# Patient Record
Sex: FEMALE | Race: WHITE | NOT HISPANIC OR LATINO | Employment: OTHER | URBAN - METROPOLITAN AREA
[De-identification: names, ages, dates, MRNs, and addresses within clinical notes are randomized per-mention and may not be internally consistent; named-entity substitution may affect disease eponyms.]

---

## 2017-01-02 ENCOUNTER — CARE COORDINATION (OUTPATIENT)
Dept: CARE COORDINATION | Facility: CLINIC | Age: 42
End: 2017-01-02

## 2017-01-02 ENCOUNTER — TELEPHONE (OUTPATIENT)
Dept: ORTHOPEDICS | Facility: CLINIC | Age: 42
End: 2017-01-02

## 2017-01-02 ENCOUNTER — TELEPHONE (OUTPATIENT)
Dept: PHARMACY | Facility: OTHER | Age: 42
End: 2017-01-02

## 2017-01-02 NOTE — TELEPHONE ENCOUNTER
Called patient about scheduling with Dr. Parkinson this week. Left number to call back an open times that we have.     Naye ALVARADO LPN

## 2017-01-02 NOTE — TELEPHONE ENCOUNTER
MTM referral from: Transitions of Care (recent hospital discharge or ED visit)    MTM referral outreach attempt #1 on January 2, 2017 at 11:06 AM      Outcome: Patient is not interested at this time because she is a [facility] patient and information was passed along to them, will route to MTM Pharmacist/Provider as an FYI. Thank you for the referral.     Renetta Calvillo, MTM Coordinator

## 2017-01-03 ENCOUNTER — TELEPHONE (OUTPATIENT)
Dept: TRANSPLANT | Facility: CLINIC | Age: 42
End: 2017-01-03

## 2017-01-04 ENCOUNTER — PRE VISIT (OUTPATIENT)
Dept: ORTHOPEDICS | Facility: CLINIC | Age: 42
End: 2017-01-04

## 2017-01-04 NOTE — TELEPHONE ENCOUNTER
1.  Date/reason for appt: 1/13/17 -- left foot diabetic ulcer, ED f/u  2.  Referring provider:   3.  Call to patient (Yes / No - short description): no, ED f/u  4.  Previous care at / records requested from: [facility] ED 12/27/16 -- records and imaging in epic/pacs

## 2017-01-09 ENCOUNTER — TELEPHONE (OUTPATIENT)
Dept: TRANSPLANT | Facility: CLINIC | Age: 42
End: 2017-01-09

## 2017-01-09 NOTE — TELEPHONE ENCOUNTER
"Pt c/o feeling chronically fatigued and can fall asleep at the \"drop of a hat\". She is afebrile and has no pain.   Last labs were done 12/30/16- he Tac level was 16.8 but he dose had been decreased that day from 0.4 mg bid to 0.2 mg bid.   Hgb was 9.7 but steady- will continue to monitor.  She was reminded to make sure to do weekly labs.  She will have labs drawn tomorrow and then we will discuss the results and what the next step will be.   "

## 2017-01-10 ENCOUNTER — TELEPHONE (OUTPATIENT)
Dept: TRANSPLANT | Facility: CLINIC | Age: 42
End: 2017-01-10

## 2017-01-10 ENCOUNTER — RESULTS ONLY (OUTPATIENT)
Dept: OTHER | Facility: CLINIC | Age: 42
End: 2017-01-10

## 2017-01-10 DIAGNOSIS — Z48.298 AFTERCARE FOLLOWING ORGAN TRANSPLANT: ICD-10-CM

## 2017-01-10 DIAGNOSIS — Z94.83 PANCREAS REPLACED BY TRANSPLANT (H): ICD-10-CM

## 2017-01-10 DIAGNOSIS — T86.899 COMPLICATION OF TRANSPLANTED PANCREAS: ICD-10-CM

## 2017-01-10 DIAGNOSIS — Z94.0 KIDNEY REPLACED BY TRANSPLANT: ICD-10-CM

## 2017-01-10 LAB
AMYLASE SERPL-CCNC: 54 U/L (ref 30–110)
ANION GAP SERPL CALCULATED.3IONS-SCNC: 8 MMOL/L (ref 3–14)
BASOPHILS # BLD AUTO: 0.1 10E9/L (ref 0–0.2)
BASOPHILS NFR BLD AUTO: 2.4 %
BUN SERPL-MCNC: 17 MG/DL (ref 7–30)
CALCIUM SERPL-MCNC: 8.9 MG/DL (ref 8.5–10.1)
CHLORIDE SERPL-SCNC: 108 MMOL/L (ref 94–109)
CO2 SERPL-SCNC: 24 MMOL/L (ref 20–32)
CREAT SERPL-MCNC: 1.48 MG/DL (ref 0.52–1.04)
CREAT UR-MCNC: 155 MG/DL
DIFFERENTIAL METHOD BLD: ABNORMAL
EOSINOPHIL # BLD AUTO: 0.3 10E9/L (ref 0–0.7)
EOSINOPHIL NFR BLD AUTO: 11.6 %
ERYTHROCYTE [DISTWIDTH] IN BLOOD BY AUTOMATED COUNT: 11.9 % (ref 10–15)
GFR SERPL CREATININE-BSD FRML MDRD: 38 ML/MIN/1.7M2
GLUCOSE SERPL-MCNC: 81 MG/DL (ref 70–99)
HCT VFR BLD AUTO: 33.9 % (ref 35–47)
HGB BLD-MCNC: 10.4 G/DL (ref 11.7–15.7)
LIPASE SERPL-CCNC: 111 U/L (ref 73–393)
LYMPHOCYTES # BLD AUTO: 0.5 10E9/L (ref 0.8–5.3)
LYMPHOCYTES NFR BLD AUTO: 20.8 %
MCH RBC QN AUTO: 29.1 PG (ref 26.5–33)
MCHC RBC AUTO-ENTMCNC: 30.7 G/DL (ref 31.5–36.5)
MCV RBC AUTO: 95 FL (ref 78–100)
MICROALBUMIN UR-MCNC: 14 MG/L
MICROALBUMIN/CREAT UR: 9.23 MG/G CR (ref 0–25)
MONOCYTES # BLD AUTO: 0.4 10E9/L (ref 0–1.3)
MONOCYTES NFR BLD AUTO: 17.2 %
NEUTROPHILS # BLD AUTO: 1.2 10E9/L (ref 1.6–8.3)
NEUTROPHILS NFR BLD AUTO: 48 %
PLATELET # BLD AUTO: 322 10E9/L (ref 150–450)
POTASSIUM SERPL-SCNC: 4.1 MMOL/L (ref 3.4–5.3)
RBC # BLD AUTO: 3.57 10E12/L (ref 3.8–5.2)
SODIUM SERPL-SCNC: 140 MMOL/L (ref 133–144)
TACROLIMUS BLD-MCNC: 13.5 UG/L (ref 5–15)
TME LAST DOSE: NORMAL H
WBC # BLD AUTO: 2.5 10E9/L (ref 4–11)

## 2017-01-10 PROCEDURE — 87799 DETECT AGENT NOS DNA QUANT: CPT | Performed by: FAMILY MEDICINE

## 2017-01-10 PROCEDURE — 82043 UR ALBUMIN QUANTITATIVE: CPT | Performed by: FAMILY MEDICINE

## 2017-01-10 PROCEDURE — 36415 COLL VENOUS BLD VENIPUNCTURE: CPT | Performed by: FAMILY MEDICINE

## 2017-01-10 PROCEDURE — 85025 COMPLETE CBC W/AUTO DIFF WBC: CPT | Performed by: FAMILY MEDICINE

## 2017-01-10 PROCEDURE — 80048 BASIC METABOLIC PNL TOTAL CA: CPT | Performed by: FAMILY MEDICINE

## 2017-01-10 PROCEDURE — 86832 HLA CLASS I HIGH DEFIN QUAL: CPT | Performed by: FAMILY MEDICINE

## 2017-01-10 PROCEDURE — 82150 ASSAY OF AMYLASE: CPT | Performed by: FAMILY MEDICINE

## 2017-01-10 PROCEDURE — 86833 HLA CLASS II HIGH DEFIN QUAL: CPT | Performed by: FAMILY MEDICINE

## 2017-01-10 PROCEDURE — 83690 ASSAY OF LIPASE: CPT | Performed by: FAMILY MEDICINE

## 2017-01-10 PROCEDURE — 80197 ASSAY OF TACROLIMUS: CPT | Performed by: FAMILY MEDICINE

## 2017-01-11 LAB
BKV DNA # SPEC NAA+PROBE: NORMAL COPIES/ML
BKV DNA SPEC NAA+PROBE-LOG#: NORMAL LOG COPIES/ML
PRA DONOR SPECIFIC ABY: NORMAL
PRA DONOR SPECIFIC ABY: NORMAL
SPECIMEN SOURCE: NORMAL

## 2017-01-12 ENCOUNTER — TELEPHONE (OUTPATIENT)
Dept: TRANSPLANT | Facility: CLINIC | Age: 42
End: 2017-01-12

## 2017-01-12 DIAGNOSIS — Z94.0 KIDNEY REPLACED BY TRANSPLANT: Primary | ICD-10-CM

## 2017-01-12 LAB
CMV DNA SPEC NAA+PROBE-ACNC: NORMAL [IU]/ML
CMV DNA SPEC NAA+PROBE-LOG#: NORMAL {LOG_IU}/ML
DONOR IDENTIFICATION: NORMAL
DSA COMMENTS: NORMAL
DSA PRESENT: NO
DSA TEST METHOD: NORMAL
ORGAN: NORMAL
SA1 CELL: NORMAL
SA1 COMMENTS: NORMAL
SA1 HI RISK ABY: NORMAL
SA1 MOD RISK ABY: NORMAL
SA1 TEST METHOD: NORMAL
SA2 CELL: NORMAL
SA2 COMMENTS: NORMAL
SA2 HI RISK ABY UA: NORMAL
SA2 MOD RISK ABY: NORMAL
SA2 TEST METHOD: NORMAL
SPECIMEN SOURCE: NORMAL

## 2017-01-12 NOTE — TELEPHONE ENCOUNTER
Tac level is 13.5- pt will hold tonight's dose and then decrease it to 0.1 mg bid. She remains on Itraconazole. Her Creatinine is elevated but it is anticipated that it will come down with decreasing her tac level.   She states she is starting to feel better after her foot infection has been taken care of. She will be stopping the Amoxicillin this weekend.

## 2017-01-13 ENCOUNTER — OFFICE VISIT (OUTPATIENT)
Dept: ORTHOPEDICS | Facility: CLINIC | Age: 42
End: 2017-01-13

## 2017-01-13 VITALS — WEIGHT: 115 LBS | HEIGHT: 61 IN | BODY MASS INDEX: 21.71 KG/M2

## 2017-01-13 DIAGNOSIS — E11.610 CHARCOT'S ARTHROPATHY, DIABETIC (H): ICD-10-CM

## 2017-01-13 DIAGNOSIS — E10.8 TYPE 1 DIABETES MELLITUS WITH COMPLICATION (H): ICD-10-CM

## 2017-01-13 DIAGNOSIS — E10.621 DIABETIC ULCER OF LEFT FOOT ASSOCIATED WITH TYPE 1 DIABETES MELLITUS (H): Primary | ICD-10-CM

## 2017-01-13 DIAGNOSIS — L97.529 DIABETIC ULCER OF LEFT FOOT ASSOCIATED WITH TYPE 1 DIABETES MELLITUS (H): Primary | ICD-10-CM

## 2017-01-13 DIAGNOSIS — G63 POLYNEUROPATHY ASSOCIATED WITH UNDERLYING DISEASE (H): ICD-10-CM

## 2017-01-13 LAB
EBV DNA # SPEC NAA+PROBE: 4700 {COPIES}/ML
EBV DNA SPEC NAA+PROBE-LOG#: 3.7 {LOG_COPIES}/ML

## 2017-01-13 NOTE — NURSING NOTE
"Reason For Visit:   Chief Complaint   Patient presents with     Consult     Left foot diabetic ulcer       Primary MD: Hafsa Orozco      Ht 1.537 m (5' 0.5\")  Wt 52.164 kg (115 lb)  BMI 22.08 kg/m2    Pain Assessment  Patient Currently in Pain: Unable to assess (States she doesnt feel her feet )        Current Outpatient Prescriptions   Medication     tacrolimus (PROGRAF - GENERIC EQUIVALENT) 1 mg/mL suspension     oxyCODONE (ROXICODONE) 5 MG IR tablet     amoxicillin-clavulanate (AUGMENTIN) 500-125 MG per tablet     order for DME     magnesium oxide (MAG-OX) 400 (241.3 MG) MG tablet     vitamin D (ERGOCALCIFEROL) 62959 UNIT capsule     pravastatin (PRAVACHOL) 20 MG tablet     mycophenolic acid (MYFORTIC - GENERIC EQUIVALENT) 180 MG EC tablet     sulfamethoxazole-trimethoprim (BACTRIM,SEPTRA) 400-80 MG per tablet     aspirin  MG EC tablet     itraconazole (SPORANOX) 100 MG capsule     citalopram (CELEXA) 10 MG tablet     Levothyroxine Sodium (SYNTHROID PO)     cetirizine (ZYRTEC ALLERGY) 10 MG tablet     No current facility-administered medications for this visit.     Facility-Administered Medications Ordered in Other Visits   Medication     0.9% sodium chloride infusion          Allergies   Allergen Reactions     Ciprofloxacin Nausea     Pollen Extract Other (See Comments)     Seasonal allergies     Pyridostigmine Other (See Comments)     Spastic muscle motion in the face and legs, weakness in the arms. Slight swelling of the tongue.       "

## 2017-01-13 NOTE — PROGRESS NOTES
Date of Service: 1/13/2017    Chief Complaint:   Chief Complaint   Patient presents with     Consult     Left foot diabetic ulcer        HPI: Rose is a 41 year old female who presents today for further evaluation of left foot wound. After her second kidney transplant in August, she had a lot of swelling from fluid overload in her lower extremities. She came back from [place] vacation on 12/27 and was working all day, then at 7 pm, she started vomiting. Went to ER and was admitted with fever and cellulitis d/t diabetic foot wound. She was d/c'ed on oral Augmentin and now today she takes her last dose. Most often she wears a custom diabetic tennis shoes with inserts. Today she is wearing a tall CAM walker on the left leg. She had had an AFO molded for her but she could not tolerate it. She had a brace made for her about a week or two prior to being admitted to the hospital from Dr. Bond. She hasn't picked it up yet. Today she is feeling much better, denies N/V, fever, chills, sweats, fatigue, changes in blood sugars. To her the left foot appears back to normal besides the wound on the bottom of her foot. She has been dressing the wound with non-adherent dressing and a DSD over it, changed every day. She is s/p kidney and pancreatic transplantation.    PMH:   Past Medical History   Diagnosis Date     Osteoporosis      Dyslipidemia      Diabetic retinopathy      Secondary renal hyperparathyroidism (H)      Diabetes mellitus type 1 (H) 1988     12yr old     History of acute pyelonephritis      2003     ESRD on hemodialysis (H)      Feb 2006 to 2007     Hypothyroidism      on and off synthroid     Charcot foot due to diabetes mellitus (H) Dec 2006     left     History of blood transfusion      Kidney replaced by transplant 2007     Immunosuppressed status (H)      Neurogenic orthostatic hypotension (H)      Anemia in chronic renal disease      Pulmonary histoplasmosis (H)        PSxH:   Past Surgical History  "  Procedure Laterality Date     Transplant kidney recipient living related  2007     sister     C anesth,open heart surgery+pump  2006     for thrombectomy only - catheter related      section            Int nephrostomy percut right*       Native nephrectomy       simult with tx surgery.  ? Right     Transplant pancreas, kidney  donor, combined N/A 2016     Procedure: COMBINED TRANSPLANT PANCREAS, KIDNEY  DONOR;  Surgeon: Gilson Doe MD;  [facility]: [facility]     Bench kidney N/A 2016     Procedure: BENCH KIDNEY;  Surgeon: Gilson Doe MD;  Location: [facility]     Bench pancreas N/A 2016     Procedure: BENCH PANCREAS;  Surgeon: Gilson Doe MD;  Location: [facility]     Cystoscopy, remove stent(s), combined Left 2016     Procedure: COMBINED CYSTOSCOPY, REMOVE STENT(S);  Surgeon: Gilson Doe MD;  Location: [facility]       Allergies: Ciprofloxacin; Pollen extract; and Pyridostigmine    SH:   Social History     Social History     Marital Status:      Spouse Name: N/A     Number of Children: N/A     Years of Education: N/A     Occupational History     Not on file.     Social History Main Topics     Smoking status: Never Smoker      Smokeless tobacco: Not on file     Alcohol Use: Yes      Comment: rare     Drug Use: No     Sexual Activity: Not on file     Other Topics Concern     Blood Transfusions Yes     ,  during sepsis     Social History Narrative       FH:   Family History   Problem Relation Age of Onset     DIABETES Mother 56     type 2     DIABETES Maternal Grandmother 60     type 2     Asthma Mother      Asthma Son      Hypertension Mother      KIDNEY DISEASE No family hx of        Objective:   5' .5\" 115 lbs 0 oz    PT and DP pulses are 2/4 bilaterally. CRT is >3s. Positive pedal hair.   Gross sensation is decreased bilaterally.   Equinus is noted bilaterally. No pain with active or passive ROM of the ankle, MTJ, 1st ray, or halluces " bilaterally,. Charcot foot changes to left foot. There is a plantar prominence noted at the left lateral midfoot that puts her in a rigid forefoot varus.    Right foot structurally normal.  Nails normal bilaterally.    A diabetic foot wound is noted at left  plantar foot measuring approximately 4cm x 3cm x 0.1cm s/p debridement.     Block Classification: II    Wound base: Red/Granulation that is dry    Edges: hyperkeratotic with a flaccid bullae    Drainage: scant/serous    Odor: none    Undermining: yes    Bone Exposure: No    Clinical Signs of Infection: No    After obtaining patient consent, all devitalized tissue was sharply removed from the wound base to the level of healthy granular tissue, using sharp dissection techniques.     Assessment:   - Diabetic left foot wound  - DM type 1 s/p pancreatic transplant 8/2016  - Charcot arthropathy Left [id number]    Plan:  - Pt seen and evaluated. We discussed the short, intermediate, and long term goals. For short term, we want to get the wound healed. Intermediate term, transition back into the brace. Long term is to keep the wound from reopening. May not need a future surgical procedure, however this may be a possibility to create a more plantargrade foot. Her chances for another Charcot event are reduced with having the pancreas transplant.   - Wound debrided as described above.  - Continue to stay off of foot as much as possible.  - Continue to wear diabetic CAM walker. Bring your brace to next visit if the lab at [facility] agrees that it fits properly.  - Wound care instructions: clean wound with microklenz and pat dry. Cover with 4x4 gauze and a roll gauze wrap. Change dressing daily.  - RTC 1 week

## 2017-01-13 NOTE — Clinical Note
1/13/2017       RE: Rose Castaneda  [address] [address]     Dear Colleague,    Thank you for referring your patient, Rose Castaneda, to the [facility] at [facility]. Please see a copy of my visit note below.    Date of Service: 1/13/2017    Chief Complaint:   Chief Complaint   Patient presents with     Consult     Left foot diabetic ulcer        HPI: Rose is a 41 year old female who presents today for further evaluation of left foot wound. After her second kidney transplant in August, she had a lot of swelling from fluid overload in her lower extremities. [place] [facility] [place] [place] and was working all day, then at 7 pm, she started vomiting. Went to ER and was admitted with fever and cellulitis d/t diabetic foot wound. She was d/c'ed on oral Augmentin and now today she takes her last dose. Most often she wears a custom diabetic tennis shoes with inserts. Today she is wearing a tall CAM walker on the left leg. She had had an AFO molded for her but she could not tolerate it. She had a brace made for her about a week or two prior to being admitted to the hospital from Dr. Bond. She hasn't picked it up yet. Today she is feeling much better, denies N/V, fever, chills, sweats, fatigue, changes in blood sugars. To her the left foot appears back to normal besides the wound on the bottom of her foot. She has been dressing the wound with non-adherent dressing and a DSD over it, changed every day. She is s/p kidney and pancreatic transplantation.    PMH:   Past Medical History   Diagnosis Date     Osteoporosis      Dyslipidemia      Diabetic retinopathy      Secondary renal hyperparathyroidism (H)      Diabetes mellitus type 1 (H) 1988     12yr old     History of acute pyelonephritis      2003     ESRD on hemodialysis (H)      Feb 2006 to 2007     Hypothyroidism      on and off synthroid     Charcot foot due to  diabetes mellitus (H) Dec 2006     left     History of blood transfusion      Kidney replaced by transplant      Immunosuppressed status (H)      Neurogenic orthostatic hypotension (H)      Anemia in chronic renal disease      Pulmonary histoplasmosis (H)        PSxH:   Past Surgical History   Procedure Laterality Date     Transplant kidney recipient living related  2007     sister     C anesth,open heart surgery+pump       for thrombectomy only - catheter related      section            Int nephrostomy percut right*       Native nephrectomy       simult with tx surgery.  ? Right     Transplant pancreas, kidney  donor, combined N/A 2016     Procedure: COMBINED TRANSPLANT PANCREAS, KIDNEY  DONOR;  Surgeon: Gilson Doe MD;  [facility]     Bench kidney N/A 2016     Procedure: BENCH KIDNEY;  Surgeon: Gilson Doe MD;  [facility]     Bench pancreas N/A 2016     Procedure: BENCH PANCREAS;  Surgeon: Gilson Doe MD;  [facility]     Cystoscopy, remove stent(s), combined Left 2016     Procedure: COMBINED CYSTOSCOPY, REMOVE STENT(S);  Surgeon: Gilson Doe MD;  Location: [facility] OR       Allergies: Ciprofloxacin; Pollen extract; and Pyridostigmine    SH:   Social History     Social History     Marital Status:      Spouse Name: N/A     Number of Children: N/A     Years of Education: N/A     Occupational History     Not on file.     Social History Main Topics     Smoking status: Never Smoker      Smokeless tobacco: Not on file     Alcohol Use: Yes      Comment: rare     Drug Use: No     Sexual Activity: Not on file     Other Topics Concern     Blood Transfusions Yes     ,  during sepsis     Social History Narrative       FH:   Family History   Problem Relation Age of Onset     DIABETES Mother 56     type 2     DIABETES Maternal Grandmother 60     type 2     Asthma Mother      Asthma Son      Hypertension Mother   "    KIDNEY DISEASE No family hx of        Objective:   5' .5\" 115 lbs 0 oz    PT and DP pulses are 2/4 bilaterally. CRT is >3s. Positive pedal hair.   Gross sensation is decreased bilaterally.   Equinus is noted bilaterally. No pain with active or passive ROM of the ankle, MTJ, 1st ray, or halluces bilaterally,. Charcot foot changes to left foot. There is a plantar prominence noted at the left lateral midfoot that puts her in a rigid forefoot varus.    Right foot structurally normal.  Nails normal bilaterally.    A diabetic foot wound is noted at left  plantar foot measuring approximately 4cm x 3cm x 0.1cm s/p debridement.     Block Classification: II    Wound base: Red/Granulation that is dry    Edges: hyperkeratotic with a flaccid bullae    Drainage: scant/serous    Odor: none    Undermining: yes    Bone Exposure: No    Clinical Signs of Infection: No    After obtaining patient consent, all devitalized tissue was sharply removed from the wound base to the level of healthy granular tissue, using sharp dissection techniques.     Assessment:   - Diabetic left foot wound  - DM type 1 s/p pancreatic transplant 8/2016  - Charcot arthropathy Left [id number]    Plan:  - Pt seen and evaluated. We discussed the short, intermediate, and long term goals. For short term, we want to get the wound healed. Intermediate term, transition back into the brace. Long term is to keep the wound from reopening. May not need a future surgical procedure, however this may be a possibility to create a more plantargrade foot. Her chances for another Charcot event are reduced with having the pancreas transplant.   - Wound debrided as described above.  - Continue to stay off of foot as much as possible.  - Continue to wear diabetic CAM walker. Bring your brace to next visit if the lab at [facility] agrees that it fits properly.  - Wound care instructions: clean wound with microklenz and pat dry. Cover with 4x4 gauze and a roll gauze wrap. Change " dressing daily.  - RTC 1 week                 Again, thank you for allowing me to participate in the care of your patient.      Sincerely,    Darius Parkinson DPM

## 2017-01-16 ENCOUNTER — TELEPHONE (OUTPATIENT)
Dept: TRANSPLANT | Facility: CLINIC | Age: 42
End: 2017-01-16

## 2017-01-16 NOTE — PROGRESS NOTES
Quick Note:    Sent to physician for review  EBV 4700. Tac level was 13- dose has been reduced. Pt is scheduled to have EBV monitored monthly  ______

## 2017-01-16 NOTE — TELEPHONE ENCOUNTER
EBV is 4700- Tac level is 13, dose has been decreased.   Dr Marquez notified  Will continue to monitor EBV monthly.

## 2017-01-17 DIAGNOSIS — Z94.83 PANCREAS TRANSPLANTED (H): ICD-10-CM

## 2017-01-17 DIAGNOSIS — Z94.0 KIDNEY REPLACED BY TRANSPLANT: Primary | ICD-10-CM

## 2017-01-17 RX ORDER — SULFAMETHOXAZOLE AND TRIMETHOPRIM 400; 80 MG/1; MG/1
1 TABLET ORAL
Qty: 40 TABLET | Refills: 3 | Status: SHIPPED | OUTPATIENT
Start: 2017-01-18 | End: 2018-01-23

## 2017-01-23 DIAGNOSIS — Z94.0 KIDNEY REPLACED BY TRANSPLANT: ICD-10-CM

## 2017-01-23 DIAGNOSIS — Z48.298 AFTERCARE FOLLOWING ORGAN TRANSPLANT: ICD-10-CM

## 2017-01-23 DIAGNOSIS — T86.899 COMPLICATION OF TRANSPLANTED PANCREAS: ICD-10-CM

## 2017-01-23 DIAGNOSIS — Z94.83 PANCREAS REPLACED BY TRANSPLANT (H): ICD-10-CM

## 2017-01-23 LAB
ALBUMIN SERPL-MCNC: 3.6 G/DL (ref 3.4–5)
ALP SERPL-CCNC: 110 U/L (ref 40–150)
ALT SERPL W P-5'-P-CCNC: 21 U/L (ref 0–50)
AMYLASE SERPL-CCNC: 74 U/L (ref 30–110)
ANION GAP SERPL CALCULATED.3IONS-SCNC: 10 MMOL/L (ref 3–14)
AST SERPL W P-5'-P-CCNC: 22 U/L (ref 0–45)
BASOPHILS # BLD AUTO: 0.1 10E9/L (ref 0–0.2)
BASOPHILS NFR BLD AUTO: 1.1 %
BILIRUB DIRECT SERPL-MCNC: 0.2 MG/DL (ref 0–0.2)
BILIRUB SERPL-MCNC: 0.5 MG/DL (ref 0.2–1.3)
BUN SERPL-MCNC: 21 MG/DL (ref 7–30)
CALCIUM SERPL-MCNC: 9.3 MG/DL (ref 8.5–10.1)
CHLORIDE SERPL-SCNC: 111 MMOL/L (ref 94–109)
CO2 SERPL-SCNC: 22 MMOL/L (ref 20–32)
CREAT SERPL-MCNC: 1.23 MG/DL (ref 0.52–1.04)
DIFFERENTIAL METHOD BLD: ABNORMAL
EOSINOPHIL # BLD AUTO: 0.4 10E9/L (ref 0–0.7)
EOSINOPHIL NFR BLD AUTO: 7.7 %
ERYTHROCYTE [DISTWIDTH] IN BLOOD BY AUTOMATED COUNT: 12 % (ref 10–15)
GFR SERPL CREATININE-BSD FRML MDRD: 48 ML/MIN/1.7M2
GLUCOSE SERPL-MCNC: 98 MG/DL (ref 70–99)
HCT VFR BLD AUTO: 35.4 % (ref 35–47)
HGB BLD-MCNC: 11.1 G/DL (ref 11.7–15.7)
LIPASE SERPL-CCNC: 197 U/L (ref 73–393)
LYMPHOCYTES # BLD AUTO: 0.5 10E9/L (ref 0.8–5.3)
LYMPHOCYTES NFR BLD AUTO: 11.6 %
MCH RBC QN AUTO: 29.4 PG (ref 26.5–33)
MCHC RBC AUTO-ENTMCNC: 31.4 G/DL (ref 31.5–36.5)
MCV RBC AUTO: 94 FL (ref 78–100)
MONOCYTES # BLD AUTO: 0.5 10E9/L (ref 0–1.3)
MONOCYTES NFR BLD AUTO: 10.3 %
NEUTROPHILS # BLD AUTO: 3.2 10E9/L (ref 1.6–8.3)
NEUTROPHILS NFR BLD AUTO: 69.3 %
PLATELET # BLD AUTO: 245 10E9/L (ref 150–450)
POTASSIUM SERPL-SCNC: 4.6 MMOL/L (ref 3.4–5.3)
PROT SERPL-MCNC: 6.4 G/DL (ref 6.8–8.8)
RBC # BLD AUTO: 3.78 10E12/L (ref 3.8–5.2)
SODIUM SERPL-SCNC: 143 MMOL/L (ref 133–144)
WBC # BLD AUTO: 4.6 10E9/L (ref 4–11)

## 2017-01-23 PROCEDURE — 82150 ASSAY OF AMYLASE: CPT | Performed by: FAMILY MEDICINE

## 2017-01-23 PROCEDURE — 80048 BASIC METABOLIC PNL TOTAL CA: CPT | Performed by: FAMILY MEDICINE

## 2017-01-23 PROCEDURE — 36415 COLL VENOUS BLD VENIPUNCTURE: CPT | Performed by: FAMILY MEDICINE

## 2017-01-23 PROCEDURE — 83690 ASSAY OF LIPASE: CPT | Performed by: FAMILY MEDICINE

## 2017-01-23 PROCEDURE — 80076 HEPATIC FUNCTION PANEL: CPT | Performed by: FAMILY MEDICINE

## 2017-01-23 PROCEDURE — 85025 COMPLETE CBC W/AUTO DIFF WBC: CPT | Performed by: FAMILY MEDICINE

## 2017-01-23 PROCEDURE — 80197 ASSAY OF TACROLIMUS: CPT | Performed by: FAMILY MEDICINE

## 2017-01-24 ENCOUNTER — TELEPHONE (OUTPATIENT)
Dept: NEPHROLOGY | Facility: CLINIC | Age: 42
End: 2017-01-24

## 2017-01-24 DIAGNOSIS — Z94.0 KIDNEY REPLACED BY TRANSPLANT: Primary | ICD-10-CM

## 2017-01-24 LAB
TACROLIMUS BLD-MCNC: 5.2 UG/L (ref 5–15)
TME LAST DOSE: NORMAL H

## 2017-01-24 NOTE — TELEPHONE ENCOUNTER
Left detailed message for patient to increase prograf and asked she call back to discuss foot etc.

## 2017-01-24 NOTE — TELEPHONE ENCOUNTER
German, this is Jimmy's office from Medicine [facility] on the 3rd floor at [facility] located at [address] [place] [address]. We are reminding you of your upcoming  appointment on 2/7/2017 at 12:00 pm. Please arrive 30 minutes prior to your appointment time for check in. Also, please bring an updated medication list including dose of prescribed and over the counter medications you are currently taking or your labeled medication bottles with you to your appointment. If you have any questions or would like to cancel or reschedule your appointment, please call us at [phone].     If you are having labs draw same day you need a lab appointment scheduled 1 hour prior to your visit.    You are welcome to have your labs done 2-7 days before your appointment at any [facility] or [facility] facility. If you have your labs completed before your appointment, please still come 30 minutes early for check-in.     Thank-You for allowing us to be a member of your Health Care Team,     Genie FLORES CMA

## 2017-01-24 NOTE — TELEPHONE ENCOUNTER
Tac level is 5.2- increase dose from 0.1 mg bid to 0.2 mg in am and 0.1 mg in pm.  Also, please see if she is feeling less fatigued. How is her foot doing? Does the wound appear well healed still?

## 2017-01-30 NOTE — TELEPHONE ENCOUNTER
Patient did receive dose change and has been taking prograf 0.2/0.1 and states feeling like she has more energy, wound healing well and has upcoming appointment with prosthetic provider to get fit again.

## 2017-02-07 ENCOUNTER — OFFICE VISIT (OUTPATIENT)
Dept: ORTHOPEDICS | Facility: CLINIC | Age: 42
End: 2017-02-07

## 2017-02-07 ENCOUNTER — OFFICE VISIT (OUTPATIENT)
Dept: NEPHROLOGY | Facility: CLINIC | Age: 42
End: 2017-02-07
Attending: INTERNAL MEDICINE
Payer: COMMERCIAL

## 2017-02-07 ENCOUNTER — RESULTS ONLY (OUTPATIENT)
Dept: OTHER | Facility: CLINIC | Age: 42
End: 2017-02-07

## 2017-02-07 VITALS
HEART RATE: 53 BPM | TEMPERATURE: 98.2 F | WEIGHT: 110.9 LBS | BODY MASS INDEX: 20.94 KG/M2 | HEIGHT: 61 IN | OXYGEN SATURATION: 98 % | DIASTOLIC BLOOD PRESSURE: 66 MMHG | SYSTOLIC BLOOD PRESSURE: 101 MMHG

## 2017-02-07 DIAGNOSIS — N18.9 ANEMIA IN CHRONIC RENAL DISEASE: ICD-10-CM

## 2017-02-07 DIAGNOSIS — D63.1 ANEMIA IN CHRONIC RENAL DISEASE: ICD-10-CM

## 2017-02-07 DIAGNOSIS — L97.529 DIABETIC ULCER OF LEFT FOOT ASSOCIATED WITH TYPE 1 DIABETES MELLITUS (H): Primary | ICD-10-CM

## 2017-02-07 DIAGNOSIS — Z79.899 OTHER LONG TERM (CURRENT) DRUG THERAPY: ICD-10-CM

## 2017-02-07 DIAGNOSIS — E10.8 TYPE 1 DIABETES MELLITUS WITH COMPLICATION (H): ICD-10-CM

## 2017-02-07 DIAGNOSIS — Z94.0 KIDNEY REPLACED BY TRANSPLANT: ICD-10-CM

## 2017-02-07 DIAGNOSIS — E83.42 HYPOMAGNESEMIA: ICD-10-CM

## 2017-02-07 DIAGNOSIS — E55.9 VITAMIN D DEFICIENCY: ICD-10-CM

## 2017-02-07 DIAGNOSIS — Z94.83 PANCREAS REPLACED BY TRANSPLANT (H): ICD-10-CM

## 2017-02-07 DIAGNOSIS — G90.3 NEUROGENIC ORTHOSTATIC HYPOTENSION (H): ICD-10-CM

## 2017-02-07 DIAGNOSIS — T86.899 COMPLICATION OF TRANSPLANTED PANCREAS: ICD-10-CM

## 2017-02-07 DIAGNOSIS — Z48.298 AFTERCARE FOLLOWING ORGAN TRANSPLANT: ICD-10-CM

## 2017-02-07 DIAGNOSIS — D84.9 IMMUNOSUPPRESSED STATUS (H): ICD-10-CM

## 2017-02-07 DIAGNOSIS — E10.621 DIABETIC ULCER OF LEFT FOOT ASSOCIATED WITH TYPE 1 DIABETES MELLITUS (H): Primary | ICD-10-CM

## 2017-02-07 DIAGNOSIS — Z94.0 KIDNEY REPLACED BY TRANSPLANT: Primary | ICD-10-CM

## 2017-02-07 LAB
AMYLASE SERPL-CCNC: 84 U/L (ref 30–110)
ANION GAP SERPL CALCULATED.3IONS-SCNC: 8 MMOL/L (ref 3–14)
BASOPHILS # BLD AUTO: 0 10E9/L (ref 0–0.2)
BASOPHILS NFR BLD AUTO: 0.5 %
BUN SERPL-MCNC: 20 MG/DL (ref 7–30)
CALCIUM SERPL-MCNC: 8.7 MG/DL (ref 8.5–10.1)
CHLORIDE SERPL-SCNC: 107 MMOL/L (ref 94–109)
CHOLEST SERPL-MCNC: 131 MG/DL
CO2 SERPL-SCNC: 24 MMOL/L (ref 20–32)
CREAT SERPL-MCNC: 1.25 MG/DL (ref 0.52–1.04)
DIFFERENTIAL METHOD BLD: ABNORMAL
EOSINOPHIL # BLD AUTO: 0.1 10E9/L (ref 0–0.7)
EOSINOPHIL NFR BLD AUTO: 4.7 %
ERYTHROCYTE [DISTWIDTH] IN BLOOD BY AUTOMATED COUNT: 12.6 % (ref 10–15)
GFR SERPL CREATININE-BSD FRML MDRD: 47 ML/MIN/1.7M2
GLUCOSE SERPL-MCNC: 68 MG/DL (ref 70–99)
HBA1C MFR BLD: 5.1 % (ref 4.3–6)
HCT VFR BLD AUTO: 32.7 % (ref 35–47)
HDLC SERPL-MCNC: 72 MG/DL
HGB BLD-MCNC: 10.5 G/DL (ref 11.7–15.7)
LDLC SERPL CALC-MCNC: 48 MG/DL
LIPASE SERPL-CCNC: 122 U/L (ref 73–393)
LYMPHOCYTES # BLD AUTO: 0.3 10E9/L (ref 0.8–5.3)
LYMPHOCYTES NFR BLD AUTO: 11.8 %
MCH RBC QN AUTO: 29.9 PG (ref 26.5–33)
MCHC RBC AUTO-ENTMCNC: 32.1 G/DL (ref 31.5–36.5)
MCV RBC AUTO: 93 FL (ref 78–100)
MONOCYTES # BLD AUTO: 0.3 10E9/L (ref 0–1.3)
MONOCYTES NFR BLD AUTO: 15.1 %
NEUTROPHILS # BLD AUTO: 1.4 10E9/L (ref 1.6–8.3)
NEUTROPHILS NFR BLD AUTO: 67.9 %
NONHDLC SERPL-MCNC: 59 MG/DL
PLATELET # BLD AUTO: 197 10E9/L (ref 150–450)
POTASSIUM SERPL-SCNC: 3.8 MMOL/L (ref 3.4–5.3)
PROT UR-MCNC: 0.5 G/L
PROT/CREAT 24H UR: 0.15 G/G CR (ref 0–0.2)
RBC # BLD AUTO: 3.51 10E12/L (ref 3.8–5.2)
SODIUM SERPL-SCNC: 139 MMOL/L (ref 133–144)
TRIGL SERPL-MCNC: 56 MG/DL
WBC # BLD AUTO: 2.1 10E9/L (ref 4–11)

## 2017-02-07 PROCEDURE — 86832 HLA CLASS I HIGH DEFIN QUAL: CPT | Performed by: FAMILY MEDICINE

## 2017-02-07 PROCEDURE — 99213 OFFICE O/P EST LOW 20 MIN: CPT | Mod: 27,ZF

## 2017-02-07 PROCEDURE — 80197 ASSAY OF TACROLIMUS: CPT | Performed by: FAMILY MEDICINE

## 2017-02-07 PROCEDURE — 80061 LIPID PANEL: CPT | Performed by: FAMILY MEDICINE

## 2017-02-07 PROCEDURE — 87799 DETECT AGENT NOS DNA QUANT: CPT | Performed by: FAMILY MEDICINE

## 2017-02-07 PROCEDURE — 83036 HEMOGLOBIN GLYCOSYLATED A1C: CPT | Performed by: FAMILY MEDICINE

## 2017-02-07 PROCEDURE — 36415 COLL VENOUS BLD VENIPUNCTURE: CPT | Performed by: FAMILY MEDICINE

## 2017-02-07 PROCEDURE — 80048 BASIC METABOLIC PNL TOTAL CA: CPT | Performed by: FAMILY MEDICINE

## 2017-02-07 PROCEDURE — 86833 HLA CLASS II HIGH DEFIN QUAL: CPT | Performed by: FAMILY MEDICINE

## 2017-02-07 PROCEDURE — 85025 COMPLETE CBC W/AUTO DIFF WBC: CPT | Performed by: FAMILY MEDICINE

## 2017-02-07 PROCEDURE — 83690 ASSAY OF LIPASE: CPT | Performed by: FAMILY MEDICINE

## 2017-02-07 PROCEDURE — 84156 ASSAY OF PROTEIN URINE: CPT | Performed by: FAMILY MEDICINE

## 2017-02-07 PROCEDURE — 82150 ASSAY OF AMYLASE: CPT | Performed by: FAMILY MEDICINE

## 2017-02-07 ASSESSMENT — PAIN SCALES - GENERAL: PAINLEVEL: NO PAIN (0)

## 2017-02-07 NOTE — MR AVS SNAPSHOT
After Visit Summary   2/7/2017    Rose Castaneda    MRN: [id number]           Patient Information     Date Of Birth          1975        Visit Information        Provider Department      2/7/2017 11:20 AM Darius Parkinson DPM [facility]        Today's Diagnoses     Diabetic ulcer of left foot associated with type 1 diabetes mellitus (H)    -  1     Pancreas replaced by transplant (H)         Type 1 diabetes mellitus with complication (H)            Follow-ups after your visit        Your next 10 appointments already scheduled     Feb 07, 2017 12:00 PM   (Arrive by 11:30 AM)   Return Kidney Transplant with Mauricio Marquez MD   [facility] ([facility])    [address] [place] [address]  3rd [facility] [address] [phone]            Feb 17, 2017  9:20 AM   (Arrive by 9:05 AM)   RETURN FOOT/ANKLE with Darius Parkinson DPM   [facility] ([facility])    [address] [place] [address]  4th [facility] [address] [phone]              Who to contact     Please call your clinic at [phone] to:    Ask questions about your health    Make or cancel appointments    Discuss your medicines    Learn about your test results    Speak to your doctor   If you have compliments or concerns about an experience at your clinic, or if you wish to file a complaint, please contact [facility] Patient Relations at [phone] or email us at [email or web address]         Additional Information About Your Visit        MyChart Information     LINYWORKS is an electronic gateway that provides easy, online access to your medical records. With LINYWORKS, you can request a clinic appointment, read your test results, renew a prescription or communicate with your care team.     To sign up for AchieveM[employer] visit the website at [email or web address]   You will be asked to  enter the access code listed below, as well as some personal information. Please follow the directions to create your username and password.     Your access code is: [email or web address]  Expires: 3/30/2017  2:52 PM     Your access code will  in 90 days. If you need help or a new code, please contact your [facility] or call [phone] for assistance.        Care EveryWhere ID     This is your Care EveryWhere ID. This could be used by other organizations to access your [facility] medical records  [id number]         Blood Pressure from Last 3 Encounters:   16 107/71   16 102/67   16 127/84    Weight from Last 3 Encounters:   17 52.164 kg (115 lb)   16 52.663 kg (116 lb 1.6 oz)   16 53.797 kg (118 lb 9.6 oz)              Today, you had the following     No orders found for display         Today's Medication Changes          These changes are accurate as of: 17 11:44 AM.  If you have any questions, ask your nurse or doctor.               These medicines have changed or have updated prescriptions.        Dose/Directions    vitamin D 16994 UNIT capsule   Commonly known as:  ERGOCALCIFEROL   This may have changed:  when to take this   Used for:  Kidney replaced by transplant, Pancreas replaced by transplant (H), Low calcium levels        Dose:  22221 Units   Take 1 capsule (50,000 Units) by mouth once a week   Quantity:  13 capsule   Refills:  3                Primary Care Provider Office Phone # Fax #    Hafsa Orozco [phone]       [facility] [address] [place] [address] DR [place] CALL [place] [phone]        Thank you!     Thank you for choosing [facility]  for your care. Our goal is always to provide you with excellent care. Hearing back from our patients is one way we can continue to improve our services. Please take a few minutes to complete the written survey that you may receive in the mail after your visit with us. Thank  you!             Your Updated Medication List - Protect others around you: Learn how to safely use, store and throw away your medicines at www.disposemymeds.org.          This list is accurate as of: 2/7/17 11:44 AM.  Always use your most recent med list.                   Brand Name Dispense Instructions for use    aspirin 325 MG EC tablet     60 tablet    Take 1 tablet (325 mg) by mouth daily       citalopram 10 MG tablet    celeXA     Take 20 mg by mouth daily       itraconazole 100 MG capsule    SPORANOX     Take by mouth 2 times daily       magnesium oxide 400 (241.3 MG) MG tablet    MAG-OX    30 tablet    Take 1 tablet (400 mg) by mouth daily       mycophenolic acid 180 MG EC tablet    MYFORTIC - GENERIC EQUIVALENT    540 tablet    Take 3 tablets (540 mg) by mouth 2 times daily       order for DME     1 each    Equipment being ordered: Lynnette () Treatment Diagnosis: impaired gait       oxyCODONE 5 MG IR tablet    ROXICODONE    30 tablet    Take 1-2 tablets (5-10 mg) by mouth every 4 hours as needed for moderate to severe pain       pravastatin 20 MG tablet    PRAVACHOL    90 tablet    Take 1 tablet (20 mg) by mouth daily       sulfamethoxazole-trimethoprim 400-80 MG per tablet    BACTRIM/SEPTRA    40 tablet    Take 1 tablet by mouth Every Mon, Wed, Fri Morning       SYNTHROID PO      Take 50 mcg by mouth daily       tacrolimus 1 mg/mL suspension    PROGRAF - GENERIC EQUIVALENT    9 mL    0.2 mg every morning and 0.1 mg every evening.       vitamin D 52856 UNIT capsule    ERGOCALCIFEROL    13 capsule    Take 1 capsule (50,000 Units) by mouth once a week       ZYRTEC ALLERGY 10 MG tablet   Generic drug:  cetirizine      Take 1 tablet by mouth daily.

## 2017-02-07 NOTE — Clinical Note
2/7/2017      RE: Rose Castaneda  [address] [facility] [address]       Assessment and Plan:  1. DDKT (SPK) - baseline Cr ~ 0.8-1.0, which is higher lately.  Patient had mild GWENDOLYN during recent hospitalization up to ~ 1.5, but was trending down with creatinine ~ 1.2 most recently after finishing antibiotics.  Labs pending for today.  Encouraged patient to push fluids.  If creatinine remains elevated, would consider a kidney transplant biopsy.  No proteinuria.  No DSA.  Will make no changes in immunosuppression.  2. Pancreas Tx (SPK) - euglycemic off insulin with normal HbA1c and stable pancreatic enzymes.  No changes in immunosuppression.  3. Neurogenic orthostatic hypotension - okay BP off Florinef.  Recommend patient push fluids, especially in the morning.  4. Anemia in chronic renal disease - improved Hgb, near normal.  Iron replete.  Will follow.  5. Secondary renal hyperparathyroidism - minimal increase in PTH and no need to follow further.  Will continue to treat vitamin D deficiency.  6. Vitamin D deficiency - low vitamin D level and will continue ergocalciferol.  After finished with ergocalciferol course, patient can start cholecalciferol 2000 iu daily.  Will recheck vitamin D level at next clinic visit.  7. Hypomagnesemia - normal serum magnesium and will continue oral magnesium supplement.  8. Diarrhea - resolved.  9. 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  Will recheck EBV PCR with today's labs.  10. H/o pulmonary histoplasmosis - asymptomatic and will continue on itraconazole.   11. Left foot cellulitis - resolved and now off antibiotics.  12. Recommend return visit in 3 months.    Assessment and plan was discussed with patient and she voiced her understanding and agreement.    Reason for Visit:  Ms. Castaneda is here for routine follow up.    HPI:   Rose Castaneda is a 41 year old female  with ESKD from DM and is status post SPK on 8/5/16.         Transplant Hx:       Tx: SPK  Date: 8/5/16       Present Maintenance IS: Tacrolimus and Mycophenolic acid       Baseline Creatinine: 0.8-1.0       Recent DSA: No  Date last checked: 1/2017       Biopsy: No    Ms. Castaneda reports feeling okay overall with some medical complaints.  Patient was recently admitted for cellulitis of left foot after she developed some swelling and a blister opened up.  She was treated with antibiotics and put in a protective boot.  Patient reports now being off antibiotics and the wound is almost healed.  At time of this infection, her serum creatinine was elevated to ~ 1.5 range.  Last check was improved at 1.2, but still above baseline.  Labs pending for today.    Her energy level is good, although maybe a bit lower the last couple of days.  She is active, but with left foot wound, not much exercise lately.  Denies any chest pain or shortness of breath with exertion.  Appetite is better and pretty good now.  Her weight is stable to down a few pounds.  No nausea, vomiting or diarrhea.  No fever, sweats or chills.  No leg swelling.  No pain or burning with urination.    Home BP: 100/60s with rare lightheadedness.  Lower right when she wakes up, but then better throughout the day.      ROS:   A comprehensive review of systems was obtained and negative, except as noted in the HPI or PMH.    Active Medical Problems:  Patient Active Problem List   Diagnosis     Diabetes mellitus type 1 (H)     Immunosuppressed status (H)     Kidney replaced by transplant     Neurogenic orthostatic hypotension (H)     Osteoporosis     Dyslipidemia     Secondary renal hyperparathyroidism (H)     Hypothyroidism     Anemia in chronic renal disease     Pulmonary histoplasmosis (H)     Pancreas replaced by transplant (H)     Hypomagnesemia     Vitamin D deficiency     Group A streptococcal infection     Cellulitis of left lower extremity     Aftercare  following organ transplant       Personal Hx:  Social History     Social History     Marital Status:      Spouse Name: N/A     Number of Children: N/A     Years of Education: N/A     Occupational History     Not on file.     Social History Main Topics     Smoking status: Never Smoker      Smokeless tobacco: Not on file     Alcohol Use: Yes      Comment: rare     Drug Use: No     Sexual Activity: Not on file     Other Topics Concern     Blood Transfusions Yes     2006, 2003 during sepsis     Social History Narrative       Allergies:  Allergies   Allergen Reactions     Ciprofloxacin Nausea     Pollen Extract Other (See Comments)     Seasonal allergies     Pyridostigmine Other (See Comments)     Spastic muscle motion in the face and legs, weakness in the arms. Slight swelling of the tongue.       Medications:  Prior to Admission medications    Medication Sig Start Date End Date Taking? Authorizing Provider   phosphorus tablet 250 mg (PHOSPHA 250 NEUTRAL) 250 MG tablet Take 2 tablets (500 mg) by mouth 3 times daily 8/19/16  Yes Mauricio Marquez MD   PROGRAF 0.5 MG PO CAPSULE Take 1 capsule (0.5 mg) by mouth 2 times daily Take as directed by physician 8/22/16   Gilson Doe MD   magnesium oxide 400 MG CAPS Take 800 mg by mouth 2 times daily 8/16/16   Mauricio Marquez MD   fludrocortisone (FLORINEF) 0.1 MG tablet Take 1 tablet (0.1 mg) by mouth 2 times daily 8/15/16   Ilan Rodriges MD   oxyCODONE (ROXICODONE) 5 MG immediate release tablet Take 1 tablet (5 mg) by mouth every 4 hours as needed for moderate to severe pain 8/12/16   Chantelle Ramon PA-C   acetaminophen (TYLENOL) 325 MG tablet Take 2 tablets (650 mg) by mouth every 4 hours as needed for mild pain or fever 8/12/16   Chantelle Ramon PA-C   aspirin  MG EC tablet Take 1 tablet (325 mg) by mouth daily 8/12/16   Chantelle Ramon PA-C   ondansetron (ZOFRAN-ODT) 4 MG disintegrating tablet Take 1 tablet (4 mg) by mouth  "every 6 hours as needed for nausea 8/12/16   Chantelle Ramon PA-C   valGANciclovir (VALCYTE) 450 MG tablet Take 2 tablets (900 mg) by mouth daily 8/12/16   Chantelle Ramon PA-C   senna-docusate (SENOKOT-S;PERICOLACE) 8.6-50 MG per tablet Take 1 tablet by mouth 2 times daily 8/12/16   Chantelle Ramon PA-C   sulfamethoxazole-trimethoprim (BACTRIM,SEPTRA) 400-80 MG per tablet Take 1 tablet by mouth daily 8/12/16   Chantelle Ramon PA-C   clotrimazole (MYCELEX) 10 MG LOZG Place 1 lozenge (10 mg) inside cheek 4 times daily 8/12/16   Chantelle Ramon PA-C   MYFORTIC 360 MG PO EC TABLET Take 2 tablets (720 mg) by mouth 2 times daily 8/12/16   Chantelle Ramon PA-C   itraconazole (SPORANOX) 100 MG capsule Take by mouth 2 times daily     Reported, Patient   pravastatin (PRAVACHOL) 80 MG tablet Take 20 mg by mouth daily 4/10/13   Dao Bills MD   citalopram (CELEXA) 10 MG tablet Take 20 mg by mouth daily     Reported, Patient   Levothyroxine Sodium (SYNTHROID PO) Take 50 mcg by mouth daily     Reported, Patient   cetirizine (ZYRTEC ALLERGY) 10 MG tablet Take 1 tablet by mouth daily.    Reported, Patient       Vitals:  /66 mmHg  Pulse 53  Temp(Src) 98.2  F (36.8  C) (Oral)  Ht 1.537 m (5' 0.5\")  Wt 50.304 kg (110 lb 14.4 oz)  BMI 21.29 kg/m2  SpO2 98%    Exam:   GENERAL APPEARANCE: alert and no distress  HENT: mouth without ulcers or lesions  LYMPHATICS: no cervical or supraclavicular nodes  RESP: lungs clear to auscultation - no rales, rhonchi or wheezes  CV: regular rhythm, normal rate, no rub, no murmur  EDEMA: no LE edema bilaterally, protective boot on left  ABDOMEN: soft, nondistended, nontender, bowel sounds normal  MS: extremities normal - no gross deformities noted, no evidence of inflammation in joints, no muscle tenderness  SKIN: no rash  TX KIDNEY: normal    Results:   Recent Results (from the past 168 hour(s))   CBC with platelets differential    Collection Time: " 02/07/17  9:21 AM   Result Value Ref Range    WBC 2.1 (L) 4.0 - 11.0 10e9/L    RBC Count 3.51 (L) 3.8 - 5.2 10e12/L    Hemoglobin 10.5 (L) 11.7 - 15.7 g/dL    Hematocrit 32.7 (L) 35.0 - 47.0 %    MCV 93 78 - 100 fl    MCH 29.9 26.5 - 33.0 pg    MCHC 32.1 31.5 - 36.5 g/dL    RDW 12.6 10.0 - 15.0 %    Platelet Count 197 150 - 450 10e9/L    Diff Method Automated Method     % Neutrophils 67.9 %    % Lymphocytes 11.8 %    % Monocytes 15.1 %    % Eosinophils 4.7 %    % Basophils 0.5 %    Absolute Neutrophil 1.4 (L) 1.6 - 8.3 10e9/L    Absolute Lymphocytes 0.3 (L) 0.8 - 5.3 10e9/L    Absolute Monocytes 0.3 0.0 - 1.3 10e9/L    Absolute Eosinophils 0.1 0.0 - 0.7 10e9/L    Absolute Basophils 0.0 0.0 - 0.2 10e9/L   Hemoglobin A1c    Collection Time: 02/07/17  9:21 AM   Result Value Ref Range    Hemoglobin A1C 5.1 4.3 - 6.0 %         Mauricio Marquez MD

## 2017-02-07 NOTE — Clinical Note
2/7/2017       RE: Rose Castaneda  [address] [facility] [address] [facility] [address]     Dear Colleague,    Thank you for referring your patient, Rose Castaneda, to the [facility] NEPHROLOGY at [facility]. Please see a copy of my visit note below.    Assessment and Plan:  1. DDKT (SPK) - baseline Cr ~ 0.8-1.0, which is higher lately.  Patient had mild GWENDOLYN during recent hospitalization up to ~ 1.5, but was trending down with creatinine ~ 1.2 most recently after finishing antibiotics.  Labs pending for today.  Encouraged patient to push fluids.  If creatinine remains elevated, would consider a kidney transplant biopsy.  No proteinuria.  No DSA.  Will make no changes in immunosuppression.  2. Pancreas Tx (SPK) - euglycemic off insulin with normal HbA1c and stable pancreatic enzymes.  No changes in immunosuppression.  3. Neurogenic orthostatic hypotension - okay BP off Florinef.  Recommend patient push fluids, especially in the morning.  4. Anemia in chronic renal disease - improved Hgb, near normal.  Iron replete.  Will follow.  5. Secondary renal hyperparathyroidism - minimal increase in PTH and no need to follow further.  Will continue to treat vitamin D deficiency.  6. Vitamin D deficiency - low vitamin D level and will continue ergocalciferol.  After finished with ergocalciferol course, patient can start cholecalciferol 2000 iu daily.  Will recheck vitamin D level at next clinic visit.  7. Hypomagnesemia - normal serum magnesium and will continue oral magnesium supplement.  8. Diarrhea - resolved.  9. 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  Will recheck EBV PCR with today's labs.  10. H/o pulmonary histoplasmosis - asymptomatic and will continue on itraconazole.   11. Left foot cellulitis - resolved and now off antibiotics.  12. Recommend return visit in 3 months.    Assessment and  plan was discussed with patient and she voiced her understanding and agreement.    Reason for Visit:  Ms. Castaneda is here for routine follow up.    HPI:   Rose Castaneda is a 41 year old female with ESKD from DM and is status post SPK on 8/5/16.         Transplant Hx:       Tx: SPK  Date: 8/5/16       Present Maintenance IS: Tacrolimus and Mycophenolic acid       Baseline Creatinine: 0.8-1.0       Recent DSA: No  Date last checked: 1/2017       Biopsy: No    Ms. Castaneda reports feeling okay overall with some medical complaints.  Patient was recently admitted for cellulitis of left foot after she developed some swelling and a blister opened up.  She was treated with antibiotics and put in a protective boot.  Patient reports now being off antibiotics and the wound is almost healed.  At time of this infection, her serum creatinine was elevated to ~ 1.5 range.  Last check was improved at 1.2, but still above baseline.  Labs pending for today.    Her energy level is good, although maybe a bit lower the last couple of days.  She is active, but with left foot wound, not much exercise lately.  Denies any chest pain or shortness of breath with exertion.  Appetite is better and pretty good now.  Her weight is stable to down a few pounds.  No nausea, vomiting or diarrhea.  No fever, sweats or chills.  No leg swelling.  No pain or burning with urination.    Home BP: 100/60s with rare lightheadedness.  Lower right when she wakes up, but then better throughout the day.      ROS:   A comprehensive review of systems was obtained and negative, except as noted in the HPI or PMH.    Active Medical Problems:  Patient Active Problem List   Diagnosis     Diabetes mellitus type 1 (H)     Immunosuppressed status (H)     Kidney replaced by transplant     Neurogenic orthostatic hypotension (H)     Osteoporosis     Dyslipidemia     Secondary renal hyperparathyroidism (H)     Hypothyroidism     Anemia in chronic renal disease     Pulmonary  histoplasmosis (H)     Pancreas replaced by transplant (H)     Hypomagnesemia     Vitamin D deficiency     Group A streptococcal infection     Cellulitis of left lower extremity     Aftercare following organ transplant       Personal Hx:  Social History     Social History     Marital Status:      Spouse Name: N/A     Number of Children: N/A     Years of Education: N/A     Occupational History     Not on file.     Social History Main Topics     Smoking status: Never Smoker      Smokeless tobacco: Not on file     Alcohol Use: Yes      Comment: rare     Drug Use: No     Sexual Activity: Not on file     Other Topics Concern     Blood Transfusions Yes     2006, 2003 during sepsis     Social History Narrative       Allergies:  Allergies   Allergen Reactions     Ciprofloxacin Nausea     Pollen Extract Other (See Comments)     Seasonal allergies     Pyridostigmine Other (See Comments)     Spastic muscle motion in the face and legs, weakness in the arms. Slight swelling of the tongue.       Medications:  Prior to Admission medications    Medication Sig Start Date End Date Taking? Authorizing Provider   phosphorus tablet 250 mg (PHOSPHA 250 NEUTRAL) 250 MG tablet Take 2 tablets (500 mg) by mouth 3 times daily 8/19/16  Yes Mauricio Marquez MD   PROGRAF 0.5 MG PO CAPSULE Take 1 capsule (0.5 mg) by mouth 2 times daily Take as directed by physician 8/22/16   Gilson Doe MD   magnesium oxide 400 MG CAPS Take 800 mg by mouth 2 times daily 8/16/16   Mauricio Marquez MD   fludrocortisone (FLORINEF) 0.1 MG tablet Take 1 tablet (0.1 mg) by mouth 2 times daily 8/15/16   Ilan Rodriges MD   oxyCODONE (ROXICODONE) 5 MG immediate release tablet Take 1 tablet (5 mg) by mouth every 4 hours as needed for moderate to severe pain 8/12/16   Chantelle Ramon PA-C   acetaminophen (TYLENOL) 325 MG tablet Take 2 tablets (650 mg) by mouth every 4 hours as needed for mild pain or fever 8/12/16   Chantelle Ramon  "MOOSE   aspirin  MG EC tablet Take 1 tablet (325 mg) by mouth daily 8/12/16   Chantelle Ramon PA-C   ondansetron (ZOFRAN-ODT) 4 MG disintegrating tablet Take 1 tablet (4 mg) by mouth every 6 hours as needed for nausea 8/12/16   Chantelle Ramon PA-C   valGANciclovir (VALCYTE) 450 MG tablet Take 2 tablets (900 mg) by mouth daily 8/12/16   Chantelle Ramon PA-C   senna-docusate (SENOKOT-S;PERICOLACE) 8.6-50 MG per tablet Take 1 tablet by mouth 2 times daily 8/12/16   Chantelle Ramon PA-C   sulfamethoxazole-trimethoprim (BACTRIM,SEPTRA) 400-80 MG per tablet Take 1 tablet by mouth daily 8/12/16   Chantelle Ramon PA-C   clotrimazole (MYCELEX) 10 MG LOZG Place 1 lozenge (10 mg) inside cheek 4 times daily 8/12/16   Chantelle Ramon PA-C   MYFORTIC 360 MG PO EC TABLET Take 2 tablets (720 mg) by mouth 2 times daily 8/12/16   Chantelle Ramon PA-C   itraconazole (SPORANOX) 100 MG capsule Take by mouth 2 times daily     Reported, Patient   pravastatin (PRAVACHOL) 80 MG tablet Take 20 mg by mouth daily 4/10/13   Dao Bills MD   citalopram (CELEXA) 10 MG tablet Take 20 mg by mouth daily     Reported, Patient   Levothyroxine Sodium (SYNTHROID PO) Take 50 mcg by mouth daily     Reported, Patient   cetirizine (ZYRTEC ALLERGY) 10 MG tablet Take 1 tablet by mouth daily.    Reported, Patient       Vitals:  /66 mmHg  Pulse 53  Temp(Src) 98.2  F (36.8  C) (Oral)  Ht 1.537 m (5' 0.5\")  Wt 50.304 kg (110 lb 14.4 oz)  BMI 21.29 kg/m2  SpO2 98%    Exam:   GENERAL APPEARANCE: alert and no distress  HENT: mouth without ulcers or lesions  LYMPHATICS: no cervical or supraclavicular nodes  RESP: lungs clear to auscultation - no rales, rhonchi or wheezes  CV: regular rhythm, normal rate, no rub, no murmur  EDEMA: no LE edema bilaterally, protective boot on left  ABDOMEN: soft, nondistended, nontender, bowel sounds normal  MS: extremities normal - no gross deformities noted, no evidence of " inflammation in joints, no muscle tenderness  SKIN: no rash  TX KIDNEY: normal    Results:   Recent Results (from the past 168 hour(s))   CBC with platelets differential    Collection Time: 02/07/17  9:21 AM   Result Value Ref Range    WBC 2.1 (L) 4.0 - 11.0 10e9/L    RBC Count 3.51 (L) 3.8 - 5.2 10e12/L    Hemoglobin 10.5 (L) 11.7 - 15.7 g/dL    Hematocrit 32.7 (L) 35.0 - 47.0 %    MCV 93 78 - 100 fl    MCH 29.9 26.5 - 33.0 pg    MCHC 32.1 31.5 - 36.5 g/dL    RDW 12.6 10.0 - 15.0 %    Platelet Count 197 150 - 450 10e9/L    Diff Method Automated Method     % Neutrophils 67.9 %    % Lymphocytes 11.8 %    % Monocytes 15.1 %    % Eosinophils 4.7 %    % Basophils 0.5 %    Absolute Neutrophil 1.4 (L) 1.6 - 8.3 10e9/L    Absolute Lymphocytes 0.3 (L) 0.8 - 5.3 10e9/L    Absolute Monocytes 0.3 0.0 - 1.3 10e9/L    Absolute Eosinophils 0.1 0.0 - 0.7 10e9/L    Absolute Basophils 0.0 0.0 - 0.2 10e9/L   Hemoglobin A1c    Collection Time: 02/07/17  9:21 AM   Result Value Ref Range    Hemoglobin A1C 5.1 4.3 - 6.0 %       Again, thank you for allowing me to participate in the care of your patient.      Sincerely,    Mauricio Marquez MD

## 2017-02-07 NOTE — PROGRESS NOTES
Chief Complaint: No chief complaint on file.    Allergies   Allergen Reactions     Ciprofloxacin Nausea     Pollen Extract Other (See Comments)     Seasonal allergies     Pyridostigmine Other (See Comments)     Spastic muscle motion in the face and legs, weakness in the arms. Slight swelling of the tongue.     Subjective: Rose is a 41 year old female who presents to the clinic today for a follow up of diabetic L foot wound. She has been cleaning wound and changing the dressing daily. She noticed there was a scab above the other wound so she picked at it and put vaseline on. This morning the piece of skin was basically just hanging there so she clipped it off. She notes that her foot rubs in the boot frequently and causes these blisters. She got her LE brace from [employer], but it hasn't been fitted yet as they are waiting for our okay. Denies increased redness, odor, purulent drainage or swelling to the area.     Objective:  A diabetic foot wound is noted at left  plantar foot measuring approximately 0.6cm x 1cm x 0.1cm s/p debridement. With a new area opened proximally, triangular shaped measuring 0.7cm x 0.4cm (red, granular base, deep to subcutaneous).     Block Classification: II - deep to the subcutaneous    Wound base: Fibrous, yellow, white    Edges: hyperkeratotic    Drainage: scant/serous    Odor: none    Undermining: yes    Bone Exposure: No    Clinical Signs of Infection: No    After obtaining patient consent, all devitalized tissue was sharply removed from the wound base to the level of healthy granular tissue, using sharp dissection techniques. Wound base exhibited minimal bleeding.    Assessment:    - Diabetic left foot wounds  - DM type 1 s/p pancreatic transplant 8/2016  - Charcot arthropathy Left foot 2007    Plan:  - Pt seen and evaluated.   - Wound debrided as described above.  - Continue to stay off of foot as much as possible - wear diabetic CAM walker for now. Okay to get the shoes and brace  fitted in the meantime.  - Wound care instructions: clean wounds with microklenz and pat dry. Apply pea size amount of medihoney to both wounds. Cover with 4x4 gauze and a roll gauze wrap. Change dressing daily.  - RTC 2 weeks

## 2017-02-07 NOTE — Clinical Note
2/7/2017       RE: Rose Castaneda  [address] [facility] [address]     Dear Colleague,    Thank you for referring your patient, Rose Castaneda, to the [facility]TH[facility]IC at [facility]. Please see a copy of my visit note below.    Chief Complaint: No chief complaint on file.    Allergies   Allergen Reactions     Ciprofloxacin Nausea     Pollen Extract Other (See Comments)     Seasonal allergies     Pyridostigmine Other (See Comments)     Spastic muscle motion in the face and legs, weakness in the arms. Slight swelling of the tongue.     Subjective: Rose is a 41 year old female who presents to the clinic today for a follow up of diabetic L foot wound. She has been cleaning wound and changing the dressing daily. She noticed there was a scab above the other wound so she picked at it and put vaseline on. This morning the piece of skin was basically just hanging there so she clipped it off. She notes that her foot rubs in the boot frequently and causes these blisters. She got her LE brace from [employer], but it hasn't been fitted yet as they are waiting for our okay. Denies increased redness, odor, purulent drainage or swelling to the area.     Objective:  A diabetic foot wound is noted at left  plantar foot measuring approximately 0.6cm x 1cm x 0.1cm s/p debridement. With a new area opened proximally, triangular shaped measuring 0.7cm x 0.4cm (red, granular base, deep to subcutaneous).     Block Classification: II - deep to the subcutaneous    Wound base: Fibrous, yellow, white    Edges: hyperkeratotic    Drainage: scant/serous    Odor: none    Undermining: yes    Bone Exposure: No    Clinical Signs of Infection: No    After obtaining patient consent, all devitalized tissue was sharply removed from the wound base to the level of healthy granular tissue, using sharp dissection techniques. Wound base exhibited minimal bleeding.    Assessment:    - Diabetic  left foot wounds  - DM type 1 s/p pancreatic transplant 8/2016  - Charcot arthropathy Left foot 2007    Plan:  - Pt seen and evaluated.   - Wound debrided as described above.  - Continue to stay off of foot as much as possible - wear diabetic CAM walker for now. Okay to get the shoes and brace fitted in the meantime.  - Wound care instructions: clean wounds with microklenz and pat dry. Apply pea size amount of medihoney to both wounds. Cover with 4x4 gauze and a roll gauze wrap. Change dressing daily.  - RTC 2 weeks    Again, thank you for allowing me to participate in the care of your patient.      Sincerely,    Darius Parkinson DPM

## 2017-02-07 NOTE — PROGRESS NOTES
Assessment and Plan:  1. DDKT (SPK) - baseline Cr ~ 0.8-1.0, which is higher lately.  Patient had mild GWENDOLYN during recent hospitalization up to ~ 1.5, but was trending down with creatinine ~ 1.2 most recently after finishing antibiotics.  Labs pending for today.  Encouraged patient to push fluids.  If creatinine remains elevated, would consider a kidney transplant biopsy.  No proteinuria.  No DSA.  Will make no changes in immunosuppression.  2. Pancreas Tx (SPK) - euglycemic off insulin with normal HbA1c and stable pancreatic enzymes.  No changes in immunosuppression.  3. Neurogenic orthostatic hypotension - okay BP off Florinef.  Recommend patient push fluids, especially in the morning.  4. Anemia in chronic renal disease - improved Hgb, near normal.  Iron replete.  Will follow.  5. Secondary renal hyperparathyroidism - minimal increase in PTH and no need to follow further.  Will continue to treat vitamin D deficiency.  6. Vitamin D deficiency - low vitamin D level and will continue ergocalciferol.  After finished with ergocalciferol course, patient can start cholecalciferol 2000 iu daily.  Will recheck vitamin D level at next clinic visit.  7. Hypomagnesemia - normal serum magnesium and will continue oral magnesium supplement.  8. Diarrhea - resolved.  9. 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  Will recheck EBV PCR with today's labs.  10. H/o pulmonary histoplasmosis - asymptomatic and will continue on itraconazole.   11. Left foot cellulitis - resolved and now off antibiotics.  12. Recommend return visit in 3 months.    Assessment and plan was discussed with patient and she voiced her understanding and agreement.    Reason for Visit:  Ms. Castaneda is here for routine follow up.    HPI:   Rose Castaneda is a 41 year old female with ESKD from DM and is status post SPK on 8/5/16.         Transplant Hx:       Tx:  SPK  Date: 8/5/16       Present Maintenance IS: Tacrolimus and Mycophenolic acid       Baseline Creatinine: 0.8-1.0       Recent DSA: No  Date last checked: 1/2017       Biopsy: No    Ms. Castaneda reports feeling okay overall with some medical complaints.  Patient was recently admitted for cellulitis of left foot after she developed some swelling and a blister opened up.  She was treated with antibiotics and put in a protective boot.  Patient reports now being off antibiotics and the wound is almost healed.  At time of this infection, her serum creatinine was elevated to ~ 1.5 range.  Last check was improved at 1.2, but still above baseline.  Labs pending for today.    Her energy level is good, although maybe a bit lower the last couple of days.  She is active, but with left foot wound, not much exercise lately.  Denies any chest pain or shortness of breath with exertion.  Appetite is better and pretty good now.  Her weight is stable to down a few pounds.  No nausea, vomiting or diarrhea.  No fever, sweats or chills.  No leg swelling.  No pain or burning with urination.    Home BP: 100/60s with rare lightheadedness.  Lower right when she wakes up, but then better throughout the day.      ROS:   A comprehensive review of systems was obtained and negative, except as noted in the HPI or PMH.    Active Medical Problems:  Patient Active Problem List   Diagnosis     Diabetes mellitus type 1 (H)     Immunosuppressed status (H)     Kidney replaced by transplant     Neurogenic orthostatic hypotension (H)     Osteoporosis     Dyslipidemia     Secondary renal hyperparathyroidism (H)     Hypothyroidism     Anemia in chronic renal disease     Pulmonary histoplasmosis (H)     Pancreas replaced by transplant (H)     Hypomagnesemia     Vitamin D deficiency     Group A streptococcal infection     Cellulitis of left lower extremity     Aftercare following organ transplant       Personal Hx:  Social History     Social History     Marital  Status:      Spouse Name: N/A     Number of Children: N/A     Years of Education: N/A     Occupational History     Not on file.     Social History Main Topics     Smoking status: Never Smoker      Smokeless tobacco: Not on file     Alcohol Use: Yes      Comment: rare     Drug Use: No     Sexual Activity: Not on file     Other Topics Concern     Blood Transfusions Yes     2006, 2003 during sepsis     Social History Narrative       Allergies:  Allergies   Allergen Reactions     Ciprofloxacin Nausea     Pollen Extract Other (See Comments)     Seasonal allergies     Pyridostigmine Other (See Comments)     Spastic muscle motion in the face and legs, weakness in the arms. Slight swelling of the tongue.       Medications:  Prior to Admission medications    Medication Sig Start Date End Date Taking? Authorizing Provider   phosphorus tablet 250 mg (PHOSPHA 250 NEUTRAL) 250 MG tablet Take 2 tablets (500 mg) by mouth 3 times daily 8/19/16  Yes Mauricio Mraquez MD   PROGRAF 0.5 MG PO CAPSULE Take 1 capsule (0.5 mg) by mouth 2 times daily Take as directed by physician 8/22/16   Gilson Doe MD   magnesium oxide 400 MG CAPS Take 800 mg by mouth 2 times daily 8/16/16   Mauricio Marquez MD   fludrocortisone (FLORINEF) 0.1 MG tablet Take 1 tablet (0.1 mg) by mouth 2 times daily 8/15/16   Ilan Rodriges MD   oxyCODONE (ROXICODONE) 5 MG immediate release tablet Take 1 tablet (5 mg) by mouth every 4 hours as needed for moderate to severe pain 8/12/16   Chantelle Ramon PA-C   acetaminophen (TYLENOL) 325 MG tablet Take 2 tablets (650 mg) by mouth every 4 hours as needed for mild pain or fever 8/12/16   Chantelle Ramon PA-C   aspirin  MG EC tablet Take 1 tablet (325 mg) by mouth daily 8/12/16   Chantelle Ramon PA-C   ondansetron (ZOFRAN-ODT) 4 MG disintegrating tablet Take 1 tablet (4 mg) by mouth every 6 hours as needed for nausea 8/12/16   Chantelle Ramon PA-C   valGANciclovir  "(VALCYTE) 450 MG tablet Take 2 tablets (900 mg) by mouth daily 8/12/16   Chantelle Ramon PA-C   senna-docusate (SENOKOT-S;PERICOLACE) 8.6-50 MG per tablet Take 1 tablet by mouth 2 times daily 8/12/16   Chantelle Ramon PA-C   sulfamethoxazole-trimethoprim (BACTRIM,SEPTRA) 400-80 MG per tablet Take 1 tablet by mouth daily 8/12/16   Chantelle Ramon PA-C   clotrimazole (MYCELEX) 10 MG LOZG Place 1 lozenge (10 mg) inside cheek 4 times daily 8/12/16   Chantelle Ramon PA-C   MYFORTIC 360 MG PO EC TABLET Take 2 tablets (720 mg) by mouth 2 times daily 8/12/16   Chantelle Ramon PA-C   itraconazole (SPORANOX) 100 MG capsule Take by mouth 2 times daily     Reported, Patient   pravastatin (PRAVACHOL) 80 MG tablet Take 20 mg by mouth daily 4/10/13   Dao Bills MD   citalopram (CELEXA) 10 MG tablet Take 20 mg by mouth daily     Reported, Patient   Levothyroxine Sodium (SYNTHROID PO) Take 50 mcg by mouth daily     Reported, Patient   cetirizine (ZYRTEC ALLERGY) 10 MG tablet Take 1 tablet by mouth daily.    Reported, Patient       Vitals:  /66 mmHg  Pulse 53  Temp(Src) 98.2  F (36.8  C) (Oral)  Ht 1.537 m (5' 0.5\")  Wt 50.304 kg (110 lb 14.4 oz)  BMI 21.29 kg/m2  SpO2 98%    Exam:   GENERAL APPEARANCE: alert and no distress  HENT: mouth without ulcers or lesions  LYMPHATICS: no cervical or supraclavicular nodes  RESP: lungs clear to auscultation - no rales, rhonchi or wheezes  CV: regular rhythm, normal rate, no rub, no murmur  EDEMA: no LE edema bilaterally, protective boot on left  ABDOMEN: soft, nondistended, nontender, bowel sounds normal  MS: extremities normal - no gross deformities noted, no evidence of inflammation in joints, no muscle tenderness  SKIN: no rash  TX KIDNEY: normal    Results:   Recent Results (from the past 168 hour(s))   CBC with platelets differential    Collection Time: 02/07/17  9:21 AM   Result Value Ref Range    WBC 2.1 (L) 4.0 - 11.0 10e9/L    RBC Count " 3.51 (L) 3.8 - 5.2 10e12/L    Hemoglobin 10.5 (L) 11.7 - 15.7 g/dL    Hematocrit 32.7 (L) 35.0 - 47.0 %    MCV 93 78 - 100 fl    MCH 29.9 26.5 - 33.0 pg    MCHC 32.1 31.5 - 36.5 g/dL    RDW 12.6 10.0 - 15.0 %    Platelet Count 197 150 - 450 10e9/L    Diff Method Automated Method     % Neutrophils 67.9 %    % Lymphocytes 11.8 %    % Monocytes 15.1 %    % Eosinophils 4.7 %    % Basophils 0.5 %    Absolute Neutrophil 1.4 (L) 1.6 - 8.3 10e9/L    Absolute Lymphocytes 0.3 (L) 0.8 - 5.3 10e9/L    Absolute Monocytes 0.3 0.0 - 1.3 10e9/L    Absolute Eosinophils 0.1 0.0 - 0.7 10e9/L    Absolute Basophils 0.0 0.0 - 0.2 10e9/L   Hemoglobin A1c    Collection Time: 02/07/17  9:21 AM   Result Value Ref Range    Hemoglobin A1C 5.1 4.3 - 6.0 %

## 2017-02-07 NOTE — NURSING NOTE
Reason For Visit:   Chief Complaint   Patient presents with     RECHECK     Follow up. Diabetic. Ulcer, Left Foot.        Pain Assessment  Patient Currently in Pain: Denies           Current Outpatient Prescriptions   Medication Sig Dispense Refill     tacrolimus (PROGRAF - GENERIC EQUIVALENT) 1 mg/mL suspension 0.2 mg every morning and 0.1 mg every evening. 9 mL 3     sulfamethoxazole-trimethoprim (BACTRIM/SEPTRA) 400-80 MG per tablet Take 1 tablet by mouth Every Mon, Wed, Fri Morning 40 tablet 3     oxyCODONE (ROXICODONE) 5 MG IR tablet Take 1-2 tablets (5-10 mg) by mouth every 4 hours as needed for moderate to severe pain 30 tablet 0     order for DME Equipment being ordered: Crutches ()  Treatment Diagnosis: impaired gait 1 each 0     magnesium oxide (MAG-OX) 400 (241.3 MG) MG tablet Take 1 tablet (400 mg) by mouth daily 30 tablet 3     vitamin D (ERGOCALCIFEROL) 07184 UNIT capsule Take 1 capsule (50,000 Units) by mouth once a week (Patient taking differently: Take 50,000 Units by mouth every 30 days ) 13 capsule 3     pravastatin (PRAVACHOL) 20 MG tablet Take 1 tablet (20 mg) by mouth daily 90 tablet 3     mycophenolic acid (MYFORTIC - GENERIC EQUIVALENT) 180 MG EC tablet Take 3 tablets (540 mg) by mouth 2 times daily 540 tablet 3     aspirin  MG EC tablet Take 1 tablet (325 mg) by mouth daily 60 tablet 3     itraconazole (SPORANOX) 100 MG capsule Take by mouth 2 times daily        citalopram (CELEXA) 10 MG tablet Take 20 mg by mouth daily        Levothyroxine Sodium (SYNTHROID PO) Take 50 mcg by mouth daily        cetirizine (ZYRTEC ALLERGY) 10 MG tablet Take 1 tablet by mouth daily.            Allergies   Allergen Reactions     Ciprofloxacin Nausea     Pollen Extract Other (See Comments)     Seasonal allergies     Pyridostigmine Other (See Comments)     Spastic muscle motion in the face and legs, weakness in the arms. Slight swelling of the tongue.

## 2017-02-07 NOTE — NURSING NOTE
"Chief Complaint   Patient presents with     RECHECK     Transplant follow up       Initial /66 mmHg  Pulse 53  Temp(Src) 98.2  F (36.8  C) (Oral)  Ht 1.537 m (5' 0.5\")  Wt 50.304 kg (110 lb 14.4 oz)  BMI 21.29 kg/m2  SpO2 98% Estimated body mass index is 21.29 kg/(m^2) as calculated from the following:    Height as of this encounter: 1.537 m (5' 0.5\").    Weight as of this encounter: 50.304 kg (110 lb 14.4 oz).  Medication Reconciliation: complete    "

## 2017-02-08 ENCOUNTER — TELEPHONE (OUTPATIENT)
Dept: TRANSPLANT | Facility: CLINIC | Age: 42
End: 2017-02-08

## 2017-02-08 LAB
BKV DNA # SPEC NAA+PROBE: NORMAL COPIES/ML
BKV DNA SPEC NAA+PROBE-LOG#: NORMAL LOG COPIES/ML
CMV DNA SPEC NAA+PROBE-ACNC: 2892 [IU]/ML
CMV DNA SPEC NAA+PROBE-LOG#: 3.5 {LOG_IU}/ML
EBV DNA # SPEC NAA+PROBE: NORMAL {COPIES}/ML
EBV DNA SPEC NAA+PROBE-LOG#: NORMAL {LOG_COPIES}/ML
PRA DONOR SPECIFIC ABY: NORMAL
SPECIMEN SOURCE: ABNORMAL
SPECIMEN SOURCE: NORMAL
TACROLIMUS BLD-MCNC: 3.6 UG/L (ref 5–15)
TME LAST DOSE: ABNORMAL H

## 2017-02-08 NOTE — TELEPHONE ENCOUNTER
Tac level is 3.6. Increase dose from .2 mg in am and .1 mg in pm to 0.2 mg bid, recheck a level next week.          mg

## 2017-02-09 NOTE — TELEPHONE ENCOUNTER
Left detailed message for patient to increase prograf to 0.2 mg twice per day and lab again x 1 week. Asked patient to call back and confirm.

## 2017-02-10 ENCOUNTER — TELEPHONE (OUTPATIENT)
Dept: TRANSPLANT | Facility: CLINIC | Age: 42
End: 2017-02-10

## 2017-02-10 DIAGNOSIS — B25.9 CMV (CYTOMEGALOVIRUS INFECTION) (H): Primary | ICD-10-CM

## 2017-02-10 DIAGNOSIS — T86.899 COMPLICATION OF TRANSPLANTED PANCREAS: ICD-10-CM

## 2017-02-10 RX ORDER — VALGANCICLOVIR 450 MG/1
450 TABLET, FILM COATED ORAL 2 TIMES DAILY
Qty: 180 TABLET | Refills: 3 | Status: SHIPPED | OUTPATIENT
Start: 2017-02-10 | End: 2018-10-01

## 2017-02-10 NOTE — TELEPHONE ENCOUNTER
CMV is 2892. CMV status at time of transplant was D-, R+.   Will start her on renal dosing of Valcyte 450 mg bid until her CMV PCR is neg 2 times in a row ( testing will be every 2 weeks). Sh will then decrease her dose to maintenance dosing- probably 450 mg qd ( renal dosing)

## 2017-02-16 LAB
DONOR IDENTIFICATION: NORMAL
DSA COMMENTS: NORMAL
DSA PRESENT: NO
DSA TEST METHOD: NORMAL
ORGAN: NORMAL
SA1 CELL: NORMAL
SA1 COMMENTS: NORMAL
SA1 HI RISK ABY: NORMAL
SA1 MOD RISK ABY: NORMAL
SA1 TEST METHOD: NORMAL
SA2 CELL: NORMAL
SA2 COMMENTS: NORMAL
SA2 HI RISK ABY UA: NORMAL
SA2 MOD RISK ABY: NORMAL
SA2 TEST METHOD: NORMAL

## 2017-02-17 ENCOUNTER — OFFICE VISIT (OUTPATIENT)
Dept: ORTHOPEDICS | Facility: CLINIC | Age: 42
End: 2017-02-17

## 2017-02-17 DIAGNOSIS — L97.522 ULCER OF LEFT FOOT, WITH FAT LAYER EXPOSED (H): Primary | ICD-10-CM

## 2017-02-17 NOTE — NURSING NOTE
Reason For Visit:   Chief Complaint   Patient presents with     Foot Problems     f/u left foot diabetic ulcer       There were no vitals taken for this visit.    Pain Assessment  Patient Currently in Pain: No

## 2017-02-17 NOTE — MR AVS SNAPSHOT
After Visit Summary   2017    Rose Castaneda    MRN: [id number]           Patient Information     Date Of Birth          1975        Visit Information        Provider Department      2017 9:20 AM Darius Parkinson DPM [facility]         Follow-ups after your visit        Your next 10 appointments already scheduled     2017 11:00 AM CST   (Arrive by 10:45 AM)   RETURN FOOT/ANKLE with ALFA Reveles [facility] ([facility])    [address] [place] [id number] [phone]              Who to contact     Please call your clinic at [phone] to:    Ask questions about your health    Make or cancel appointments    Discuss your medicines    Learn about your test results    Speak to your doctor   If you have compliments or concerns about an experience at your clinic, or if you wish to file a complaint, please contact [facility] Patient Relations at [phone] or email us at [email or web address]         Additional Information About Your Visit        MyChart Information     MeSixty is an electronic gateway that provides easy, online access to your medical records. With MeSixty, you can request a clinic appointment, read your test results, renew a prescription or communicate with your care team.     To sign up for MeSixty visit the website at [email or web address]   You will be asked to enter the access code listed below, as well as some personal information. Please follow the directions to create your username and password.     Your access code is: [email or web address]  Expires: 3/30/2017  2:52 PM     Your access code will  in 90 days. If you need help or a new code, please contact your [facility] or call [phone] for assistance.        Care EveryWhere ID     This is your Care EveryWhere ID. This could  be used by other organizations to access your [facility] medical records  [id number]         Blood Pressure from Last 3 Encounters:   02/07/17 101/66   12/31/16 107/71   11/14/16 102/67    Weight from Last 3 Encounters:   02/07/17 50.3 kg (110 lb 14.4 oz)   01/13/17 52.2 kg (115 lb)   12/31/16 52.7 kg (116 lb 1.6 oz)              Today, you had the following     No orders found for display         Today's Medication Changes          These changes are accurate as of: 2/17/17 10:06 AM.  If you have any questions, ask your nurse or doctor.               These medicines have changed or have updated prescriptions.        Dose/Directions    vitamin D 76235 UNIT capsule   Commonly known as:  ERGOCALCIFEROL   This may have changed:  when to take this   Used for:  Kidney replaced by transplant, Pancreas replaced by transplant (H), Low calcium levels        Dose:  84894 Units   Take 1 capsule (50,000 Units) by mouth once a week   Quantity:  13 capsule   Refills:  3                Primary Care Provider Office Phone # Fax #    Hafsa JOSEPH Orozco [phone]       [facility] [address] [place] [address] [address] [place] [phone]        Thank you!     Thank you for choosing [facility]  for your care. Our goal is always to provide you with excellent care. Hearing back from our patients is one way we can continue to improve our services. Please take a few minutes to complete the written survey that you may receive in the mail after your visit with us. Thank you!             Your Updated Medication List - Protect others around you: Learn how to safely use, store and throw away your medicines at www.disposemymeds.org.          This list is accurate as of: 2/17/17 10:06 AM.  Always use your most recent med list.                   Brand Name Dispense Instructions for use    aspirin 325 MG EC tablet     60 tablet    Take 1 tablet (325 mg) by mouth daily       citalopram 10 MG tablet    celeXA     Take 20 mg by mouth  daily       itraconazole 100 MG capsule    SPORANOX     Take by mouth 2 times daily       mycophenolic acid 180 MG EC tablet    MYFORTIC - GENERIC EQUIVALENT    540 tablet    Take 3 tablets (540 mg) by mouth 2 times daily       order for DME     1 each    Equipment being ordered: Lynnette () Treatment Diagnosis: impaired gait       pravastatin 20 MG tablet    PRAVACHOL    90 tablet    Take 1 tablet (20 mg) by mouth daily       sulfamethoxazole-trimethoprim 400-80 MG per tablet    BACTRIM/SEPTRA    40 tablet    Take 1 tablet by mouth Every Mon, Wed, Fri Morning       SYNTHROID PO      Take 50 mcg by mouth daily       tacrolimus 1 mg/mL suspension    PROGRAF - GENERIC EQUIVALENT    9 mL    0.2 mg every morning and 0.1 mg every evening.       valGANciclovir 450 MG tablet    VALCYTE    180 tablet    Take 1 tablet (450 mg) by mouth 2 times daily       vitamin D 69763 UNIT capsule    ERGOCALCIFEROL    13 capsule    Take 1 capsule (50,000 Units) by mouth once a week       ZYRTEC ALLERGY 10 MG tablet   Generic drug:  cetirizine      Take 1 tablet by mouth daily.

## 2017-02-17 NOTE — LETTER
2/17/2017       RE: Rose Castaneda  [address] [place] [address]     Dear Colleague,    Thank you for referring your patient, Rose Castaneda, to the [facility] at [facility]. Please see a copy of my visit note below.    Chief Complaint:   Chief Complaint   Patient presents with     Foot Problems     f/u left foot diabetic ulcer          Allergies   Allergen Reactions     Ciprofloxacin Nausea     Pollen Extract Other (See Comments)     Seasonal allergies     Pyridostigmine Other (See Comments)     Spastic muscle motion in the face and legs, weakness in the arms. Slight swelling of the tongue.         Subjective: Rose is a 41 year old female who presents to the clinic today for a follow up of left foot wound. She relates that she has been wearing the boot still. She is covering the wound still.     Objective      A diabetic wound is noted at left  plantar midfoot measuring 1.1cm x 0.8cm x 0.1cm.    Block Classification: II    Wound base: Pink  Yellow/Granulation  Slough    Edges: hyperkeratotic    Drainage: small/serous    Odor: no    Undermining: no    Bone Exposure: No    Clinical Signs of Infection: No    After obtaining patient consent, all devitalized tissue was sharply removed from the wound base to the level of healthy granular tissue, using sharp dissection techniques. The wound base and edges exhibited healthy bleeding.        Assessment: Wound on the plantar left foot - not much progress since the last visit.     Plan:   - Pt seen and evaluated  - I will see her more often to serially debride the wound. It will benefit from this.  - I covered the area with the MediHoney and a Mepilex and Kerlix. She should continue with this dressing. The wound did have some dog and human hair in it. I related to her to keep this . Mepilex should help form a better barrier. Cont wearing the boot.  - Pt to return to clinic in 1 week.        Again, thank you for allowing me to participate in the care of your patient.      Sincerely,    Darius Parkinson DPM

## 2017-02-17 NOTE — PROGRESS NOTES
Chief Complaint:   Chief Complaint   Patient presents with     Foot Problems     f/u left foot diabetic ulcer          Allergies   Allergen Reactions     Ciprofloxacin Nausea     Pollen Extract Other (See Comments)     Seasonal allergies     Pyridostigmine Other (See Comments)     Spastic muscle motion in the face and legs, weakness in the arms. Slight swelling of the tongue.         Subjective: Rose is a 41 year old female who presents to the clinic today for a follow up of left foot wound. She relates that she has been wearing the boot still. She is covering the wound still.     Objective      A diabetic wound is noted at left  plantar midfoot measuring 1.1cm x 0.8cm x 0.1cm.    Block Classification: II    Wound base: Pink  Yellow/Granulation  Slough    Edges: hyperkeratotic    Drainage: small/serous    Odor: no    Undermining: no    Bone Exposure: No    Clinical Signs of Infection: No    After obtaining patient consent, all devitalized tissue was sharply removed from the wound base to the level of healthy granular tissue, using sharp dissection techniques. The wound base and edges exhibited healthy bleeding.        Assessment: Wound on the plantar left foot - not much progress since the last visit.     Plan:   - Pt seen and evaluated  - I will see her more often to serially debride the wound. It will benefit from this.  - I covered the area with the MediHoney and a Mepilex and Kerlix. She should continue with this dressing. The wound did have some dog and human hair in it. I related to her to keep this . Mepilex should help form a better barrier. Cont wearing the boot.  - Pt to return to clinic in 1 week.

## 2017-02-21 ENCOUNTER — TELEPHONE (OUTPATIENT)
Dept: TRANSPLANT | Facility: CLINIC | Age: 42
End: 2017-02-21

## 2017-02-21 DIAGNOSIS — B25.9 CMV (CYTOMEGALOVIRUS INFECTION) (H): ICD-10-CM

## 2017-02-21 DIAGNOSIS — T86.899 COMPLICATION OF TRANSPLANTED PANCREAS: ICD-10-CM

## 2017-02-21 DIAGNOSIS — Z94.0 KIDNEY REPLACED BY TRANSPLANT: ICD-10-CM

## 2017-02-21 DIAGNOSIS — Z48.298 AFTERCARE FOLLOWING ORGAN TRANSPLANT: ICD-10-CM

## 2017-02-21 DIAGNOSIS — I95.9 HYPOTENSION: Primary | ICD-10-CM

## 2017-02-21 DIAGNOSIS — Z94.83 PANCREAS REPLACED BY TRANSPLANT (H): ICD-10-CM

## 2017-02-21 LAB
BASOPHILS # BLD AUTO: 0 10E9/L (ref 0–0.2)
BASOPHILS NFR BLD AUTO: 0.5 %
DIFFERENTIAL METHOD BLD: ABNORMAL
EOSINOPHIL # BLD AUTO: 0.1 10E9/L (ref 0–0.7)
EOSINOPHIL NFR BLD AUTO: 3.2 %
ERYTHROCYTE [DISTWIDTH] IN BLOOD BY AUTOMATED COUNT: 12.6 % (ref 10–15)
HCT VFR BLD AUTO: 30.8 % (ref 35–47)
HGB BLD-MCNC: 9.7 G/DL (ref 11.7–15.7)
LIPASE SERPL-CCNC: 79 U/L (ref 73–393)
LYMPHOCYTES # BLD AUTO: 0.2 10E9/L (ref 0.8–5.3)
LYMPHOCYTES NFR BLD AUTO: 8.6 %
MCH RBC QN AUTO: 28.4 PG (ref 26.5–33)
MCHC RBC AUTO-ENTMCNC: 31.5 G/DL (ref 31.5–36.5)
MCV RBC AUTO: 90 FL (ref 78–100)
MONOCYTES # BLD AUTO: 0.1 10E9/L (ref 0–1.3)
MONOCYTES NFR BLD AUTO: 5.9 %
NEUTROPHILS # BLD AUTO: 1.8 10E9/L (ref 1.6–8.3)
NEUTROPHILS NFR BLD AUTO: 81.8 %
PLATELET # BLD AUTO: 226 10E9/L (ref 150–450)
RBC # BLD AUTO: 3.42 10E12/L (ref 3.8–5.2)
WBC # BLD AUTO: 2.2 10E9/L (ref 4–11)

## 2017-02-21 PROCEDURE — 36415 COLL VENOUS BLD VENIPUNCTURE: CPT | Performed by: FAMILY MEDICINE

## 2017-02-21 PROCEDURE — 80048 BASIC METABOLIC PNL TOTAL CA: CPT | Performed by: FAMILY MEDICINE

## 2017-02-21 PROCEDURE — 80197 ASSAY OF TACROLIMUS: CPT | Performed by: FAMILY MEDICINE

## 2017-02-21 PROCEDURE — 83690 ASSAY OF LIPASE: CPT | Performed by: FAMILY MEDICINE

## 2017-02-21 PROCEDURE — 80180 DRUG SCRN QUAN MYCOPHENOLATE: CPT | Performed by: FAMILY MEDICINE

## 2017-02-21 PROCEDURE — 82150 ASSAY OF AMYLASE: CPT | Performed by: FAMILY MEDICINE

## 2017-02-21 PROCEDURE — 85025 COMPLETE CBC W/AUTO DIFF WBC: CPT | Performed by: FAMILY MEDICINE

## 2017-02-21 RX ORDER — MYCOPHENOLIC ACID 180 MG/1
360 TABLET, DELAYED RELEASE ORAL 2 TIMES DAILY
Qty: 180 TABLET | Refills: 3
Start: 2017-02-21 | End: 2017-05-22

## 2017-02-21 RX ORDER — FLUDROCORTISONE ACETATE 0.1 MG/1
0.1 TABLET ORAL
Qty: 14 TABLET | Refills: 6 | Status: SHIPPED | OUTPATIENT
Start: 2017-02-22 | End: 2017-06-11

## 2017-02-21 NOTE — TELEPHONE ENCOUNTER
"----- Message from Mauricio Marquez MD sent at 2/21/2017 10:59 AM CST -----  Would decrease mycophenolic acid to 360 mg bid and restart Florinef 0.1 mg 3x per week.  Okay to give IV fluids if needed.    She should be getting CMV PCR weekly.    Eduard  ----- Message -----     From: Swapna Peres RN     Sent: 2/21/2017   8:42 AM       To: Mauricio Marquez MD    Is going to get her labs done today including a follow up CMV.   She states that for about the past 2 weeks she has been sleeping about 16 hrs/ day and feels \"crummy\". Since she is sleeping so much, she is not eating and drinking as much and has lost about 18# since transplant.  Now she is battling low BP again. She is trying to eat and drink more.  When we discussed Midodrine and  she reminded me that there was a black box warning concerning Midodrine and her having coronary arthrosclerosis.  She knows to use abdominal binders and compression stockings.  Anything else?    "

## 2017-02-22 DIAGNOSIS — Z94.0 KIDNEY REPLACED BY TRANSPLANT: ICD-10-CM

## 2017-02-22 LAB
AMYLASE SERPL-CCNC: 78 U/L (ref 30–110)
ANION GAP SERPL CALCULATED.3IONS-SCNC: 9 MMOL/L (ref 3–14)
BUN SERPL-MCNC: 19 MG/DL (ref 7–30)
CALCIUM SERPL-MCNC: 8.9 MG/DL (ref 8.5–10.1)
CHLORIDE SERPL-SCNC: 111 MMOL/L (ref 94–109)
CMV DNA SPEC NAA+PROBE-ACNC: ABNORMAL [IU]/ML
CMV DNA SPEC NAA+PROBE-LOG#: 4.3 {LOG_IU}/ML
CO2 SERPL-SCNC: 22 MMOL/L (ref 20–32)
CREAT SERPL-MCNC: 1.28 MG/DL (ref 0.52–1.04)
GFR SERPL CREATININE-BSD FRML MDRD: 45 ML/MIN/1.7M2
GLUCOSE SERPL-MCNC: 89 MG/DL (ref 70–99)
POTASSIUM SERPL-SCNC: 4 MMOL/L (ref 3.4–5.3)
SODIUM SERPL-SCNC: 142 MMOL/L (ref 133–144)
SPECIMEN SOURCE: ABNORMAL
TACROLIMUS BLD-MCNC: 4.9 UG/L (ref 5–15)
TME LAST DOSE: ABNORMAL H

## 2017-02-22 NOTE — TELEPHONE ENCOUNTER
Patient states accurate prograf level and she is already taking 0.2 mg twice per day, per orders of Swapna Thell to increase prograf to 0.3 mg every morning and 0.2 mg every evening.

## 2017-02-22 NOTE — TELEPHONE ENCOUNTER
Tac level is 4.9- verify that this was a 12 hour trough, then increase dose from 0.2 in am and 0.1 in pm to 0.2 mg bid

## 2017-02-23 ENCOUNTER — TELEPHONE (OUTPATIENT)
Dept: TRANSPLANT | Facility: CLINIC | Age: 42
End: 2017-02-23

## 2017-02-23 DIAGNOSIS — Z94.83 PANCREAS REPLACED BY TRANSPLANT (H): Primary | ICD-10-CM

## 2017-02-23 DIAGNOSIS — B25.9 CMV (CYTOMEGALOVIRUS INFECTION) (H): ICD-10-CM

## 2017-02-23 LAB
MYCOPHENOLATE SERPL LC/MS/MS-MCNC: 1.2 MG/L (ref 1–3.5)
MYCOPHENOLATE-G SERPL LC/MS/MS-MCNC: 45.2 MG/L (ref 30–95)
TME LAST DOSE: NORMAL H

## 2017-02-24 DIAGNOSIS — Z94.0 KIDNEY REPLACED BY TRANSPLANT: ICD-10-CM

## 2017-03-01 ENCOUNTER — OFFICE VISIT (OUTPATIENT)
Dept: ORTHOPEDICS | Facility: CLINIC | Age: 42
End: 2017-03-01

## 2017-03-01 DIAGNOSIS — E10.8 TYPE 1 DIABETES MELLITUS WITH COMPLICATION (H): ICD-10-CM

## 2017-03-01 DIAGNOSIS — L97.522 ULCER OF LEFT FOOT, WITH FAT LAYER EXPOSED (H): Primary | ICD-10-CM

## 2017-03-01 NOTE — LETTER
3/1/2017       RE: Rose Castaneda  [address] [facility] [facility] [address]     Dear Colleague,    Thank you for referring your patient, Rose Castaneda, to the [facility] at [facility]. Please see a copy of my visit note below.    Chief Complaint: No chief complaint on file.         Allergies   Allergen Reactions     Ciprofloxacin Nausea     Pollen Extract Other (See Comments)     Seasonal allergies     Pyridostigmine Other (See Comments)     Spastic muscle motion in the face and legs, weakness in the arms. Slight swelling of the tongue.         Subjective: Rose is a 41 year old female who presents to the clinic today for a follow up of left foot wound. She relates that she was walking a little more than usual over the last week and she has noticed that the wound has started to leak a clear fluid. She relates that she has had a spike in her CMV numbers. She is currently on immunosuppressive drugs.      Objective      A diabetic wound is noted at left  plantar foot measuring 2 cm x 2 cm x 0.3 cm. The original wound is surrounded by a flaccid friction bullae from 1 to 3 O'Clock. This is the source of the increased drainage. This was deroofed today and is deep to the dermis.     Block Classification: II    Wound base: Red  Yellow/Granulation, slough    Edges: Macerated/hyperkeratotic    Drainage: moderate/serous    Odor: None     Undermining: Yes     Bone Exposure: No    Clinical Signs of Infection: No    After obtaining patient consent, all devitalized tissue was sharply removed from the wound base to the level of healthy granular tissue, using sharp dissection techniques. The wound base and edges exhibited minimal bleeding.        Assessment: Wound on the plantar left foot - increased in size from the pressure bullae.     Plan:   - Pt seen and evaluated  - Debrided the wound as described. Applied cedrick to the wound and covered with DSD. She is to  only change the gauze dressing and do not scrub out the wound for at least 72 hours. On Sunday, she can transition back to the MediHoney dressings.   - The CAM is not efficiently minimizing her plantar pressures. I dispensed DH2 shoe to be worn at all times when walking. It was modified for the wound.   - Pt to return to clinic in 2 weeks.      Again, thank you for allowing me to participate in the care of your patient.      Sincerely,    Darius Parkinson DPM

## 2017-03-01 NOTE — PROGRESS NOTES
Chief Complaint: No chief complaint on file.         Allergies   Allergen Reactions     Ciprofloxacin Nausea     Pollen Extract Other (See Comments)     Seasonal allergies     Pyridostigmine Other (See Comments)     Spastic muscle motion in the face and legs, weakness in the arms. Slight swelling of the tongue.         Subjective: Rose is a 41 year old female who presents to the clinic today for a follow up of left foot wound. She relates that she was walking a little more than usual over the last week and she has noticed that the wound has started to leak a clear fluid. She relates that she has had a spike in her CMV numbers. She is currently on immunosuppressive drugs.      Objective      A diabetic wound is noted at left  plantar foot measuring 2 cm x 2 cm x 0.3 cm. The original wound is surrounded by a flaccid friction bullae from 1 to 3 O'Clock. This is the source of the increased drainage. This was deroofed today and is deep to the dermis.     Block Classification: II    Wound base: Red  Yellow/Granulation, slough    Edges: Macerated/hyperkeratotic    Drainage: moderate/serous    Odor: None     Undermining: Yes     Bone Exposure: No    Clinical Signs of Infection: No    After obtaining patient consent, all devitalized tissue was sharply removed from the wound base to the level of healthy granular tissue, using sharp dissection techniques. The wound base and edges exhibited minimal bleeding.        Assessment: Wound on the plantar left foot - increased in size from the pressure bullae.     Plan:   - Pt seen and evaluated  - Debrided the wound as described. Applied cedrick to the wound and covered with DSD. She is to only change the gauze dressing and do not scrub out the wound for at least 72 hours. On Sunday, she can transition back to the MediHoney dressings.   - The CAM is not efficiently minimizing her plantar pressures. I dispensed DH2 shoe to be worn at all times when walking. It was modified for the  wound.   - Pt to return to clinic in 2 weeks.

## 2017-03-01 NOTE — PATIENT INSTRUCTIONS
Wound care instructions:   Do not wash wound for 72 hours, but change the gauze wrap daily.    On Monday 3/5/17, you can clean the wound out with saline or microklenz like normal. Then, apply medihoney to the wound and cover with 4x4 gauze and a kerlix wrap.    Change dressing daily. Always wear DH2 shoe when ambulating.

## 2017-03-01 NOTE — NURSING NOTE
Reason For Visit:   Chief Complaint   Patient presents with     Wound Check     Diabetic Ulcer, Left Foot.       Pain Assessment  Patient Currently in Pain: Yes  0-10 Pain Scale: 1  Primary Pain Location: Foot  Pain Orientation: Left               Current Outpatient Prescriptions   Medication Sig Dispense Refill     tacrolimus (PROGRAF - GENERIC EQUIVALENT) 1 mg/mL suspension 0.3 mg every morning and 0.2 mg every evening. 15 mL 6     mycophenolic acid (MYFORTIC - GENERIC EQUIVALENT) 180 MG EC tablet Take 2 tablets (360 mg) by mouth 2 times daily 180 tablet 3     fludrocortisone (FLORINEF) 0.1 MG tablet Take 1 tablet (0.1 mg) by mouth three times a week 14 tablet 6     valGANciclovir (VALCYTE) 450 MG tablet Take 1 tablet (450 mg) by mouth 2 times daily 180 tablet 3     sulfamethoxazole-trimethoprim (BACTRIM/SEPTRA) 400-80 MG per tablet Take 1 tablet by mouth Every Mon, Wed, Fri Morning 40 tablet 3     order for DME Equipment being ordered: Lynnette ()  Treatment Diagnosis: impaired gait 1 each 0     vitamin D (ERGOCALCIFEROL) 13437 UNIT capsule Take 1 capsule (50,000 Units) by mouth once a week (Patient taking differently: Take 50,000 Units by mouth every 30 days ) 13 capsule 3     pravastatin (PRAVACHOL) 20 MG tablet Take 1 tablet (20 mg) by mouth daily 90 tablet 3     aspirin  MG EC tablet Take 1 tablet (325 mg) by mouth daily 60 tablet 3     itraconazole (SPORANOX) 100 MG capsule Take by mouth 2 times daily        citalopram (CELEXA) 10 MG tablet Take 20 mg by mouth daily        Levothyroxine Sodium (SYNTHROID PO) Take 50 mcg by mouth daily        cetirizine (ZYRTEC ALLERGY) 10 MG tablet Take 1 tablet by mouth daily.            Allergies   Allergen Reactions     Ciprofloxacin Nausea     Pollen Extract Other (See Comments)     Seasonal allergies     Pyridostigmine Other (See Comments)     Spastic muscle motion in the face and legs, weakness in the arms. Slight swelling of the tongue.

## 2017-03-01 NOTE — MR AVS SNAPSHOT
After Visit Summary   3/1/2017    Rose Castaneda    MRN: [id number]           Patient Information     Date Of Birth          1975        Visit Information        Provider Department      3/1/2017 11:20 AM Darius Parkinson DPM [facility]        Today's Diagnoses     Type 1 diabetes mellitus with complication (H)    -  1    Ulcer of left foot, with fat layer exposed (H)          Care Instructions    Wound care instructions:   Do not wash wound for 72 hours, but change the gauze wrap daily.    On Monday 3/5/17, you can clean the wound out with saline or microklenz like normal. Then, apply medihoney to the wound and cover with 4x4 gauze and a kerlix wrap.    Change dressing daily. Always wear DH2 shoe when ambulating.        Follow-ups after your visit        Follow-up notes from your care team     Return in about 1 week (around 3/8/2017).      Your next 10 appointments already scheduled     Mar 17, 2017 11:40 AM CDT   (Arrive by 11:25 AM)   RETURN FOOT/ANKLE with Darius Parkinson DPM   [facility] ([facility])    [address] [place] [address] [phone]              Who to contact     Please call your clinic at [phone] to:    Ask questions about your health    Make or cancel appointments    Discuss your medicines    Learn about your test results    Speak to your doctor   If you have compliments or concerns about an experience at your clinic, or if you wish to file a complaint, please contact [facility] Patient Relations at [phone] or email us at [email or web address]         Additional Information About Your Visit        MyChart Information     Glokalise is an electronic gateway that provides easy, online access to your medical records. With Glokalise, you can request a clinic appointment, read your test results, renew a prescription or communicate with  your care team.     To sign up for [email or web address] visit the website at [email or web address]   You will be asked to enter the access code listed below, as well as some personal information. Please follow the directions to create your username and password.     Your access code is: [email or web address]  Expires: 3/30/2017  2:52 PM     Your access code will  in 90 days. If you need help or a new code, please contact your [facility] or call [phone] for assistance.        Care EveryWhere ID     This is your Care EveryWhere ID. This could be used by other organizations to access your [place] medical records  [id number]         Blood Pressure from Last 3 Encounters:   17 101/66   16 107/71   16 102/67    Weight from Last 3 Encounters:   17 50.3 kg (110 lb 14.4 oz)   17 52.2 kg (115 lb)   16 52.7 kg (116 lb 1.6 oz)              Today, you had the following     No orders found for display         Today's Medication Changes          These changes are accurate as of: 3/1/17 12:01 PM.  If you have any questions, ask your nurse or doctor.               These medicines have changed or have updated prescriptions.        Dose/Directions    vitamin D 08375 UNIT capsule   Commonly known as:  ERGOCALCIFEROL   This may have changed:  when to take this   Used for:  Kidney replaced by transplant, Pancreas replaced by transplant (H), Low calcium levels        Dose:  32233 Units   Take 1 capsule (50,000 Units) by mouth once a week   Quantity:  13 capsule   Refills:  3                Primary Care Provider Office Phone # Fax #    Hafsa Orozco [phone]       [facility] [address] [place] [address] DR [place] [phone]        Thank you!     Thank you for choosing [facility]  for your care. Our goal is always to provide you with excellent care. Hearing back from our patients is one way we can continue to improve our services. Please take  a few minutes to complete the written survey that you may receive in the mail after your visit with us. Thank you!             Your Updated Medication List - Protect others around you: Learn how to safely use, store and throw away your medicines at www.disposemymeds.org.          This list is accurate as of: 3/1/17 12:01 PM.  Always use your most recent med list.                   Brand Name Dispense Instructions for use    aspirin 325 MG EC tablet     60 tablet    Take 1 tablet (325 mg) by mouth daily       citalopram 10 MG tablet    celeXA     Take 20 mg by mouth daily       fludrocortisone 0.1 MG tablet    FLORINEF    14 tablet    Take 1 tablet (0.1 mg) by mouth three times a week       itraconazole 100 MG capsule    SPORANOX     Take by mouth 2 times daily       mycophenolic acid 180 MG EC tablet    MYFORTIC - GENERIC EQUIVALENT    180 tablet    Take 2 tablets (360 mg) by mouth 2 times daily       order for DME     1 each    Equipment being ordered: Lynnette () Treatment Diagnosis: impaired gait       pravastatin 20 MG tablet    PRAVACHOL    90 tablet    Take 1 tablet (20 mg) by mouth daily       sulfamethoxazole-trimethoprim 400-80 MG per tablet    BACTRIM/SEPTRA    40 tablet    Take 1 tablet by mouth Every Mon, Wed, Fri Morning       SYNTHROID PO      Take 50 mcg by mouth daily       tacrolimus 1 mg/mL suspension    PROGRAF - GENERIC EQUIVALENT    15 mL    0.3 mg every morning and 0.2 mg every evening.       valGANciclovir 450 MG tablet    VALCYTE    180 tablet    Take 1 tablet (450 mg) by mouth 2 times daily       vitamin D 25040 UNIT capsule    ERGOCALCIFEROL    13 capsule    Take 1 capsule (50,000 Units) by mouth once a week       ZYRTEC ALLERGY 10 MG tablet   Generic drug:  cetirizine      Take 1 tablet by mouth daily.

## 2017-03-07 ENCOUNTER — TELEPHONE (OUTPATIENT)
Dept: NEPHROLOGY | Facility: CLINIC | Age: 42
End: 2017-03-07

## 2017-03-07 NOTE — TELEPHONE ENCOUNTER
Aundrea Richardson called to advise neph clinic she will be pt's [employer]  for things like claims, other assistance. Phone: [phone]. Detailed VM fine. Fax: [phone]. Sent to neph pool.

## 2017-03-09 DIAGNOSIS — Z94.83 PANCREAS REPLACED BY TRANSPLANT (H): ICD-10-CM

## 2017-03-09 DIAGNOSIS — B25.9 CMV (CYTOMEGALOVIRUS INFECTION) (H): ICD-10-CM

## 2017-03-09 LAB
AMYLASE SERPL-CCNC: 55 U/L (ref 30–110)
ANION GAP SERPL CALCULATED.3IONS-SCNC: 10 MMOL/L (ref 3–14)
BASOPHILS # BLD AUTO: 0 10E9/L (ref 0–0.2)
BASOPHILS NFR BLD AUTO: 0.2 %
BUN SERPL-MCNC: 27 MG/DL (ref 7–30)
CALCIUM SERPL-MCNC: 8.5 MG/DL (ref 8.5–10.1)
CHLORIDE SERPL-SCNC: 113 MMOL/L (ref 94–109)
CO2 SERPL-SCNC: 19 MMOL/L (ref 20–32)
CREAT SERPL-MCNC: 1.11 MG/DL (ref 0.52–1.04)
DIFFERENTIAL METHOD BLD: ABNORMAL
EOSINOPHIL # BLD AUTO: 0.1 10E9/L (ref 0–0.7)
EOSINOPHIL NFR BLD AUTO: 2.9 %
ERYTHROCYTE [DISTWIDTH] IN BLOOD BY AUTOMATED COUNT: 14.4 % (ref 10–15)
GFR SERPL CREATININE-BSD FRML MDRD: 54 ML/MIN/1.7M2
GLUCOSE SERPL-MCNC: 74 MG/DL (ref 70–99)
HCT VFR BLD AUTO: 26.6 % (ref 35–47)
HGB BLD-MCNC: 8.6 G/DL (ref 11.7–15.7)
LIPASE SERPL-CCNC: 118 U/L (ref 73–393)
LYMPHOCYTES # BLD AUTO: 0.5 10E9/L (ref 0.8–5.3)
LYMPHOCYTES NFR BLD AUTO: 11.9 %
MCH RBC QN AUTO: 29.4 PG (ref 26.5–33)
MCHC RBC AUTO-ENTMCNC: 32.3 G/DL (ref 31.5–36.5)
MCV RBC AUTO: 91 FL (ref 78–100)
MONOCYTES # BLD AUTO: 0.3 10E9/L (ref 0–1.3)
MONOCYTES NFR BLD AUTO: 7.4 %
NEUTROPHILS # BLD AUTO: 3.5 10E9/L (ref 1.6–8.3)
NEUTROPHILS NFR BLD AUTO: 77.6 %
PLATELET # BLD AUTO: 292 10E9/L (ref 150–450)
POTASSIUM SERPL-SCNC: 3.7 MMOL/L (ref 3.4–5.3)
RBC # BLD AUTO: 2.93 10E12/L (ref 3.8–5.2)
SODIUM SERPL-SCNC: 142 MMOL/L (ref 133–144)
TACROLIMUS BLD-MCNC: 7.2 UG/L (ref 5–15)
TME LAST DOSE: NORMAL H
WBC # BLD AUTO: 4.5 10E9/L (ref 4–11)

## 2017-03-09 PROCEDURE — 36415 COLL VENOUS BLD VENIPUNCTURE: CPT | Performed by: FAMILY MEDICINE

## 2017-03-09 PROCEDURE — 82150 ASSAY OF AMYLASE: CPT | Performed by: FAMILY MEDICINE

## 2017-03-09 PROCEDURE — 80197 ASSAY OF TACROLIMUS: CPT | Performed by: FAMILY MEDICINE

## 2017-03-09 PROCEDURE — 83690 ASSAY OF LIPASE: CPT | Performed by: FAMILY MEDICINE

## 2017-03-09 PROCEDURE — 85025 COMPLETE CBC W/AUTO DIFF WBC: CPT | Performed by: FAMILY MEDICINE

## 2017-03-09 PROCEDURE — 80048 BASIC METABOLIC PNL TOTAL CA: CPT | Performed by: FAMILY MEDICINE

## 2017-03-10 LAB
CMV DNA SPEC NAA+PROBE-ACNC: 1246 [IU]/ML
CMV DNA SPEC NAA+PROBE-LOG#: 3.1 {LOG_IU}/ML
SPECIMEN SOURCE: ABNORMAL

## 2017-03-17 ENCOUNTER — OFFICE VISIT (OUTPATIENT)
Dept: ORTHOPEDICS | Facility: CLINIC | Age: 42
End: 2017-03-17

## 2017-03-17 DIAGNOSIS — E10.8 TYPE 1 DIABETES MELLITUS WITH COMPLICATION (H): ICD-10-CM

## 2017-03-17 DIAGNOSIS — L97.522 ULCER OF LEFT FOOT, WITH FAT LAYER EXPOSED (H): Primary | ICD-10-CM

## 2017-03-17 NOTE — LETTER
3/17/2017       RE: Rose Castaneda  [address] [address]     Dear Colleague,    Thank you for referring your patient, Rose Castaneda, to the [facility] at [facility]. Please see a copy of my visit note below.    1.1 x 1  Chief Complaint:   Chief Complaint   Patient presents with     WOUND CARE     Follow up left diabetic plantar foot wound.           Allergies   Allergen Reactions     Ciprofloxacin Nausea     Pollen Extract Other (See Comments)     Seasonal allergies     Pyridostigmine Other (See Comments)     Spastic muscle motion in the face and legs, weakness in the arms. Slight swelling of the tongue.         Subjective: Rose is a 41 year old female who presents to the clinic today for a follow up of left plantar foot wound. She relates that a few days after the last visit, her foot got very red and swollen. She was going to go to the ED, but the swelling started to reduce. She notes that she has decreased the size of the dressing and she has noticed that the foot sinks better into the hole in the DH2 shoe.     Objective      A diabetic wound is noted at left  plantar foot measuring 1.1cm x 1cm x 1mm.    Block Classification: II    Wound base: Red/Granulation    Edges: intact    Drainage: light/serous    Odor: no    Undermining: no    Bone Exposure: No    Clinical Signs of Infection: No    After obtaining patient consent, all devitalized tissue was sharply removed from the wound base to the level of healthy granular tissue, using sharp dissection techniques. The wound base and edges exhibited some bleeding.        Assessment: left plantar midfoot wound -has contracted since the last visit.     Plan:   - Pt seen and evaluated  - Wound was debrided as described. It has contracted by 50%. Unsure of th etiology of the swelling, however she may be allergic to the Renetta. I changed the dressing to Silvercel and a 4x4. If the foot  begins to swell again, she may be allergic to the silver. She should then stop and go back to [facility].   - Pt to return to clinic in 2 weeks      Again, thank you for allowing me to participate in the care of your patient.      Sincerely,    Darius Parkinson DPM

## 2017-03-17 NOTE — MR AVS SNAPSHOT
After Visit Summary   3/17/2017    Rose Castaneda    MRN: [id number]           Patient Information     Date Of Birth          1975        Visit Information        Provider Department      3/17/2017 11:40 AM Darius Parkinson DPM [facility]        Today's Diagnoses     Ulcer of left foot, with fat layer exposed (H)    -  1    Type 1 diabetes mellitus with complication (H)           Follow-ups after your visit        Your next 10 appointments already scheduled     Apr 03, 2017  1:00 PM CDT   (Arrive by 12:45 PM)   RETURN FOOT/ANKLE with Darius Parkinson DPM   [facility] ([facility])    [address]  4th Floor  [facility] [address] [phone]            Jun 09, 2017  1:20 PM CDT   (Arrive by 12:50 PM)   Return Kidney Transplant with Mauricio Marquez MD   [facility] ([facility])    [address]  3rd Floor  [facility] [address] [phone]              Who to contact     Please call your clinic at [phone] to:    Ask questions about your health    Make or cancel appointments    Discuss your medicines    Learn about your test results    Speak to your doctor   If you have compliments or concerns about an experience at your clinic, or if you wish to file a complaint, please contact [facility] Patient Relations at [phone] or email us at [email or web address]         Additional Information About Your Visit        MyChart Information     wmbly is an electronic gateway that provides easy, online access to your medical records. With wmbly, you can request a clinic appointment, read your test results, renew a prescription or communicate with your care team.     To sign up for Zazengot visit the website at [email or web address]   You will be asked to enter the access code listed below, as well as some personal  information. Please follow the directions to create your username and password.     Your access code is: [email or web address]  Expires: 3/30/2017  3:52 PM     Your access code will  in 90 days. If you need help or a new code, please contact your [facility] or call [phone] for assistance.        Care EveryWhere ID     This is your Care EveryWhere ID. This could be used by other organizations to access your [place] medical records  [id number]         Blood Pressure from Last 3 Encounters:   17 101/66   16 107/71   16 102/67    Weight from Last 3 Encounters:   17 50.3 kg (110 lb 14.4 oz)   17 52.2 kg (115 lb)   16 52.7 kg (116 lb 1.6 oz)              We Performed the Following     DEBRIDEMENT WOUND UP TO 20 SQ CM          Today's Medication Changes          These changes are accurate as of: 3/17/17 12:05 PM.  If you have any questions, ask your nurse or doctor.               These medicines have changed or have updated prescriptions.        Dose/Directions    vitamin D 79843 UNIT capsule   Commonly known as:  ERGOCALCIFEROL   This may have changed:  when to take this   Used for:  Kidney replaced by transplant, Pancreas replaced by transplant (H), Low calcium levels        Dose:  86848 Units   Take 1 capsule (50,000 Units) by mouth once a week   Quantity:  13 capsule   Refills:  3                Primary Care Provider Office Phone # Fax #    Hafsa Orozco [phone]       [facility] [address] [place] [address] DR [place] [phone]        Thank you!     Thank you for choosing [facility]  for your care. Our goal is always to provide you with excellent care. Hearing back from our patients is one way we can continue to improve our services. Please take a few minutes to complete the written survey that you may receive in the mail after your visit with us. Thank you!             Your Updated Medication List - Protect others  around you: Learn how to safely use, store and throw away your medicines at www.disposemymeds.org.          This list is accurate as of: 3/17/17 12:05 PM.  Always use your most recent med list.                   Brand Name Dispense Instructions for use    aspirin 325 MG EC tablet     60 tablet    Take 1 tablet (325 mg) by mouth daily       citalopram 10 MG tablet    celeXA     Take 20 mg by mouth daily       fludrocortisone 0.1 MG tablet    FLORINEF    14 tablet    Take 1 tablet (0.1 mg) by mouth three times a week       itraconazole 100 MG capsule    SPORANOX     Take by mouth 2 times daily       mycophenolic acid 180 MG EC tablet    MYFORTIC - GENERIC EQUIVALENT    180 tablet    Take 2 tablets (360 mg) by mouth 2 times daily       order for DME     1 each    Equipment being ordered: Lynnette () Treatment Diagnosis: impaired gait       pravastatin 20 MG tablet    PRAVACHOL    90 tablet    Take 1 tablet (20 mg) by mouth daily       sulfamethoxazole-trimethoprim 400-80 MG per tablet    BACTRIM/SEPTRA    40 tablet    Take 1 tablet by mouth Every Mon, Wed, Fri Morning       SYNTHROID PO      Take 50 mcg by mouth daily       tacrolimus 1 mg/mL suspension    PROGRAF - GENERIC EQUIVALENT    15 mL    0.3 mg every morning and 0.2 mg every evening.       valGANciclovir 450 MG tablet    VALCYTE    180 tablet    Take 1 tablet (450 mg) by mouth 2 times daily       vitamin D 67285 UNIT capsule    ERGOCALCIFEROL    13 capsule    Take 1 capsule (50,000 Units) by mouth once a week       ZITHROMAX Z-RAMONA PO      Take 250 mg by mouth daily       ZYRTEC ALLERGY 10 MG tablet   Generic drug:  cetirizine      Take 1 tablet by mouth daily.

## 2017-03-17 NOTE — PROGRESS NOTES
1.1 x 1  Chief Complaint:   Chief Complaint   Patient presents with     WOUND CARE     Follow up left diabetic plantar foot wound.           Allergies   Allergen Reactions     Ciprofloxacin Nausea     Pollen Extract Other (See Comments)     Seasonal allergies     Pyridostigmine Other (See Comments)     Spastic muscle motion in the face and legs, weakness in the arms. Slight swelling of the tongue.         Subjective: Rose is a 41 year old female who presents to the clinic today for a follow up of left plantar foot wound. She relates that a few days after the last visit, her foot got very red and swollen. She was going to go to the ED, but the swelling started to reduce. She notes that she has decreased the size of the dressing and she has noticed that the foot sinks better into the hole in the DH2 shoe.     Objective      A diabetic wound is noted at left  plantar foot measuring 1.1cm x 1cm x 1mm.    Block Classification: II    Wound base: Red/Granulation    Edges: intact    Drainage: light/serous    Odor: no    Undermining: no    Bone Exposure: No    Clinical Signs of Infection: No    After obtaining patient consent, all devitalized tissue was sharply removed from the wound base to the level of healthy granular tissue, using sharp dissection techniques. The wound base and edges exhibited some bleeding.        Assessment: left plantar midfoot wound -has contracted since the last visit.     Plan:   - Pt seen and evaluated  - Wound was debrided as described. It has contracted by 50%. Unsure of th etiology of the swelling, however she may be allergic to the Renetta. I changed the dressing to Silvercel and a 4x4. If the foot begins to swell again, she may be allergic to the silver. She should then stop and go back to [facility].   - Pt to return to clinic in 2 weeks

## 2017-03-17 NOTE — NURSING NOTE
Reason For Visit:   Chief Complaint   Patient presents with     WOUND CARE     Follow up left diabetic plantar foot wound.        Pain Assessment  Patient Currently in Pain: No

## 2017-03-27 ENCOUNTER — TELEPHONE (OUTPATIENT)
Dept: TRANSPLANT | Facility: CLINIC | Age: 42
End: 2017-03-27

## 2017-03-27 NOTE — TELEPHONE ENCOUNTER
Pt had pneumococcal 23 in 2010 so can get the [id number]. She was advised to follow her PCP's advise on the frequency of pap's.

## 2017-03-27 NOTE — TELEPHONE ENCOUNTER
Rose just saw PCP for yearly and she wants to know if she can have the pneumonia vaccine (Prevnar) and if she can now get her paps every 3 years rather than yearly. Please call her at [phone]

## 2017-03-28 DIAGNOSIS — Z48.298 AFTERCARE FOLLOWING ORGAN TRANSPLANT: ICD-10-CM

## 2017-03-28 DIAGNOSIS — T86.899 COMPLICATION OF TRANSPLANTED PANCREAS: ICD-10-CM

## 2017-03-28 DIAGNOSIS — Z94.0 KIDNEY REPLACED BY TRANSPLANT: ICD-10-CM

## 2017-03-28 DIAGNOSIS — B25.9 CMV (CYTOMEGALOVIRUS INFECTION) (H): ICD-10-CM

## 2017-03-28 DIAGNOSIS — Z94.83 PANCREAS REPLACED BY TRANSPLANT (H): ICD-10-CM

## 2017-03-28 LAB
AMYLASE SERPL-CCNC: 67 U/L (ref 30–110)
ANION GAP SERPL CALCULATED.3IONS-SCNC: 9 MMOL/L (ref 3–14)
BASOPHILS # BLD AUTO: 0.1 10E9/L (ref 0–0.2)
BASOPHILS NFR BLD AUTO: 1.5 %
BUN SERPL-MCNC: 21 MG/DL (ref 7–30)
CALCIUM SERPL-MCNC: 8.5 MG/DL (ref 8.5–10.1)
CHLORIDE SERPL-SCNC: 112 MMOL/L (ref 94–109)
CO2 SERPL-SCNC: 23 MMOL/L (ref 20–32)
CREAT SERPL-MCNC: 1.17 MG/DL (ref 0.52–1.04)
DIFFERENTIAL METHOD BLD: ABNORMAL
EOSINOPHIL # BLD AUTO: 0.2 10E9/L (ref 0–0.7)
EOSINOPHIL NFR BLD AUTO: 5.8 %
ERYTHROCYTE [DISTWIDTH] IN BLOOD BY AUTOMATED COUNT: 16.3 % (ref 10–15)
GFR SERPL CREATININE-BSD FRML MDRD: 50 ML/MIN/1.7M2
GLUCOSE SERPL-MCNC: 89 MG/DL (ref 70–99)
HCT VFR BLD AUTO: 31.1 % (ref 35–47)
HGB BLD-MCNC: 9.8 G/DL (ref 11.7–15.7)
LIPASE SERPL-CCNC: 97 U/L (ref 73–393)
LYMPHOCYTES # BLD AUTO: 0.5 10E9/L (ref 0.8–5.3)
LYMPHOCYTES NFR BLD AUTO: 14.8 %
MCH RBC QN AUTO: 30 PG (ref 26.5–33)
MCHC RBC AUTO-ENTMCNC: 31.5 G/DL (ref 31.5–36.5)
MCV RBC AUTO: 95 FL (ref 78–100)
MONOCYTES # BLD AUTO: 0.3 10E9/L (ref 0–1.3)
MONOCYTES NFR BLD AUTO: 8 %
NEUTROPHILS # BLD AUTO: 2.3 10E9/L (ref 1.6–8.3)
NEUTROPHILS NFR BLD AUTO: 69.9 %
PLATELET # BLD AUTO: 296 10E9/L (ref 150–450)
POTASSIUM SERPL-SCNC: 4.2 MMOL/L (ref 3.4–5.3)
RBC # BLD AUTO: 3.27 10E12/L (ref 3.8–5.2)
SODIUM SERPL-SCNC: 144 MMOL/L (ref 133–144)
TACROLIMUS BLD-MCNC: 7.5 UG/L (ref 5–15)
TME LAST DOSE: NORMAL H
WBC # BLD AUTO: 3.3 10E9/L (ref 4–11)

## 2017-03-28 PROCEDURE — 36415 COLL VENOUS BLD VENIPUNCTURE: CPT | Performed by: FAMILY MEDICINE

## 2017-03-28 PROCEDURE — 80180 DRUG SCRN QUAN MYCOPHENOLATE: CPT | Performed by: FAMILY MEDICINE

## 2017-03-28 PROCEDURE — 82150 ASSAY OF AMYLASE: CPT | Performed by: FAMILY MEDICINE

## 2017-03-28 PROCEDURE — 83690 ASSAY OF LIPASE: CPT | Performed by: FAMILY MEDICINE

## 2017-03-28 PROCEDURE — 80048 BASIC METABOLIC PNL TOTAL CA: CPT | Performed by: FAMILY MEDICINE

## 2017-03-28 PROCEDURE — 80197 ASSAY OF TACROLIMUS: CPT | Performed by: FAMILY MEDICINE

## 2017-03-28 PROCEDURE — 87799 DETECT AGENT NOS DNA QUANT: CPT | Performed by: FAMILY MEDICINE

## 2017-03-28 PROCEDURE — 85025 COMPLETE CBC W/AUTO DIFF WBC: CPT | Performed by: FAMILY MEDICINE

## 2017-03-29 LAB
CMV DNA SPEC NAA+PROBE-ACNC: ABNORMAL [IU]/ML
CMV DNA SPEC NAA+PROBE-LOG#: <2.1 {LOG_IU}/ML
MYCOPHENOLATE SERPL LC/MS/MS-MCNC: 2.48 MG/L (ref 1–3.5)
MYCOPHENOLATE-G SERPL LC/MS/MS-MCNC: 63.5 MG/L (ref 30–95)
SPECIMEN SOURCE: ABNORMAL
TME LAST DOSE: NORMAL H

## 2017-03-30 LAB
EBV DNA # SPEC NAA+PROBE: 1354 {COPIES}/ML
EBV DNA SPEC NAA+PROBE-LOG#: 3.1 {LOG_COPIES}/ML

## 2017-04-03 ENCOUNTER — OFFICE VISIT (OUTPATIENT)
Dept: ORTHOPEDICS | Facility: CLINIC | Age: 42
End: 2017-04-03

## 2017-04-03 DIAGNOSIS — L97.522 ULCER OF LEFT FOOT, WITH FAT LAYER EXPOSED (H): Primary | ICD-10-CM

## 2017-04-03 DIAGNOSIS — E10.8 TYPE 1 DIABETES MELLITUS WITH COMPLICATION (H): ICD-10-CM

## 2017-04-03 DIAGNOSIS — D84.9 IMMUNOSUPPRESSED STATUS (H): ICD-10-CM

## 2017-04-03 NOTE — NURSING NOTE
Reason For Visit:   Chief Complaint   Patient presents with     RECHECK     2 week follow up.  Diabetic wound, left plantar foot.       Pain Assessment  Patient Currently in Pain: Denies            Current Outpatient Prescriptions   Medication Sig Dispense Refill     Azithromycin (ZITHROMAX Z-RAMONA PO) Take 250 mg by mouth daily       tacrolimus (PROGRAF - GENERIC EQUIVALENT) 1 mg/mL suspension 0.3 mg every morning and 0.2 mg every evening. 15 mL 6     mycophenolic acid (MYFORTIC - GENERIC EQUIVALENT) 180 MG EC tablet Take 2 tablets (360 mg) by mouth 2 times daily 180 tablet 3     fludrocortisone (FLORINEF) 0.1 MG tablet Take 1 tablet (0.1 mg) by mouth three times a week 14 tablet 6     valGANciclovir (VALCYTE) 450 MG tablet Take 1 tablet (450 mg) by mouth 2 times daily 180 tablet 3     sulfamethoxazole-trimethoprim (BACTRIM/SEPTRA) 400-80 MG per tablet Take 1 tablet by mouth Every Mon, Wed, Fri Morning 40 tablet 3     order for DME Equipment being ordered: Lynnette ()  Treatment Diagnosis: impaired gait 1 each 0     vitamin D (ERGOCALCIFEROL) 31711 UNIT capsule Take 1 capsule (50,000 Units) by mouth once a week (Patient taking differently: Take 50,000 Units by mouth every 30 days ) 13 capsule 3     pravastatin (PRAVACHOL) 20 MG tablet Take 1 tablet (20 mg) by mouth daily 90 tablet 3     aspirin  MG EC tablet Take 1 tablet (325 mg) by mouth daily 60 tablet 3     itraconazole (SPORANOX) 100 MG capsule Take by mouth 2 times daily        citalopram (CELEXA) 10 MG tablet Take 20 mg by mouth daily        Levothyroxine Sodium (SYNTHROID PO) Take 50 mcg by mouth daily        cetirizine (ZYRTEC ALLERGY) 10 MG tablet Take 1 tablet by mouth daily.            Allergies   Allergen Reactions     Ciprofloxacin Nausea     Pollen Extract Other (See Comments)     Seasonal allergies     Pyridostigmine Other (See Comments)     Spastic muscle motion in the face and legs, weakness in the arms. Slight swelling of the tongue.

## 2017-04-03 NOTE — LETTER
4/3/2017       RE: Rose Castaneda  [address] [address]     Dear Colleague,    Thank you for referring your patient, Rose Castaneda, to the [facility] at [facility]. Please see a copy of my visit note below.    Chief Complaint:   Chief Complaint   Patient presents with     RECHECK     2 week follow up.  Diabetic wound, left plantar foot.          Allergies   Allergen Reactions     Ciprofloxacin Nausea     Pollen Extract Other (See Comments)     Seasonal allergies     Pyridostigmine Other (See Comments)     Spastic muscle motion in the face and legs, weakness in the arms. Slight swelling of the tongue.         Subjective: Rose is a 41 year old female who presents to the clinic today for a follow up of left foot wound. She relates that she has been using the SilverCel on the wound. There has been some drainage on the wound, but overall she is doing well. No new complaints today     Objective      A diabetic pressure wound is noted at left  plantar foot measuring 1cm x 1cm x 0.1cm.    Block Classification: II    Wound base: Red/Granulation    Edges: hyperkeratotic    Drainage: small/serous    Odor: no    Undermining: no    Bone Exposure: No    Clinical Signs of Infection: No    After obtaining patient consent, all devitalized tissue was sharply removed from the wound base to the level of healthy granular tissue, using sharp dissection techniques. The wound base and edges exhibited healthy bleeding.        Assessment: Wound on the plantar left foot    Plan:   - Pt seen and evaluated  - At this point, I feel as though the wound is stalling some. It may be possible that she will need to see Dr. Norton in order to have a resection of bone in order to help this heal. In the interim, I would like to try Regranex on the wound. I think that we may be able to see some closure with 1 to 2 tubes of the medications. I discussed the black box  warning with her and she would like to try it to get the wound closed. I have ordered this for her.   - Pt to return to clinic in 1 week with the Regranex.       Again, thank you for allowing me to participate in the care of your patient.      Sincerely,    Darius Parkinson DPM

## 2017-04-03 NOTE — MR AVS SNAPSHOT
After Visit Summary   4/3/2017    Rose Castaneda    MRN: [id number]           Patient Information     Date Of Birth          1975        Visit Information        Provider Department      4/3/2017 1:00 PM Darius Parkinson DPM [facility]         Follow-ups after your visit        Your next 10 appointments already scheduled     Apr 28, 2017  9:00 AM CDT   (Arrive by 8:45 AM)   RETURN FOOT/ANKLE with ALFA Reveles [facility] ([facility])    [address]  [address] [facility] [id number] [phone]            Jun 09, 2017  1:20 PM CDT   (Arrive by 12:50 PM)   Return Kidney Transplant with Mauricio Marquez MD   [facility] ([facility])    [address] [place] [address]  [address] [facility] [id number] [phone]              Who to contact     Please call your clinic at [phone] to:    Ask questions about your health    Make or cancel appointments    Discuss your medicines    Learn about your test results    Speak to your doctor   If you have compliments or concerns about an experience at your clinic, or if you wish to file a complaint, please contact [facility] Patient Relations at [phone] or email us at [email or web address]         Additional Information About Your Visit        MyChart Information     R[employer] Junglet is an electronic gateway that provides easy, online access to your medical records. With Motomotives, you can request a clinic appointment, read your test results, renew a prescription or communicate with your care team.     To sign up for [employer]let visit the website at [email or web address]   You will be asked to enter the access code listed below, as well as some personal information. Please follow the directions to create your username and password.     Your access code is: [email or web address]  Expires: 7/2/2017  2:03  PM     Your access code will  in 90 days. If you need help or a new code, please contact your [facility] or call [phone] for assistance.        Care EveryWhere ID     This is your Care EveryWhere ID. This could be used by other organizations to access your [facility] medical records  [id number]         Blood Pressure from Last 3 Encounters:   17 101/66   16 107/71   16 102/67    Weight from Last 3 Encounters:   17 50.3 kg (110 lb 14.4 oz)   17 52.2 kg (115 lb)   16 52.7 kg (116 lb 1.6 oz)              Today, you had the following     No orders found for display         Today's Medication Changes          These changes are accurate as of: 4/3/17  2:03 PM.  If you have any questions, ask your nurse or doctor.               These medicines have changed or have updated prescriptions.        Dose/Directions    vitamin D 13125 UNIT capsule   Commonly known as:  ERGOCALCIFEROL   This may have changed:  when to take this   Used for:  Kidney replaced by transplant, Pancreas replaced by transplant (H), Low calcium levels        Dose:  43143 Units   Take 1 capsule (50,000 Units) by mouth once a week   Quantity:  13 capsule   Refills:  3                Primary Care Provider Office Phone # Fax #    Hafsa JOSEPH Orozco [phone]       [facility] [address] [place] [address] DR [place]AN[place]A [place] [phone]        Thank you!     Thank you for choosing [facility]  for your care. Our goal is always to provide you with excellent care. Hearing back from our patients is one way we can continue to improve our services. Please take a few minutes to complete the written survey that you may receive in the mail after your visit with us. Thank you!             Your Updated Medication List - Protect others around you: Learn how to safely use, store and throw away your medicines at www.disposemymeds.org.          This list is accurate as of: 4/3/17   2:03 PM.  Always use your most recent med list.                   Brand Name Dispense Instructions for use    aspirin 325 MG EC tablet     60 tablet    Take 1 tablet (325 mg) by mouth daily       citalopram 10 MG tablet    celeXA     Take 20 mg by mouth daily       fludrocortisone 0.1 MG tablet    FLORINEF    14 tablet    Take 1 tablet (0.1 mg) by mouth three times a week       itraconazole 100 MG capsule    SPORANOX     Take by mouth 2 times daily       mycophenolic acid 180 MG EC tablet    MYFORTIC - GENERIC EQUIVALENT    180 tablet    Take 2 tablets (360 mg) by mouth 2 times daily       order for DME     1 each    Equipment being ordered: [employer] () Treatment Diagnosis: impaired gait       pravastatin 20 MG tablet    PRAVACHOL    90 tablet    Take 1 tablet (20 mg) by mouth daily       sulfamethoxazole-trimethoprim 400-80 MG per tablet    BACTRIM/SEPTRA    40 tablet    Take 1 tablet by mouth Every Mon, Wed, Fri Morning       SYNTHROID PO      Take 50 mcg by mouth daily       tacrolimus 1 mg/mL suspension    PROGRAF - GENERIC EQUIVALENT    15 mL    0.3 mg every morning and 0.2 mg every evening.       valGANciclovir 450 MG tablet    VALCYTE    180 tablet    Take 1 tablet (450 mg) by mouth 2 times daily       vitamin D 35379 UNIT capsule    ERGOCALCIFEROL    13 capsule    Take 1 capsule (50,000 Units) by mouth once a week       ZITHROMAX Z-RAMONA PO      Take 250 mg by mouth daily       ZYRTEC ALLERGY 10 MG tablet   Generic drug:  cetirizine      Take 1 tablet by mouth daily.

## 2017-04-05 NOTE — PROGRESS NOTES
Chief Complaint:   Chief Complaint   Patient presents with     RECHECK     2 week follow up.  Diabetic wound, left plantar foot.          Allergies   Allergen Reactions     Ciprofloxacin Nausea     Pollen Extract Other (See Comments)     Seasonal allergies     Pyridostigmine Other (See Comments)     Spastic muscle motion in the face and legs, weakness in the arms. Slight swelling of the tongue.         Subjective: Rose is a 41 year old female who presents to the clinic today for a follow up of left foot wound. She relates that she has been using the SilverCel on the wound. There has been some drainage on the wound, but overall she is doing well. No new complaints today     Objective      A diabetic pressure wound is noted at left  plantar foot measuring 1cm x 1cm x 0.1cm.    Block Classification: II    Wound base: Red/Granulation    Edges: hyperkeratotic    Drainage: small/serous    Odor: no    Undermining: no    Bone Exposure: No    Clinical Signs of Infection: No    After obtaining patient consent, all devitalized tissue was sharply removed from the wound base to the level of healthy granular tissue, using sharp dissection techniques. The wound base and edges exhibited healthy bleeding.        Assessment: Wound on the plantar left foot    Plan:   - Pt seen and evaluated  - At this point, I feel as though the wound is stalling some. It may be possible that she will need to see Dr. Norton in order to have a resection of bone in order to help this heal. In the interim, I would like to try Regranex on the wound. I think that we may be able to see some closure with 1 to 2 tubes of the medications. I discussed the black box warning with her and she would like to try it to get the wound closed. I have ordered this for her.   - Pt to return to clinic in 1 week with the Regranex.

## 2017-05-11 DIAGNOSIS — Z79.899 OTHER LONG TERM (CURRENT) DRUG THERAPY: ICD-10-CM

## 2017-05-11 DIAGNOSIS — Z94.83 PANCREAS REPLACED BY TRANSPLANT (H): ICD-10-CM

## 2017-05-11 DIAGNOSIS — B25.9 CMV (CYTOMEGALOVIRUS INFECTION) (H): ICD-10-CM

## 2017-05-11 DIAGNOSIS — Z94.0 KIDNEY REPLACED BY TRANSPLANT: ICD-10-CM

## 2017-05-11 DIAGNOSIS — T86.899 COMPLICATION OF TRANSPLANTED PANCREAS: ICD-10-CM

## 2017-05-11 DIAGNOSIS — Z48.298 AFTERCARE FOLLOWING ORGAN TRANSPLANT: ICD-10-CM

## 2017-05-11 LAB
ALBUMIN SERPL-MCNC: 3.5 G/DL (ref 3.4–5)
ALP SERPL-CCNC: 157 U/L (ref 40–150)
ALT SERPL W P-5'-P-CCNC: 40 U/L (ref 0–50)
AMYLASE SERPL-CCNC: 74 U/L (ref 30–110)
ANION GAP SERPL CALCULATED.3IONS-SCNC: 10 MMOL/L (ref 3–14)
AST SERPL W P-5'-P-CCNC: 35 U/L (ref 0–45)
BASOPHILS # BLD AUTO: 0 10E9/L (ref 0–0.2)
BASOPHILS NFR BLD AUTO: 0.6 %
BILIRUB DIRECT SERPL-MCNC: 0.1 MG/DL (ref 0–0.2)
BILIRUB SERPL-MCNC: 0.4 MG/DL (ref 0.2–1.3)
BUN SERPL-MCNC: 15 MG/DL (ref 7–30)
CALCIUM SERPL-MCNC: 8.6 MG/DL (ref 8.5–10.1)
CHLORIDE SERPL-SCNC: 109 MMOL/L (ref 94–109)
CHOLEST SERPL-MCNC: 151 MG/DL
CO2 SERPL-SCNC: 24 MMOL/L (ref 20–32)
CREAT SERPL-MCNC: 1.02 MG/DL (ref 0.52–1.04)
CREAT UR-MCNC: 141 MG/DL
DIFFERENTIAL METHOD BLD: ABNORMAL
EOSINOPHIL # BLD AUTO: 0.2 10E9/L (ref 0–0.7)
EOSINOPHIL NFR BLD AUTO: 5.8 %
ERYTHROCYTE [DISTWIDTH] IN BLOOD BY AUTOMATED COUNT: 14.6 % (ref 10–15)
GFR SERPL CREATININE-BSD FRML MDRD: 59 ML/MIN/1.7M2
GLUCOSE SERPL-MCNC: 84 MG/DL (ref 70–99)
HBA1C MFR BLD: 5 % (ref 4.3–6)
HCT VFR BLD AUTO: 35.4 % (ref 35–47)
HDLC SERPL-MCNC: 75 MG/DL
HGB BLD-MCNC: 11.3 G/DL (ref 11.7–15.7)
LDLC SERPL CALC-MCNC: 62 MG/DL
LIPASE SERPL-CCNC: 93 U/L (ref 73–393)
LYMPHOCYTES # BLD AUTO: 0.5 10E9/L (ref 0.8–5.3)
LYMPHOCYTES NFR BLD AUTO: 14.7 %
MCH RBC QN AUTO: 30.5 PG (ref 26.5–33)
MCHC RBC AUTO-ENTMCNC: 31.9 G/DL (ref 31.5–36.5)
MCV RBC AUTO: 96 FL (ref 78–100)
MICROALBUMIN UR-MCNC: 24 MG/L
MICROALBUMIN/CREAT UR: 17.09 MG/G CR (ref 0–25)
MONOCYTES # BLD AUTO: 0.2 10E9/L (ref 0–1.3)
MONOCYTES NFR BLD AUTO: 7.3 %
NEUTROPHILS # BLD AUTO: 2.2 10E9/L (ref 1.6–8.3)
NEUTROPHILS NFR BLD AUTO: 71.6 %
NONHDLC SERPL-MCNC: 76 MG/DL
PLATELET # BLD AUTO: 291 10E9/L (ref 150–450)
POTASSIUM SERPL-SCNC: 3.8 MMOL/L (ref 3.4–5.3)
PROT SERPL-MCNC: 6.7 G/DL (ref 6.8–8.8)
PROT UR-MCNC: 0.28 G/L
PROT/CREAT 24H UR: 0.2 G/G CR (ref 0–0.2)
RBC # BLD AUTO: 3.7 10E12/L (ref 3.8–5.2)
SODIUM SERPL-SCNC: 143 MMOL/L (ref 133–144)
TACROLIMUS BLD-MCNC: 5.7 UG/L (ref 5–15)
TME LAST DOSE: NORMAL H
TRIGL SERPL-MCNC: 71 MG/DL
WBC # BLD AUTO: 3.1 10E9/L (ref 4–11)

## 2017-05-11 PROCEDURE — 82043 UR ALBUMIN QUANTITATIVE: CPT | Performed by: TRANSPLANT SURGERY

## 2017-05-11 PROCEDURE — 80048 BASIC METABOLIC PNL TOTAL CA: CPT | Performed by: TRANSPLANT SURGERY

## 2017-05-11 PROCEDURE — 83036 HEMOGLOBIN GLYCOSYLATED A1C: CPT | Performed by: TRANSPLANT SURGERY

## 2017-05-11 PROCEDURE — 87799 DETECT AGENT NOS DNA QUANT: CPT | Performed by: TRANSPLANT SURGERY

## 2017-05-11 PROCEDURE — 80061 LIPID PANEL: CPT | Performed by: TRANSPLANT SURGERY

## 2017-05-11 PROCEDURE — 80180 DRUG SCRN QUAN MYCOPHENOLATE: CPT | Performed by: TRANSPLANT SURGERY

## 2017-05-11 PROCEDURE — 83690 ASSAY OF LIPASE: CPT | Performed by: TRANSPLANT SURGERY

## 2017-05-11 PROCEDURE — 82150 ASSAY OF AMYLASE: CPT | Performed by: TRANSPLANT SURGERY

## 2017-05-11 PROCEDURE — 85025 COMPLETE CBC W/AUTO DIFF WBC: CPT | Performed by: TRANSPLANT SURGERY

## 2017-05-11 PROCEDURE — 84156 ASSAY OF PROTEIN URINE: CPT | Performed by: TRANSPLANT SURGERY

## 2017-05-11 PROCEDURE — 80197 ASSAY OF TACROLIMUS: CPT | Performed by: TRANSPLANT SURGERY

## 2017-05-11 PROCEDURE — 36415 COLL VENOUS BLD VENIPUNCTURE: CPT | Performed by: TRANSPLANT SURGERY

## 2017-05-11 PROCEDURE — 80076 HEPATIC FUNCTION PANEL: CPT | Performed by: TRANSPLANT SURGERY

## 2017-05-12 ENCOUNTER — OFFICE VISIT (OUTPATIENT)
Dept: ORTHOPEDICS | Facility: CLINIC | Age: 42
End: 2017-05-12

## 2017-05-12 ENCOUNTER — TELEPHONE (OUTPATIENT)
Dept: TRANSPLANT | Facility: CLINIC | Age: 42
End: 2017-05-12

## 2017-05-12 DIAGNOSIS — L97.522 ULCER OF LEFT FOOT, WITH FAT LAYER EXPOSED (H): Primary | ICD-10-CM

## 2017-05-12 DIAGNOSIS — E11.610 CHARCOT FOOT DUE TO DIABETES MELLITUS (H): ICD-10-CM

## 2017-05-12 LAB
CMV DNA SPEC NAA+PROBE-ACNC: 2110 [IU]/ML
CMV DNA SPEC NAA+PROBE-LOG#: 3.3 {LOG_IU}/ML
EBV DNA # SPEC NAA+PROBE: NORMAL {COPIES}/ML
EBV DNA SPEC NAA+PROBE-LOG#: NORMAL {LOG_COPIES}/ML
SPECIMEN SOURCE: ABNORMAL

## 2017-05-12 NOTE — PROGRESS NOTES
Chief Complaint:   Chief Complaint   Patient presents with     RECHECK     2 week follow up.  Diabetic wound, left plantar foot.          Allergies   Allergen Reactions     Amoxicillin-Pot Clavulanate Diarrhea     Ciprofloxacin Nausea     Pollen Extract Other (See Comments)     Seasonal allergies     Pyridostigmine Other (See Comments)     Spastic muscle motion in the face and legs, weakness in the arms. Slight swelling of the tongue.         Subjective: Rose is a 41 year old female who presents to the clinic today for a follow up of left foot wound. She relates that the pain has increased in the left foot over the last week or so, along with some swelling. She has also been sleepy She relates that her dog ate the velcro strap on the surgical shoe and she then has to go back into her regular shoe. She did  the Regranex but has not used it yet. She would like to know if she should be using her brace again.     ROS: No N/V/D/F/C/NS/SOB/CP    Objective  Data Unavailable Data Unavailable Data Unavailable Data Unavailable Data Unavailable 0 lbs 0 oz    A diabetic wound is noted at left  plantar foot measuring 1cm x 1 cm x 0.1cm.    Block Classification: II to subcutaneous    Wound base: Red  Pink/Granulation    Edges: hyperkeratotic    Drainage: light/serous    Odor: no    Undermining: no    Bone Exposure: No    Clinical Signs of Infection: No    After obtaining patient consent, the wound was irrigated with copious amounts of saline. A scalpel was then used to debride the wound into the subcutaneous tissue. The wound edges were debrided to healthy, bleeding tissue. Given the patient's lack of sensation, no anesthesia was necessary for the procedure.     left foot xrays indicated in 3 weightbearing views.    Charcot changes are noted to be unchanged in the midfoot. Very subtle  Transverse linear lucency noted in the sclerotic portion of the 5th met base. Could be possible stress reaction secondary to a Charcot  foot in non-diabetic shoes. No other osseous or soft tissue      Assessment: Left plantar foot wound secondary to Charcot foot. - STALLED  Stress fracture of the left 5th met      Plan:   - Pt seen and evaluated  - The wound was debrided as described. She is already taking Bactrim. I don't see any infection. The pain and swelling is likely coming from this lucency. I have dispensed another DH2 shoe for her. This will be helpful for the wound and possible stress fx.   - I taught her how to use the Regranex gel. 12 hours on and 12 off. She relates that she can use this.  - Pt to return to clinic in 2 weeks.

## 2017-05-12 NOTE — NURSING NOTE
Reason For Visit:   Chief Complaint   Patient presents with     RECHECK     2 week follow up.  Diabetic wound, left plantar foot. Pt stated that her left foot is swollen and sore and has been leaking. Pt also stated that she has not been wearing her boot due to her dog chewing off a velcroe piece.        Pain Assessment  Patient Currently in Pain: Yes  Primary Pain Location: Foot  Pain Orientation: Left  Pain Descriptors: Sore (stiffness. )               Current Outpatient Prescriptions   Medication Sig Dispense Refill     Azithromycin (ZITHROMAX Z-RAMONA PO) Take 250 mg by mouth daily       tacrolimus (PROGRAF - GENERIC EQUIVALENT) 1 mg/mL suspension 0.3 mg every morning and 0.2 mg every evening. 15 mL 6     mycophenolic acid (MYFORTIC - GENERIC EQUIVALENT) 180 MG EC tablet Take 2 tablets (360 mg) by mouth 2 times daily 180 tablet 3     fludrocortisone (FLORINEF) 0.1 MG tablet Take 1 tablet (0.1 mg) by mouth three times a week 14 tablet 6     valGANciclovir (VALCYTE) 450 MG tablet Take 1 tablet (450 mg) by mouth 2 times daily 180 tablet 3     sulfamethoxazole-trimethoprim (BACTRIM/SEPTRA) 400-80 MG per tablet Take 1 tablet by mouth Every Mon, Wed, Fri Morning 40 tablet 3     order for DME Equipment being ordered: Crutches ()  Treatment Diagnosis: impaired gait 1 each 0     vitamin D (ERGOCALCIFEROL) 96851 UNIT capsule Take 1 capsule (50,000 Units) by mouth once a week (Patient taking differently: Take 50,000 Units by mouth every 30 days ) 13 capsule 3     pravastatin (PRAVACHOL) 20 MG tablet Take 1 tablet (20 mg) by mouth daily 90 tablet 3     aspirin  MG EC tablet Take 1 tablet (325 mg) by mouth daily 60 tablet 3     itraconazole (SPORANOX) 100 MG capsule Take by mouth 2 times daily        citalopram (CELEXA) 10 MG tablet Take 20 mg by mouth daily        Levothyroxine Sodium (SYNTHROID PO) Take 50 mcg by mouth daily        cetirizine (ZYRTEC ALLERGY) 10 MG tablet Take 1 tablet by mouth daily.             Allergies   Allergen Reactions     Amoxicillin-Pot Clavulanate Diarrhea     Ciprofloxacin Nausea     Pollen Extract Other (See Comments)     Seasonal allergies     Pyridostigmine Other (See Comments)     Spastic muscle motion in the face and legs, weakness in the arms. Slight swelling of the tongue.

## 2017-05-12 NOTE — MR AVS SNAPSHOT
After Visit Summary   5/12/2017    Rose Castaneda    MRN: [id number]           Patient Information     Date Of Birth          1975        Visit Information        Provider Department      5/12/2017 9:20 AM Darius Parkinson DPM [facility]        Today's Diagnoses     Ulcer of left foot, with fat layer exposed (H)    -  1    Charcot foot due to diabetes mellitus (H)           Follow-ups after your visit        Your next 10 appointments already scheduled     May 26, 2017  9:20 AM CDT   (Arrive by 9:05 AM)   RETURN FOOT/ANKLE with Darius Parkinson DPM   [facility] ([facility])    [address]  4th [address]  [facility] [address] [phone]            Jun 09, 2017  1:20 PM CDT   (Arrive by 12:50 PM)   Return Kidney Transplant with Mauricio Marquez MD   [facility] ([facility])    [address]  [address] [facility] [address] [phone]              Who to contact     Please call your clinic at [phone] to:    Ask questions about your health    Make or cancel appointments    Discuss your medicines    Learn about your test results    Speak to your doctor   If you have compliments or concerns about an experience at your clinic, or if you wish to file a complaint, please contact [facility] Patient Relations at [phone] or email us at [email or web address]         Additional Information About Your Visit        MyChart Information     Whistlestop is an electronic gateway that provides easy, online access to your medical records. With Whistlestop, you can request a clinic appointment, read your test results, renew a prescription or communicate with your care team.     To sign up for [employer] visit the website at [email or web address]   You will be asked to enter the access code listed below, as well as some personal  information. Please follow the directions to create your username and password.     Your access code is: [email or web address]  Expires: 2017  2:03 PM     Your access code will  in 90 days. If you need help or a new code, please contact your [facility] or call [phone] for assistance.        Care EveryWhere ID     This is your Care EveryWhere ID. This could be used by other organizations to access your [place] medical records  [id number]         Blood Pressure from Last 3 Encounters:   17 101/66   16 107/71   16 102/67    Weight from Last 3 Encounters:   17 50.3 kg (110 lb 14.4 oz)   17 52.2 kg (115 lb)   16 52.7 kg (116 lb 1.6 oz)              We Performed the Following     DEBRIDEMENT WOUND UP TO 20 SQ CM          Today's Medication Changes          These changes are accurate as of: 17 12:19 PM.  If you have any questions, ask your nurse or doctor.               These medicines have changed or have updated prescriptions.        Dose/Directions    vitamin D 52766 UNIT capsule   Commonly known as:  ERGOCALCIFEROL   This may have changed:  when to take this   Used for:  Kidney replaced by transplant, Pancreas replaced by transplant (H), Low calcium levels        Dose:  53311 Units   Take 1 capsule (50,000 Units) by mouth once a week   Quantity:  13 capsule   Refills:  3                Primary Care Provider Office Phone # Fax #    Hafsa Orozco [phone]       [facility] [address] [place] [address] DR [place] [phone]        Thank you!     Thank you for choosing [facility]  for your care. Our goal is always to provide you with excellent care. Hearing back from our patients is one way we can continue to improve our services. Please take a few minutes to complete the written survey that you may receive in the mail after your visit with us. Thank you!             Your Updated Medication List - Protect others  around you: Learn how to safely use, store and throw away your medicines at www.disposemymeds.org.          This list is accurate as of: 5/12/17 12:19 PM.  Always use your most recent med list.                   Brand Name Dispense Instructions for use    aspirin 325 MG EC tablet     60 tablet    Take 1 tablet (325 mg) by mouth daily       citalopram 10 MG tablet    celeXA     Take 20 mg by mouth daily       fludrocortisone 0.1 MG tablet    FLORINEF    14 tablet    Take 1 tablet (0.1 mg) by mouth three times a week       itraconazole 100 MG capsule    SPORANOX     Take by mouth 2 times daily       mycophenolic acid 180 MG EC tablet    MYFORTIC - GENERIC EQUIVALENT    180 tablet    Take 2 tablets (360 mg) by mouth 2 times daily       order for DME     1 each    Equipment being ordered: Lynnette () Treatment Diagnosis: impaired gait       pravastatin 20 MG tablet    PRAVACHOL    90 tablet    Take 1 tablet (20 mg) by mouth daily       sulfamethoxazole-trimethoprim 400-80 MG per tablet    BACTRIM/SEPTRA    40 tablet    Take 1 tablet by mouth Every Mon, Wed, Fri Morning       SYNTHROID PO      Take 50 mcg by mouth daily       tacrolimus 1 mg/mL suspension    PROGRAF - GENERIC EQUIVALENT    15 mL    0.3 mg every morning and 0.2 mg every evening.       valGANciclovir 450 MG tablet    VALCYTE    180 tablet    Take 1 tablet (450 mg) by mouth 2 times daily       vitamin D 99166 UNIT capsule    ERGOCALCIFEROL    13 capsule    Take 1 capsule (50,000 Units) by mouth once a week       ZITHROMAX Z-RAMONA PO      Take 250 mg by mouth daily       ZYRTEC ALLERGY 10 MG tablet   Generic drug:  cetirizine      Take 1 tablet by mouth daily.

## 2017-05-12 NOTE — TELEPHONE ENCOUNTER
CMV is up to 2110. Pt continues to be on Valcyte 450 mg bid ( renal dosing). She will have the CMV rechecked again in about 3 weeks.

## 2017-05-12 NOTE — LETTER
5/12/2017     RE: Rose Castaneda  [address] [facility] [address]     Dear Colleague,    Thank you for referring your patient, Rose Castaneda, to the [facility] at [facility]. Please see a copy of my visit note below.    Chief Complaint:   Chief Complaint   Patient presents with     RECHECK     2 week follow up.  Diabetic wound, left plantar foot.          Allergies   Allergen Reactions     Amoxicillin-Pot Clavulanate Diarrhea     Ciprofloxacin Nausea     Pollen Extract Other (See Comments)     Seasonal allergies     Pyridostigmine Other (See Comments)     Spastic muscle motion in the face and legs, weakness in the arms. Slight swelling of the tongue.         Subjective: Rose is a 41 year old female who presents to the clinic today for a follow up of left foot wound. She relates that the pain has increased in the left foot over the last week or so, along with some swelling. She has also been sleepy She relates that her dog ate the velcro strap on the surgical shoe and she then has to go back into her regular shoe. She did  the Regranex but has not used it yet. She would like to know if she should be using her brace again.     ROS: No N/V/D/F/C/NS/SOB/CP    Objective  Data Unavailable Data Unavailable Data Unavailable Data Unavailable Data Unavailable 0 lbs 0 oz    A diabetic wound is noted at left  plantar foot measuring 1cm x 1 cm x 0.1cm.    Block Classification: II to subcutaneous    Wound base: Red  Pink/Granulation    Edges: hyperkeratotic    Drainage: light/serous    Odor: no    Undermining: no    Bone Exposure: No    Clinical Signs of Infection: No    After obtaining patient consent, the wound was irrigated with copious amounts of saline. A scalpel was then used to debride the wound into the subcutaneous tissue. The wound edges were debrided to healthy, bleeding tissue. Given the patient's lack of sensation, no anesthesia was  necessary for the procedure.     left foot xrays indicated in 3 weightbearing views.    Charcot changes are noted to be unchanged in the midfoot. Very subtle  Transverse linear lucency noted in the sclerotic portion of the 5th met base. Could be possible stress reaction secondary to a Charcot foot in non-diabetic shoes. No other osseous or soft tissue      Assessment: Left plantar foot wound secondary to Charcot foot. - STALLED  Stress fracture of the left 5th met      Plan:   - Pt seen and evaluated  - The wound was debrided as described. She is already taking Bactrim. I don't see any infection. The pain and swelling is likely coming from this lucency. I have dispensed another DH2 shoe for her. This will be helpful for the wound and possible stress fx.   - I taught her how to use the Regranex gel. 12 hours on and 12 off. She relates that she can use this.  - Pt to return to clinic in 2 weeks.  Again, thank you for allowing me to participate in the care of your patient.      Sincerely,    Darius Parkinson DPM

## 2017-05-14 LAB
MYCOPHENOLATE SERPL LC/MS/MS-MCNC: 0.64 MG/L (ref 1–3.5)
MYCOPHENOLATE-G SERPL LC/MS/MS-MCNC: 24.9 MG/L (ref 30–95)
TME LAST DOSE: ABNORMAL H

## 2017-05-19 ENCOUNTER — HOSPITAL ENCOUNTER (EMERGENCY)
Facility: CLINIC | Age: 42
Discharge: HOME | End: 2017-05-20
Attending: INTERNAL MEDICINE | Admitting: INTERNAL MEDICINE
Payer: COMMERCIAL

## 2017-05-19 ENCOUNTER — APPOINTMENT (OUTPATIENT)
Dept: ULTRASOUND IMAGING | Facility: CLINIC | Age: 42
End: 2017-05-19
Attending: INTERNAL MEDICINE
Payer: COMMERCIAL

## 2017-05-19 ENCOUNTER — TELEPHONE (OUTPATIENT)
Dept: TRANSPLANT | Facility: CLINIC | Age: 42
End: 2017-05-19

## 2017-05-19 DIAGNOSIS — N39.0 ACUTE UTI: ICD-10-CM

## 2017-05-19 DIAGNOSIS — Z94.0 KIDNEY REPLACED BY TRANSPLANT: ICD-10-CM

## 2017-05-19 DIAGNOSIS — R53.81 MALAISE: ICD-10-CM

## 2017-05-19 DIAGNOSIS — E86.0 DEHYDRATION: ICD-10-CM

## 2017-05-19 DIAGNOSIS — Z94.83 PANCREAS REPLACED BY TRANSPLANT (H): ICD-10-CM

## 2017-05-19 LAB
ALBUMIN SERPL-MCNC: 3.4 G/DL (ref 3.4–5)
ALBUMIN UR-MCNC: 10 MG/DL
ALP SERPL-CCNC: 169 U/L (ref 40–150)
ALT SERPL W P-5'-P-CCNC: 29 U/L (ref 0–50)
ANION GAP SERPL CALCULATED.3IONS-SCNC: 7 MMOL/L (ref 3–14)
APPEARANCE UR: ABNORMAL
AST SERPL W P-5'-P-CCNC: 24 U/L (ref 0–45)
BACTERIA #/AREA URNS HPF: ABNORMAL /HPF
BASOPHILS # BLD AUTO: 0 10E9/L (ref 0–0.2)
BASOPHILS NFR BLD AUTO: 0.6 %
BILIRUB SERPL-MCNC: 0.6 MG/DL (ref 0.2–1.3)
BILIRUB UR QL STRIP: NEGATIVE
BUN SERPL-MCNC: 17 MG/DL (ref 7–30)
CALCIUM SERPL-MCNC: 8.6 MG/DL (ref 8.5–10.1)
CHLORIDE SERPL-SCNC: 108 MMOL/L (ref 94–109)
CO2 SERPL-SCNC: 26 MMOL/L (ref 20–32)
COLOR UR AUTO: YELLOW
CREAT SERPL-MCNC: 1.25 MG/DL (ref 0.52–1.04)
CRP SERPL-MCNC: 97 MG/L (ref 0–8)
DIFFERENTIAL METHOD BLD: ABNORMAL
EOSINOPHIL # BLD AUTO: 0.1 10E9/L (ref 0–0.7)
EOSINOPHIL NFR BLD AUTO: 2 %
ERYTHROCYTE [DISTWIDTH] IN BLOOD BY AUTOMATED COUNT: 14 % (ref 10–15)
ERYTHROCYTE [SEDIMENTATION RATE] IN BLOOD BY WESTERGREN METHOD: 18 MM/H (ref 0–20)
GFR SERPL CREATININE-BSD FRML MDRD: 47 ML/MIN/1.7M2
GLUCOSE SERPL-MCNC: 71 MG/DL (ref 70–99)
GLUCOSE UR STRIP-MCNC: NEGATIVE MG/DL
HCT VFR BLD AUTO: 34.7 % (ref 35–47)
HGB BLD-MCNC: 11.1 G/DL (ref 11.7–15.7)
HGB UR QL STRIP: NEGATIVE
HYALINE CASTS #/AREA URNS LPF: 12 /LPF (ref 0–2)
IMM GRANULOCYTES # BLD: 0 10E9/L (ref 0–0.4)
IMM GRANULOCYTES NFR BLD: 0.2 %
INR PPP: 1.12 (ref 0.86–1.14)
KETONES UR STRIP-MCNC: NEGATIVE MG/DL
LACTATE BLD-SCNC: 1 MMOL/L (ref 0.7–2.1)
LEUKOCYTE ESTERASE UR QL STRIP: ABNORMAL
LIPASE SERPL-CCNC: 149 U/L (ref 73–393)
LYMPHOCYTES # BLD AUTO: 0.6 10E9/L (ref 0.8–5.3)
LYMPHOCYTES NFR BLD AUTO: 12 %
MAGNESIUM SERPL-MCNC: 1.9 MG/DL (ref 1.6–2.3)
MCH RBC QN AUTO: 29.8 PG (ref 26.5–33)
MCHC RBC AUTO-ENTMCNC: 32 G/DL (ref 31.5–36.5)
MCV RBC AUTO: 93 FL (ref 78–100)
MONOCYTES # BLD AUTO: 0.4 10E9/L (ref 0–1.3)
MONOCYTES NFR BLD AUTO: 7.1 %
MUCOUS THREADS #/AREA URNS LPF: PRESENT /LPF
NEUTROPHILS # BLD AUTO: 4 10E9/L (ref 1.6–8.3)
NEUTROPHILS NFR BLD AUTO: 78.1 %
NITRATE UR QL: NEGATIVE
NRBC # BLD AUTO: 0 10*3/UL
NRBC BLD AUTO-RTO: 0 /100
PH UR STRIP: 6 PH (ref 5–7)
PHOSPHATE SERPL-MCNC: 3 MG/DL (ref 2.5–4.5)
PLATELET # BLD AUTO: 243 10E9/L (ref 150–450)
POTASSIUM SERPL-SCNC: 3.7 MMOL/L (ref 3.4–5.3)
PROT SERPL-MCNC: 7.1 G/DL (ref 6.8–8.8)
RBC # BLD AUTO: 3.72 10E12/L (ref 3.8–5.2)
RBC #/AREA URNS AUTO: 1 /HPF (ref 0–2)
SODIUM SERPL-SCNC: 140 MMOL/L (ref 133–144)
SP GR UR STRIP: 1.01 (ref 1–1.03)
SQUAMOUS #/AREA URNS AUTO: 1 /HPF (ref 0–1)
TROPONIN I SERPL-MCNC: NORMAL UG/L (ref 0–0.04)
TSH SERPL DL<=0.005 MIU/L-ACNC: 1.79 MU/L (ref 0.4–4)
URN SPEC COLLECT METH UR: ABNORMAL
UROBILINOGEN UR STRIP-MCNC: NORMAL MG/DL (ref 0–2)
WBC # BLD AUTO: 5.1 10E9/L (ref 4–11)
WBC #/AREA URNS AUTO: 59 /HPF (ref 0–2)

## 2017-05-19 PROCEDURE — 96360 HYDRATION IV INFUSION INIT: CPT | Performed by: INTERNAL MEDICINE

## 2017-05-19 PROCEDURE — 83605 ASSAY OF LACTIC ACID: CPT | Performed by: INTERNAL MEDICINE

## 2017-05-19 PROCEDURE — 87086 URINE CULTURE/COLONY COUNT: CPT | Performed by: INTERNAL MEDICINE

## 2017-05-19 PROCEDURE — 83735 ASSAY OF MAGNESIUM: CPT | Performed by: INTERNAL MEDICINE

## 2017-05-19 PROCEDURE — 80053 COMPREHEN METABOLIC PANEL: CPT | Performed by: INTERNAL MEDICINE

## 2017-05-19 PROCEDURE — 87186 SC STD MICRODIL/AGAR DIL: CPT | Performed by: INTERNAL MEDICINE

## 2017-05-19 PROCEDURE — 99284 EMERGENCY DEPT VISIT MOD MDM: CPT | Mod: Z6 | Performed by: INTERNAL MEDICINE

## 2017-05-19 PROCEDURE — 83690 ASSAY OF LIPASE: CPT | Performed by: INTERNAL MEDICINE

## 2017-05-19 PROCEDURE — 84484 ASSAY OF TROPONIN QUANT: CPT | Performed by: INTERNAL MEDICINE

## 2017-05-19 PROCEDURE — 25000128 H RX IP 250 OP 636: Performed by: INTERNAL MEDICINE

## 2017-05-19 PROCEDURE — 81001 URINALYSIS AUTO W/SCOPE: CPT | Performed by: INTERNAL MEDICINE

## 2017-05-19 PROCEDURE — 99285 EMERGENCY DEPT VISIT HI MDM: CPT | Mod: 25 | Performed by: INTERNAL MEDICINE

## 2017-05-19 PROCEDURE — 80197 ASSAY OF TACROLIMUS: CPT | Performed by: INTERNAL MEDICINE

## 2017-05-19 PROCEDURE — 87088 URINE BACTERIA CULTURE: CPT | Performed by: INTERNAL MEDICINE

## 2017-05-19 PROCEDURE — 84443 ASSAY THYROID STIM HORMONE: CPT | Performed by: INTERNAL MEDICINE

## 2017-05-19 PROCEDURE — 85025 COMPLETE CBC W/AUTO DIFF WBC: CPT | Performed by: INTERNAL MEDICINE

## 2017-05-19 PROCEDURE — 86140 C-REACTIVE PROTEIN: CPT | Performed by: INTERNAL MEDICINE

## 2017-05-19 PROCEDURE — 80158 DRUG ASSAY CYCLOSPORINE: CPT | Performed by: INTERNAL MEDICINE

## 2017-05-19 PROCEDURE — 85652 RBC SED RATE AUTOMATED: CPT | Performed by: INTERNAL MEDICINE

## 2017-05-19 PROCEDURE — 84100 ASSAY OF PHOSPHORUS: CPT | Performed by: INTERNAL MEDICINE

## 2017-05-19 PROCEDURE — 76776 US EXAM K TRANSPL W/DOPPLER: CPT

## 2017-05-19 PROCEDURE — 85610 PROTHROMBIN TIME: CPT | Performed by: INTERNAL MEDICINE

## 2017-05-19 RX ORDER — SODIUM CHLORIDE 9 MG/ML
1000 INJECTION, SOLUTION INTRAVENOUS CONTINUOUS
Status: DISCONTINUED | OUTPATIENT
Start: 2017-05-19 | End: 2017-05-20 | Stop reason: HOSPADM

## 2017-05-19 RX ADMIN — SODIUM CHLORIDE 1000 ML: 9 INJECTION, SOLUTION INTRAVENOUS at 20:50

## 2017-05-19 ASSESSMENT — ENCOUNTER SYMPTOMS
BACK PAIN: 0
FLANK PAIN: 0
FEVER: 0
ADENOPATHY: 0
CONFUSION: 0
CONSTIPATION: 0
HEADACHES: 1
FATIGUE: 1
NECK PAIN: 0
VOMITING: 0
NAUSEA: 0
ABDOMINAL PAIN: 1
DYSURIA: 0
COUGH: 0
LIGHT-HEADEDNESS: 1
SORE THROAT: 0
DIARRHEA: 0
TROUBLE SWALLOWING: 0
WOUND: 1
WEAKNESS: 0
CHILLS: 0
SHORTNESS OF BREATH: 0
WHEEZING: 0
NUMBNESS: 0
SPEECH DIFFICULTY: 0

## 2017-05-19 NOTE — TELEPHONE ENCOUNTER
Rose has called back again and would like a call within the next hour  she really isn't feeling well and wants to go in and have labs done if need be please call pt

## 2017-05-19 NOTE — TELEPHONE ENCOUNTER
"Pt states that she has been sleeping almost all day every day for te past 3 days. She does eat and drink when she is awake but feels that she may now be dehydrated because she is sleeping so much and probably not getting enough fluid in when she is awake.   She complains that she had a \"crazy headache\" before this sleepiness started and it has not gone away   She denies fever, constipation or diarrhea.   She does have a pinpoint of pain just below her umbilicus that she wonders if it is her ovaries. She has not had her menses for several months but does not get it very often.  He last labs on 5/11/17 were fairly normal for her except for the increase in her CMV.  She was advised to come to the ER here to be assessed.  She said she will once her son gets home from school and she gets him situated safely.  "

## 2017-05-19 NOTE — TELEPHONE ENCOUNTER
Rose called concerned about her CMV level coming back high. She wants to let Swapna know that she has sleeping for almost two days straight, and would like to be called back.

## 2017-05-19 NOTE — ED AVS SNAPSHOT
[facility], [place], Emergency Department    [address] [place] [address]    Phone:  [phone]                                       Rose Castaneda   MRN: [id number]    Department:  [facility], Emergency Department   Date of Visit:  5/19/2017           After Visit Summary Signature Page     I have received my discharge instructions, and my questions have been answered. I have discussed any challenges I see with this plan with the nurse or doctor.    ..........................................................................................................................................  Patient/Patient Representative Signature      ..........................................................................................................................................  Patient Representative Print Name and Relationship to Patient    ..................................................               ................................................  Date                                            Time    ..........................................................................................................................................  Reviewed by Signature/Title    ...................................................              ..............................................  Date                                                            Time

## 2017-05-19 NOTE — ED AVS SNAPSHOT
[facility], Emergency Department    [address] [facility] [address]    Phone:  [phone]                                       Rose Castaneda   MRN: [id number]    Department:  [facility], Emergency Department   Date of Visit:  5/19/2017           Patient Information     Date Of Birth          1975        Your diagnoses for this visit were:     Malaise     Acute UTI     Kidney replaced by transplant     Pancreas replaced by transplant (H)     Dehydration        You were seen by Gurinder Eckert MD.      Follow-up Information     Follow up with Hafsa Orozco    Contact information:    [facility]  [address] [place] [address] [address] [place] [address]  [phone]          Discharge Instructions       Levaquin 500 mg daily for 7 days.  Have your labs drawn on Tuesday.  Follow up [facility].  Follow up with Dr Marquez in the nephrology/ transplant clinic.    Future Appointments        Provider Department Dept Phone Center    5/26/2017 9:20 AM Darius Parkinson DPM [facility] Orthopaedic Clinic [phone] [facility]    6/9/2017 1:20 PM Mauricio Marquez MD [facility] Nephrology [phone] [facility]      24 Hour Appointment Hotline       To make an appointment at any [facility], call [phone] ([phone]). If you don't have a family doctor or clinic, we will help you find one. [facility] clinics are conveniently located to serve the needs of you and your family.             Review of your medicines      START taking        Dose / Directions Last dose taken    levofloxacin 500 MG tablet   Commonly known as:  LEVAQUIN   Dose:  500 mg   Quantity:  7 tablet        Take 1 tablet (500 mg) by mouth daily   Refills:  0          Our records show that you are taking the medicines listed below. If these are incorrect, please call your family doctor or clinic.        Dose / Directions Last dose taken    aspirin 325 MG EC tablet   Dose:  325 mg   Quantity:  60 tablet         Take 1 tablet (325 mg) by mouth daily   Refills:  3        citalopram 10 MG tablet   Commonly known as:  celeXA   Dose:  20 mg        Take 20 mg by mouth daily   Refills:  0        fludrocortisone 0.1 MG tablet   Commonly known as:  FLORINEF   Dose:  0.1 mg   Quantity:  14 tablet        Take 1 tablet (0.1 mg) by mouth three times a week   Refills:  6        itraconazole 100 MG capsule   Commonly known as:  SPORANOX        Take by mouth 2 times daily   Refills:  0        mycophenolic acid 180 MG EC tablet   Commonly known as:  MYFORTIC - GENERIC EQUIVALENT   Dose:  360 mg   Quantity:  180 tablet        Take 2 tablets (360 mg) by mouth 2 times daily   Refills:  3        order for DME   Quantity:  1 each        Equipment being ordered: Crutches () Treatment Diagnosis: impaired gait   Refills:  0        pravastatin 20 MG tablet   Commonly known as:  PRAVACHOL   Dose:  20 mg   Quantity:  90 tablet        Take 1 tablet (20 mg) by mouth daily   Refills:  3        sulfamethoxazole-trimethoprim 400-80 MG per tablet   Commonly known as:  BACTRIM/SEPTRA   Dose:  1 tablet   Indication:  PCP prophy   Quantity:  40 tablet        Take 1 tablet by mouth Every Mon, Wed, Fri Morning   Refills:  3        SYNTHROID PO   Dose:  50 mcg        Take 50 mcg by mouth daily   Refills:  0        tacrolimus 1 mg/mL suspension   Commonly known as:  PROGRAF - GENERIC EQUIVALENT   Quantity:  15 mL        0.3 mg every morning and 0.2 mg every evening.   Refills:  6        valGANciclovir 450 MG tablet   Commonly known as:  VALCYTE   Dose:  450 mg   Quantity:  180 tablet        Take 1 tablet (450 mg) by mouth 2 times daily   Refills:  3        vitamin D 49644 UNIT capsule   Commonly known as:  ERGOCALCIFEROL   Dose:  51151 Units   Quantity:  13 capsule        Take 1 capsule (50,000 Units) by mouth once a week   Refills:  3        ZITHROMAX Z-RAMONA PO   Dose:  250 mg        Take 250 mg by mouth daily   Refills:  0        ZYRTEC ALLERGY 10 MG tablet    Dose:  1 tablet   Generic drug:  cetirizine        Take 1 tablet by mouth daily.   Refills:  0                Prescriptions were sent or printed at these locations (1 Prescription)                   Other Prescriptions                Printed at Department/Unit printer (1 of 1)         levofloxacin (LEVAQUIN) 500 MG tablet                Procedures and tests performed during your visit     CBC with platelets differential    CRP inflammation    Comprehensive metabolic panel    Cyclosporine    Erythrocyte sedimentation rate auto    INR    Lactic acid    Lipase    Magnesium    Phosphorus    TSH with free T4 reflex    Tacrolimus level    Troponin I    UA with Microscopic    US Renal Transplant    Urine Culture      Orders Needing Specimen Collection     None      Pending Results     Date and Time Order Name Status Description    5/19/2017 2011 Urine Culture Preliminary     5/19/2017 2011 US Renal Transplant Preliminary     5/19/2017 2011 Cyclosporine In process     5/19/2017 2011 Tacrolimus level In process             Pending Culture Results     Date and Time Order Name Status Description    5/19/2017 2011 Urine Culture Preliminary             Pending Results Instructions     If you had any lab results that were not finalized at the time of your Discharge, you can call the ED Lab Result RN at [phone]. You will be contacted by this team for any positive Lab results or changes in treatment. The nurses are available 7 days a week from 10A to 6:30P.  You can leave a message 24 hours per day and they will return your call.        Thank you for choosing [facility]       Thank you for choosing [facility] for your care. Our goal is always to provide you with excellent care. Hearing back from our patients is one way we can continue to improve our services. Please take a few minutes to complete the written survey that you may receive in the mail after you visit with us. Thank you!        MyChart Information     [employer] lets  "you send messages to your doctor, view your test results, renew your prescriptions, schedule appointments and more. To sign up, go to [email or web address] . Click on \"Log in\" on the left side of the screen, which will take you to the Welcome page. Then click on \"Sign up Now\" on the right side of the page.     You will be asked to enter the access code listed below, as well as some personal information. Please follow the directions to create your username and password.     Your access code is: [email or web address]  Expires: 2017  2:03 PM     Your access code will  in 90 days. If you need help or a new code, please call your [facility] clinic or [phone].        Care EveryWhere ID     This is your Care EveryWhere ID. This could be used by other organizations to access your [facility] medical records  [id number]        After Visit Summary       This is your record. Keep this with you and show to your community pharmacist(s) and doctor(s) at your next visit.                  "

## 2017-05-19 NOTE — ED NOTES
Extreme fatigue since Tuesday. Also has localized pain to the left side of her umbilicus since Tuesday. Kidney and pancreas transplant in August 2016. Patient called transplant coordinator and was told to come to ED for evaluation. SBP 80s in triage. Patient states her BP runs on the low side, but she may be dehydrated from fatigue the last few days.

## 2017-05-20 VITALS
DIASTOLIC BLOOD PRESSURE: 86 MMHG | OXYGEN SATURATION: 99 % | SYSTOLIC BLOOD PRESSURE: 144 MMHG | RESPIRATION RATE: 16 BRPM | HEIGHT: 61 IN | TEMPERATURE: 98.5 F

## 2017-05-20 LAB
CYCLOSPORINE BLD LC/MS/MS-MCNC: <25 UG/L (ref 50–400)
TACROLIMUS BLD-MCNC: 6.9 UG/L (ref 5–15)
TME LAST DOSE: ABNORMAL H
TME LAST DOSE: NORMAL H

## 2017-05-20 PROCEDURE — 25000132 ZZH RX MED GY IP 250 OP 250 PS 637: Performed by: INTERNAL MEDICINE

## 2017-05-20 RX ORDER — LEVOFLOXACIN 500 MG/1
500 TABLET, FILM COATED ORAL DAILY
Qty: 7 TABLET | Refills: 0 | Status: SHIPPED | OUTPATIENT
Start: 2017-05-20 | End: 2018-02-12

## 2017-05-20 RX ORDER — LEVOFLOXACIN 500 MG/1
500 TABLET, FILM COATED ORAL ONCE
Status: COMPLETED | OUTPATIENT
Start: 2017-05-20 | End: 2017-05-20

## 2017-05-20 RX ADMIN — LEVOFLOXACIN 500 MG: 500 TABLET, FILM COATED ORAL at 00:14

## 2017-05-20 NOTE — DISCHARGE INSTRUCTIONS
Levaquin 500 mg daily for 7 days.  Have your labs drawn on Tuesday.  Follow up [facility].  Follow up with Dr Marquez in the nephrology/ transplant clinic.

## 2017-05-20 NOTE — ED PROVIDER NOTES
History     Chief Complaint   Patient presents with     Fatigue     Abdominal Pain     HPI  Rose Castaneda is a 41 year old female who has a history of kidney pancreas transplant in August who presents with 3 days of fatigue and lethargy. She has been sleeping much of the day. She has no fever, chills or sweats. She has no URI symptoms. She has mild diffuse headache and lightheadedness when upright. She has no cough, sputum, or shortness of breath. She has no nausea, vomiting or diarrhea. She has had LLQ abdominal pain over her transplant. She has no dysuria. She has a chronic foot ulcer on her left foot which was recently debrided and she is treating with a new topical platelet growth hormone cream.     PAST MEDICAL HISTORY:   Past Medical History:   Diagnosis Date     Anemia in chronic renal disease      Charcot foot due to diabetes mellitus (H) Dec 2006    left     Diabetes mellitus type 1 (H) 1988    12yr old     Diabetic retinopathy      Dyslipidemia      ESRD on hemodialysis (H)     2006 to      History of acute pyelonephritis     2003     History of blood transfusion      Hypothyroidism     on and off synthroid     Immunosuppressed status (H)      Kidney replaced by transplant      Neurogenic orthostatic hypotension (H)      Osteoporosis      Pulmonary histoplasmosis (H)      Secondary renal hyperparathyroidism (H)        PAST SURGICAL HISTORY:   Past Surgical History:   Procedure Laterality Date     BENCH KIDNEY N/A 2016    Procedure: BENCH KIDNEY;  Surgeon: Gilson Doe MD;  Location: [facility] OR     BENCH PANCREAS N/A 2016    Procedure: BENCH PANCREAS;  Surgeon: Gilson Doe MD;  Location: [facility] OR     C ANESTH,OPEN HEART SURGERY+PUMP      for thrombectomy only - catheter related     C CARDIAC SURG PROCEDURE UNLIST       C STOMACH SURGERY PROCEDURE UNLISTED        SECTION      2006     CYSTOSCOPY, REMOVE STENT(S), COMBINED Left 2016    Procedure: COMBINED  CYSTOSCOPY, REMOVE STENT(S);  Surgeon: Gilson Doe MD;  Location: [facility] OR     INT NEPHROSTOMY PERCUT RIGHT*       native nephrectomy      simult with tx surgery.  ? Right     TRANSPLANT KIDNEY RECIPIENT LIVING RELATED  2007    sister     TRANSPLANT PANCREAS, KIDNEY  DONOR, COMBINED N/A 2016    Procedure: COMBINED TRANSPLANT PANCREAS, KIDNEY  DONOR;  Surgeon: Gilson Doe MD;  Location: [facility] OR       FAMILY HISTORY:   Family History   Problem Relation Age of Onset     DIABETES Mother 56     type 2     Asthma Mother      Hypertension Mother      Anxiety Disorder Mother      MENTAL ILLNESS Mother      Depression Mother      OSTEOPOROSIS Mother      Thyroid Disease Mother      Obesity Mother      Hyperlipidemia Mother      Rheumatoid Arthritis Mother      Hypothyroidism Mother      DIABETES Maternal Grandmother 60     type 2     Anxiety Disorder Maternal Grandmother      Asthma Maternal Grandmother      CEREBROVASCULAR DISEASE Maternal Grandmother      Rheumatoid Arthritis Maternal Grandmother      Hypothyroidism Maternal Grandmother      Asthma Son      Anxiety Disorder Son      Anxiety Disorder Sister      Substance Abuse Father      Depression Father      Alcoholism Father      Substance Abuse Sister      MENTAL ILLNESS Sister      Depression Son      Depression Sister      Migraines Sister      Alcoholism Maternal Grandfather      Alcoholism Paternal Grandmother      Alcoholism Paternal Grandfather      Alcoholism Niece      Alcoholism Sister             KIDNEY DISEASE No family hx of      CANCER No family hx of      Other Cancer No family hx of        SOCIAL HISTORY:   Social History   Substance Use Topics     Smoking status: Never Smoker     Smokeless tobacco: Not on file     Alcohol use Yes      Comment: rare         I have reviewed the Medications, Allergies, Past Medical and Surgical History, and Social History in the Epic system.    Review of Systems   Constitutional:  "Positive for fatigue. Negative for chills and fever.   HENT: Negative for congestion, sore throat and trouble swallowing.    Eyes: Negative for visual disturbance.   Respiratory: Negative for cough, shortness of breath and wheezing.    Gastrointestinal: Positive for abdominal pain. Negative for constipation, diarrhea, nausea and vomiting.   Endocrine: Negative for polyuria.   Genitourinary: Positive for menstrual problem (no menses since transplant). Negative for dysuria and flank pain.   Musculoskeletal: Negative for back pain and neck pain.   Skin: Positive for wound. Negative for rash.   Allergic/Immunologic: Positive for immunocompromised state.   Neurological: Positive for light-headedness and headaches. Negative for speech difficulty, weakness and numbness.   Hematological: Negative for adenopathy.   Psychiatric/Behavioral: Negative for confusion.       Physical Exam   BP: (!) 87/35 (left arm)  Heart Rate: 56  Temp: 98.5  F (36.9  C)  Resp: 17  Height: 153.7 cm (5' 0.5\")  SpO2: 98 %  Physical Exam   Constitutional: She is oriented to person, place, and time. She appears well-developed and well-nourished. She appears ill. No distress.   HENT:   Head: Normocephalic and atraumatic.   Right Ear: External ear normal.   Left Ear: External ear normal.   Nose: Nose normal.   Mouth/Throat: Uvula is midline and oropharynx is clear and moist. Mucous membranes are dry. No oropharyngeal exudate.   Eyes: EOM are normal. Pupils are equal, round, and reactive to light. No scleral icterus.   Neck: Normal range of motion. Neck supple. No JVD present.   Cardiovascular: Normal rate, regular rhythm and normal heart sounds.  Exam reveals no friction rub.    No murmur heard.  Pulmonary/Chest: Effort normal and breath sounds normal. She has no wheezes. She has no rales.   Abdominal: Soft. Bowel sounds are normal. There is tenderness in the left lower quadrant. There is no rebound, no guarding and no CVA tenderness.   Musculoskeletal: " She exhibits no edema or tenderness.   2 cm superficial ulcer lateral MTP area of sole. No cellulitis.   Lymphadenopathy:     She has no cervical adenopathy.   Neurological: She is alert and oriented to person, place, and time. She displays normal reflexes. No cranial nerve deficit. Coordination normal.   Skin: Skin is warm and dry. No rash noted.   Psychiatric: She has a normal mood and affect. Her behavior is normal.   Nursing note and vitals reviewed.      ED Course     ED Course     Procedures        Labs/Imaging    Results for orders placed or performed during the hospital encounter of 05/19/17 (from the past 24 hour(s))   UA with Microscopic   Result Value Ref Range    Color Urine Yellow     Appearance Urine Slightly Cloudy     Glucose Urine Negative NEG mg/dL    Bilirubin Urine Negative NEG    Ketones Urine Negative NEG mg/dL    Specific Gravity Urine 1.012 1.003 - 1.035    Blood Urine Negative NEG    pH Urine 6.0 5.0 - 7.0 pH    Protein Albumin Urine 10 (A) NEG mg/dL    Urobilinogen mg/dL Normal 0.0 - 2.0 mg/dL    Nitrite Urine Negative NEG    Leukocyte Esterase Urine Moderate (A) NEG    Source Midstream Urine     WBC Urine 59 (H) 0 - 2 /HPF    RBC Urine 1 0 - 2 /HPF    Bacteria Urine Many (A) NEG /HPF    Squamous Epithelial /HPF Urine 1 0 - 1 /HPF    Mucous Urine Present (A) NEG /LPF    Hyaline Casts 12 (H) 0 - 2 /LPF   Urine Culture   Result Value Ref Range    Specimen Description Midstream Urine     Special Requests Specimen received in preservative     Culture Micro Pending     Micro Report Status Pending    CBC with platelets differential   Result Value Ref Range    WBC 5.1 4.0 - 11.0 10e9/L    RBC Count 3.72 (L) 3.8 - 5.2 10e12/L    Hemoglobin 11.1 (L) 11.7 - 15.7 g/dL    Hematocrit 34.7 (L) 35.0 - 47.0 %    MCV 93 78 - 100 fl    MCH 29.8 26.5 - 33.0 pg    MCHC 32.0 31.5 - 36.5 g/dL    RDW 14.0 10.0 - 15.0 %    Platelet Count 243 150 - 450 10e9/L    Diff Method Automated Method     % Neutrophils 78.1 %     % Lymphocytes 12.0 %    % Monocytes 7.1 %    % Eosinophils 2.0 %    % Basophils 0.6 %    % Immature Granulocytes 0.2 %    Nucleated RBCs 0 0 /100    Absolute Neutrophil 4.0 1.6 - 8.3 10e9/L    Absolute Lymphocytes 0.6 (L) 0.8 - 5.3 10e9/L    Absolute Monocytes 0.4 0.0 - 1.3 10e9/L    Absolute Eosinophils 0.1 0.0 - 0.7 10e9/L    Absolute Basophils 0.0 0.0 - 0.2 10e9/L    Abs Immature Granulocytes 0.0 0 - 0.4 10e9/L    Absolute Nucleated RBC 0.0    Comprehensive metabolic panel   Result Value Ref Range    Sodium 140 133 - 144 mmol/L    Potassium 3.7 3.4 - 5.3 mmol/L    Chloride 108 94 - 109 mmol/L    Carbon Dioxide 26 20 - 32 mmol/L    Anion Gap 7 3 - 14 mmol/L    Glucose 71 70 - 99 mg/dL    Urea Nitrogen 17 7 - 30 mg/dL    Creatinine 1.25 (H) 0.52 - 1.04 mg/dL    GFR Estimate 47 (L) >60 mL/min/1.7m2    GFR Estimate If Black 56 (L) >60 mL/min/1.7m2    Calcium 8.6 8.5 - 10.1 mg/dL    Bilirubin Total 0.6 0.2 - 1.3 mg/dL    Albumin 3.4 3.4 - 5.0 g/dL    Protein Total 7.1 6.8 - 8.8 g/dL    Alkaline Phosphatase 169 (H) 40 - 150 U/L    ALT 29 0 - 50 U/L    AST 24 0 - 45 U/L   CRP inflammation   Result Value Ref Range    CRP Inflammation 97.0 (H) 0.0 - 8.0 mg/L   Erythrocyte sedimentation rate auto   Result Value Ref Range    Sed Rate 18 0 - 20 mm/h   Lipase   Result Value Ref Range    Lipase 149 73 - 393 U/L   Lactic acid   Result Value Ref Range    Lactic Acid 1.0 0.7 - 2.1 mmol/L   INR   Result Value Ref Range    INR 1.12 0.86 - 1.14   Magnesium   Result Value Ref Range    Magnesium 1.9 1.6 - 2.3 mg/dL   Phosphorus   Result Value Ref Range    Phosphorus 3.0 2.5 - 4.5 mg/dL   TSH with free T4 reflex   Result Value Ref Range    TSH 1.79 0.40 - 4.00 mU/L   Troponin I   Result Value Ref Range    Troponin I ES  0.000 - 0.045 ug/L     <0.015  The 99th percentile for upper reference range is 0.045 ug/L.  Troponin values in   the range of 0.045 - 0.120 ug/L may be associated with risks of adverse   clinical events.     US  Renal Transplant    Impression    IMPRESSION:  1.  Normal transplant kidney ultrasound.   2.  Patent transplant vasculature as above         Assessments & Plan (with Medical Decision Making)   Impression:  Rose presented tonight with a history of 3-4 days of lethargy and malaise. She had kidney pancreas transplant in August 2016 and has been doing well since that time. Her only focal symptoms other than the fatigue was left suprapubic abdominal pain. Her current kidney is on the right. She was somewhat pale on arrival, and her  Systolic blood pressure was in the 80-90 range. She is afebrile and has no other indices of sepsis. He blood pressure improved markedly with IV hydration into the 120-140 range. Her color improved with this as well, and mild headache resolved.. Labs showed normal CBC and lactate. Her CRP is significantly elevated. Creatinine at 1.25 is a bit high for her consistent with dehydration. Liver enzymes, troponin and thyroid function were normal. She had no acute problems on transplant kidney US, stable vascular flow and no hydronephrosis.. Lipase was normal. UA has significant leukocytosis and many bacteria consistent with UTI. Her medication allergies and intolerances as well as he chronic prophylactic medications limit options for oral treatment of UTI. I will chose Levaquin empirically pending culture. She will need to continue to monitor her immunosuppressant drug levels with this.          I have reviewed the nursing notes.    I have reviewed the findings, diagnosis, plan and need for follow up with the patient.    Discharge Medication List as of 5/20/2017 12:16 AM      START taking these medications    Details   levofloxacin (LEVAQUIN) 500 MG tablet Take 1 tablet (500 mg) by mouth daily, Disp-7 tablet, R-0, Local Print             Final diagnoses:   Malaise   Acute UTI   Kidney replaced by transplant   Pancreas replaced by transplant (H)   Dehydration       5/19/2017   [facility], [place],  EMERGENCY DEPARTMENT     Gurinder Eckert MD  05/20/17 0109

## 2017-05-21 LAB
BACTERIA SPEC CULT: ABNORMAL
Lab: ABNORMAL
MICRO REPORT STATUS: ABNORMAL
MICROORGANISM SPEC CULT: ABNORMAL
SPECIMEN SOURCE: ABNORMAL

## 2017-05-22 ENCOUNTER — TELEPHONE (OUTPATIENT)
Dept: TRANSPLANT | Facility: CLINIC | Age: 42
End: 2017-05-22

## 2017-05-22 DIAGNOSIS — Z94.83 PANCREAS REPLACED BY TRANSPLANT (H): ICD-10-CM

## 2017-05-22 DIAGNOSIS — Z94.0 KIDNEY REPLACED BY TRANSPLANT: ICD-10-CM

## 2017-05-22 RX ORDER — MYCOPHENOLIC ACID 180 MG/1
360 TABLET, DELAYED RELEASE ORAL 2 TIMES DAILY
Qty: 120 TABLET | Refills: 3 | Status: SHIPPED | OUTPATIENT
Start: 2017-05-22 | End: 2017-09-19

## 2017-05-22 NOTE — TELEPHONE ENCOUNTER
Patient was very upset  and crying regarding her needing Rx re-fill (Mycophenolate) fax to [facility]. I transferred the call to [facility] and spoke with daisy which asked one of the other nurses to do asap.

## 2017-05-23 NOTE — TELEPHONE ENCOUNTER
Called pt and left her a VM asking if she received her Cellcept yesterday. Asked her to call back if she had other questions or concerns.

## 2017-05-26 ENCOUNTER — OFFICE VISIT (OUTPATIENT)
Dept: ORTHOPEDICS | Facility: CLINIC | Age: 42
End: 2017-05-26

## 2017-05-26 DIAGNOSIS — L97.522 ULCER OF LEFT FOOT, WITH FAT LAYER EXPOSED (H): Primary | ICD-10-CM

## 2017-05-26 DIAGNOSIS — Z94.0 KIDNEY REPLACED BY TRANSPLANT: ICD-10-CM

## 2017-05-26 DIAGNOSIS — Z94.83 PANCREAS REPLACED BY TRANSPLANT (H): ICD-10-CM

## 2017-05-26 DIAGNOSIS — D84.9 IMMUNOSUPPRESSED STATUS (H): ICD-10-CM

## 2017-05-26 NOTE — MR AVS SNAPSHOT
After Visit Summary   5/26/2017    Rose Castaneda    MRN: [id number]           Patient Information     Date Of Birth          1975        Visit Information        Provider Department      5/26/2017 9:20 AM Darius Parkinson DPM [facility]        Today's Diagnoses     Ulcer of left foot, with fat layer exposed (H)    -  1    Immunosuppressed status (H)        Kidney replaced by transplant        Pancreas replaced by transplant (H)           Follow-ups after your visit        Your next 10 appointments already scheduled     Jun 09, 2017 12:40 PM CDT   (Arrive by 12:25 PM)   RETURN FOOT/ANKLE with Darius Parkinson DPM   [facility] ([facility]    [address] [place] [address]  4th [facility] [address] [phone]            Jun 09, 2017  1:20 PM CDT   (Arrive by 12:50 PM)   Return Kidney Transplant with Mauricio Marquez MD   [facility] ([facility])    [address] [place] [address]  3rd [facility] [address] [phone]              Who to contact     Please call your clinic at [phone] to:    Ask questions about your health    Make or cancel appointments    Discuss your medicines    Learn about your test results    Speak to your doctor   If you have compliments or concerns about an experience at your clinic, or if you wish to file a complaint, please contact [facility] Patient Relations at [phone] or email us at [email or web address]         Additional Information About Your Visit        MyChart Information     Alloptic is an electronic gateway that provides easy, online access to your medical records. With All[employer], you can request a clinic appointment, read your test results, renew a prescription or communicate with your care team.     To sign up for milabentt visit the website at [email or web address]   You will be asked to enter  the access code listed below, as well as some personal information. Please follow the directions to create your username and password.     Your access code is: [email or web address]  Expires: 2017  2:03 PM     Your access code will  in 90 days. If you need help or a new code, please contact your [facility] or call [phone] for assistance.        Care EveryWhere ID     This is your Care EveryWhere ID. This could be used by other organizations to access your [facility] medical records  [id number]         Blood Pressure from Last 3 Encounters:   17 144/86   17 101/66   16 107/71    Weight from Last 3 Encounters:   17 50.3 kg (110 lb 14.4 oz)   17 52.2 kg (115 lb)   16 52.7 kg (116 lb 1.6 oz)              We Performed the Following     DEBRIDEMENT WOUND UP TO 20 SQ CM          Today's Medication Changes          These changes are accurate as of: 17  9:37 AM.  If you have any questions, ask your nurse or doctor.               These medicines have changed or have updated prescriptions.        Dose/Directions    vitamin D 54577 UNIT capsule   Commonly known as:  ERGOCALCIFEROL   This may have changed:  when to take this   Used for:  Kidney replaced by transplant, Pancreas replaced by transplant (H), Low calcium levels        Dose:  04433 Units   Take 1 capsule (50,000 Units) by mouth once a week   Quantity:  13 capsule   Refills:  3                Primary Care Provider Office Phone # Fax #    Hafsa Orozco [phone]       [facility] [address] [place] [address] [address] [phone]        Thank you!     Thank you for choosing [facility]  for your care. Our goal is always to provide you with excellent care. Hearing back from our patients is one way we can continue to improve our services. Please take a few minutes to complete the written survey that you may receive in the mail after your visit with us. Thank you!              Your Updated Medication List - Protect others around you: Learn how to safely use, store and throw away your medicines at www.disposemymeds.org.          This list is accurate as of: 5/26/17  9:37 AM.  Always use your most recent med list.                   Brand Name Dispense Instructions for use    aspirin 325 MG EC tablet     60 tablet    Take 1 tablet (325 mg) by mouth daily       citalopram 10 MG tablet    celeXA     Take 20 mg by mouth daily       fludrocortisone 0.1 MG tablet    FLORINEF    14 tablet    Take 1 tablet (0.1 mg) by mouth three times a week       itraconazole 100 MG capsule    SPORANOX     Take by mouth 2 times daily       levofloxacin 500 MG tablet    LEVAQUIN    7 tablet    Take 1 tablet (500 mg) by mouth daily       mycophenolic acid 180 MG EC tablet    MYFORTIC - GENERIC EQUIVALENT    120 tablet    Take 2 tablets (360 mg) by mouth 2 times daily       order for DME     1 each    Equipment being ordered: Lynnette () Treatment Diagnosis: impaired gait       pravastatin 20 MG tablet    PRAVACHOL    90 tablet    Take 1 tablet (20 mg) by mouth daily       sulfamethoxazole-trimethoprim 400-80 MG per tablet    BACTRIM/SEPTRA    40 tablet    Take 1 tablet by mouth Every Mon, Wed, Fri Morning       SYNTHROID PO      Take 50 mcg by mouth daily       tacrolimus 1 mg/mL suspension    PROGRAF - GENERIC EQUIVALENT    15 mL    0.3 mg every morning and 0.2 mg every evening.       valGANciclovir 450 MG tablet    VALCYTE    180 tablet    Take 1 tablet (450 mg) by mouth 2 times daily       vitamin D 83999 UNIT capsule    ERGOCALCIFEROL    13 capsule    Take 1 capsule (50,000 Units) by mouth once a week       ZITHROMAX Z-RAMONA PO      Take 250 mg by mouth daily       ZYRTEC ALLERGY 10 MG tablet   Generic drug:  cetirizine      Take 1 tablet by mouth daily.

## 2017-05-26 NOTE — NURSING NOTE
Reason For Visit:   Chief Complaint   Patient presents with     RECHECK     2 week follow up, diabetic foot wound, states Regranex gel is working well     Age: 41 year old    Pain Assessment  Patient Currently in Pain: No (Complaints of skin breakdown)       Allergies   Allergen Reactions     Amoxicillin-Pot Clavulanate Diarrhea     Ciprofloxacin Nausea     Pollen Extract Other (See Comments)     Seasonal allergies     Pyridostigmine Other (See Comments)     Spastic muscle motion in the face and legs, weakness in the arms. Slight swelling of the tongue.

## 2017-05-26 NOTE — PROGRESS NOTES
Chief Complaint: No chief complaint on file.         Allergies   Allergen Reactions     Amoxicillin-Pot Clavulanate Diarrhea     Ciprofloxacin Nausea     Pollen Extract Other (See Comments)     Seasonal allergies     Pyridostigmine Other (See Comments)     Spastic muscle motion in the face and legs, weakness in the arms. Slight swelling of the tongue.         Subjective: Rose is a 41 year old female who presents to the clinic today for a follow up of left foot wound. Since the last visit, she was in the ED for malaise. Relates improved growth to the area after starting Regranex, but hasn't been using it everyday. Relates that the straps on the DH2 has caused an abrasion on the top of the left foot.     Objective      A diabetic wound is noted at left  plantar forefoot measuring 1.1cm x 1cm.    Block Classification: II    Wound base: Red/hypergranulation     Edges: hyperkeratotic    Drainage: small/serous    Odor: no    Undermining: no    Bone Exposure: No    Clinical Signs of Infection: No    After obtaining patient consent, the wound was irrigated with copious amounts of saline. A scalpel was then used to debride the wound into the subcutaneous tissue. The wound edges were debrided to healthy, bleeding tissue. Given the patient's lack of sensation, no anesthesia was necessary for the procedure. Silver nitrate was also used on the hypergranulation tissue.   Abrasion noted on the top of the left foot secondary to DH2 strap. No infection noted. Deep to dermis.       Assessment: Left foot diabetic wound - has gotten shallower. New dorsal left foot abrasion. Not infected.     Plan:   - Pt seen and evaluated  - Plantar foot wound was debrided. It is hypergranular now. I applied silver nitrate to this today. Wound was covered with Adaptic, Hydrofera Blue and a DSD. She should change this tomorrow and start the Regranex but change to every other day 12 hour on applications.   - Primapore to the dorsal abrasion.   - Pt  to return to clinic in 2 weeks

## 2017-05-26 NOTE — LETTER
5/26/2017       RE: Rose Castaneda  [address] [place] [address]     Dear Colleague,    Thank you for referring your patient, Rose Castaneda, to the [facility] at [facility]. Please see a copy of my visit note below.    Chief Complaint: No chief complaint on file.    Allergies   Allergen Reactions     Amoxicillin-Pot Clavulanate Diarrhea     Ciprofloxacin Nausea     Pollen Extract Other (See Comments)     Seasonal allergies     Pyridostigmine Other (See Comments)     Spastic muscle motion in the face and legs, weakness in the arms. Slight swelling of the tongue.     Subjective: Rsoe is a 41 year old female who presents to the clinic today for a follow up of left foot wound. Since the last visit, she was in the ED for malaise. Relates improved growth to the area after starting Regranex, but hasn't been using it everyday. Relates that the straps on the DH2 has caused an abrasion on the top of the left foot.     Objective  A diabetic wound is noted at left  plantar forefoot measuring 1.1cm x 1cm.    Block Classification: II    Wound base: Red/hypergranulation     Edges: hyperkeratotic    Drainage: small/serous    Odor: no    Undermining: no    Bone Exposure: No    Clinical Signs of Infection: No    After obtaining patient consent, the wound was irrigated with copious amounts of saline. A scalpel was then used to debride the wound into the subcutaneous tissue. The wound edges were debrided to healthy, bleeding tissue. Given the patient's lack of sensation, no anesthesia was necessary for the procedure. Silver nitrate was also used on the hypergranulation tissue.   Abrasion noted on the top of the left foot secondary to DH2 strap. No infection noted. Deep to dermis.     Assessment: Left foot diabetic wound - has gotten shallower. New dorsal left foot abrasion. Not infected.     Plan:   - Pt seen and evaluated  - Plantar foot wound was debrided.  It is hypergranular now. I applied silver nitrate to this today. Wound was covered with Adaptic, Hydrofera Blue and a DSD. She should change this tomorrow and start the Regranex but change to every other day 12 hour on applications.   - Primapore to the dorsal abrasion.   - Pt to return to clinic in 2 weeks    Again, thank you for allowing me to participate in the care of your patient.      Sincerely,  Darius Parkinson DPM

## 2017-06-03 ENCOUNTER — TELEPHONE (OUTPATIENT)
Dept: NEPHROLOGY | Facility: CLINIC | Age: 42
End: 2017-06-03

## 2017-06-03 NOTE — TELEPHONE ENCOUNTER
German, this is Jimmy's office from Medicine Specialty on the 3rd floor at [facility] located at [address] [place] [address]. We are reminding you of your upcoming Nephrology appointment on 6/9/2017 at 1:20 pm. Please arrive an hour prior to your appointment time for labs. Also, please bring an updated medication list including dose of prescribed and over the counter medications you are currently taking or your labeled medication bottles with you to your appointment. If you have any questions or would like to cancel or reschedule your appointment, please call us at [phone].     If you are having labs draw same day you need a lab appointment scheduled 1 hour prior to your visit.    You are welcome to have your labs done 2-7 days before your appointment at any [facility] or [facility] [place] facility. If you have your labs completed before your appointment, please still come 30 minutes early for check-in.     Thank-You for allowing us to be a member of your Health Care Team,     Genie Guillaume., CMA

## 2017-06-08 ENCOUNTER — TELEPHONE (OUTPATIENT)
Dept: TRANSPLANT | Facility: CLINIC | Age: 42
End: 2017-06-08

## 2017-06-08 DIAGNOSIS — Z94.0 KIDNEY REPLACED BY TRANSPLANT: ICD-10-CM

## 2017-06-08 NOTE — TELEPHONE ENCOUNTER
Pt states she is taking Tac 0.3 mg bid not 0.3 mg in the am and 0.2 mg in the pm as her med list states.  Since her Tac level is 6.9, we will send the pharmacy a prescription for 0.3 mg bid

## 2017-06-09 ENCOUNTER — OFFICE VISIT (OUTPATIENT)
Dept: ORTHOPEDICS | Facility: CLINIC | Age: 42
End: 2017-06-09

## 2017-06-09 ENCOUNTER — OFFICE VISIT (OUTPATIENT)
Dept: NEPHROLOGY | Facility: CLINIC | Age: 42
End: 2017-06-09
Attending: INTERNAL MEDICINE
Payer: COMMERCIAL

## 2017-06-09 VITALS — HEIGHT: 61 IN | BODY MASS INDEX: 21.34 KG/M2 | WEIGHT: 113 LBS

## 2017-06-09 VITALS
OXYGEN SATURATION: 99 % | HEART RATE: 60 BPM | BODY MASS INDEX: 21.09 KG/M2 | SYSTOLIC BLOOD PRESSURE: 86 MMHG | TEMPERATURE: 98.3 F | WEIGHT: 111.6 LBS | DIASTOLIC BLOOD PRESSURE: 47 MMHG

## 2017-06-09 DIAGNOSIS — D63.1 ANEMIA IN CHRONIC RENAL DISEASE: ICD-10-CM

## 2017-06-09 DIAGNOSIS — Z48.298 AFTERCARE FOLLOWING ORGAN TRANSPLANT: ICD-10-CM

## 2017-06-09 DIAGNOSIS — E55.9 VITAMIN D DEFICIENCY: ICD-10-CM

## 2017-06-09 DIAGNOSIS — B25.9 CYTOMEGALOVIRUS (CMV) VIREMIA (H): ICD-10-CM

## 2017-06-09 DIAGNOSIS — D84.9 IMMUNOSUPPRESSED STATUS (H): ICD-10-CM

## 2017-06-09 DIAGNOSIS — L97.522 ULCER OF LEFT FOOT, WITH FAT LAYER EXPOSED (H): Primary | ICD-10-CM

## 2017-06-09 DIAGNOSIS — N39.0 URINARY TRACT INFECTION, SITE UNSPECIFIED: ICD-10-CM

## 2017-06-09 DIAGNOSIS — E11.610 DIABETIC CHARCOT FOOT (H): ICD-10-CM

## 2017-06-09 DIAGNOSIS — Z94.0 KIDNEY REPLACED BY TRANSPLANT: ICD-10-CM

## 2017-06-09 DIAGNOSIS — R19.7 DIARRHEA, UNSPECIFIED TYPE: ICD-10-CM

## 2017-06-09 DIAGNOSIS — N18.9 ANEMIA IN CHRONIC RENAL DISEASE: ICD-10-CM

## 2017-06-09 DIAGNOSIS — N25.81 SECONDARY RENAL HYPERPARATHYROIDISM (H): Primary | ICD-10-CM

## 2017-06-09 DIAGNOSIS — G90.3 NEUROGENIC ORTHOSTATIC HYPOTENSION (H): ICD-10-CM

## 2017-06-09 DIAGNOSIS — B27.00 EBV (EPSTEIN-BARR VIRUS) VIREMIA: ICD-10-CM

## 2017-06-09 DIAGNOSIS — Z94.83 PANCREAS REPLACED BY TRANSPLANT (H): ICD-10-CM

## 2017-06-09 PROCEDURE — 99212 OFFICE O/P EST SF 10 MIN: CPT | Mod: ZF

## 2017-06-09 ASSESSMENT — PAIN SCALES - GENERAL: PAINLEVEL: NO PAIN (0)

## 2017-06-09 NOTE — MR AVS SNAPSHOT
After Visit Summary   6/9/2017    Rose Castaneda    MRN: [id number]           Patient Information     Date Of Birth          1975        Visit Information        Provider Department      6/9/2017 1:20 PM Mauricio Marquez MD [facility]        Today's Diagnoses     Secondary renal hyperparathyroidism (H)    -  1    Kidney replaced by transplant        Immunosuppressed status (H)        Anemia in chronic renal disease        Pancreas replaced by transplant (H)        Vitamin D deficiency        Diarrhea, unspecified type        Cytomegalovirus (CMV) viremia (H)        EBV (Isabelle-Barr virus) viremia        Urinary tract infection, site unspecified        Aftercare following organ transplant        Neurogenic orthostatic hypotension (H)           Follow-ups after your visit        Follow-up notes from your care team     Return in about 3 months (around 9/9/2017).      Your next 10 appointments already scheduled     Jun 30, 2017 10:00 AM CDT   (Arrive by 9:45 AM)   RETURN FOOT/ANKLE with Darius Parkinson DPM   [facility] ([facility])    [address] [place] [address] [phone]              Who to contact     If you have questions or need follow up information about today's clinic visit or your schedule please contact [facility] directly at [phone].  Normal or non-critical lab and imaging results will be communicated to you by MyChart, letter or phone within 4 business days after the clinic has received the results. If you do not hear from us within 7 days, please contact the clinic through MyChart or phone. If you have a critical or abnormal lab result, we will notify you by phone as soon as possible.  Submit refill requests through [employer] or call your pharmacy and they will forward the refill request to us. Please allow 3 business days for your refill to be completed.          Additional  "Information About Your Visit        MyChart Information     [employer] lets you send messages to your doctor, view your test results, renew your prescriptions, schedule appointments and more. To sign up, go to [email or web address] . Click on \"Log in\" on the left side of the screen, which will take you to the Welcome page. Then click on \"Sign up Now\" on the right side of the page.     You will be asked to enter the access code listed below, as well as some personal information. Please follow the directions to create your username and password.     Your access code is: [email or web address]  Expires: 2017  2:03 PM     Your access code will  in 90 days. If you need help or a new code, please call your [place] clinic or [phone].        Care EveryWhere ID     This is your Care EveryWhere ID. This could be used by other organizations to access your [facility] medical records  [id number]        Your Vitals Were     Pulse Temperature Pulse Oximetry BMI (Body Mass Index)          60 98.3  F (36.8  C) (Oral) 99% 21.09 kg/m2         Blood Pressure from Last 3 Encounters:   17 (!) 86/47   17 144/86   17 101/66    Weight from Last 3 Encounters:   17 50.6 kg (111 lb 9.6 oz)   17 51.3 kg (113 lb)   17 50.3 kg (110 lb 14.4 oz)                 Today's Medication Changes          These changes are accurate as of: 17 11:59 PM.  If you have any questions, ask your nurse or doctor.               These medicines have changed or have updated prescriptions.        Dose/Directions    vitamin D 57197 UNIT capsule   Commonly known as:  ERGOCALCIFEROL   This may have changed:  when to take this   Used for:  Kidney replaced by transplant, Pancreas replaced by transplant (H), Low calcium levels        Dose:  60098 Units   Take 1 capsule (50,000 Units) by mouth once a week   Quantity:  13 capsule   Refills:  3         Stop taking these medicines if you haven't already. Please contact your care team " if you have questions.     fludrocortisone 0.1 MG tablet   Commonly known as:  FLORINEF   Stopped by:  Mauricio Marquez MD                    Primary Care Provider Office Phone # Fax #    Hafsa Orozco [phone]       [facility] [address] [place] [address]        Thank you!     Thank you for choosing [facility]  for your care. Our goal is always to provide you with excellent care. Hearing back from our patients is one way we can continue to improve our services. Please take a few minutes to complete the written survey that you may receive in the mail after your visit with us. Thank you!             Your Updated Medication List - Protect others around you: Learn how to safely use, store and throw away your medicines at www.disposemymeds.org.          This list is accurate as of: 6/9/17 11:59 PM.  Always use your most recent med list.                   Brand Name Dispense Instructions for use    aspirin 325 MG EC tablet     60 tablet    Take 1 tablet (325 mg) by mouth daily       citalopram 10 MG tablet    celeXA     Take 20 mg by mouth daily       itraconazole 100 MG capsule    SPORANOX     Take by mouth 2 times daily       levofloxacin 500 MG tablet    LEVAQUIN    7 tablet    Take 1 tablet (500 mg) by mouth daily       mycophenolic acid 180 MG EC tablet    MYFORTIC - GENERIC EQUIVALENT    120 tablet    Take 2 tablets (360 mg) by mouth 2 times daily       order for DME     1 each    Equipment being ordered: [employer] () Treatment Diagnosis: impaired gait       pravastatin 20 MG tablet    PRAVACHOL    90 tablet    Take 1 tablet (20 mg) by mouth daily       sulfamethoxazole-trimethoprim 400-80 MG per tablet    BACTRIM/SEPTRA    40 tablet    Take 1 tablet by mouth Every Mon, Wed, Fri Morning       SYNTHROID PO      Take 50 mcg by mouth daily       tacrolimus 1 mg/mL suspension    PROGRAF - GENERIC EQUIVALENT    20 mL    Take 0.3 mLs (0.3 mg) by mouth 2 times daily        valGANciclovir 450 MG tablet    VALCYTE    180 tablet    Take 1 tablet (450 mg) by mouth 2 times daily       vitamin D 31876 UNIT capsule    ERGOCALCIFEROL    13 capsule    Take 1 capsule (50,000 Units) by mouth once a week       ZITHROMAX Z-RAMONA PO      Take 250 mg by mouth daily       ZYRTEC ALLERGY 10 MG tablet   Generic drug:  cetirizine      Take 1 tablet by mouth daily.

## 2017-06-09 NOTE — MR AVS SNAPSHOT
After Visit Summary   2017    Rose Castaneda    MRN: [id number]           Patient Information     Date Of Birth          1975        Visit Information        Provider Department      2017 12:40 PM Darius Parkinson DPM [facility]        Today's Diagnoses     Ulcer of left foot, with fat layer exposed (H)    -  1    Diabetic Charcot foot (H)           Follow-ups after your visit        Your next 10 appointments already scheduled     2017 10:00 AM CDT   (Arrive by 9:45 AM)   RETURN FOOT/ANKLE with ALFA Reveles [facility] ([facility])    [address]  [address] [facility] [address] [phone]              Who to contact     Please call your clinic at [phone] to:    Ask questions about your health    Make or cancel appointments    Discuss your medicines    Learn about your test results    Speak to your doctor   If you have compliments or concerns about an experience at your clinic, or if you wish to file a complaint, please contact [facility] Patient Relations at [phone] or email us at [email or web address]         Additional Information About Your Visit        MyChart Information     [employer] is an electronic gateway that provides easy, online access to your medical records. With [employer], you can request a clinic appointment, read your test results, renew a prescription or communicate with your care team.     To sign up for [employer] visit the website at [email or web address]   You will be asked to enter the access code listed below, as well as some personal information. Please follow the directions to create your username and password.     Your access code is: [email or web address]  Expires: 2017  2:03 PM     Your access code will  in 90 days. If you need help or a new code, please contact your [facility]  "Clinic or call [phone] for assistance.        Care EveryWhere ID     This is your Care EveryWhere ID. This could be used by other organizations to access your [facility] medical records  [id number]        Your Vitals Were     Height BMI (Body Mass Index)                1.549 m (5' 1\") 21.35 kg/m2           Blood Pressure from Last 3 Encounters:   05/20/17 144/86   02/07/17 101/66   12/31/16 107/71    Weight from Last 3 Encounters:   06/09/17 51.3 kg (113 lb)   02/07/17 50.3 kg (110 lb 14.4 oz)   01/13/17 52.2 kg (115 lb)                 Today's Medication Changes          These changes are accurate as of: 6/9/17  1:20 PM.  If you have any questions, ask your nurse or doctor.               These medicines have changed or have updated prescriptions.        Dose/Directions    vitamin D 73543 UNIT capsule   Commonly known as:  ERGOCALCIFEROL   This may have changed:  when to take this   Used for:  Kidney replaced by transplant, Pancreas replaced by transplant (H), Low calcium levels        Dose:  01491 Units   Take 1 capsule (50,000 Units) by mouth once a week   Quantity:  13 capsule   Refills:  3                Primary Care Provider Office Phone # Fax #    Hafsa JOSEPH Orozco [phone]       [facility] [address] [place] [address] [place] [address]        Thank you!     Thank you for choosing [facility]  for your care. Our goal is always to provide you with excellent care. Hearing back from our patients is one way we can continue to improve our services. Please take a few minutes to complete the written survey that you may receive in the mail after your visit with us. Thank you!             Your Updated Medication List - Protect others around you: Learn how to safely use, store and throw away your medicines at www.disposemymeds.org.          This list is accurate as of: 6/9/17  1:20 PM.  Always use your most recent med list.                   Brand Name Dispense Instructions for use "    aspirin 325 MG EC tablet     60 tablet    Take 1 tablet (325 mg) by mouth daily       citalopram 10 MG tablet    celeXA     Take 20 mg by mouth daily       fludrocortisone 0.1 MG tablet    FLORINEF    14 tablet    Take 1 tablet (0.1 mg) by mouth three times a week       itraconazole 100 MG capsule    SPORANOX     Take by mouth 2 times daily       levofloxacin 500 MG tablet    LEVAQUIN    7 tablet    Take 1 tablet (500 mg) by mouth daily       mycophenolic acid 180 MG EC tablet    MYFORTIC - GENERIC EQUIVALENT    120 tablet    Take 2 tablets (360 mg) by mouth 2 times daily       order for DME     1 each    Equipment being ordered: [facility] () Treatment Diagnosis: impaired gait       pravastatin 20 MG tablet    PRAVACHOL    90 tablet    Take 1 tablet (20 mg) by mouth daily       sulfamethoxazole-trimethoprim 400-80 MG per tablet    BACTRIM/SEPTRA    40 tablet    Take 1 tablet by mouth Every Mon, Wed, Fri Morning       SYNTHROID PO      Take 50 mcg by mouth daily       tacrolimus 1 mg/mL suspension    PROGRAF - GENERIC EQUIVALENT    20 mL    Take 0.3 mLs (0.3 mg) by mouth 2 times daily       valGANciclovir 450 MG tablet    VALCYTE    180 tablet    Take 1 tablet (450 mg) by mouth 2 times daily       vitamin D 13416 UNIT capsule    ERGOCALCIFEROL    13 capsule    Take 1 capsule (50,000 Units) by mouth once a week       ZITHROMAX Z-RAMONA PO      Take 250 mg by mouth daily       ZYRTEC ALLERGY 10 MG tablet   Generic drug:  cetirizine      Take 1 tablet by mouth daily.

## 2017-06-09 NOTE — LETTER
6/9/2017      RE: Rose JOHNSON Leonel  [address] [place] [address]       Assessment and Plan:  1. DDKT (SPK) - baseline Cr ~ 1.1-1.3, which has remained stable for last 6-9 months, but slightly higher than initial baseline closer to 0.8-1.0 range.  Patient did have mild GWENDOLYN episode previously and also may be a bit prerenal with her hypotension and CNI.  Normal proteinuria.  No DSA.  Will make no changes in immunosuppression.  2. Pancreas Tx (SPK) - euglycemic off insulin with normal HbA1c and stable pancreatic enzymes.  No changes in immunosuppression.  3. Neurogenic orthostatic hypotension - low BP, but really no symptoms.  Patient would really like to avoid Florinef as it gave her swelling and she is hoping to have her left foot wound heal.  Recommend patient push fluids, especially in the morning.  4. Anemia in chronic renal disease - stable Hgb, near normal.  Iron replete.  Will follow.  5. Vitamin D deficiency - low vitamin D level and will continue ergocalciferol.  After finished with ergocalciferol course, patient can start cholecalciferol 2000 iu daily.  Will recheck vitamin D level with next labs.  6. Chronic diarrhea - stable to slight increase in symptoms, likely dumping syndrome from diabetes.  Will also rule out infectious causes with checking CMV PCR and also C. diff with recent antibiotics.  If symptoms worsen, would also consider checking for enteric infection with ALKA stool testing.  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  Will recheck CMV PCR.  8. 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  9. H/o pulmonary histoplasmosis - asymptomatic and will continue on itraconazole.   10. Left foot ulcer - resolved cellulitis and now off antibiotics.  Slowly healing.  11. UTI - asymptomatic and now finished with antibiotics.  Will repeat urine culture to ensure infection is resolved.  12. Recommend return visit in 3 months.    Assessment  and plan was discussed with patient and she voiced her understanding and agreement.    Reason for Visit:  Ms. Castaneda is here for routine follow up.    HPI:   Rose Castaneda is a 41 year old female with ESKD from DM and is status post SPK on 8/5/16.         Transplant Hx:       Tx: SPK  Date: 8/5/16       Present Maintenance IS: Tacrolimus and Mycophenolic acid       Baseline Creatinine: 1.1-1.3       Recent DSA: No  Date last checked: 2/2017       Biopsy: No    Ms. Castaneda reports feeling good overall with some medical complaints.  Since patient's last clinic visit, she was recently treated for a UTI a couple of weeks ago and just finished antibiotics.  Her energy level is good and pretty much normal.  She is active and back to working full time, but really gets minimal exercise.  Denies any chest pain or shortness of breath with exertion.  Appetite is good and weight is unchanged.  No nausea or vomiting.  Some loose to watery stools about twice a day, usually right after eating.  These are stable symptoms and more her normal from prior to transplant.  No bloody or black, tarry stools.  No fever, sweats or chills.  No leg swelling.  Her left foot ulcer is slowly healing.  No pain or burning with urination.    Home BP: Not checked, but rare lightheadedness.  Patient reports significant swelling with Florinef and midodrine contraindicated with CAD.      ROS:   A comprehensive review of systems was obtained and negative, except as noted in the HPI or PMH.    Active Medical Problems:  Patient Active Problem List   Diagnosis     Diabetes mellitus type 1 (H)     Immunosuppressed status (H)     Kidney replaced by transplant     Neurogenic orthostatic hypotension (H)     Osteoporosis     Dyslipidemia     Secondary renal hyperparathyroidism (H)     Hypothyroidism     Anemia in chronic renal disease     Pulmonary histoplasmosis (H)     Pancreas replaced by transplant (H)     Hypomagnesemia     Vitamin D deficiency      Cellulitis of left lower extremity     Aftercare following organ transplant     Ulcer of left foot, with fat layer exposed (H)     Cytomegalovirus (CMV) viremia (H)       Personal Hx:  Social History     Social History     Marital status:      Spouse name: N/A     Number of children: N/A     Years of education: N/A     Occupational History     Not on file.     Social History Main Topics     Smoking status: Never Smoker     Smokeless tobacco: Not on file     Alcohol use Yes      Comment: rare     Drug use: No     Sexual activity: Yes     Partners: Male     Birth control/ protection: Condom     Other Topics Concern     Blood Transfusions Yes     2006, 2003 during sepsis     Social History Narrative       Allergies:  Allergies   Allergen Reactions     Amoxicillin-Pot Clavulanate Diarrhea     Ciprofloxacin Nausea     Pollen Extract Other (See Comments)     Seasonal allergies     Pyridostigmine Other (See Comments)     Spastic muscle motion in the face and legs, weakness in the arms. Slight swelling of the tongue.       Medications:  Prior to Admission medications    Medication Sig Start Date End Date Taking? Authorizing Provider   phosphorus tablet 250 mg (PHOSPHA 250 NEUTRAL) 250 MG tablet Take 2 tablets (500 mg) by mouth 3 times daily 8/19/16  Yes Mauricio Marquez MD   PROGRAF 0.5 MG PO CAPSULE Take 1 capsule (0.5 mg) by mouth 2 times daily Take as directed by physician 8/22/16   Gilson Doe MD   magnesium oxide 400 MG CAPS Take 800 mg by mouth 2 times daily 8/16/16   Mauricio Marquez MD   fludrocortisone (FLORINEF) 0.1 MG tablet Take 1 tablet (0.1 mg) by mouth 2 times daily 8/15/16   Ilan Rodriges MD   oxyCODONE (ROXICODONE) 5 MG immediate release tablet Take 1 tablet (5 mg) by mouth every 4 hours as needed for moderate to severe pain 8/12/16   Chantelle Ramon PA-C   acetaminophen (TYLENOL) 325 MG tablet Take 2 tablets (650 mg) by mouth every 4 hours as needed for mild pain or  fever 8/12/16   Chantelle Ramon PA-C   aspirin  MG EC tablet Take 1 tablet (325 mg) by mouth daily 8/12/16   Chantelle Ramon PA-C   ondansetron (ZOFRAN-ODT) 4 MG disintegrating tablet Take 1 tablet (4 mg) by mouth every 6 hours as needed for nausea 8/12/16   Chantelle Ramon PA-C   valGANciclovir (VALCYTE) 450 MG tablet Take 2 tablets (900 mg) by mouth daily 8/12/16   Chantelle Ramon PA-C   senna-docusate (SENOKOT-S;PERICOLACE) 8.6-50 MG per tablet Take 1 tablet by mouth 2 times daily 8/12/16   Chantelle Ramon PA-C   sulfamethoxazole-trimethoprim (BACTRIM,SEPTRA) 400-80 MG per tablet Take 1 tablet by mouth daily 8/12/16   Chantelle Ramon PA-C   clotrimazole (MYCELEX) 10 MG LOZG Place 1 lozenge (10 mg) inside cheek 4 times daily 8/12/16   Chantelle Ramon PA-C   MYFORTIC 360 MG PO EC TABLET Take 2 tablets (720 mg) by mouth 2 times daily 8/12/16   Chantelle Ramon PA-C   itraconazole (SPORANOX) 100 MG capsule Take by mouth 2 times daily     Reported, Patient   pravastatin (PRAVACHOL) 80 MG tablet Take 20 mg by mouth daily 4/10/13   Dao Bills MD   citalopram (CELEXA) 10 MG tablet Take 20 mg by mouth daily     Reported, Patient   Levothyroxine Sodium (SYNTHROID PO) Take 50 mcg by mouth daily     Reported, Patient   cetirizine (ZYRTEC ALLERGY) 10 MG tablet Take 1 tablet by mouth daily.    Reported, Patient       Vitals:  BP (!) 86/47  Pulse 60  Temp 98.3  F (36.8  C) (Oral)  Wt 50.6 kg (111 lb 9.6 oz)  SpO2 99%  BMI 21.09 kg/m2    Exam:   GENERAL APPEARANCE: alert and no distress  HENT: mouth without ulcers or lesions  LYMPHATICS: no cervical or supraclavicular nodes  RESP: lungs clear to auscultation - no rales, rhonchi or wheezes  CV: regular rhythm, normal rate, no rub, no murmur  EDEMA: no LE edema bilaterally, left foot wrapped  ABDOMEN: soft, nondistended, nontender, bowel sounds normal  MS: extremities normal - no gross deformities noted, no evidence of  inflammation in joints, no muscle tenderness  SKIN: no rash  TX KIDNEY: normal    Results:   Recent Results (from the past 672 hour(s))   UA with Microscopic    Collection Time: 05/19/17  8:36 PM   Result Value Ref Range    Color Urine Yellow     Appearance Urine Slightly Cloudy     Glucose Urine Negative NEG mg/dL    Bilirubin Urine Negative NEG    Ketones Urine Negative NEG mg/dL    Specific Gravity Urine 1.012 1.003 - 1.035    Blood Urine Negative NEG    pH Urine 6.0 5.0 - 7.0 pH    Protein Albumin Urine 10 (A) NEG mg/dL    Urobilinogen mg/dL Normal 0.0 - 2.0 mg/dL    Nitrite Urine Negative NEG    Leukocyte Esterase Urine Moderate (A) NEG    Source Midstream Urine     WBC Urine 59 (H) 0 - 2 /HPF    RBC Urine 1 0 - 2 /HPF    Bacteria Urine Many (A) NEG /HPF    Squamous Epithelial /HPF Urine 1 0 - 1 /HPF    Mucous Urine Present (A) NEG /LPF    Hyaline Casts 12 (H) 0 - 2 /LPF   Urine Culture    Collection Time: 05/19/17  8:36 PM   Result Value Ref Range    Specimen Description Midstream Urine     Special Requests Specimen received in preservative     Culture Micro >100,000 colonies/mL Klebsiella pneumoniae (A)     Micro Report Status FINAL 05/21/2017     Organism: >100,000 colonies/mL Klebsiella pneumoniae        Susceptibility    >100,000 colonies/ml klebsiella pneumoniae ([place]) -  (no method available)     AMPICILLIN >32.0 Resistant  ug/mL     CEFAZOLIN Value in next row  ug/mL      <=4 SusceptibleCefazolin [place] breakpoints are for the treatment of uncomplicated urinary tract infections.  For the treatment of systemic infections, please contact the laboratory for additional testing.     CEFOXITIN Value in next row  ug/mL      <=4 SusceptibleCefazolin [place] breakpoints are for the treatment of uncomplicated urinary tract infections.  For the treatment of systemic infections, please contact the laboratory for additional testing.     CEFTAZIDIME Value in next row  ug/mL      <=4 SusceptibleCefazolin [place] breakpoints are  for the treatment of uncomplicated urinary tract infections.  For the treatment of systemic infections, please contact the laboratory for additional testing.     CEFTRIAXONE Value in next row  ug/mL      <=4 SusceptibleCefazolin [place] breakpoints are for the treatment of uncomplicated urinary tract infections.  For the treatment of systemic infections, please contact the laboratory for additional testing.     CIPROFLOXACIN Value in next row  ug/mL      <=4 SusceptibleCefazolin [place] breakpoints are for the treatment of uncomplicated urinary tract infections.  For the treatment of systemic infections, please contact the laboratory for additional testing.     GENTAMICIN Value in next row  ug/mL      <=4 SusceptibleCefazolin [place] breakpoints are for the treatment of uncomplicated urinary tract infections.  For the treatment of systemic infections, please contact the laboratory for additional testing.     LEVOFLOXACIN Value in next row  ug/mL      <=4 SusceptibleCefazolin [place] breakpoints are for the treatment of uncomplicated urinary tract infections.  For the treatment of systemic infections, please contact the laboratory for additional testing.     NITROFURANTOIN Value in next row  ug/mL      <=4 SusceptibleCefazolin [place] breakpoints are for the treatment of uncomplicated urinary tract infections.  For the treatment of systemic infections, please contact the laboratory for additional testing.     TOBRAMYCIN Value in next row  ug/mL      <=4 SusceptibleCefazolin [place] breakpoints are for the treatment of uncomplicated urinary tract infections.  For the treatment of systemic infections, please contact the laboratory for additional testing.     Trimethoprim/Sulfa Value in next row  ug/mL      <=4 SusceptibleCefazolin [place] breakpoints are for the treatment of uncomplicated urinary tract infections.  For the treatment of systemic infections, please contact the laboratory for additional testing.     AMPICILLIN/SULBACTAM Value in  next row  ug/mL      <=4 SusceptibleCefazolin [place] breakpoints are for the treatment of uncomplicated urinary tract infections.  For the treatment of systemic infections, please contact the laboratory for additional testing.     Piperacillin/Tazo Value in next row  ug/mL      <=4 SusceptibleCefazolin [place] breakpoints are for the treatment of uncomplicated urinary tract infections.  For the treatment of systemic infections, please contact the laboratory for additional testing.     CEFEPIME Value in next row  ug/mL      <=4 SusceptibleCefazolin [place] breakpoints are for the treatment of uncomplicated urinary tract infections.  For the treatment of systemic infections, please contact the laboratory for additional testing.   CBC with platelets differential    Collection Time: 05/19/17  8:51 PM   Result Value Ref Range    WBC 5.1 4.0 - 11.0 10e9/L    RBC Count 3.72 (L) 3.8 - 5.2 10e12/L    Hemoglobin 11.1 (L) 11.7 - 15.7 g/dL    Hematocrit 34.7 (L) 35.0 - 47.0 %    MCV 93 78 - 100 fl    MCH 29.8 26.5 - 33.0 pg    MCHC 32.0 31.5 - 36.5 g/dL    RDW 14.0 10.0 - 15.0 %    Platelet Count 243 150 - 450 10e9/L    Diff Method Automated Method     % Neutrophils 78.1 %    % Lymphocytes 12.0 %    % Monocytes 7.1 %    % Eosinophils 2.0 %    % Basophils 0.6 %    % Immature Granulocytes 0.2 %    Nucleated RBCs 0 0 /100    Absolute Neutrophil 4.0 1.6 - 8.3 10e9/L    Absolute Lymphocytes 0.6 (L) 0.8 - 5.3 10e9/L    Absolute Monocytes 0.4 0.0 - 1.3 10e9/L    Absolute Eosinophils 0.1 0.0 - 0.7 10e9/L    Absolute Basophils 0.0 0.0 - 0.2 10e9/L    Abs Immature Granulocytes 0.0 0 - 0.4 10e9/L    Absolute Nucleated RBC 0.0    Comprehensive metabolic panel    Collection Time: 05/19/17  8:51 PM   Result Value Ref Range    Sodium 140 133 - 144 mmol/L    Potassium 3.7 3.4 - 5.3 mmol/L    Chloride 108 94 - 109 mmol/L    Carbon Dioxide 26 20 - 32 mmol/L    Anion Gap 7 3 - 14 mmol/L    Glucose 71 70 - 99 mg/dL    Urea Nitrogen 17 7 - 30 mg/dL     Creatinine 1.25 (H) 0.52 - 1.04 mg/dL    GFR Estimate 47 (L) >60 mL/min/1.7m2    GFR Estimate If Black 56 (L) >60 mL/min/1.7m2    Calcium 8.6 8.5 - 10.1 mg/dL    Bilirubin Total 0.6 0.2 - 1.3 mg/dL    Albumin 3.4 3.4 - 5.0 g/dL    Protein Total 7.1 6.8 - 8.8 g/dL    Alkaline Phosphatase 169 (H) 40 - 150 U/L    ALT 29 0 - 50 U/L    AST 24 0 - 45 U/L   CRP inflammation    Collection Time: 05/19/17  8:51 PM   Result Value Ref Range    CRP Inflammation 97.0 (H) 0.0 - 8.0 mg/L   Erythrocyte sedimentation rate auto    Collection Time: 05/19/17  8:51 PM   Result Value Ref Range    Sed Rate 18 0 - 20 mm/h   Lipase    Collection Time: 05/19/17  8:51 PM   Result Value Ref Range    Lipase 149 73 - 393 U/L   Lactic acid    Collection Time: 05/19/17  8:51 PM   Result Value Ref Range    Lactic Acid 1.0 0.7 - 2.1 mmol/L   INR    Collection Time: 05/19/17  8:51 PM   Result Value Ref Range    INR 1.12 0.86 - 1.14   Magnesium    Collection Time: 05/19/17  8:51 PM   Result Value Ref Range    Magnesium 1.9 1.6 - 2.3 mg/dL   Phosphorus    Collection Time: 05/19/17  8:51 PM   Result Value Ref Range    Phosphorus 3.0 2.5 - 4.5 mg/dL   TSH with free T4 reflex    Collection Time: 05/19/17  8:51 PM   Result Value Ref Range    TSH 1.79 0.40 - 4.00 mU/L   Tacrolimus level    Collection Time: 05/19/17  8:51 PM   Result Value Ref Range    Tacrolimus Last Dose Not Provided     Tacrolimus Level 6.9 5.0 - 15.0 ug/L   Cyclosporine    Collection Time: 05/19/17  8:51 PM   Result Value Ref Range    Cyclosporine Last Dose Not Provided     Cyclosporine Level <25 (L) 50 - 400 ug/L   Troponin I    Collection Time: 05/19/17  8:51 PM   Result Value Ref Range    Troponin I ES  0.000 - 0.045 ug/L     <0.015  The 99th percentile for upper reference range is 0.045 ug/L.  Troponin values in   the range of 0.045 - 0.120 ug/L may be associated with risks of adverse   clinical events.         Mauricio Marquez MD

## 2017-06-09 NOTE — PROGRESS NOTES
"  Chief Complaint:   Chief Complaint   Patient presents with     Wound Check     f/u left foot ulcer          Allergies   Allergen Reactions     Amoxicillin-Pot Clavulanate Diarrhea     Ciprofloxacin Nausea     Pollen Extract Other (See Comments)     Seasonal allergies     Pyridostigmine Other (See Comments)     Spastic muscle motion in the face and legs, weakness in the arms. Slight swelling of the tongue.         Subjective: Rose is a 41 year old female who presents to the clinic today for a follow up of left foot wound. She relates that she recently had an uncle die of cancer and got very nervous using the Regranex because of the label warnings. She continues to use her regular shoes. She relates a new complaint today of swelling in the top of the same foot that comes and goes. She has been wrapping her foot in an ACE and that seems to help.     Objective   5' 1\" 113 lbs 0 oz    A diabetic pressure wound is noted at left  plantar foot measuring 1cm x 0.9cm x 0.1cm.    Block Classification: II    Wound base: Red/Granulation    Edges: hyperkeratotic    Drainage: small/serous    Odor: no    Undermining: Noon to 5 O'Clock    Bone Exposure: No    Clinical Signs of Infection: No    After obtaining patient consent, the wound was irrigated with copious amounts of saline. A scalpel was then used to debride the wound into the subcutaneous tissue. The wound edges were debrided to healthy, bleeding tissue. Given the patient's lack of sensation, no anesthesia was necessary for the procedure.   There is some soft tissue edema noted at the dorsal foot at about the TN joint. Not enough for transillumination, however there feels to be a fluid filled sac, consistent with a ganglion. Not much fluid in the sac today. No s/s of infection.    left foot xrays indicated in 3 weightbearing views.    Charcot foot changes are noted throughout the left rearfoot and midfoot that appear largely unchanged since the last study. No new " fragmentation noted.       Assessment: Left foot diabetic pressure wound  Charcot foot on the left  Soft tissue edema - likely ganglion    Plan:   - Pt seen and evaluated  - Xrays taken and discussed with her.   - Wound was debrided as described. I can see the portion of the 5th met that is causing the pressure on the xray. She may require sx to plane this portion of the metatarsal, if the wound continues to not contract.   - No intervention on the ganglion. She is covering the foot in an ACE. OK for now.  - She should try to transition into the brace that was fabricated for her to try to offload the area.   - Pt to return to clinic in 3 weeks.

## 2017-06-09 NOTE — LETTER
"6/9/2017       RE: Rose Castaneda  [address] [place] [address]     Dear Colleague,    Thank you for referring your patient, Rose Castaneda, to the [facility] at [facility]. Please see a copy of my visit note below.    Chief Complaint:   Chief Complaint   Patient presents with     Wound Check     f/u left foot ulcer          Allergies   Allergen Reactions     Amoxicillin-Pot Clavulanate Diarrhea     Ciprofloxacin Nausea     Pollen Extract Other (See Comments)     Seasonal allergies     Pyridostigmine Other (See Comments)     Spastic muscle motion in the face and legs, weakness in the arms. Slight swelling of the tongue.     Subjective: Rose is a 41 year old female who presents to the clinic today for a follow up of left foot wound. She relates that she recently had an uncle die of cancer and got very nervous using the Regranex because of the label warnings. She continues to use her regular shoes. She relates a new complaint today of swelling in the top of the same foot that comes and goes. She has been wrapping her foot in an ACE and that seems to help.   Objective   5' 1\" 113 lbs 0 oz    A diabetic pressure wound is noted at left  plantar foot measuring 1cm x 0.9cm x 0.1cm.    Block Classification: II    Wound base: Red/Granulation    Edges: hyperkeratotic    Drainage: small/serous    Odor: no    Undermining: Noon to 5 O'Clock    Bone Exposure: No    Clinical Signs of Infection: No    After obtaining patient consent, the wound was irrigated with copious amounts of saline. A scalpel was then used to debride the wound into the subcutaneous tissue. The wound edges were debrided to healthy, bleeding tissue. Given the patient's lack of sensation, no anesthesia was necessary for the procedure.   There is some soft tissue edema noted at the dorsal foot at about the TN joint. Not enough for transillumination, however there feels to be a fluid " filled sac, consistent with a ganglion. Not much fluid in the sac today. No s/s of infection.    left foot xrays indicated in 3 weightbearing views.    Charcot foot changes are noted throughout the left rearfoot and midfoot that appear largely unchanged since the last study. No new fragmentation noted.       Assessment: Left foot diabetic pressure wound  Charcot foot on the left  Soft tissue edema - likely ganglion    Plan:   - Pt seen and evaluated  - Xrays taken and discussed with her.   - Wound was debrided as described. I can see the portion of the 5th met that is causing the pressure on the xray. She may require sx to plane this portion of the metatarsal, if the wound continues to not contract.   - No intervention on the ganglion. She is covering the foot in an ACE. OK for now.  - She should try to transition into the brace that was fabricated for her to try to offload the area.   - Pt to return to clinic in 3 weeks.  Darius Parkinson DPM

## 2017-06-09 NOTE — NURSING NOTE
"Chief Complaint   Patient presents with     RECHECK     Follow up kidney transplant.       Initial BP (!) 86/47  Pulse 60  Temp 98.3  F (36.8  C) (Oral)  Wt 50.6 kg (111 lb 9.6 oz)  SpO2 99%  BMI 21.09 kg/m2 Estimated body mass index is 21.09 kg/(m^2) as calculated from the following:    Height as of an earlier encounter on 6/9/17: 1.549 m (5' 1\").    Weight as of this encounter: 50.6 kg (111 lb 9.6 oz).  Medication Reconciliation: complete   Genie Sheikh., CMA    "

## 2017-06-09 NOTE — NURSING NOTE
"Reason For Visit:   Chief Complaint   Patient presents with     Wound Check     f/u left foot ulcer       Ht 1.549 m (5' 1\")  Wt 51.3 kg (113 lb)  BMI 21.35 kg/m2    Pain Assessment  Patient Currently in Pain: No  "

## 2017-06-11 PROBLEM — B25.9 CYTOMEGALOVIRUS (CMV) VIREMIA (H): Status: ACTIVE | Noted: 2017-06-11

## 2017-06-11 NOTE — PROGRESS NOTES
Assessment and Plan:  1. DDKT (SPK) - baseline Cr ~ 1.1-1.3, which has remained stable for last 6-9 months, but slightly higher than initial baseline closer to 0.8-1.0 range.  Patient did have mild GWENDOLYN episode previously and also may be a bit prerenal with her hypotension and CNI.  Normal proteinuria.  No DSA.  Will make no changes in immunosuppression.  2. Pancreas Tx (SPK) - euglycemic off insulin with normal HbA1c and stable pancreatic enzymes.  No changes in immunosuppression.  3. Neurogenic orthostatic hypotension - low BP, but really no symptoms.  Patient would really like to avoid Florinef as it gave her swelling and she is hoping to have her left foot wound heal.  Recommend patient push fluids, especially in the morning.  4. Anemia in chronic renal disease - stable Hgb, near normal.  Iron replete.  Will follow.  5. Vitamin D deficiency - low vitamin D level and will continue ergocalciferol.  After finished with ergocalciferol course, patient can start cholecalciferol 2000 iu daily.  Will recheck vitamin D level with next labs.  6. Chronic diarrhea - stable to slight increase in symptoms, likely dumping syndrome from diabetes.  Will also rule out infectious causes with checking CMV PCR and also C. diff with recent antibiotics.  If symptoms worsen, would also consider checking for enteric infection with ALKA stool testing.  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  Will recheck CMV PCR.  8. 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  9. H/o pulmonary histoplasmosis - asymptomatic and will continue on itraconazole.   10. Left foot ulcer - resolved cellulitis and now off antibiotics.  Slowly healing.  11. UTI - asymptomatic and now finished with antibiotics.  Will repeat urine culture to ensure infection is resolved.  12. Recommend return visit in 3 months.    Assessment and plan was discussed with patient and she voiced her understanding and  agreement.    Reason for Visit:  Ms. Castandea is here for routine follow up.    HPI:   Rose Castaneda is a 41 year old female with ESKD from DM and is status post SPK on 8/5/16.         Transplant Hx:       Tx: SPK  Date: 8/5/16       Present Maintenance IS: Tacrolimus and Mycophenolic acid       Baseline Creatinine: 1.1-1.3       Recent DSA: No  Date last checked: 2/2017       Biopsy: No    Ms. Castaneda reports feeling good overall with some medical complaints.  Since patient's last clinic visit, she was recently treated for a UTI a couple of weeks ago and just finished antibiotics.  Her energy level is good and pretty much normal.  She is active and back to working full time, but really gets minimal exercise.  Denies any chest pain or shortness of breath with exertion.  Appetite is good and weight is unchanged.  No nausea or vomiting.  Some loose to watery stools about twice a day, usually right after eating.  These are stable symptoms and more her normal from prior to transplant.  No bloody or black, tarry stools.  No fever, sweats or chills.  No leg swelling.  Her left foot ulcer is slowly healing.  No pain or burning with urination.    Home BP: Not checked, but rare lightheadedness.  Patient reports significant swelling with Florinef and midodrine contraindicated with CAD.      ROS:   A comprehensive review of systems was obtained and negative, except as noted in the HPI or PMH.    Active Medical Problems:  Patient Active Problem List   Diagnosis     Diabetes mellitus type 1 (H)     Immunosuppressed status (H)     Kidney replaced by transplant     Neurogenic orthostatic hypotension (H)     Osteoporosis     Dyslipidemia     Secondary renal hyperparathyroidism (H)     Hypothyroidism     Anemia in chronic renal disease     Pulmonary histoplasmosis (H)     Pancreas replaced by transplant (H)     Hypomagnesemia     Vitamin D deficiency     Cellulitis of left lower extremity     Aftercare following organ transplant      Ulcer of left foot, with fat layer exposed (H)     Cytomegalovirus (CMV) viremia (H)       Personal Hx:  Social History     Social History     Marital status:      Spouse name: N/A     Number of children: N/A     Years of education: N/A     Occupational History     Not on file.     Social History Main Topics     Smoking status: Never Smoker     Smokeless tobacco: Not on file     Alcohol use Yes      Comment: rare     Drug use: No     Sexual activity: Yes     Partners: Male     Birth control/ protection: Condom     Other Topics Concern     Blood Transfusions Yes     2006, 2003 during sepsis     Social History Narrative       Allergies:  Allergies   Allergen Reactions     Amoxicillin-Pot Clavulanate Diarrhea     Ciprofloxacin Nausea     Pollen Extract Other (See Comments)     Seasonal allergies     Pyridostigmine Other (See Comments)     Spastic muscle motion in the face and legs, weakness in the arms. Slight swelling of the tongue.       Medications:  Prior to Admission medications    Medication Sig Start Date End Date Taking? Authorizing Provider   phosphorus tablet 250 mg (PHOSPHA 250 NEUTRAL) 250 MG tablet Take 2 tablets (500 mg) by mouth 3 times daily 8/19/16  Yes Mauricio Marquez MD   PROGRAF 0.5 MG PO CAPSULE Take 1 capsule (0.5 mg) by mouth 2 times daily Take as directed by physician 8/22/16   Gilson Doe MD   magnesium oxide 400 MG CAPS Take 800 mg by mouth 2 times daily 8/16/16   Mauricio Marquez MD   fludrocortisone (FLORINEF) 0.1 MG tablet Take 1 tablet (0.1 mg) by mouth 2 times daily 8/15/16   Ilan Rodriges MD   oxyCODONE (ROXICODONE) 5 MG immediate release tablet Take 1 tablet (5 mg) by mouth every 4 hours as needed for moderate to severe pain 8/12/16   Chantelle Ramon PA-C   acetaminophen (TYLENOL) 325 MG tablet Take 2 tablets (650 mg) by mouth every 4 hours as needed for mild pain or fever 8/12/16   Chantelle Ramon PA-C   aspirin  MG EC tablet Take 1  tablet (325 mg) by mouth daily 8/12/16   Chantelle Ramon PA-C   ondansetron (ZOFRAN-ODT) 4 MG disintegrating tablet Take 1 tablet (4 mg) by mouth every 6 hours as needed for nausea 8/12/16   Chantelle Ramon PA-C   valGANciclovir (VALCYTE) 450 MG tablet Take 2 tablets (900 mg) by mouth daily 8/12/16   Chantelle Ramon PA-C   senna-docusate (SENOKOT-S;PERICOLACE) 8.6-50 MG per tablet Take 1 tablet by mouth 2 times daily 8/12/16   Chantelle Ramon PA-C   sulfamethoxazole-trimethoprim (BACTRIM,SEPTRA) 400-80 MG per tablet Take 1 tablet by mouth daily 8/12/16   Chantelle Ramon PA-C   clotrimazole (MYCELEX) 10 MG LOZG Place 1 lozenge (10 mg) inside cheek 4 times daily 8/12/16   Chantelle Ramon PA-C   MYFORTIC 360 MG PO EC TABLET Take 2 tablets (720 mg) by mouth 2 times daily 8/12/16   Chantelle Ramon PA-C   itraconazole (SPORANOX) 100 MG capsule Take by mouth 2 times daily     Reported, Patient   pravastatin (PRAVACHOL) 80 MG tablet Take 20 mg by mouth daily 4/10/13   Dao Bills MD   citalopram (CELEXA) 10 MG tablet Take 20 mg by mouth daily     Reported, Patient   Levothyroxine Sodium (SYNTHROID PO) Take 50 mcg by mouth daily     Reported, Patient   cetirizine (ZYRTEC ALLERGY) 10 MG tablet Take 1 tablet by mouth daily.    Reported, Patient       Vitals:  BP (!) 86/47  Pulse 60  Temp 98.3  F (36.8  C) (Oral)  Wt 50.6 kg (111 lb 9.6 oz)  SpO2 99%  BMI 21.09 kg/m2    Exam:   GENERAL APPEARANCE: alert and no distress  HENT: mouth without ulcers or lesions  LYMPHATICS: no cervical or supraclavicular nodes  RESP: lungs clear to auscultation - no rales, rhonchi or wheezes  CV: regular rhythm, normal rate, no rub, no murmur  EDEMA: no LE edema bilaterally, left foot wrapped  ABDOMEN: soft, nondistended, nontender, bowel sounds normal  MS: extremities normal - no gross deformities noted, no evidence of inflammation in joints, no muscle tenderness  SKIN: no rash  TX KIDNEY:  normal    Results:   Recent Results (from the past 672 hour(s))   UA with Microscopic    Collection Time: 05/19/17  8:36 PM   Result Value Ref Range    Color Urine Yellow     Appearance Urine Slightly Cloudy     Glucose Urine Negative NEG mg/dL    Bilirubin Urine Negative NEG    Ketones Urine Negative NEG mg/dL    Specific Gravity Urine 1.012 1.003 - 1.035    Blood Urine Negative NEG    pH Urine 6.0 5.0 - 7.0 pH    Protein Albumin Urine 10 (A) NEG mg/dL    Urobilinogen mg/dL Normal 0.0 - 2.0 mg/dL    Nitrite Urine Negative NEG    Leukocyte Esterase Urine Moderate (A) NEG    Source Midstream Urine     WBC Urine 59 (H) 0 - 2 /HPF    RBC Urine 1 0 - 2 /HPF    Bacteria Urine Many (A) NEG /HPF    Squamous Epithelial /HPF Urine 1 0 - 1 /HPF    Mucous Urine Present (A) NEG /LPF    Hyaline Casts 12 (H) 0 - 2 /LPF   Urine Culture    Collection Time: 05/19/17  8:36 PM   Result Value Ref Range    Specimen Description Midstream Urine     Special Requests Specimen received in preservative     Culture Micro >100,000 colonies/mL Klebsiella pneumoniae (A)     Micro Report Status FINAL 05/21/2017     Organism: >100,000 colonies/mL Klebsiella pneumoniae        Susceptibility    >100,000 colonies/ml klebsiella pneumoniae (austin) -  (no method available)     AMPICILLIN >32.0 Resistant  ug/mL     CEFAZOLIN Value in next row  ug/mL      <=4 SusceptibleCefazolin [place] breakpoints are for the treatment of uncomplicated urinary tract infections.  For the treatment of systemic infections, please contact the laboratory for additional testing.     CEFOXITIN Value in next row  ug/mL      <=4 SusceptibleCefazolin [place] breakpoints are for the treatment of uncomplicated urinary tract infections.  For the treatment of systemic infections, please contact the laboratory for additional testing.     CEFTAZIDIME Value in next row  ug/mL      <=4 SusceptibleCefazolin [place] breakpoints are for the treatment of uncomplicated urinary tract infections.  For the  treatment of systemic infections, please contact the laboratory for additional testing.     CEFTRIAXONE Value in next row  ug/mL      <=4 SusceptibleCefazolin SHAHNAZ breakpoints are for the treatment of uncomplicated urinary tract infections.  For the treatment of systemic infections, please contact the laboratory for additional testing.     CIPROFLOXACIN Value in next row  ug/mL      <=4 SusceptibleCefazolin SHAHNAZ breakpoints are for the treatment of uncomplicated urinary tract infections.  For the treatment of systemic infections, please contact the laboratory for additional testing.     GENTAMICIN Value in next row  ug/mL      <=4 SusceptibleCefazolin SHAHNAZ breakpoints are for the treatment of uncomplicated urinary tract infections.  For the treatment of systemic infections, please contact the laboratory for additional testing.     LEVOFLOXACIN Value in next row  ug/mL      <=4 SusceptibleCefazolin SHAHNAZ breakpoints are for the treatment of uncomplicated urinary tract infections.  For the treatment of systemic infections, please contact the laboratory for additional testing.     NITROFURANTOIN Value in next row  ug/mL      <=4 SusceptibleCefazolin SHAHNAZ breakpoints are for the treatment of uncomplicated urinary tract infections.  For the treatment of systemic infections, please contact the laboratory for additional testing.     TOBRAMYCIN Value in next row  ug/mL      <=4 SusceptibleCefazolin SHAHNAZ breakpoints are for the treatment of uncomplicated urinary tract infections.  For the treatment of systemic infections, please contact the laboratory for additional testing.     Trimethoprim/Sulfa Value in next row  ug/mL      <=4 SusceptibleCefazolin SHAHNAZ breakpoints are for the treatment of uncomplicated urinary tract infections.  For the treatment of systemic infections, please contact the laboratory for additional testing.     AMPICILLIN/SULBACTAM Value in next row  ug/mL      <=4 SusceptibleCefazolin SHAHNAZ breakpoints are for the  treatment of uncomplicated urinary tract infections.  For the treatment of systemic infections, please contact the laboratory for additional testing.     Piperacillin/Tazo Value in next row  ug/mL      <=4 SusceptibleCefazolin SHAHNAZ breakpoints are for the treatment of uncomplicated urinary tract infections.  For the treatment of systemic infections, please contact the laboratory for additional testing.     CEFEPIME Value in next row  ug/mL      <=4 SusceptibleCefazolin SHAHNAZ breakpoints are for the treatment of uncomplicated urinary tract infections.  For the treatment of systemic infections, please contact the laboratory for additional testing.   CBC with platelets differential    Collection Time: 05/19/17  8:51 PM   Result Value Ref Range    WBC 5.1 4.0 - 11.0 10e9/L    RBC Count 3.72 (L) 3.8 - 5.2 10e12/L    Hemoglobin 11.1 (L) 11.7 - 15.7 g/dL    Hematocrit 34.7 (L) 35.0 - 47.0 %    MCV 93 78 - 100 fl    MCH 29.8 26.5 - 33.0 pg    MCHC 32.0 31.5 - 36.5 g/dL    RDW 14.0 10.0 - 15.0 %    Platelet Count 243 150 - 450 10e9/L    Diff Method Automated Method     % Neutrophils 78.1 %    % Lymphocytes 12.0 %    % Monocytes 7.1 %    % Eosinophils 2.0 %    % Basophils 0.6 %    % Immature Granulocytes 0.2 %    Nucleated RBCs 0 0 /100    Absolute Neutrophil 4.0 1.6 - 8.3 10e9/L    Absolute Lymphocytes 0.6 (L) 0.8 - 5.3 10e9/L    Absolute Monocytes 0.4 0.0 - 1.3 10e9/L    Absolute Eosinophils 0.1 0.0 - 0.7 10e9/L    Absolute Basophils 0.0 0.0 - 0.2 10e9/L    Abs Immature Granulocytes 0.0 0 - 0.4 10e9/L    Absolute Nucleated RBC 0.0    Comprehensive metabolic panel    Collection Time: 05/19/17  8:51 PM   Result Value Ref Range    Sodium 140 133 - 144 mmol/L    Potassium 3.7 3.4 - 5.3 mmol/L    Chloride 108 94 - 109 mmol/L    Carbon Dioxide 26 20 - 32 mmol/L    Anion Gap 7 3 - 14 mmol/L    Glucose 71 70 - 99 mg/dL    Urea Nitrogen 17 7 - 30 mg/dL    Creatinine 1.25 (H) 0.52 - 1.04 mg/dL    GFR Estimate 47 (L) >60 mL/min/1.7m2     GFR Estimate If Black 56 (L) >60 mL/min/1.7m2    Calcium 8.6 8.5 - 10.1 mg/dL    Bilirubin Total 0.6 0.2 - 1.3 mg/dL    Albumin 3.4 3.4 - 5.0 g/dL    Protein Total 7.1 6.8 - 8.8 g/dL    Alkaline Phosphatase 169 (H) 40 - 150 U/L    ALT 29 0 - 50 U/L    AST 24 0 - 45 U/L   CRP inflammation    Collection Time: 05/19/17  8:51 PM   Result Value Ref Range    CRP Inflammation 97.0 (H) 0.0 - 8.0 mg/L   Erythrocyte sedimentation rate auto    Collection Time: 05/19/17  8:51 PM   Result Value Ref Range    Sed Rate 18 0 - 20 mm/h   Lipase    Collection Time: 05/19/17  8:51 PM   Result Value Ref Range    Lipase 149 73 - 393 U/L   Lactic acid    Collection Time: 05/19/17  8:51 PM   Result Value Ref Range    Lactic Acid 1.0 0.7 - 2.1 mmol/L   INR    Collection Time: 05/19/17  8:51 PM   Result Value Ref Range    INR 1.12 0.86 - 1.14   Magnesium    Collection Time: 05/19/17  8:51 PM   Result Value Ref Range    Magnesium 1.9 1.6 - 2.3 mg/dL   Phosphorus    Collection Time: 05/19/17  8:51 PM   Result Value Ref Range    Phosphorus 3.0 2.5 - 4.5 mg/dL   TSH with free T4 reflex    Collection Time: 05/19/17  8:51 PM   Result Value Ref Range    TSH 1.79 0.40 - 4.00 mU/L   Tacrolimus level    Collection Time: 05/19/17  8:51 PM   Result Value Ref Range    Tacrolimus Last Dose Not Provided     Tacrolimus Level 6.9 5.0 - 15.0 ug/L   Cyclosporine    Collection Time: 05/19/17  8:51 PM   Result Value Ref Range    Cyclosporine Last Dose Not Provided     Cyclosporine Level <25 (L) 50 - 400 ug/L   Troponin I    Collection Time: 05/19/17  8:51 PM   Result Value Ref Range    Troponin I ES  0.000 - 0.045 ug/L     <0.015  The 99th percentile for upper reference range is 0.045 ug/L.  Troponin values in   the range of 0.045 - 0.120 ug/L may be associated with risks of adverse   clinical events.

## 2017-06-14 DIAGNOSIS — B25.9 CYTOMEGALOVIRUS (CMV) VIREMIA (H): ICD-10-CM

## 2017-06-14 DIAGNOSIS — Z94.0 KIDNEY REPLACED BY TRANSPLANT: ICD-10-CM

## 2017-06-14 DIAGNOSIS — E55.9 VITAMIN D DEFICIENCY: ICD-10-CM

## 2017-06-14 DIAGNOSIS — D63.1 ANEMIA IN CHRONIC RENAL DISEASE: ICD-10-CM

## 2017-06-14 DIAGNOSIS — B27.00 EBV (EPSTEIN-BARR VIRUS) VIREMIA: ICD-10-CM

## 2017-06-14 DIAGNOSIS — N39.0 URINARY TRACT INFECTION, SITE UNSPECIFIED: ICD-10-CM

## 2017-06-14 DIAGNOSIS — N18.9 ANEMIA IN CHRONIC RENAL DISEASE: ICD-10-CM

## 2017-06-14 DIAGNOSIS — Z94.83 PANCREAS REPLACED BY TRANSPLANT (H): ICD-10-CM

## 2017-06-14 DIAGNOSIS — N25.81 SECONDARY RENAL HYPERPARATHYROIDISM (H): ICD-10-CM

## 2017-06-14 LAB
DEPRECATED CALCIDIOL+CALCIFEROL SERPL-MC: 31 UG/L (ref 20–75)
ERYTHROCYTE [DISTWIDTH] IN BLOOD BY AUTOMATED COUNT: 13 % (ref 10–15)
HCT VFR BLD AUTO: 32.6 % (ref 35–47)
HGB BLD-MCNC: 10.8 G/DL (ref 11.7–15.7)
LIPASE SERPL-CCNC: 92 U/L (ref 73–393)
MCH RBC QN AUTO: 31.6 PG (ref 26.5–33)
MCHC RBC AUTO-ENTMCNC: 33.1 G/DL (ref 31.5–36.5)
MCV RBC AUTO: 95 FL (ref 78–100)
PLATELET # BLD AUTO: 191 10E9/L (ref 150–450)
PROT UR-MCNC: 0.18 G/L
PROT/CREAT 24H UR: 0.2 G/G CR (ref 0–0.2)
PTH-INTACT SERPL-MCNC: 34 PG/ML (ref 12–72)
RBC # BLD AUTO: 3.42 10E12/L (ref 3.8–5.2)
WBC # BLD AUTO: 2.2 10E9/L (ref 4–11)

## 2017-06-14 PROCEDURE — 80180 DRUG SCRN QUAN MYCOPHENOLATE: CPT | Performed by: TRANSPLANT SURGERY

## 2017-06-14 PROCEDURE — 36415 COLL VENOUS BLD VENIPUNCTURE: CPT | Performed by: INTERNAL MEDICINE

## 2017-06-14 PROCEDURE — 82150 ASSAY OF AMYLASE: CPT | Performed by: INTERNAL MEDICINE

## 2017-06-14 PROCEDURE — 87799 DETECT AGENT NOS DNA QUANT: CPT | Performed by: INTERNAL MEDICINE

## 2017-06-14 PROCEDURE — 84156 ASSAY OF PROTEIN URINE: CPT | Performed by: INTERNAL MEDICINE

## 2017-06-14 PROCEDURE — 82306 VITAMIN D 25 HYDROXY: CPT | Performed by: INTERNAL MEDICINE

## 2017-06-14 PROCEDURE — 80048 BASIC METABOLIC PNL TOTAL CA: CPT | Performed by: INTERNAL MEDICINE

## 2017-06-14 PROCEDURE — 87086 URINE CULTURE/COLONY COUNT: CPT | Performed by: INTERNAL MEDICINE

## 2017-06-14 PROCEDURE — 83970 ASSAY OF PARATHORMONE: CPT | Performed by: INTERNAL MEDICINE

## 2017-06-14 PROCEDURE — 83690 ASSAY OF LIPASE: CPT | Performed by: INTERNAL MEDICINE

## 2017-06-14 PROCEDURE — 85027 COMPLETE CBC AUTOMATED: CPT | Performed by: INTERNAL MEDICINE

## 2017-06-15 LAB
AMYLASE SERPL-CCNC: 70 U/L (ref 30–110)
ANION GAP SERPL CALCULATED.3IONS-SCNC: 9 MMOL/L (ref 3–14)
BACTERIA SPEC CULT: NORMAL
BUN SERPL-MCNC: 17 MG/DL (ref 7–30)
CALCIUM SERPL-MCNC: 8.6 MG/DL (ref 8.5–10.1)
CHLORIDE SERPL-SCNC: 111 MMOL/L (ref 94–109)
CO2 SERPL-SCNC: 23 MMOL/L (ref 20–32)
CREAT SERPL-MCNC: 1.08 MG/DL (ref 0.52–1.04)
EBV DNA # SPEC NAA+PROBE: 3682 {COPIES}/ML
EBV DNA SPEC NAA+PROBE-LOG#: 3.6 {LOG_COPIES}/ML
GFR SERPL CREATININE-BSD FRML MDRD: 55 ML/MIN/1.7M2
GLUCOSE SERPL-MCNC: 97 MG/DL (ref 70–99)
MICRO REPORT STATUS: NORMAL
POTASSIUM SERPL-SCNC: 4.4 MMOL/L (ref 3.4–5.3)
SODIUM SERPL-SCNC: 143 MMOL/L (ref 133–144)
SPECIMEN SOURCE: NORMAL

## 2017-06-16 LAB
CMV DNA SPEC NAA+PROBE-ACNC: 1011 [IU]/ML
CMV DNA SPEC NAA+PROBE-LOG#: 3 {LOG_IU}/ML
SPECIMEN SOURCE: ABNORMAL

## 2017-06-18 LAB
MYCOPHENOLATE SERPL LC/MS/MS-MCNC: 0.92 MG/L (ref 1–3.5)
MYCOPHENOLATE-G SERPL LC/MS/MS-MCNC: 84.2 MG/L (ref 30–95)
TME LAST DOSE: ABNORMAL H

## 2017-06-26 ENCOUNTER — TELEPHONE (OUTPATIENT)
Dept: TRANSPLANT | Facility: CLINIC | Age: 42
End: 2017-06-26

## 2017-06-26 DIAGNOSIS — Z94.0 KIDNEY REPLACED BY TRANSPLANT: Primary | ICD-10-CM

## 2017-06-26 DIAGNOSIS — Z94.83 PANCREAS REPLACED BY TRANSPLANT (H): ICD-10-CM

## 2017-06-26 NOTE — TELEPHONE ENCOUNTER
----- Message from Swapna Peres RN sent at 6/20/2017  9:45 AM CDT -----   Please send orders for the tests listed below and then call Rashid to let her know about them so she can make sure they draw them with her next lab draw_ thanks    ----- Message -----     From: Mauricio Marquez MD     Sent: 6/19/2017   1:06 PM       To: KYA Aldridge,    Can you please send IgG and CD4 levels with next labs.  She still has CMV and EBV viremia and I want to see her risk.    Eduard

## 2017-06-30 ENCOUNTER — OFFICE VISIT (OUTPATIENT)
Dept: ORTHOPEDICS | Facility: CLINIC | Age: 42
End: 2017-06-30

## 2017-06-30 DIAGNOSIS — L97.522 ULCER OF LEFT FOOT, WITH FAT LAYER EXPOSED (H): Primary | ICD-10-CM

## 2017-06-30 DIAGNOSIS — D84.9 IMMUNOSUPPRESSED STATUS (H): ICD-10-CM

## 2017-06-30 DIAGNOSIS — E10.8 TYPE 1 DIABETES MELLITUS WITH COMPLICATION (H): ICD-10-CM

## 2017-06-30 DIAGNOSIS — Z94.0 KIDNEY REPLACED BY TRANSPLANT: ICD-10-CM

## 2017-06-30 NOTE — MR AVS SNAPSHOT
After Visit Summary   2017    Rose Castaneda    MRN: [id number]           Patient Information     Date Of Birth          1975        Visit Information        Provider Department      2017 10:00 AM Darius Parkinson DPM [facility]        Today's Diagnoses     Ulcer of left foot, with fat layer exposed (H)    -  1    Type 1 diabetes mellitus with complication (H)        Immunosuppressed status (H)        Kidney replaced by transplant           Follow-ups after your visit        Your next 10 appointments already scheduled     2017 10:20 AM CDT   (Arrive by 10:05 AM)   RETURN FOOT/ANKLE with ALFA [facility] ([facility])    [address] [facility] [address] [phone]              Who to contact     Please call your clinic at [phone] to:    Ask questions about your health    Make or cancel appointments    Discuss your medicines    Learn about your test results    Speak to your doctor   If you have compliments or concerns about an experience at your clinic, or if you wish to file a complaint, please contact [facility] Patient Relations at [phone] or email us at [email or web address]         Additional Information About Your Visit        MyChart Information     [employer] is an electronic gateway that provides easy, online access to your medical records. With [employer], you can request a clinic appointment, read your test results, renew a prescription or communicate with your care team.     To sign up for [employer] visit the website at [email or web address]   You will be asked to enter the access code listed below, as well as some personal information. Please follow the directions to create your username and password.     Your access code is: [email or web address]  Expires: 2017  2:03 PM     Your access code will  in 90  days. If you need help or a new code, please contact your [facility] or call [phone] for assistance.        Care EveryWhere ID     This is your Care EveryWhere ID. This could be used by other organizations to access your [facility] medical records  [id number]         Blood Pressure from Last 3 Encounters:   06/09/17 (!) 86/47   05/20/17 144/86   02/07/17 101/66    Weight from Last 3 Encounters:   06/09/17 50.6 kg (111 lb 9.6 oz)   06/09/17 51.3 kg (113 lb)   02/07/17 50.3 kg (110 lb 14.4 oz)              We Performed the Following     DEBRIDEMENT WOUND UP TO 20 SQ CM          Today's Medication Changes          These changes are accurate as of: 6/30/17  4:02 PM.  If you have any questions, ask your nurse or doctor.               These medicines have changed or have updated prescriptions.        Dose/Directions    vitamin D 65635 UNIT capsule   Commonly known as:  ERGOCALCIFEROL   This may have changed:  when to take this   Used for:  Kidney replaced by transplant, Pancreas replaced by transplant (H), Low calcium levels        Dose:  37447 Units   Take 1 capsule (50,000 Units) by mouth once a week   Quantity:  13 capsule   Refills:  3                Primary Care Provider Office Phone # Fax #    Hafsa JOSEPH Orozco [phone]       [facility] [address] [place] [address] [address] [place] [address]        Equal Access to Services     ANDRESSA ROSSI AH: Hadii wiliam faith hadasho Sotrudiali, waaxda luqadaha, qaybta kaalmada adeegyada, son prasad . So [facility] [phone].    ATENCIÓN: Si habla español, tiene a kessler disposición servicios gratuitos de asistencia lingüística. Llame al [phone].    We comply with applicable federal civil rights laws and [place] laws. We do not discriminate on the basis of race, color, national origin, age, disability sex, sexual orientation or gender identity.            Thank you!     Thank you for choosing [facility]  for your care. Our goal is always to provide you with excellent care. Hearing back from our patients is one way we can continue to improve our services. Please take a few minutes to complete the written survey that you may receive in the mail after your visit with us. Thank you!             Your Updated Medication List - Protect others around you: Learn how to safely use, store and throw away your medicines at www.disposemymeds.org.          This list is accurate as of: 6/30/17  4:02 PM.  Always use your most recent med list.                   Brand Name Dispense Instructions for use Diagnosis    aspirin 325 MG EC tablet     60 tablet    Take 1 tablet (325 mg) by mouth daily    Kidney replaced by transplant       citalopram 10 MG tablet    celeXA     Take 20 mg by mouth daily        itraconazole 100 MG capsule    SPORANOX     Take by mouth 2 times daily        levofloxacin 500 MG tablet    LEVAQUIN    7 tablet    Take 1 tablet (500 mg) by mouth daily        mycophenolic acid 180 MG EC tablet    MYFORTIC - GENERIC EQUIVALENT    120 tablet    Take 2 tablets (360 mg) by mouth 2 times daily    Kidney replaced by transplant, Pancreas replaced by transplant (H)       order for DME     1 each    Equipment being ordered: Lynnette () Treatment Diagnosis: impaired gait    Diabetic foot ulcer with osteomyelitis (H)       pravastatin 20 MG tablet    PRAVACHOL    90 tablet    Take 1 tablet (20 mg) by mouth daily    Dyslipidemia       sulfamethoxazole-trimethoprim 400-80 MG per tablet    BACTRIM/SEPTRA    40 tablet    Take 1 tablet by mouth Every Mon, Wed, Fri Morning    Kidney replaced by transplant, Pancreas transplanted (H)       SYNTHROID PO      Take 50 mcg by mouth daily        tacrolimus 1 mg/mL suspension    PROGRAF - GENERIC EQUIVALENT    20 mL    Take 0.3 mLs (0.3 mg) by mouth 2 times daily    Kidney replaced by transplant       valGANciclovir 450 MG tablet    VALCYTE    180 tablet    Take 1 tablet (450 mg)  by mouth 2 times daily    CMV (cytomegalovirus infection) (H)       vitamin D 83062 UNIT capsule    ERGOCALCIFEROL    13 capsule    Take 1 capsule (50,000 Units) by mouth once a week    Kidney replaced by transplant, Pancreas replaced by transplant (H), Low calcium levels       ZITHROMAX Z-RAMONA PO      Take 250 mg by mouth daily        ZYRTEC ALLERGY 10 MG tablet   Generic drug:  cetirizine      Take 1 tablet by mouth daily.

## 2017-06-30 NOTE — PROGRESS NOTES
Chief Complaint:   Chief Complaint   Patient presents with     Wound Check     Follow up. Diabetic wound, left plantar foot.           Allergies   Allergen Reactions     Amoxicillin-Pot Clavulanate Diarrhea     Ciprofloxacin Nausea     Pollen Extract Other (See Comments)     Seasonal allergies     Pyridostigmine Other (See Comments)     Spastic muscle motion in the face and legs, weakness in the arms. Slight swelling of the tongue.         Subjective: Rose is a 41 year old female who presents to the clinic today for a follow up of left foot wound. She has gone back to using the Regranex infrequently. She relates that she feels like that her foot swells after using it. She presents today wearing small shoes and hot a DH or diabetic shoe. No brace.     Objective      A diabetic pressure wound is noted at left  plantar foot  measuring 0.9cm  x 0.9cm x 0.1cm    Block Classification: II    Wound base: Red/Granulation    Edges: hyperkeratotic    Drainage: small/serous    Odor: no    Undermining: no    Bone Exposure: No    Clinical Signs of Infection: No    After obtaining patient consent, the wound was irrigated with copious amounts of saline. A scalpel was then used to debride the wound into the subcutaneous tissue. The wound edges were debrided to healthy, bleeding tissue. Given the patient's lack of sensation, no anesthesia was necessary for the procedure.       Assessment: Right Charcot foot 2/2 DM with neuropathy with a diabetic pressure wound.     Plan:   - Pt seen and evaluated  - The wound was debrided as described.   - Changed the dressings again today: Wrap Unna-Z around the foot in a couple of layers. Cover with a DSD. Change daily.  - Pt to return to clinic in 3 weeks. May need surgical intervention for the right foot at the metatarsal to help this wound heal. I spoke to hr about this and will talk more at the next visit.

## 2017-06-30 NOTE — NURSING NOTE
Reason For Visit:   Chief Complaint   Patient presents with     Wound Check     Follow up. Diabetic wound, left plantar foot.        Pain Assessment  Patient Currently in Pain: Denies       Current Outpatient Prescriptions   Medication Sig Dispense Refill     tacrolimus (PROGRAF - GENERIC EQUIVALENT) 1 mg/mL suspension Take 0.3 mLs (0.3 mg) by mouth 2 times daily 20 mL 6     mycophenolic acid (MYFORTIC - GENERIC EQUIVALENT) 180 MG EC tablet Take 2 tablets (360 mg) by mouth 2 times daily 120 tablet 3     levofloxacin (LEVAQUIN) 500 MG tablet Take 1 tablet (500 mg) by mouth daily 7 tablet 0     Azithromycin (ZITHROMAX Z-RAMONA PO) Take 250 mg by mouth daily       valGANciclovir (VALCYTE) 450 MG tablet Take 1 tablet (450 mg) by mouth 2 times daily 180 tablet 3     sulfamethoxazole-trimethoprim (BACTRIM/SEPTRA) 400-80 MG per tablet Take 1 tablet by mouth Every Mon, Wed, Fri Morning 40 tablet 3     order for DME Equipment being ordered: Lynnette ()  Treatment Diagnosis: impaired gait 1 each 0     vitamin D (ERGOCALCIFEROL) 51827 UNIT capsule Take 1 capsule (50,000 Units) by mouth once a week (Patient taking differently: Take 50,000 Units by mouth every 30 days ) 13 capsule 3     pravastatin (PRAVACHOL) 20 MG tablet Take 1 tablet (20 mg) by mouth daily 90 tablet 3     aspirin  MG EC tablet Take 1 tablet (325 mg) by mouth daily 60 tablet 3     itraconazole (SPORANOX) 100 MG capsule Take by mouth 2 times daily        citalopram (CELEXA) 10 MG tablet Take 20 mg by mouth daily        Levothyroxine Sodium (SYNTHROID PO) Take 50 mcg by mouth daily        cetirizine (ZYRTEC ALLERGY) 10 MG tablet Take 1 tablet by mouth daily.            Allergies   Allergen Reactions     Amoxicillin-Pot Clavulanate Diarrhea     Ciprofloxacin Nausea     Pollen Extract Other (See Comments)     Seasonal allergies     Pyridostigmine Other (See Comments)     Spastic muscle motion in the face and legs, weakness in the arms. Slight swelling of  the tongue.

## 2017-06-30 NOTE — LETTER
6/30/2017      RE: Rose Castaneda  [address] [place] [address]       Chief Complaint:   Chief Complaint   Patient presents with     Wound Check     Follow up. Diabetic wound, left plantar foot.           Allergies   Allergen Reactions     Amoxicillin-Pot Clavulanate Diarrhea     Ciprofloxacin Nausea     Pollen Extract Other (See Comments)     Seasonal allergies     Pyridostigmine Other (See Comments)     Spastic muscle motion in the face and legs, weakness in the arms. Slight swelling of the tongue.         Subjective: Rose is a 41 year old female who presents to the clinic today for a follow up of left foot wound. She has gone back to using the Regranex infrequently. She relates that she feels like that her foot swells after using it. She presents today wearing small shoes and hot a DH or diabetic shoe. No brace.     Objective      A diabetic pressure wound is noted at left  plantar foot  measuring 0.9cm  x 0.9cm x 0.1cm    Block Classification: II    Wound base: Red/Granulation    Edges: hyperkeratotic    Drainage: small/serous    Odor: no    Undermining: no    Bone Exposure: No    Clinical Signs of Infection: No    After obtaining patient consent, the wound was irrigated with copious amounts of saline. A scalpel was then used to debride the wound into the subcutaneous tissue. The wound edges were debrided to healthy, bleeding tissue. Given the patient's lack of sensation, no anesthesia was necessary for the procedure.       Assessment: Right Charcot foot 2/2 DM with neuropathy with a diabetic pressure wound.     Plan:   - Pt seen and evaluated  - The wound was debrided as described.   - Changed the dressings again today: Wrap Unna-Z around the foot in a couple of layers. Cover with a DSD. Change daily.  - Pt to return to clinic in 3 weeks. May need surgical intervention for the right foot at the metatarsal to help this wound heal. I spoke to  about this and will talk more at the next  visit.       KIKI LyonsM

## 2017-06-30 NOTE — LETTER
6/30/2017       RE: Rose Castaneda  [address] [place] [address]     Dear Colleague,    Thank you for referring your patient, Rose Castaneda, to the [facility] at [facility]. Please see a copy of my visit note below.    Chief Complaint:   Chief Complaint   Patient presents with     Wound Check     Follow up. Diabetic wound, left plantar foot.           Allergies   Allergen Reactions     Amoxicillin-Pot Clavulanate Diarrhea     Ciprofloxacin Nausea     Pollen Extract Other (See Comments)     Seasonal allergies     Pyridostigmine Other (See Comments)     Spastic muscle motion in the face and legs, weakness in the arms. Slight swelling of the tongue.         Subjective: Rose is a 41 year old female who presents to the clinic today for a follow up of left foot wound. She has gone back to using the Regranex infrequently. She relates that she feels like that her foot swells after using it. She presents today wearing small shoes and hot a DH or diabetic shoe. No brace.     Objective      A diabetic pressure wound is noted at left  plantar foot  measuring 0.9cm  x 0.9cm x 0.1cm    Block Classification: II    Wound base: Red/Granulation    Edges: hyperkeratotic    Drainage: small/serous    Odor: no    Undermining: no    Bone Exposure: No    Clinical Signs of Infection: No    After obtaining patient consent, the wound was irrigated with copious amounts of saline. A scalpel was then used to debride the wound into the subcutaneous tissue. The wound edges were debrided to healthy, bleeding tissue. Given the patient's lack of sensation, no anesthesia was necessary for the procedure.       Assessment: Right Charcot foot 2/2 DM with neuropathy with a diabetic pressure wound.     Plan:   - Pt seen and evaluated  - The wound was debrided as described.   - Changed the dressings again today: Wrap Unna-Z around the foot in a couple of layers. Cover with a DSD.  Change daily.  - Pt to return to clinic in 3 weeks. May need surgical intervention for the right foot at the metatarsal to help this wound heal. I spoke to hr about this and will talk more at the next visit.       Again, thank you for allowing me to participate in the care of your patient.      Sincerely,    Darius Parkinson DPM

## 2017-07-19 DIAGNOSIS — R19.7 DIARRHEA, UNSPECIFIED TYPE: ICD-10-CM

## 2017-07-19 LAB
C DIFF TOX B STL QL: NORMAL
SPECIMEN SOURCE: NORMAL

## 2017-07-19 PROCEDURE — 87493 C DIFF AMPLIFIED PROBE: CPT | Performed by: FAMILY MEDICINE

## 2017-07-26 ENCOUNTER — OFFICE VISIT (OUTPATIENT)
Dept: ORTHOPEDICS | Facility: CLINIC | Age: 42
End: 2017-07-26

## 2017-07-26 DIAGNOSIS — R60.0 PERIPHERAL EDEMA: ICD-10-CM

## 2017-07-26 DIAGNOSIS — Z94.0 KIDNEY REPLACED BY TRANSPLANT: ICD-10-CM

## 2017-07-26 DIAGNOSIS — D84.9 IMMUNOSUPPRESSED STATUS (H): ICD-10-CM

## 2017-07-26 DIAGNOSIS — M14.672 CHARCOT'S JOINT OF FOOT, LEFT: ICD-10-CM

## 2017-07-26 DIAGNOSIS — L97.522 ULCER OF LEFT FOOT, WITH FAT LAYER EXPOSED (H): Primary | ICD-10-CM

## 2017-07-26 NOTE — PROGRESS NOTES
Chief Complaint:   Chief Complaint   Patient presents with     Wound Check       Diabetic wound, left plantar foot. Pt stated that her foot has been swollen over the weekend.           Allergies   Allergen Reactions     Amoxicillin-Pot Clavulanate Diarrhea     Ciprofloxacin Nausea     Pollen Extract Other (See Comments)     Seasonal allergies     Pyridostigmine Other (See Comments)     Spastic muscle motion in the face and legs, weakness in the arms. Slight swelling of the tongue.     Subjective: Rose is a 41 year old female who presents to the clinic today for a follow up of left foot wound. She has not been covering the wound with Unna-z as instructed. She notes that her foot has been swelling more lately - so much so that she has difficulty strapping her shoes on effectively. She has new scabs on her left foot secondary to rubbing from the shoe. Would like a referral for Left ankle brace - this ankle feels very unstable while walking.     Objective:  Mild non-pitting edema to LLE.  Scab to dorsal third digit with surrounding erythema. No calor or drainage.   Scab to dorsal midfoot prominence. No erythema, edema, calor or drainage.  A diabetic pressure wound is noted at left  plantar foot  measuring 0.8cm  x 0.8cm x 0.2cm     Lbock Classification: II     Wound base: Red/Granulation     Edges: hyperkeratotic     Drainage: small/serous     Odor: mild     Undermining: no     Bone Exposure: No     Clinical Signs of Infection: No     After obtaining patient consent, the wound was irrigated with copious amounts of saline. A scalpel was then used to debride the wound into the subcutaneous tissue. The wound edges were debrided to healthy, bleeding tissue. Given the patient's lack of sensation, no anesthesia was necessary for the procedure.      Assessment: Right Charcot foot 2/2 DM with neuropathy with a diabetic pressure wound.      Plan:   - Pt seen and evaluated  - The wound was debrided as described. Unchanged.  -  Endoform was applied to wound base and covered by polymem foam. A multi-layered unna boot was then applied to the LLE. Instructed patient to keep it dry, clean and intact.  - Referral to [facility]  - RTC next Monday for boot removal and wound recheck.

## 2017-07-26 NOTE — NURSING NOTE
Reason For Visit:   Chief Complaint   Patient presents with     Wound Check       Diabetic wound, left plantar foot. Pt stated that her foot has been swollen over the weekend.        Pain Assessment  Patient Currently in Pain: Yes  Primary Pain Location: Foot  Pain Orientation: Left  Pain Descriptors: Sore            Current Outpatient Prescriptions   Medication Sig Dispense Refill     tacrolimus (PROGRAF - GENERIC EQUIVALENT) 1 mg/mL suspension Take 0.3 mLs (0.3 mg) by mouth 2 times daily 20 mL 6     mycophenolic acid (MYFORTIC - GENERIC EQUIVALENT) 180 MG EC tablet Take 2 tablets (360 mg) by mouth 2 times daily 120 tablet 3     levofloxacin (LEVAQUIN) 500 MG tablet Take 1 tablet (500 mg) by mouth daily 7 tablet 0     Azithromycin (ZITHROMAX Z-RAMONA PO) Take 250 mg by mouth daily       valGANciclovir (VALCYTE) 450 MG tablet Take 1 tablet (450 mg) by mouth 2 times daily 180 tablet 3     sulfamethoxazole-trimethoprim (BACTRIM/SEPTRA) 400-80 MG per tablet Take 1 tablet by mouth Every Mon, Wed, Fri Morning 40 tablet 3     order for DME Equipment being ordered: Lynnette ()  Treatment Diagnosis: impaired gait 1 each 0     vitamin D (ERGOCALCIFEROL) 07172 UNIT capsule Take 1 capsule (50,000 Units) by mouth once a week (Patient taking differently: Take 50,000 Units by mouth every 30 days ) 13 capsule 3     pravastatin (PRAVACHOL) 20 MG tablet Take 1 tablet (20 mg) by mouth daily 90 tablet 3     aspirin  MG EC tablet Take 1 tablet (325 mg) by mouth daily 60 tablet 3     itraconazole (SPORANOX) 100 MG capsule Take by mouth 2 times daily        citalopram (CELEXA) 10 MG tablet Take 20 mg by mouth daily        Levothyroxine Sodium (SYNTHROID PO) Take 50 mcg by mouth daily        cetirizine (ZYRTEC ALLERGY) 10 MG tablet Take 1 tablet by mouth daily.            Allergies   Allergen Reactions     Amoxicillin-Pot Clavulanate Diarrhea     Ciprofloxacin Nausea     Pollen Extract Other (See Comments)     Seasonal allergies      Pyridostigmine Other (See Comments)     Spastic muscle motion in the face and legs, weakness in the arms. Slight swelling of the tongue.

## 2017-07-26 NOTE — LETTER
7/26/2017       RE: Rose Castaneda  [address] [place] [address]     Dear Colleague,    Thank you for referring your patient, Rose Castaneda, to the [facility] at [facility]. Please see a copy of my visit note below.    Chief Complaint:   Chief Complaint   Patient presents with     Wound Check       Diabetic wound, left plantar foot. Pt stated that her foot has been swollen over the weekend.      Allergies   Allergen Reactions     Amoxicillin-Pot Clavulanate Diarrhea     Ciprofloxacin Nausea     Pollen Extract Other (See Comments)     Seasonal allergies     Pyridostigmine Other (See Comments)     Spastic muscle motion in the face and legs, weakness in the arms. Slight swelling of the tongue.     Subjective: Rose is a 41 year old female who presents to the clinic today for a follow up of left foot wound. She has not been covering the wound with Unna-z as instructed. She notes that her foot has been swelling more lately - so much so that she has difficulty strapping her shoes on effectively. She has new scabs on her left foot secondary to rubbing from the shoe. Would like a referral for Left ankle brace - this ankle feels very unstable while walking.     Objective:  Mild non-pitting edema to LLE.  Scab to dorsal third digit with surrounding erythema. No calor or drainage.   Scab to dorsal midfoot prominence. No erythema, edema, calor or drainage.  A diabetic pressure wound is noted at left  plantar foot  measuring 0.8cm  x 0.8cm x 0.2cm     Block Classification: II     Wound base: Red/Granulation     Edges: hyperkeratotic     Drainage: small/serous     Odor: mild     Undermining: no     Bone Exposure: No     Clinical Signs of Infection: No     After obtaining patient consent, the wound was irrigated with copious amounts of saline. A scalpel was then used to debride the wound into the subcutaneous tissue. The wound edges were debrided to  healthy, bleeding tissue. Given the patient's lack of sensation, no anesthesia was necessary for the procedure.      Assessment: Right Charcot foot 2/2 DM with neuropathy with a diabetic pressure wound.      Plan:   - Pt seen and evaluated  - The wound was debrided as described. Unchanged.  - Endoform was applied to wound base and covered by polymem foam. A multi-layered unna boot was then applied to the LLE. Instructed patient to keep it dry, clean and intact.  - Referral to [facility] placed  - RTC next Monday for boot removal and wound recheck.    Again, thank you for allowing me to participate in the care of your patient.      Sincerely,    Darius Parkinson DPM

## 2017-07-26 NOTE — MR AVS SNAPSHOT
After Visit Summary   7/26/2017    Rose Castaneda    MRN: [id number]           Patient Information     Date Of Birth          1975        Visit Information        Provider Department      7/26/2017 10:20 AM Darius Parkinson DPM M [facility]        Today's Diagnoses     Ulcer of left foot, with fat layer exposed (H)    -  1    Charcot's joint of foot, left        Immunosuppressed status (H)        Kidney replaced by transplant        Peripheral edema           Follow-ups after your visit        Additional Services     ORTHOTICS REFERRAL       **This referral order prints off in the [facility]  (Orthotics & Prosthetics) Central Scheduling Office**    The [facility] Central Scheduling Staff will contact the patient to schedule appointments.     Central Scheduling Contact Information: [phone] ([place])    Ankle Foot Orthotic for Left ankle and foot    Please be aware that coverage of these services is subject to the terms and limitations of your health insurance plan.  Call member services at your health plan with any benefit or coverage questions.      Please bring the following to your appointment:    >>   Any x-rays, CTs or MRIs which have been performed.  Contact the facility where they were done to arrange for  prior to your scheduled appointment.    >>   List of current medications   >>   This referral request   >>   Any documents/labs given to you for this referral                  Your next 10 appointments already scheduled     Jul 31, 2017  9:00 AM CDT   (Arrive by 8:45 AM)   RETURN FOOT/ANKLE with ALFA Reveles [facility] ([facility])    [address] [place] [id number] [phone]              Who to contact     Please call your clinic at [phone] to:    Ask questions about your health    Make or cancel appointments    Discuss your  medicines    Learn about your test results    Speak to your doctor   If you have compliments or concerns about an experience at your clinic, or if you wish to file a complaint, please contact [facility] Patient Relations at [phone] or email us at [email or web address]         Additional Information About Your Visit        Exit GamesharTURN8 Information     [employer] Visiont is an electronic gateway that provides easy, online access to your medical records. With TapHome, you can request a clinic appointment, read your test results, renew a prescription or communicate with your care team.     To sign up for [employer] Vision[employer] visit the website at [email or web address]   You will be asked to enter the access code listed below, as well as some personal information. Please follow the directions to create your username and password.     Your access code is: [email or web address]  Expires: 10/10/2017  6:31 AM     Your access code will  in 90 days. If you need help or a new code, please contact your [facility] or call [phone] for assistance.        Care EveryWhere ID     This is your Care EveryWhere ID. This could be used by other organizations to access your [place] medical records  [id number]         Blood Pressure from Last 3 Encounters:   17 (!) 86/47   17 144/86   17 101/66    Weight from Last 3 Encounters:   17 50.6 kg (111 lb 9.6 oz)   17 51.3 kg (113 lb)   17 50.3 kg (110 lb 14.4 oz)              We Performed the Following     DEBRIDEMENT WOUND UP TO 20 SQ CM     ORTHOTICS REFERRAL     UNNA BOOT APPLICATION          Today's Medication Changes          These changes are accurate as of: 17  5:05 PM.  If you have any questions, ask your nurse or doctor.               These medicines have changed or have updated prescriptions.        Dose/Directions    vitamin D 46776 UNIT capsule   Commonly known as:  ERGOCALCIFEROL   This may  have changed:  when to take this   Used for:  Kidney replaced by transplant, Pancreas replaced by transplant (H), Low calcium levels        Dose:  49051 Units   Take 1 capsule (50,000 Units) by mouth once a week   Quantity:  13 capsule   Refills:  3                Primary Care Provider Office Phone # Fax #    Hafsa Orozco [phone]       NATALI PALKERRI [facility] [address] [place] [address]        Equal Access to Services     [facility]: Hadii aad ku hadasho Soomaali, waaxda luqadaha, qaybta kaalmada adeegyada, waxay idiin hayaan adeeg kharash la'nun . [facility] [phone].    ATENCIÓN: Si habla español, tiene a kessler disposición servicios gratuitos de asistencia lingüística. [facility] [phone].    We comply with applicable federal civil rights laws and [place] laws. We do not discriminate on the basis of race, color, national origin, age, disability sex, sexual orientation or gender identity.            Thank you!     Thank you for choosing [facility]  for your care. Our goal is always to provide you with excellent care. Hearing back from our patients is one way we can continue to improve our services. Please take a few minutes to complete the written survey that you may receive in the mail after your visit with us. Thank you!             Your Updated Medication List - Protect others around you: Learn how to safely use, store and throw away your medicines at www.disposemymeds.org.          This list is accurate as of: 7/26/17  5:05 PM.  Always use your most recent med list.                   Brand Name Dispense Instructions for use Diagnosis    aspirin 325 MG EC tablet     60 tablet    Take 1 tablet (325 mg) by mouth daily    Kidney replaced by transplant       citalopram 10 MG tablet    celeXA     Take 20 mg by mouth daily        itraconazole 100 MG capsule    SPORANOX     Take by mouth 2 times daily        levofloxacin 500 MG tablet    LEVAQUIN    7 tablet    Take 1  tablet (500 mg) by mouth daily        mycophenolic acid 180 MG EC tablet    MYFORTIC - GENERIC EQUIVALENT    120 tablet    Take 2 tablets (360 mg) by mouth 2 times daily    Kidney replaced by transplant, Pancreas replaced by transplant (H)       order for DME     1 each    Equipment being ordered: Lynnette () Treatment Diagnosis: impaired gait    Diabetic foot ulcer with osteomyelitis (H)       pravastatin 20 MG tablet    PRAVACHOL    90 tablet    Take 1 tablet (20 mg) by mouth daily    Dyslipidemia       sulfamethoxazole-trimethoprim 400-80 MG per tablet    BACTRIM/SEPTRA    40 tablet    Take 1 tablet by mouth Every Mon, Wed, Fri Morning    Kidney replaced by transplant, Pancreas transplanted (H)       SYNTHROID PO      Take 50 mcg by mouth daily        tacrolimus 1 mg/mL suspension    PROGRAF - GENERIC EQUIVALENT    20 mL    Take 0.3 mLs (0.3 mg) by mouth 2 times daily    Kidney replaced by transplant       valGANciclovir 450 MG tablet    VALCYTE    180 tablet    Take 1 tablet (450 mg) by mouth 2 times daily    CMV (cytomegalovirus infection) (H)       vitamin D 56578 UNIT capsule    ERGOCALCIFEROL    13 capsule    Take 1 capsule (50,000 Units) by mouth once a week    Kidney replaced by transplant, Pancreas replaced by transplant (H), Low calcium levels       ZITHROMAX Z-RAMONA PO      Take 250 mg by mouth daily        ZYRTEC ALLERGY 10 MG tablet   Generic drug:  cetirizine      Take 1 tablet by mouth daily.

## 2017-07-27 ENCOUNTER — TELEPHONE (OUTPATIENT)
Dept: TRANSPLANT | Facility: CLINIC | Age: 42
End: 2017-07-27

## 2017-07-31 ENCOUNTER — OFFICE VISIT (OUTPATIENT)
Dept: ORTHOPEDICS | Facility: CLINIC | Age: 42
End: 2017-07-31

## 2017-07-31 DIAGNOSIS — D84.9 IMMUNOSUPPRESSED STATUS (H): ICD-10-CM

## 2017-07-31 DIAGNOSIS — E10.8 TYPE 1 DIABETES MELLITUS WITH COMPLICATION (H): ICD-10-CM

## 2017-07-31 DIAGNOSIS — L97.522 ULCER OF LEFT FOOT, WITH FAT LAYER EXPOSED (H): Primary | ICD-10-CM

## 2017-07-31 NOTE — MR AVS SNAPSHOT
After Visit Summary   7/31/2017    Rose Castaneda    MRN: [id number]           Patient Information     Date Of Birth          1975        Visit Information        Provider Department      7/31/2017 9:00 AM Darius Parkinson DPM [facility]        Today's Diagnoses     Ulcer of left foot, with fat layer exposed (H)    -  1    Type 1 diabetes mellitus with complication (H)        Immunosuppressed status (H)           Follow-ups after your visit        Your next 10 appointments already scheduled     Aug 07, 2017  2:00 PM CDT   (Arrive by 1:45 PM)   RETURN FOOT/ANKLE with Darius Parkinson DPM   [facility] ([facility])    [address] [place] [address]  [address]th [facility] [address] [phone]            Sep 19, 2017  4:25 PM CDT   (Arrive by 3:55 PM)   Return Kidney Transplant with Mauricio Marquez MD   [facility] ([facility])    [address] [place] [address]  3rd [facility] [address] [phone]              Who to contact     Please call your clinic at [phone] to:    Ask questions about your health    Make or cancel appointments    Discuss your medicines    Learn about your test results    Speak to your doctor   If you have compliments or concerns about an experience at your clinic, or if you wish to file a complaint, please contact [facility] Patient Relations at [phone] or email us at [email or web address]         Additional Information About Your Visit        MyChart Information     [email or web address] is an electronic gateway that provides easy, online access to your medical records. With [email or web address], you can request a clinic appointment, read your test results, renew a prescription or communicate with your care team.     To sign up for Studyplacest visit the website at [email or web address]   You will be asked to enter the access code listed  below, as well as some personal information. Please follow the directions to create your username and password.     Your access code is: [email or web address]  Expires: 10/10/2017  6:31 AM     Your access code will  in 90 days. If you need help or a new code, please contact your [facility] or call [phone] for assistance.        Care EveryWhere ID     This is your Care EveryWhere ID. This could be used by other organizations to access your [place] medical records  [id number]         Blood Pressure from Last 3 Encounters:   17 (!) 86/47   17 144/86   17 101/66    Weight from Last 3 Encounters:   17 50.6 kg (111 lb 9.6 oz)   17 51.3 kg (113 lb)   17 50.3 kg (110 lb 14.4 oz)              We Performed the Following     Touch BionicsA BOOT APPLICATION          Today's Medication Changes          These changes are accurate as of: 17  9:50 AM.  If you have any questions, ask your nurse or doctor.               These medicines have changed or have updated prescriptions.        Dose/Directions    vitamin D 73208 UNIT capsule   Commonly known as:  ERGOCALCIFEROL   This may have changed:  when to take this   Used for:  Kidney replaced by transplant, Pancreas replaced by transplant (H), Low calcium levels        Dose:  59899 Units   Take 1 capsule (50,000 Units) by mouth once a week   Quantity:  13 capsule   Refills:  3                Primary Care Provider Office Phone # Fax #    Hafsa JOSEPH Orozco [phone]       [facility] [address] [place] [address] [address] [place] [address]        Equal Access to Services     MISSY [facility] AH: Hadii wiliam faith hadbruce Sosherley, waaxda luqadaha, qaybta kaalmada dariel, son tipton. So [facility] [phone].    ATENCIÓN: Si habla español, tiene a kessler disposición servicios gratuitos de asistencia lingüística. Bella al [phone].    We comply with applicable federal civil rights laws and [place]  laws. We do not discriminate on the basis of race, color, national origin, age, disability sex, sexual orientation or gender identity.            Thank you!     Thank you for choosing [facility]  for your care. Our goal is always to provide you with excellent care. Hearing back from our patients is one way we can continue to improve our services. Please take a few minutes to complete the written survey that you may receive in the mail after your visit with us. Thank you!             Your Updated Medication List - Protect others around you: Learn how to safely use, store and throw away your medicines at www.disposemymeds.org.          This list is accurate as of: 7/31/17  9:50 AM.  Always use your most recent med list.                   Brand Name Dispense Instructions for use Diagnosis    aspirin 325 MG EC tablet     60 tablet    Take 1 tablet (325 mg) by mouth daily    Kidney replaced by transplant       citalopram 10 MG tablet    celeXA     Take 20 mg by mouth daily        itraconazole 100 MG capsule    SPORANOX     Take by mouth 2 times daily        levofloxacin 500 MG tablet    LEVAQUIN    7 tablet    Take 1 tablet (500 mg) by mouth daily        mycophenolic acid 180 MG EC tablet    MYFORTIC - GENERIC EQUIVALENT    120 tablet    Take 2 tablets (360 mg) by mouth 2 times daily    Kidney replaced by transplant, Pancreas replaced by transplant (H)       order for DME     1 each    Equipment being ordered: Crutches () Treatment Diagnosis: impaired gait    Diabetic foot ulcer with osteomyelitis (H)       pravastatin 20 MG tablet    PRAVACHOL    90 tablet    Take 1 tablet (20 mg) by mouth daily    Dyslipidemia       sulfamethoxazole-trimethoprim 400-80 MG per tablet    BACTRIM/SEPTRA    40 tablet    Take 1 tablet by mouth Every Mon, Wed, Fri Morning    Kidney replaced by transplant, Pancreas transplanted (H)       SYNTHROID PO      Take 50 mcg by mouth daily        tacrolimus 1 mg/mL suspension     PROGRAF - GENERIC EQUIVALENT    20 mL    Take 0.3 mLs (0.3 mg) by mouth 2 times daily    Kidney replaced by transplant       valGANciclovir 450 MG tablet    VALCYTE    180 tablet    Take 1 tablet (450 mg) by mouth 2 times daily    CMV (cytomegalovirus infection) (H)       vitamin D 26046 UNIT capsule    ERGOCALCIFEROL    13 capsule    Take 1 capsule (50,000 Units) by mouth once a week    Kidney replaced by transplant, Pancreas replaced by transplant (H), Low calcium levels       ZITHROMAX Z-RAMONA PO      Take 250 mg by mouth daily        ZYRTEC ALLERGY 10 MG tablet   Generic drug:  cetirizine      Take 1 tablet by mouth daily.

## 2017-07-31 NOTE — PROGRESS NOTES
Chief Complaint:   Chief Complaint   Patient presents with     Wound Check     Wound, Left plantar foot. Unna boot. Diabetic. Pt stated that the odor from her left foot is horrible and is wondering what she can do to help with the odor.          Allergies   Allergen Reactions     Amoxicillin-Pot Clavulanate Diarrhea     Ciprofloxacin Nausea     Pollen Extract Other (See Comments)     Seasonal allergies     Pyridostigmine Other (See Comments)     Spastic muscle motion in the face and legs, weakness in the arms. Slight swelling of the tongue.         Subjective: Rose is a 41 year old female who presents to the clinic today for a follow up of left foot wound. She relates that she has kept the boot C/D/I over the last week. She relates that she does notice some odor coming from the area. She relates that the swelling has significantly decreased over the last few days.       Objective      A wound is noted at left  plantar foot measuring 0.6cm x 0.7cm x 0.1cm.    Block Classification: II    Wound base: Red/Granulation    Edges: intact    Drainage: light/serous    Odor: no    Undermining: no    Bone Exposure: No    Clinical Signs of Infection: No    No debridement indicated on this wound today.   Deroofed bullae noted on the dorsal aspect of the left foot. No s/s of infection noted to this area.     Assessment: Left plantar foot wound - has healed some since the last visit.     Plan:   - Pt seen and evaluated  - Silvercel was placed directly on the wound. The leg was then wrapped in a multi-layered, valved unna's boot. She should keep this C/D/I for the next week.   - Pt to return to clinic in 1 week or sooner if problems arise.

## 2017-07-31 NOTE — NURSING NOTE
Reason For Visit:   Chief Complaint   Patient presents with     Wound Check     Wound, Left plantar foot. Unna boot. Diabetic. Pt stated that the odor from her left foot is horrible and is wondering what she can do to help with the odor.       Pain Assessment  Patient Currently in Pain: Yes (Pt stated that she can feel it. )  Primary Pain Location: Foot  Pain Orientation: Left         Current Outpatient Prescriptions   Medication Sig Dispense Refill     tacrolimus (PROGRAF - GENERIC EQUIVALENT) 1 mg/mL suspension Take 0.3 mLs (0.3 mg) by mouth 2 times daily 20 mL 6     mycophenolic acid (MYFORTIC - GENERIC EQUIVALENT) 180 MG EC tablet Take 2 tablets (360 mg) by mouth 2 times daily 120 tablet 3     levofloxacin (LEVAQUIN) 500 MG tablet Take 1 tablet (500 mg) by mouth daily 7 tablet 0     Azithromycin (ZITHROMAX Z-RAMONA PO) Take 250 mg by mouth daily       valGANciclovir (VALCYTE) 450 MG tablet Take 1 tablet (450 mg) by mouth 2 times daily 180 tablet 3     sulfamethoxazole-trimethoprim (BACTRIM/SEPTRA) 400-80 MG per tablet Take 1 tablet by mouth Every Mon, Wed, Fri Morning 40 tablet 3     order for DME Equipment being ordered: Crutches ()  Treatment Diagnosis: impaired gait 1 each 0     vitamin D (ERGOCALCIFEROL) 86261 UNIT capsule Take 1 capsule (50,000 Units) by mouth once a week (Patient taking differently: Take 50,000 Units by mouth every 30 days ) 13 capsule 3     pravastatin (PRAVACHOL) 20 MG tablet Take 1 tablet (20 mg) by mouth daily 90 tablet 3     aspirin  MG EC tablet Take 1 tablet (325 mg) by mouth daily 60 tablet 3     itraconazole (SPORANOX) 100 MG capsule Take by mouth 2 times daily        citalopram (CELEXA) 10 MG tablet Take 20 mg by mouth daily        Levothyroxine Sodium (SYNTHROID PO) Take 50 mcg by mouth daily        cetirizine (ZYRTEC ALLERGY) 10 MG tablet Take 1 tablet by mouth daily.            Allergies   Allergen Reactions     Amoxicillin-Pot Clavulanate Diarrhea     Ciprofloxacin  Nausea     Pollen Extract Other (See Comments)     Seasonal allergies     Pyridostigmine Other (See Comments)     Spastic muscle motion in the face and legs, weakness in the arms. Slight swelling of the tongue.

## 2017-08-02 ENCOUNTER — TELEPHONE (OUTPATIENT)
Dept: ORTHOPEDICS | Facility: CLINIC | Age: 42
End: 2017-08-02

## 2017-08-02 NOTE — TELEPHONE ENCOUNTER
Pt calls stating that she removed her dressing to left foot because she thought she was to be fitted with a boot, she showered and dried wound. She is wondering if she needs to come in to have foot redressed or if she can wait until scheduled appt with Dr Parkinson on Monday 08/07/17. Per Dr Parkinson pt can wait until she is seen in clinic on Monday. Pt states she will cover wound with a bandage until then

## 2017-08-07 ENCOUNTER — OFFICE VISIT (OUTPATIENT)
Dept: ORTHOPEDICS | Facility: CLINIC | Age: 42
End: 2017-08-07

## 2017-08-07 DIAGNOSIS — E10.8 TYPE 1 DIABETES MELLITUS WITH COMPLICATION (H): ICD-10-CM

## 2017-08-07 DIAGNOSIS — M14.671 CHARCOT'S JOINT OF RIGHT FOOT: Primary | ICD-10-CM

## 2017-08-07 DIAGNOSIS — D84.9 IMMUNOSUPPRESSED STATUS (H): ICD-10-CM

## 2017-08-07 DIAGNOSIS — L97.522 ULCER OF LEFT FOOT, WITH FAT LAYER EXPOSED (H): ICD-10-CM

## 2017-08-07 DIAGNOSIS — Z94.0 KIDNEY REPLACED BY TRANSPLANT: ICD-10-CM

## 2017-08-07 NOTE — LETTER
8/7/2017       RE: Rose Castaneda  [address] [place] [address]     Dear Colleague,    Thank you for referring your patient, Rose Castaneda, to the [facility] at [facility]. Please see a copy of my visit note below.    Chief Complaint:   Chief Complaint   Patient presents with     Wound Check     Follow up. Diabetic wound, left plantar foot. Pt stated that she needs to talk to Dr. Parkinson. Pt stated wound is still awful and swollen.           Allergies   Allergen Reactions     Amoxicillin-Pot Clavulanate Diarrhea     Ciprofloxacin Nausea     Pollen Extract Other (See Comments)     Seasonal allergies     Pyridostigmine Other (See Comments)     Spastic muscle motion in the face and legs, weakness in the arms. Slight swelling of the tongue.         Subjective: Rose is a 41 year old female who presents to the clinic today for a follow up of left foot wound. She relates that she had to take off her boot because she was supposed to be fitted for a brace at the orthotics lab, so she has just been putting a dry dressing on the wound. She relates that she is not having pain, and the swelling has decreased some.     Objective      A diabetic wound is noted at left  plantar foot measuring 0.6cm x 0.6cm x 0.1cm.    Block Classification: II    Wound base: Red/Granulation    Edges: intact    Drainage: slight/serous    Odor: no    Undermining: no    Bone Exposure: No    Clinical Signs of Infection: No    After obtaining patient consent, the wound was irrigated with copious amounts of saline. A scalpel was then used to debride the wound into the subcutaneous tissue. The wound edges were debrided to healthy, bleeding tissue. Given the patient's lack of sensation, no anesthesia was necessary for the procedure.       Assessment: Left Charcot foot with wound.     Plan:   - Pt seen and evaluated  - The wound was debrided, cleansed, and the leg was wrapped with  a multi-layered, valved unna boot. This should be left intact for a week.   - Pt to return to clinic in 1 week.     Again, thank you for allowing me to participate in the care of your patient.      Sincerely,    Darius Parkinson DPM

## 2017-08-07 NOTE — NURSING NOTE
Reason For Visit:   Chief Complaint   Patient presents with     Wound Check     Follow up. Diabetic wound, left plantar foot. Pt stated that she needs to talk to Dr. Parkinson. Pt stated wound is still awful and swollen.        Pain Assessment  Patient Currently in Pain: Denies (Pt stated that her left foot is more uncomfotable then painful. )  Primary Pain Location: Foot  Pain Orientation: Left  Pain Descriptors: Discomfort                Current Outpatient Prescriptions   Medication Sig Dispense Refill     tacrolimus (PROGRAF - GENERIC EQUIVALENT) 1 mg/mL suspension Take 0.3 mLs (0.3 mg) by mouth 2 times daily 20 mL 6     mycophenolic acid (MYFORTIC - GENERIC EQUIVALENT) 180 MG EC tablet Take 2 tablets (360 mg) by mouth 2 times daily 120 tablet 3     levofloxacin (LEVAQUIN) 500 MG tablet Take 1 tablet (500 mg) by mouth daily 7 tablet 0     Azithromycin (ZITHROMAX Z-RAMONA PO) Take 250 mg by mouth daily       valGANciclovir (VALCYTE) 450 MG tablet Take 1 tablet (450 mg) by mouth 2 times daily 180 tablet 3     sulfamethoxazole-trimethoprim (BACTRIM/SEPTRA) 400-80 MG per tablet Take 1 tablet by mouth Every Mon, Wed, Fri Morning 40 tablet 3     order for DME Equipment being ordered: Crutches ()  Treatment Diagnosis: impaired gait 1 each 0     vitamin D (ERGOCALCIFEROL) 17978 UNIT capsule Take 1 capsule (50,000 Units) by mouth once a week (Patient taking differently: Take 50,000 Units by mouth every 30 days ) 13 capsule 3     pravastatin (PRAVACHOL) 20 MG tablet Take 1 tablet (20 mg) by mouth daily 90 tablet 3     aspirin  MG EC tablet Take 1 tablet (325 mg) by mouth daily 60 tablet 3     itraconazole (SPORANOX) 100 MG capsule Take by mouth 2 times daily        citalopram (CELEXA) 10 MG tablet Take 20 mg by mouth daily        Levothyroxine Sodium (SYNTHROID PO) Take 50 mcg by mouth daily        cetirizine (ZYRTEC ALLERGY) 10 MG tablet Take 1 tablet by mouth daily.            Allergies   Allergen Reactions      Amoxicillin-Pot Clavulanate Diarrhea     Ciprofloxacin Nausea     Pollen Extract Other (See Comments)     Seasonal allergies     Pyridostigmine Other (See Comments)     Spastic muscle motion in the face and legs, weakness in the arms. Slight swelling of the tongue.

## 2017-08-07 NOTE — MR AVS SNAPSHOT
After Visit Summary   8/7/2017    Rose Castaneda    MRN: [id number]           Patient Information     Date Of Birth          1975        Visit Information        Provider Department      8/7/2017 2:00 PM Darius Parkinson DPM [facility]        Today's Diagnoses     Charcot's joint of right foot    -  1    Ulcer of left foot, with fat layer exposed (H)        Type 1 diabetes mellitus with complication (H)        Immunosuppressed status (H)        Kidney replaced by transplant           Follow-ups after your visit        Additional Services     ORTHOTICS REFERRAL (Foot and Ankle)       Please be aware that coverage of these services is subject to the terms and limitations of your health insurance plan.  Call member services at your health plan with any benefit or coverage questions.      Please bring the following to your appointment:    >>   Any x-rays, CTs or MRIs which have been performed.  Contact the facility where they were done to arrange for  prior to your scheduled appointment.  Any new CT, MRI or other procedures ordered by your specialist must be performed at a [facility] facility or coordinated by your clinic's referral office.    >>   List of current medications   >>   This referral request   >>   Any documents/labs given to you for this referral    ==This Referral PRINTS in the [facility] (ORTHOTICS & PROSTHETICS) Central scheduling office ==     The [facility] Central Scheduling staff will contact patient to arrange appointments. Central Scheduling Phone #:  [place]  [phone]     Evaluated and treat for left Charcot foot. Possible bracing with [place] brace. Try bracing before CROW    FOOT AND ANKLE ORTHOTIC PRESCRIPTION:  AFO:  Right                  Your next 10 appointments already scheduled     Aug 16, 2017  7:00 AM CDT   (Arrive by 6:45 AM)   RETURN FOOT/ANKLE with ALFA Reveles [facility] ([facility])    [address]  4th Floor  [facility] [address] [phone]            Sep 19, 2017  4:25 PM CDT   (Arrive by 3:55 PM)   Return Kidney Transplant with Mauricio Marquez MD   [facility] ([facility])    [address]  3rd Floor  [facility] [address] [phone]              Who to contact     Please call your clinic at [phone] to:    Ask questions about your health    Make or cancel appointments    Discuss your medicines    Learn about your test results    Speak to your doctor   If you have compliments or concerns about an experience at your clinic, or if you wish to file a complaint, please contact [facility] Patient Relations at [phone] or email us at [email or web address]         Additional Information About Your Visit        cCAM [employer]otherapeuticsharQualiLife Information     oDeskt is an electronic gateway that provides easy, online access to your medical records. With CFEngine, you can request a clinic appointment, read your test results, renew a prescription or communicate with your care team.     To sign up for CFEngine visit the website at [email or web address]   You will be asked to enter the access code listed below, as well as some personal information. Please follow the directions to create your username and password.     Your access code is: [email or web address]  Expires: 10/10/2017  6:31 AM     Your access code will  in 90 days. If you need help or a new code, please contact your [facility] or call [phone] for assistance.        Care EveryWhere ID     This is your Care EveryWhere ID. This could be used by other organizations to access your [place] medical records  [id number]         Blood Pressure from Last 3 Encounters:   17 (!) 86/47   17 144/86   17 101/66    Weight from Last 3 Encounters:   17 50.6 kg  (111 lb 9.6 oz)   06/09/17 51.3 kg (113 lb)   02/07/17 50.3 kg (110 lb 14.4 oz)              We Performed the Following     DEBRIDEMENT WOUND UP TO 20 SQ CM     ORTHOTICS REFERRAL (Foot and Ankle)     UNNA BOOT APPLICATION          Today's Medication Changes          These changes are accurate as of: 8/7/17 11:59 PM.  If you have any questions, ask your nurse or doctor.               These medicines have changed or have updated prescriptions.        Dose/Directions    vitamin D 35117 UNIT capsule   Commonly known as:  ERGOCALCIFEROL   This may have changed:  when to take this   Used for:  Kidney replaced by transplant, Pancreas replaced by transplant (H), Low calcium levels        Dose:  43442 Units   Take 1 capsule (50,000 Units) by mouth once a week   Quantity:  13 capsule   Refills:  3                Primary Care Provider Office Phone # Fax #    Hafsa Orozco [phone]       [facility] [address] [place] [address]        Equal Access to Services     MISSY [facility] AH: Hadii aad ku hadasho Soomaali, waaxda luqadaha, qaybta kaalmada adeegyada, waxay chungin hayspike prasad . So [facility] [phone].    ATENCIÓN: Si habla español, tiene a kessler disposición servicios gratuitos de asistencia lingüística. Llame al [phone].    We comply with applicable federal civil rights laws and [place] laws. We do not discriminate on the basis of race, color, national origin, age, disability sex, sexual orientation or gender identity.            Thank you!     Thank you for choosing [facility]  for your care. Our goal is always to provide you with excellent care. Hearing back from our patients is one way we can continue to improve our services. Please take a few minutes to complete the written survey that you may receive in the mail after your visit with us. Thank you!             Your Updated Medication List - Protect others around you: Learn how to safely use, store and  throw away your medicines at www.disposemymeds.org.          This list is accurate as of: 8/7/17 11:59 PM.  Always use your most recent med list.                   Brand Name Dispense Instructions for use Diagnosis    aspirin 325 MG EC tablet     60 tablet    Take 1 tablet (325 mg) by mouth daily    Kidney replaced by transplant       citalopram 10 MG tablet    celeXA     Take 20 mg by mouth daily        itraconazole 100 MG capsule    SPORANOX     Take by mouth 2 times daily        levofloxacin 500 MG tablet    LEVAQUIN    7 tablet    Take 1 tablet (500 mg) by mouth daily        mycophenolic acid 180 MG EC tablet    MYFORTIC - GENERIC EQUIVALENT    120 tablet    Take 2 tablets (360 mg) by mouth 2 times daily    Kidney replaced by transplant, Pancreas replaced by transplant (H)       order for DME     1 each    Equipment being ordered: Lynnette () Treatment Diagnosis: impaired gait    Diabetic foot ulcer with osteomyelitis (H)       pravastatin 20 MG tablet    PRAVACHOL    90 tablet    Take 1 tablet (20 mg) by mouth daily    Dyslipidemia       sulfamethoxazole-trimethoprim 400-80 MG per tablet    BACTRIM/SEPTRA    40 tablet    Take 1 tablet by mouth Every Mon, Wed, Fri Morning    Kidney replaced by transplant, Pancreas transplanted (H)       SYNTHROID PO      Take 50 mcg by mouth daily        tacrolimus 1 mg/mL suspension    PROGRAF - GENERIC EQUIVALENT    20 mL    Take 0.3 mLs (0.3 mg) by mouth 2 times daily    Kidney replaced by transplant       valGANciclovir 450 MG tablet    VALCYTE    180 tablet    Take 1 tablet (450 mg) by mouth 2 times daily    CMV (cytomegalovirus infection) (H)       vitamin D 75755 UNIT capsule    ERGOCALCIFEROL    13 capsule    Take 1 capsule (50,000 Units) by mouth once a week    Kidney replaced by transplant, Pancreas replaced by transplant (H), Low calcium levels       ZITHROMAX Z-RAMONA PO      Take 250 mg by mouth daily        ZYRTEC ALLERGY 10 MG tablet   Generic drug:  cetirizine       Take 1 tablet by mouth daily.

## 2017-08-08 NOTE — PROGRESS NOTES
Chief Complaint:   Chief Complaint   Patient presents with     Wound Check     Follow up. Diabetic wound, left plantar foot. Pt stated that she needs to talk to Dr. Parkinson. Pt stated wound is still awful and swollen.           Allergies   Allergen Reactions     Amoxicillin-Pot Clavulanate Diarrhea     Ciprofloxacin Nausea     Pollen Extract Other (See Comments)     Seasonal allergies     Pyridostigmine Other (See Comments)     Spastic muscle motion in the face and legs, weakness in the arms. Slight swelling of the tongue.         Subjective: Rose is a 41 year old female who presents to the clinic today for a follow up of left foot wound. She relates that she had to take off her boot because she was supposed to be fitted for a brace at the orthotics lab, so she has just been putting a dry dressing on the wound. She relates that she is not having pain, and the swelling has decreased some.     Objective      A diabetic wound is noted at left  plantar foot measuring 0.6cm x 0.6cm x 0.1cm.    Bolck Classification: II    Wound base: Red/Granulation    Edges: intact    Drainage: slight/serous    Odor: no    Undermining: no    Bone Exposure: No    Clinical Signs of Infection: No    After obtaining patient consent, the wound was irrigated with copious amounts of saline. A scalpel was then used to debride the wound into the subcutaneous tissue. The wound edges were debrided to healthy, bleeding tissue. Given the patient's lack of sensation, no anesthesia was necessary for the procedure.       Assessment: Left Charcot foot with wound.     Plan:   - Pt seen and evaluated  - The wound was debrided, cleansed, and the leg was wrapped with a multi-layered, valved unna boot. This should be left intact for a week.   - Pt to return to clinic in 1 week.

## 2017-08-16 ENCOUNTER — OFFICE VISIT (OUTPATIENT)
Dept: ORTHOPEDICS | Facility: CLINIC | Age: 42
End: 2017-08-16

## 2017-08-16 DIAGNOSIS — Z94.83 PANCREAS REPLACED BY TRANSPLANT (H): ICD-10-CM

## 2017-08-16 DIAGNOSIS — L97.522 ULCER OF LEFT FOOT, WITH FAT LAYER EXPOSED (H): Primary | ICD-10-CM

## 2017-08-16 DIAGNOSIS — D84.9 IMMUNOSUPPRESSED STATUS (H): ICD-10-CM

## 2017-08-16 DIAGNOSIS — Z94.0 KIDNEY REPLACED BY TRANSPLANT: ICD-10-CM

## 2017-08-16 DIAGNOSIS — E10.8 TYPE 1 DIABETES MELLITUS WITH COMPLICATION (H): ICD-10-CM

## 2017-08-16 NOTE — LETTER
2017       RE: Rose Castaneda  [address] [place] [address]     Dear Colleague,    Thank you for referring your patient, Rose Castaneda, to the [facility] at [facility]. Please see a copy of my visit note below.    Chief Complaint: No chief complaint on file.         Allergies   Allergen Reactions     Amoxicillin-Pot Clavulanate Diarrhea     Ciprofloxacin Nausea     Pollen Extract Other (See Comments)     Seasonal allergies     Pyridostigmine Other (See Comments)     Spastic muscle motion in the face and legs, weakness in the arms. Slight swelling of the tongue.         Subjective: Rose is a 41 year old female who presents to the clinic today for a follow up of left foot wound. She relates that she is moving her house and she got the Unna boot wet. She cut this off and began wrapping her foot in a tight gauze dressing with a square of Unna directly on the wound. She relates that she did see some white fluid on the scab on the top of her foot.     Objective      A diabetic wound is noted at left  plantar foot measuring 0.9cm x 1cm x 0.1cm.    Block Classification: II    Wound base: Red/Granulation    Edges: hyperkeratotic    Drainage: small/serous    Odor: no    Underminin O'Clock to 4 O'Clock    Bone Exposure: No    Clinical Signs of Infection: No    After obtaining patient consent, the wound was irrigated with copious amounts of saline. A scalpel was then used to debride the wound into the subcutaneous tissue. The wound edges were debrided to healthy, bleeding tissue. Given the patient's lack of sensation, no anesthesia was necessary for the procedure.       Assessment: Left foot wound - stable.     Plan:   - Pt seen and evaluated  - The wound was cleansed and debrided as described. Endoform was applied to the wound and a valved, multi-layered Unna boot was applied to the left leg. This should be left intact.   - Rose and I had  a long discussion about what it would take to heal this wound. This wound has the best chance of healing if she can stay off of the foot and remain NWB. She may also benefit from Apligraf, which would only work if she can stay NWB. Will see how she can stay off of the foot for the next week. 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   - Pt to return to clinic in 1 week in wound care.    Again, thank you for allowing me to participate in the care of your patient.      Sincerely,    Darius Parkinson DPM

## 2017-08-16 NOTE — MR AVS SNAPSHOT
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)        Type 1 diabetes mellitus with complication (H)        Immunosuppressed status (H)        Kidney replaced by transplant           Follow-ups after your visit        Your next 10 appointments already scheduled     Aug 24, 2017  3:00 PM CDT   (Arrive by 2:45 PM)   Return Visit with Darius Parkinson DPM   [facility] ([facility])    [address] [place] [address]  4th Floor  [facility] [address] [phone]            Sep 19, 2017  4:25 PM CDT   (Arrive by 3:55 PM)   Return Kidney Transplant with Mauricio Marquez MD   [facility] ([facility])    [address] [place] [address]  3rd Floor  [facility] [address] [phone]              Who to contact     Please call your clinic at [phone] to:    Ask questions about your health    Make or cancel appointments    Discuss your medicines    Learn about your test results    Speak to your doctor   If you have compliments or concerns about an experience at your clinic, or if you wish to file a complaint, please contact [facility] Patient Relations at [phone] or email us at [email or web address]         Additional Information About Your Visit        MyChart Information     Playdemic is an electronic gateway that provides easy, online access to your medical records. With Playdemic, you can request a clinic appointment, read your test results, renew a prescription or communicate with your care team.     To sign up for Playdemic visit the website at  [email or web address]   You will be asked to enter the access code listed below, as well as some personal information. Please follow the directions to create your username and password.     Your access code is: [email or web address]  Expires: 10/10/2017  6:31 AM     Your access code will  in 90 days. If you need help or a new code, please contact your [facility] or call [phone] for assistance.        Care EveryWhere ID     This is your Care EveryWhere ID. This could be used by other organizations to access your [place] medical records  [id number]         Blood Pressure from Last 3 Encounters:   17 (!) 86/47   17 144/86   17 101/66    Weight from Last 3 Encounters:   17 50.6 kg (111 lb 9.6 oz)   17 51.3 kg (113 lb)   17 50.3 kg (110 lb 14.4 oz)              We Performed the Following     DEBRIDEMENT WOUND UP TO 20 SQ CM     UNNA BOOT APPLICATION          Today's Medication Changes          These changes are accurate as of: 17  8:00 AM.  If you have any questions, ask your nurse or doctor.               These medicines have changed or have updated prescriptions.        Dose/Directions    vitamin D 02428 UNIT capsule   Commonly known as:  ERGOCALCIFEROL   This may have changed:  when to take this   Used for:  Kidney replaced by transplant, Pancreas replaced by transplant (H), Low calcium levels        Dose:  62099 Units   Take 1 capsule (50,000 Units) by mouth once a week   Quantity:  13 capsule   Refills:  3                Primary Care Provider Office Phone # Fax #    Hafsa Orozco [phone]       [facility] [address] [place] [address] [address] [place] [address]        Equal Access to Services     ANDRESSA ROSSI AH: Hadii wiliam faith hadleso Soomaali, waaxda luqadaha, qaybta kaalmada adeegyada, son pennyn estefani tipton. So [facility] [phone].    ATENCIÓN: Si habla español, tiene a kessler disposición servicios gratuitos de  asistencia lingüística. Bella al [phone].    We comply with applicable federal civil rights laws and [place] laws. We do not discriminate on the basis of race, color, national origin, age, disability sex, sexual orientation or gender identity.            Thank you!     Thank you for choosing [facility]  for your care. Our goal is always to provide you with excellent care. Hearing back from our patients is one way we can continue to improve our services. Please take a few minutes to complete the written survey that you may receive in the mail after your visit with us. Thank you!             Your Updated Medication List - Protect others around you: Learn how to safely use, store and throw away your medicines at www.disposemymeds.org.          This list is accurate as of: 8/16/17  8:00 AM.  Always use your most recent med list.                   Brand Name Dispense Instructions for use Diagnosis    aspirin 325 MG EC tablet     60 tablet    Take 1 tablet (325 mg) by mouth daily    Kidney replaced by transplant       citalopram 10 MG tablet    celeXA     Take 20 mg by mouth daily        itraconazole 100 MG capsule    SPORANOX     Take by mouth 2 times daily        levofloxacin 500 MG tablet    LEVAQUIN    7 tablet    Take 1 tablet (500 mg) by mouth daily        mycophenolic acid 180 MG EC tablet    GENERIC EQUIVALENT    120 tablet    Take 2 tablets (360 mg) by mouth 2 times daily    Kidney replaced by transplant, Pancreas replaced by transplant (H)       order for DME     1 each    Equipment being ordered: Sindyches () Treatment Diagnosis: impaired gait    Diabetic foot ulcer with osteomyelitis (H)       pravastatin 20 MG tablet    PRAVACHOL    90 tablet    Take 1 tablet (20 mg) by mouth daily    Dyslipidemia       sulfamethoxazole-trimethoprim 400-80 MG per tablet    BACTRIM/SEPTRA    40 tablet    Take 1 tablet by mouth Every Mon, Wed, Fri Morning    Kidney replaced by transplant, Pancreas  transplanted (H)       SYNTHROID PO      Take 50 mcg by mouth daily        tacrolimus 1 mg/mL suspension    GENERIC EQUIVALENT    20 mL    Take 0.3 mLs (0.3 mg) by mouth 2 times daily    Kidney replaced by transplant       valGANciclovir 450 MG tablet    VALCYTE    180 tablet    Take 1 tablet (450 mg) by mouth 2 times daily    CMV (cytomegalovirus infection) (H)       vitamin D 29407 UNIT capsule    ERGOCALCIFEROL    13 capsule    Take 1 capsule (50,000 Units) by mouth once a week    Kidney replaced by transplant, Pancreas replaced by transplant (H), Low calcium levels       ZITHROMAX Z-RAMONA PO      Take 250 mg by mouth daily        ZYRTEC ALLERGY 10 MG tablet   Generic drug:  cetirizine      Take 1 tablet by mouth daily.

## 2017-08-16 NOTE — PROGRESS NOTES
Chief Complaint: No chief complaint on file.         Allergies   Allergen Reactions     Amoxicillin-Pot Clavulanate Diarrhea     Ciprofloxacin Nausea     Pollen Extract Other (See Comments)     Seasonal allergies     Pyridostigmine Other (See Comments)     Spastic muscle motion in the face and legs, weakness in the arms. Slight swelling of the tongue.         Subjective: Rose is a 41 year old female who presents to the clinic today for a follow up of left foot wound. She relates that she is moving her house and she got the Unna boot wet. She cut this off and began wrapping her foot in a tight gauze dressing with a square of Unna directly on the wound. She relates that she did see some white fluid on the scab on the top of her foot.     Objective      A diabetic wound is noted at left  plantar foot measuring 0.9cm x 1cm x 0.1cm.    Block Classification: II    Wound base: Red/Granulation    Edges: hyperkeratotic    Drainage: small/serous    Odor: no    Underminin O'Clock to 4 O'Clock    Bone Exposure: No    Clinical Signs of Infection: No    After obtaining patient consent, the wound was irrigated with copious amounts of saline. A scalpel was then used to debride the wound into the subcutaneous tissue. The wound edges were debrided to healthy, bleeding tissue. Given the patient's lack of sensation, no anesthesia was necessary for the procedure.       Assessment: Left foot wound - stable.     Plan:   - Pt seen and evaluated  - The wound was cleansed and debrided as described. Endoform was applied to the wound and a valved, multi-layered Unna boot was applied to the left leg. This should be left intact.   - Rose and I had a long discussion about what it would take to heal this wound. This wound has the best chance of healing if she can stay off of the foot and remain NWB. She may also benefit from Apligraf, which would only work if she can stay NWB. Will see how she can stay off of the foot for the next week.  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   - Pt to return to clinic in 1 week in wound care.

## 2017-08-16 NOTE — LETTER
[employer] Customer Service  [facility]  [address], [address]  [place] [address]  Fax: [phone]  Phone: [phone]      2017      Rose Castaneda  [address] [place] [address]        Dear Rose,    Thank you for your interest in becoming a [employer] user!    Your access code is: [email or web address]  Expires: 10/10/2017  6:31 AM     Please access the [employer] website at [email or web address].  Below the ID and password fields, select the  Sign Up Now  as New User.  You will be prompted to enter the access code listed above as well as additional personal information.  Please follow the directions carefully when creating your username and password.    If you allow your access code to , or if you have any questions please call a [employer] Representative at [phone] during normal clinic hours.     Sincerely,      [employer] Customer Service  [facility]

## 2017-08-24 ENCOUNTER — OFFICE VISIT (OUTPATIENT)
Dept: WOUND CARE | Facility: CLINIC | Age: 42
End: 2017-08-24

## 2017-08-24 DIAGNOSIS — L97.522 ULCER OF LEFT FOOT, WITH FAT LAYER EXPOSED (H): Primary | ICD-10-CM

## 2017-08-24 DIAGNOSIS — D84.9 IMMUNOSUPPRESSED STATUS (H): ICD-10-CM

## 2017-08-24 DIAGNOSIS — M14.672 CHARCOT'S JOINT OF FOOT, LEFT: ICD-10-CM

## 2017-08-24 DIAGNOSIS — E10.8 TYPE 1 DIABETES MELLITUS WITH COMPLICATION (H): ICD-10-CM

## 2017-08-24 NOTE — MR AVS SNAPSHOT
After Visit Summary   8/24/2017    Rose Castaneda    MRN: [id number]           Patient Information     Date Of Birth          1975        Visit Information        Provider Department      8/24/2017 3:00 PM Darius Parkinson DPM [facility]        Today's Diagnoses     Ulcer of left foot, with fat layer exposed (H)    -  1    Charcot's joint of foot, left           Follow-ups after your visit        Additional Services     ORTHOPEDICS ADULT REFERRAL       Your provider has referred you to: [facility]: Orthopaedic Clinic [facility] [phone]   [email or web address]      Dr. Norton    Please be aware that coverage of these services is subject to the terms and limitations of your health insurance plan.  Call member services at your health plan with any benefit or coverage questions.      Please bring the following to your appointment:    >>   Any x-rays, CTs or MRIs which have been performed.  Contact the facility where they were done to arrange for  prior to your scheduled appointment.    >>   List of current medications   >>   This referral request   >>   Any documents/labs given to you for this referral                  Your next 10 appointments already scheduled     Aug 24, 2017  8:00 PM CDT   CT LOWER EXTREMITY LEFT W/O CONTRAST with UCCT1   [facility] ([facility])    [address] [address] [phone]           Please bring any scans or X-rays taken at other hospitals, if similar tests were done. Also bring a list of your medicines, including vitamins, minerals and over-the-counter drugs. It is safest to leave personal items at home.  Be sure to tell your doctor:   If you have any allergies.   If there s any chance you are pregnant.   If you are breastfeeding.   If you have any special needs.  You do not need to do anything special to prepare.  Please wear loose  clothing, such as a sweat suit or jogging clothes. Avoid snaps, zippers and other metal. We may ask you to undress and put on a hospital gown.            Aug 29, 2017  7:50 AM CDT   (Arrive by 7:35 AM)   NEW FOOT/ANKLE with Xu Norton MD   [facility] ([facility])    [address] [place] [address]  [address] [facility] [id number] [phone]            Aug 31, 2017  7:30 AM CDT   (Arrive by 7:15 AM)   Return Visit with Nini Mckeon PA-C   [facility] ([facility])    [address]  [address] [facility] [id number] [phone]            Sep 19, 2017  4:25 PM CDT   (Arrive by 3:55 PM)   Return Kidney Transplant with Mauricio Marquez MD   [facility] ([facility])    [address] [place] [address]  3rd [facility] [id number] [phone]              Future tests that were ordered for you today     Open Future Orders        Priority Expected Expires Ordered    CT Lower extrem LT w/o contrast Routine  8/24/2018 8/24/2017            Who to contact     Please call your clinic at [phone] to:    Ask questions about your health    Make or cancel appointments    Discuss your medicines    Learn about your test results    Speak to your doctor   If you have compliments or concerns about an experience at your clinic, or if you wish to file a complaint, please contact [facility] Patient Relations at [phone] or email us at [email or web address]         Additional Information About Your Visit        The E[employer] Network (TEN)hart Information     [employer] is an electronic gateway that provides easy, online access to your medical records. With [employer], you can request a clinic appointment, read your test results, renew a prescription or communicate with your care team.     To sign up for [employer] visit the website at [email or web address]   You will be asked to enter the  access code listed below, as well as some personal information. Please follow the directions to create your username and password.     Your access code is: [email or web address]  Expires: 10/10/2017  6:31 AM     Your access code will  in 90 days. If you need help or a new code, please contact your [facility] or call [phone] for assistance.        Care EveryWhere ID     This is your Care EveryWhere ID. This could be used by other organizations to access your [place] medical records  [id number]         Blood Pressure from Last 3 Encounters:   17 (!) 86/47   17 144/86   17 101/66    Weight from Last 3 Encounters:   17 50.6 kg (111 lb 9.6 oz)   17 51.3 kg (113 lb)   17 50.3 kg (110 lb 14.4 oz)              We Performed the Following     ORTHOPEDICS ADULT REFERRAL          Today's Medication Changes          These changes are accurate as of: 17  3:55 PM.  If you have any questions, ask your nurse or doctor.               These medicines have changed or have updated prescriptions.        Dose/Directions    vitamin D 77581 UNIT capsule   Commonly known as:  ERGOCALCIFEROL   This may have changed:  when to take this   Used for:  Kidney replaced by transplant, Pancreas replaced by transplant (H), Low calcium levels        Dose:  67635 Units   Take 1 capsule (50,000 Units) by mouth once a week   Quantity:  13 capsule   Refills:  3                Primary Care Provider Office Phone # Fax #    Hafsa Orozco [phone]       [facility] [address] [place] [address] [address] [place] [address]        Equal Access to Services     MISSY ROSSI AH: Hadii wiliam faith hadashhalley Sosherley, waaxda luqadaha, qaybta kaalmada son vieira. So [facility] [phone].    ATENCIÓN: Si habla español, tiene a kessler disposición servicios gratuitos de asistencia lingüística. Llame al [phone].    We comply with applicable federal civil  rights laws and [place] laws. We do not discriminate on the basis of race, color, national origin, age, disability sex, sexual orientation or gender identity.            Thank you!     Thank you for choosing [facility]  for your care. Our goal is always to provide you with excellent care. Hearing back from our patients is one way we can continue to improve our services. Please take a few minutes to complete the written survey that you may receive in the mail after your visit with us. Thank you!             Your Updated Medication List - Protect others around you: Learn how to safely use, store and throw away your medicines at www.disposemymeds.org.          This list is accurate as of: 8/24/17  3:55 PM.  Always use your most recent med list.                   Brand Name Dispense Instructions for use Diagnosis    aspirin 325 MG EC tablet     60 tablet    Take 1 tablet (325 mg) by mouth daily    Kidney replaced by transplant       citalopram 10 MG tablet    celeXA     Take 20 mg by mouth daily        itraconazole 100 MG capsule    SPORANOX     Take by mouth 2 times daily        levofloxacin 500 MG tablet    LEVAQUIN    7 tablet    Take 1 tablet (500 mg) by mouth daily        mycophenolic acid 180 MG EC tablet    GENERIC EQUIVALENT    120 tablet    Take 2 tablets (360 mg) by mouth 2 times daily    Kidney replaced by transplant, Pancreas replaced by transplant (H)       order for DME     1 each    Equipment being ordered: Crutches () Treatment Diagnosis: impaired gait    Diabetic foot ulcer with osteomyelitis (H)       pravastatin 20 MG tablet    PRAVACHOL    90 tablet    Take 1 tablet (20 mg) by mouth daily    Dyslipidemia       sulfamethoxazole-trimethoprim 400-80 MG per tablet    BACTRIM/SEPTRA    40 tablet    Take 1 tablet by mouth Every Mon, Wed, Fri Morning    Kidney replaced by transplant, Pancreas transplanted (H)       SYNTHROID PO      Take 50 mcg by mouth daily        tacrolimus 1 mg/mL  suspension    GENERIC EQUIVALENT    20 mL    Take 0.3 mLs (0.3 mg) by mouth 2 times daily    Kidney replaced by transplant       valGANciclovir 450 MG tablet    VALCYTE    180 tablet    Take 1 tablet (450 mg) by mouth 2 times daily    CMV (cytomegalovirus infection) (H)       vitamin D 06754 UNIT capsule    ERGOCALCIFEROL    13 capsule    Take 1 capsule (50,000 Units) by mouth once a week    Kidney replaced by transplant, Pancreas replaced by transplant (H), Low calcium levels       ZITHROMAX Z-RAMONA PO      Take 250 mg by mouth daily        ZYRTEC ALLERGY 10 MG tablet   Generic drug:  cetirizine      Take 1 tablet by mouth daily.

## 2017-08-24 NOTE — LETTER
2017       RE: Rose Castaneda  [address] [place] [address]     Dear Colleague,    Thank you for referring your patient, Rose Castaneda, to the [facility] at [facility]. Please see a copy of my visit note below.    1.2cm x 1.3cm x 0.4cm  Chief Complaint:   Chief Complaint   Patient presents with     RECHECK     unna boot change          Allergies   Allergen Reactions     Amoxicillin-Pot Clavulanate Diarrhea     Ciprofloxacin Nausea     Pollen Extract Other (See Comments)     Seasonal allergies     Pyridostigmine Other (See Comments)     Spastic muscle motion in the face and legs, weakness in the arms. Slight swelling of the tongue.     Subjective: Rose is a 41 year old female who presents to the clinic today for a follow up of left foot wound. Rose again took off the Unna boot early, however she relates that she wrapped it back very tightly with Unna-Z and a DSD. She continues to walk and is in her regular shoe today. She thinks that the wound has improved over the last week.     Objective  Data Unavailable Data Unavailable Data Unavailable Data Unavailable Data Unavailable 0 lbs 0 oz    A diabetic Charcot wound is noted at left  plantar 5th met measuring 1.2cm x 1.3cm x 0.4cm.    Block Classification: II    Wound base: Red  Pink/Granulation    Edges: intact    Drainage: small/serous    Odor: no    Underminin O'Clock 0.4cm    Bone Exposure: No    Clinical Signs of Infection: No    After obtaining patient consent, the wound was irrigated with copious amounts of saline. A scalpel was then used to debride the wound into the subcutaneous tissue. The wound edges were debrided to healthy, bleeding tissue. Given the patient's lack of sensation, no anesthesia was necessary for the procedure.     Assessment: Left Charcot foot with wound.   Non-compliance.     Plan:   - Pt seen and evaluated  - Wound was debrided as described. The undermining was  left intact.  - I would like her to see Dr. Norton about possible planing of the 5th met to decrease plantar pressures. I ordered a CT to assess the deformity in 3 dimensions.   - Foot wrapped in Unna-Z. She should leave this intact for the week.   - Pt to return to clinic in 1 week.     Again, thank you for allowing me to participate in the care of your patient.      Sincerely,    Darius Parkinson DPM

## 2017-08-24 NOTE — PROGRESS NOTES
1.2cm x 1.3cm x 0.4cm  Chief Complaint:   Chief Complaint   Patient presents with     RECHECK     unna boot change          Allergies   Allergen Reactions     Amoxicillin-Pot Clavulanate Diarrhea     Ciprofloxacin Nausea     Pollen Extract Other (See Comments)     Seasonal allergies     Pyridostigmine Other (See Comments)     Spastic muscle motion in the face and legs, weakness in the arms. Slight swelling of the tongue.         Subjective: Rose is a 41 year old female who presents to the clinic today for a follow up of left foot wound. Rose again took off the Unna boot early, however she relates that she wrapped it back very tightly with Unna-Z and a DSD. She continues to walk and is in her regular shoe today. She thinks that the wound has improved over the last week.     Objective  Data Unavailable Data Unavailable Data Unavailable Data Unavailable Data Unavailable 0 lbs 0 oz    A diabetic Charcot wound is noted at left  plantar 5th met measuring 1.2cm x 1.3cm x 0.4cm.    Block Classification: II    Wound base: Red  Pink/Granulation    Edges: intact    Drainage: small/serous    Odor: no    Underminin O'Clock 0.4cm    Bone Exposure: No    Clinical Signs of Infection: No    After obtaining patient consent, the wound was irrigated with copious amounts of saline. A scalpel was then used to debride the wound into the subcutaneous tissue. The wound edges were debrided to healthy, bleeding tissue. Given the patient's lack of sensation, no anesthesia was necessary for the procedure.       Assessment: Left Charcot foot with wound.   Non-compliance.     Plan:   - Pt seen and evaluated  - Wound was debrided as described. The undermining was left intact.  - I would like her to see Dr. Norton about possible planing of the 5th met to decrease plantar pressures. I ordered a CT to assess the deformity in 3 dimensions.   - Foot wrapped in Unna-Z. She should leave this intact for the week.   - Pt to return to clinic in 1  week.

## 2017-08-25 ENCOUNTER — PRE VISIT (OUTPATIENT)
Dept: ORTHOPEDICS | Facility: CLINIC | Age: 42
End: 2017-08-25

## 2017-08-25 NOTE — TELEPHONE ENCOUNTER
1.  Date/reason for appt: 8/29/17 - Left Foot Ulcer  2.  Referring provider: Dr. Parkinson  3.  Call to patient (Yes / No - short description): no, internal referral  4.  Previous care at / records requested from: [facility] -- records and imaging (XR's and CT) in epic/pacs

## 2017-08-29 ENCOUNTER — OFFICE VISIT (OUTPATIENT)
Dept: ORTHOPEDICS | Facility: CLINIC | Age: 42
End: 2017-08-29

## 2017-08-29 DIAGNOSIS — M25.572 PAIN IN JOINT, ANKLE AND FOOT, LEFT: Primary | ICD-10-CM

## 2017-08-29 NOTE — PROGRESS NOTES
CHIEF COMPLAINT:  Chronic nonhealing ulcer on plantar aspect of the left foot.      HISTORY OF PRESENT ILLNESS:  Ms. Castaneda is a 41-year-old female who presents today for evaluation of her left foot.  The patient reports to have a history of diabetes and a history of Charcot foot, although she reports to have undergone a pancreas transplant 08/2016 and does not suffer from diabetes anymore.        The patient reports to have had an ulceration along the plantar left foot since December which has been addressed by Dr. Parkinson, and unfortunately, she is not making significant progress.  She presents today for surgical discussion.      The patient also reports to have recently started a new job and to be very much interested on diminishing her recovery as much as possible.      PAST MEDICAL HISTORY:  Quite extensive and complicated and was reviewed today.      PAST SURGICAL HISTORY:  Reviewed today.        CURRENT MEDICATIONS:  Also reviewed today.      ALLERGIES:  Amoxicillin, ciprofloxacin, among others.      PHYSICAL EXAMINATION:  On today's visit, she presents as a very pleasant female in no apparent distress with a height of 5 feet 1 inch and a weight of 113 pounds.  Denies to have any constitutional symptoms.      On today's visit, she presents with full range of motion of the left ankle.  Presents with an ulceration along the plantar aspect of the fifth metatarsal shaft.  It does not have any odor or drainage.  Presents with an ulcer measuring approximately 2 cm in diameter with some elevation of the skin edges.      IMAGING:  Plain x-rays and a CT scan were reviewed today which were significant for showing a prominent fifth metatarsal proximal half which presents bulkiness plantarly, which clearly is related to the ulcer that she has.      ASSESSMENT:  Chronic nonhealing ulcer, left foot, lateral aspect.      PLAN:  I discussed with the patient that at this point the options are to proceed with a fifth  metatarsal partial excision while the ulcer is present, although she will have to do some nonweightbearing as part of her recovery and will increase the chances that if we accomplished healing of the ulcer and then we proceed with an elective partial fifth metatarsal excision.      I discussed the pros and cons of each option as well as the most likely postoperative course and complications.      For the time being, she would like to do some thinking.  I will discuss her case with Dr. Parkinson as well, and based on their level of desperation, we will decide to the timing for the surgery.      All questions were answered.      TT 30 minutes, CT 20 minutes.

## 2017-08-29 NOTE — NURSING NOTE
"Reason For Visit:   Chief Complaint   Patient presents with     Musculoskeletal Problem     L foot wound. Has noticed since 12/16           Pain Assessment  Patient Currently in Pain: Denies (\"generally sore\")                                                 "

## 2017-08-29 NOTE — LETTER
8/29/2017       RE: Rose Castaneda  [address] [place] [address]     Dear Colleague,    Thank you for referring your patient, Rose Castaneda, to the [facility] at [facility]. Please see a copy of my visit note below.    CHIEF COMPLAINT:  Chronic nonhealing ulcer on plantar aspect of the left foot.      HISTORY OF PRESENT ILLNESS:  Ms. Castaneda is a 41-year-old female who presents today for evaluation of her left foot.  The patient reports to have a history of diabetes and a history of Charcot foot, although she reports to have undergone a pancreas transplant 08/2016 and does not suffer from diabetes anymore.        The patient reports to have had an ulceration along the plantar left foot since December which has been addressed by Dr. Parkinson, and unfortunately, she is not making significant progress.  She presents today for surgical discussion.      The patient also reports to have recently started a new job and to be very much interested on diminishing her recovery as much as possible.      PAST MEDICAL HISTORY:  Quite extensive and complicated and was reviewed today.      PAST SURGICAL HISTORY:  Reviewed today.        CURRENT MEDICATIONS:  Also reviewed today.      ALLERGIES:  Amoxicillin, ciprofloxacin, among others.      PHYSICAL EXAMINATION:  On today's visit, she presents as a very pleasant female in no apparent distress with a height of 5 feet 1 inch and a weight of 113 pounds.  Denies to have any constitutional symptoms.      On today's visit, she presents with full range of motion of the left ankle.  Presents with an ulceration along the plantar aspect of the fifth metatarsal shaft.  It does not have any odor or drainage.  Presents with an ulcer measuring approximately 2 cm in diameter with some elevation of the skin edges.      IMAGING:  Plain x-rays and a CT scan were reviewed today which were significant for showing a prominent fifth  metatarsal proximal half which presents bulkiness plantarly, which clearly is related to the ulcer that she has.      ASSESSMENT:  Chronic nonhealing ulcer, left foot, lateral aspect.      PLAN:  I discussed with the patient that at this point the options are to proceed with a fifth metatarsal partial excision while the ulcer is present, although she will have to do some nonweightbearing as part of her recovery and will increase the chances that if we accomplished healing of the ulcer and then we proceed with an elective partial fifth metatarsal excision.      I discussed the pros and cons of each option as well as the most likely postoperative course and complications.      For the time being, she would like to do some thinking.  I will discuss her case with Dr. Parkinson as well, and based on their level of desperation, we will decide to the timing for the surgery.      All questions were answered.      TT 30 minutes, CT 20 minutes.     Xu Norton MD

## 2017-08-29 NOTE — MR AVS SNAPSHOT
After Visit Summary   8/29/2017    Rose Castaneda    MRN: [id number]           Patient Information     Date Of Birth          1975        Visit Information        Provider Department      8/29/2017 7:50 AM Xu Norton MD [facility]        Today's Diagnoses     Pain in joint, ankle and foot, left    -  1       Follow-ups after your visit        Your next 10 appointments already scheduled     Sep 07, 2017  8:30 AM CDT   (Arrive by 8:15 AM)   Return Visit with MOOSE Rodriguez [facility] ([facility])    [address] [place] [address]  [address] [facility] [address]   [phone]            Sep 14, 2017  9:30 AM CDT   (Arrive by 9:15 AM)   Return Visit with MOOSE Rodriguez [facility] ([facility])    [address] [place] [address] [phone]            Sep 19, 2017  4:25 PM CDT   (Arrive by 3:55 PM)   Return Kidney Transplant with Mauricio Marquez MD   [facility] ([facility])    [address] [place] [address] [phone]            Sep 21, 2017  9:30 AM CDT   (Arrive by 9:15 AM)   Return Visit with MOOSE Rodriguez [facility] ([facility])    [address] [place] [address] [phone]            Sep 28, 2017  9:30 AM CDT   (Arrive by 9:15 AM)   Return Visit with MOOSE Rodriguez [facility] ([facility])    [address] [place] [id number] [phone]              Who to contact     Please call your clinic at [phone] to:    Ask questions about your health    Make or cancel appointments    Discuss your medicines    Learn about your test results    Speak to your doctor   If you have compliments or concerns about an experience at your clinic, or if you wish to file a  complaint, please contact [facility] Patient Relations at [phone] or email us at [email or web address]         Additional Information About Your Visit        Haofangtong Information     Haofangtong is an electronic gateway that provides easy, online access to your medical records. With Haofangtong, you can request a clinic appointment, read your test results, renew a prescription or communicate with your care team.     To sign up for Haofangtong visit the website at [email or web address]   You will be asked to enter the access code listed below, as well as some personal information. Please follow the directions to create your username and password.     Your access code is: [email or web address]  Expires: 10/10/2017  6:31 AM     Your access code will  in 90 days. If you need help or a new code, please contact your [facility] Clinic or call [phone] for assistance.        Care EveryWhere ID     This is your Care EveryWhere ID. This could be used by other organizations to access your [facility] medical records  [id number]         Blood Pressure from Last 3 Encounters:   17 (!) 86/47   17 144/86   17 101/66    Weight from Last 3 Encounters:   17 50.6 kg (111 lb 9.6 oz)   17 51.3 kg (113 lb)   17 50.3 kg (110 lb 14.4 oz)              Today, you had the following     No orders found for display         Today's Medication Changes          These changes are accurate as of: 17 11:59 PM.  If you have any questions, ask your nurse or doctor.               These medicines have changed or have updated prescriptions.        Dose/Directions    vitamin D 85262 UNIT capsule   Commonly known as:  ERGOCALCIFEROL   This may have changed:  when to take this   Used for:  Kidney replaced by transplant, Pancreas replaced by transplant (H), Low calcium levels        Dose:  16105 Units   Take 1 capsule (50,000 Units) by mouth once a week    Quantity:  13 capsule   Refills:  3                Primary Care Provider Office Phone # Fax #    Hafsa Orozco [phone]       [facility] [address] [place] [address]        Equal Access to Services     IMSSY ROSSI : Hadii aad ku hadasho Soomaali, waaxda luqadaha, qaybta kaalmada adeegyada, waxay idiin haynun ademinna lazo lamattierin danuta. So [facility] [phone].    ATENCIÓN: Si habla español, tiene a kessler disposición servicios gratuitos de asistencia lingüística. Llame al [phone].    We comply with applicable federal civil rights laws and [place] laws. We do not discriminate on the basis of race, color, national origin, age, disability sex, sexual orientation or gender identity.            Thank you!     Thank you for choosing [facility]  for your care. Our goal is always to provide you with excellent care. Hearing back from our patients is one way we can continue to improve our services. Please take a few minutes to complete the written survey that you may receive in the mail after your visit with us. Thank you!             Your Updated Medication List - Protect others around you: Learn how to safely use, store and throw away your medicines at www.disposemymeds.org.          This list is accurate as of: 8/29/17 11:59 PM.  Always use your most recent med list.                   Brand Name Dispense Instructions for use Diagnosis    aspirin 325 MG EC tablet     60 tablet    Take 1 tablet (325 mg) by mouth daily    Kidney replaced by transplant       citalopram 10 MG tablet    celeXA     Take 20 mg by mouth daily        itraconazole 100 MG capsule    SPORANOX     Take by mouth 2 times daily        levofloxacin 500 MG tablet    LEVAQUIN    7 tablet    Take 1 tablet (500 mg) by mouth daily        mycophenolic acid 180 MG EC tablet    GENERIC EQUIVALENT    120 tablet    Take 2 tablets (360 mg) by mouth 2 times daily    Kidney replaced by transplant, Pancreas replaced  by transplant (H)       order for DME     1 each    Equipment being ordered: Crutches () Treatment Diagnosis: impaired gait    Diabetic foot ulcer with osteomyelitis (H)       pravastatin 20 MG tablet    PRAVACHOL    90 tablet    Take 1 tablet (20 mg) by mouth daily    Dyslipidemia       sulfamethoxazole-trimethoprim 400-80 MG per tablet    BACTRIM/SEPTRA    40 tablet    Take 1 tablet by mouth Every Mon, Wed, Fri Morning    Kidney replaced by transplant, Pancreas transplanted (H)       SYNTHROID PO      Take 50 mcg by mouth daily        tacrolimus 1 mg/mL suspension    GENERIC EQUIVALENT    20 mL    Take 0.3 mLs (0.3 mg) by mouth 2 times daily    Kidney replaced by transplant       valGANciclovir 450 MG tablet    VALCYTE    180 tablet    Take 1 tablet (450 mg) by mouth 2 times daily    CMV (cytomegalovirus infection) (H)       vitamin D 99145 UNIT capsule    ERGOCALCIFEROL    13 capsule    Take 1 capsule (50,000 Units) by mouth once a week    Kidney replaced by transplant, Pancreas replaced by transplant (H), Low calcium levels       ZITHROMAX Z-RAMONA PO      Take 250 mg by mouth daily        ZYRTEC ALLERGY 10 MG tablet   Generic drug:  cetirizine      Take 1 tablet by mouth daily.

## 2017-08-31 ENCOUNTER — OFFICE VISIT (OUTPATIENT)
Dept: WOUND CARE | Facility: CLINIC | Age: 42
End: 2017-08-31

## 2017-08-31 DIAGNOSIS — L97.522 ULCER OF LEFT FOOT, WITH FAT LAYER EXPOSED (H): Primary | ICD-10-CM

## 2017-08-31 DIAGNOSIS — M14.60 CHARCOT'S ARTHROPATHY: ICD-10-CM

## 2017-08-31 NOTE — NURSING NOTE
Chief Complaint   Patient presents with     RECHECK     f/u unna boot        There were no vitals filed for this visit.    There is no height or weight on file to calculate BMI.    Regis ENAMORADO

## 2017-08-31 NOTE — PROGRESS NOTES
Chief Complaint:   Chief Complaint   Patient presents with     RECHECK     f/u unna boot      Allergies   Allergen Reactions     Amoxicillin-Pot Clavulanate Diarrhea     Ciprofloxacin Nausea     Pollen Extract Other (See Comments)     Seasonal allergies     Pyridostigmine Other (See Comments)     Spastic muscle motion in the face and legs, weakness in the arms. Slight swelling of the tongue.     Subjective: Rose is a 41 year old female who presents to the clinic today for a follow up of left foot wound. Rose had her appointment with Dr. Norton who would ideally like to wait to perform a fifth met partial excision until there is more healing to the overlying ulceration. There are new scabs on the top of her foot from cutting off a bandage recently. She is moving this weekend and will not be able to be non-weightbearing. Has crutches at home. Still wearing diabetic shoes. Denies erythema, edema, purulent drainage, odor or warmth to the foot.     Objective  There were no vitals taken for this visit.    A diabetic Charcot wound is noted at left plantar 5th met measuring 2.8 cm x 2cm x 0.2m.     Block Classification: II     Wound base: Red  Pink/Granulation     Edges: intact     Drainage: small/serous     Odor: no     Underminin O'Clock 0.4cm     Bone Exposure: No     Clinical Signs of Infection: No     After obtaining patient consent, the wound was irrigated with copious amounts of saline. A scalpel was then used to debride the wound into the subcutaneous tissue. The wound edges were debrided to healthy, bleeding tissue. Given the patient's lack of sensation, no anesthesia was necessary for the procedure.      Assessment: Left Charcot foot with chronic overlying ulceration     Plan:   - Pt seen and evaluated  - Wound was cleansed and debrided as described above. Renetta was applied to wound and LE was wrapped in gauze dressing. She is to keep this intact for 3 days, then continue dressing wound with dry  dressings.  - Order Apligraf for next week  - Plan to schedule surgery when wound is virtually closed.   - Pt to return to clinic in 1 week.

## 2017-08-31 NOTE — MR AVS SNAPSHOT
After Visit Summary   8/31/2017    Rose Castaneda    MRN: [id number]           Patient Information     Date Of Birth          1975        Visit Information        Provider Department      8/31/2017 7:30 AM Nini Mckeon PA-C [facility]         Follow-ups after your visit        Your next 10 appointments already scheduled     Sep 07, 2017  8:30 AM CDT   (Arrive by 8:15 AM)   [facility] with [facility] ([facility])    [address] [place] [id number] [phone]            Sep 14, 2017  9:30 AM CDT   (Arrive by 9:15 AM)   Return Visit with [facility] ([facility])    [address] [place] [id number] [phone]            Sep 19, 2017  4:25 PM CDT   (Arrive by 3:55 PM)   Return Kidney Transplant with Mauricio Marquez MD   [facility] ([facility])    [address] [place] [id number] [phone]            Sep 21, 2017  9:30 AM CDT   (Arrive by 9:15 AM)   Return Visit with [facility] ([facility])    [address] [place] [id number] [phone]            Sep 28, 2017  9:30 AM CDT   (Arrive by 9:15 AM)   Return Visit with [facility] ([facility])    [address] [place] [id number] [phone]              Who to contact     Please call your clinic at [phone] to:    Ask questions about your health    Make or cancel appointments    Discuss your medicines    Learn about your test results    Speak to your doctor   If you have compliments or concerns about an experience at your clinic, or if you wish to file a complaint, please contact [facility] Patient Relations at  [phone] or email us at [email or web address]         Additional Information About Your Visit        [employer] Information     [employer] is an electronic gateway that provides easy, online access to your medical records. With Gen[employer], you can request a clinic appointment, read your test results, renew a prescription or communicate with your care team.     To sign up for Gen[employer] visit the website at [email or web address]   You will be asked to enter the access code listed below, as well as some personal information. Please follow the directions to create your username and password.     Your access code is: [email or web address]  Expires: 10/10/2017  6:31 AM     Your access code will  in 90 days. If you need help or a new code, please contact your [facility] or call [phone] for assistance.        Care EveryWhere ID     This is your Care EveryWhere ID. This could be used by other organizations to access your [facility] medical records  [id number]         Blood Pressure from Last 3 Encounters:   17 (!) 86/47   17 144/86   17 101/66    Weight from Last 3 Encounters:   17 111 lb 9.6 oz   17 113 lb   17 110 lb 14.4 oz              Today, you had the following     No orders found for display         Today's Medication Changes          These changes are accurate as of: 17  7:43 AM.  If you have any questions, ask your nurse or doctor.               These medicines have changed or have updated prescriptions.        Dose/Directions    vitamin D 99315 UNIT capsule   Commonly known as:  ERGOCALCIFEROL   This may have changed:  when to take this   Used for:  Kidney replaced by transplant, Pancreas replaced by transplant (H), Low calcium levels        Dose:  58553 Units   Take 1 capsule (50,000 Units) by mouth once a week   Quantity:  13 capsule   Refills:  3                Primary Care Provider Office Phone # Fax #    Hafsa Orozco  [phone]       [facility] [address] [place] [address]        Equal Access to Services     ANDRESSA ROSSI : Hadii wiliam faith rehan Lucero, wadavidda luqjohnnie, qaybta kaalmada dariel, son tipton. So [facility] [phone].    ATENCIÓN: Si habla español, tiene a kessler disposición servicios gratuitos de asistencia lingüística. Llame al [phone].    We comply with applicable federal civil rights laws and [place] laws. We do not discriminate on the basis of race, color, national origin, age, disability sex, sexual orientation or gender identity.            Thank you!     Thank you for choosing [facility]  for your care. Our goal is always to provide you with excellent care. Hearing back from our patients is one way we can continue to improve our services. Please take a few minutes to complete the written survey that you may receive in the mail after your visit with us. Thank you!             Your Updated Medication List - Protect others around you: Learn how to safely use, store and throw away your medicines at www.disposemymeds.org.          This list is accurate as of: 8/31/17  7:43 AM.  Always use your most recent med list.                   Brand Name Dispense Instructions for use Diagnosis    aspirin 325 MG EC tablet     60 tablet    Take 1 tablet (325 mg) by mouth daily    Kidney replaced by transplant       citalopram 10 MG tablet    celeXA     Take 20 mg by mouth daily        itraconazole 100 MG capsule    SPORANOX     Take by mouth 2 times daily        levofloxacin 500 MG tablet    LEVAQUIN    7 tablet    Take 1 tablet (500 mg) by mouth daily        mycophenolic acid 180 MG EC tablet    GENERIC EQUIVALENT    120 tablet    Take 2 tablets (360 mg) by mouth 2 times daily    Kidney replaced by transplant, Pancreas replaced by transplant (H)       order for DME     1 each    Equipment being ordered: Crutches () Treatment Diagnosis: impaired  gait    Diabetic foot ulcer with osteomyelitis (H)       pravastatin 20 MG tablet    PRAVACHOL    90 tablet    Take 1 tablet (20 mg) by mouth daily    Dyslipidemia       sulfamethoxazole-trimethoprim 400-80 MG per tablet    BACTRIM/SEPTRA    40 tablet    Take 1 tablet by mouth Every Mon, Wed, Fri Morning    Kidney replaced by transplant, Pancreas transplanted (H)       SYNTHROID PO      Take 50 mcg by mouth daily        tacrolimus 1 mg/mL suspension    GENERIC EQUIVALENT    20 mL    Take 0.3 mLs (0.3 mg) by mouth 2 times daily    Kidney replaced by transplant       valGANciclovir 450 MG tablet    VALCYTE    180 tablet    Take 1 tablet (450 mg) by mouth 2 times daily    CMV (cytomegalovirus infection) (H)       vitamin D 81148 UNIT capsule    ERGOCALCIFEROL    13 capsule    Take 1 capsule (50,000 Units) by mouth once a week    Kidney replaced by transplant, Pancreas replaced by transplant (H), Low calcium levels       ZITHROMAX Z-RAMONA PO      Take 250 mg by mouth daily        ZYRTEC ALLERGY 10 MG tablet   Generic drug:  cetirizine      Take 1 tablet by mouth daily.

## 2017-08-31 NOTE — LETTER
2017     RE: Rose Castaneda  [address] [place] [address]     Dear Colleague,    Thank you for referring your patient, Rose Castaneda, to the [facility] at [facility]. Please see a copy of my visit note below.    Chief Complaint:   Chief Complaint   Patient presents with     RECHECK     f/u unna boot      Allergies   Allergen Reactions     Amoxicillin-Pot Clavulanate Diarrhea     Ciprofloxacin Nausea     Pollen Extract Other (See Comments)     Seasonal allergies     Pyridostigmine Other (See Comments)     Spastic muscle motion in the face and legs, weakness in the arms. Slight swelling of the tongue.     Subjective: Rose is a 41 year old female who presents to the clinic today for a follow up of left foot wound. Rose had her appointment with Dr. Norton who would ideally like to wait to perform a fifth met partial excision until there is more healing to the overlying ulceration. There are new scabs on the top of her foot from cutting off a bandage recently. She is moving this weekend and will not be able to be non-weightbearing. Has crutches at home. Still wearing diabetic shoes. Denies erythema, edema, purulent drainage, odor or warmth to the foot.     Objective  There were no vitals taken for this visit.    A diabetic Charcot wound is noted at left plantar 5th met measuring  2.8 cm x 2cm x 0.2m.     Block Classification: II     Wound base: Red  Pink/Granulation     Edges: intact     Drainage: small/serous     Odor: no     Underminin O'Clock 0.4cm     Bone Exposure: No     Clinical Signs of Infection: No     After obtaining patient consent, the wound was irrigated with copious amounts of saline. A scalpel was then used to debride the wound into the subcutaneous tissue. The wound edges were debrided to healthy, bleeding tissue. Given the patient's lack of sensation, no anesthesia was necessary for the procedure.      Assessment: Left  Charcot foot with chronic overlying ulceration     Plan:   - Pt seen and evaluated  - Wound was cleansed and debrided as described above. Renetta was applied to wound and LE was wrapped in gauze dressing. She is to keep this intact for 3 days, then continue dressing wound with dry dressings.  - Order Apligraf for next week  - Plan to schedule surgery when wound is virtually closed.   - Pt to return to clinic in 1 week.    Again, thank you for allowing me to participate in the care of your patient.      Sincerely,    Nini Mckeon PA-C

## 2017-09-07 ENCOUNTER — TELEPHONE (OUTPATIENT)
Dept: NEPHROLOGY | Facility: CLINIC | Age: 42
End: 2017-09-07

## 2017-09-07 NOTE — TELEPHONE ENCOUNTER
German, this is Jimmy's office from [facility] on the 3rd floor at [facility] located at [address] [place] [address]. We are reminding you of your upcoming Nephrology appointment on 9/19/2017 at 4:25 pm. Please arrive an hour prior to your appointment time for labs. Also, please bring an updated medication list including dose of prescribed and over the counter medications you are currently taking or your labeled medication bottles with you to your appointment. If you have any questions or would like to cancel or reschedule your appointment, please call us at [phone].     If you are having labs draw same day you need a lab appointment scheduled 1 hour prior to your visit.    You are welcome to have your labs done 2-7 days before your appointment at any [place] or [facility] facility. If you have your labs completed before your appointment, please still come 30 minutes early for check-in.     Thank-You for allowing us to be a member of your Health Care Team,     Genie Sheikh., CMA

## 2017-09-08 ENCOUNTER — RESULTS ONLY (OUTPATIENT)
Dept: OTHER | Facility: CLINIC | Age: 42
End: 2017-09-08

## 2017-09-08 ENCOUNTER — OFFICE VISIT (OUTPATIENT)
Dept: ORTHOPEDICS | Facility: CLINIC | Age: 42
End: 2017-09-08

## 2017-09-08 ENCOUNTER — TELEPHONE (OUTPATIENT)
Dept: ORTHOPEDICS | Facility: CLINIC | Age: 42
End: 2017-09-08

## 2017-09-08 DIAGNOSIS — L97.522 ULCER OF LEFT FOOT, WITH FAT LAYER EXPOSED (H): Primary | ICD-10-CM

## 2017-09-08 DIAGNOSIS — E11.610 CHARCOT FOOT DUE TO DIABETES MELLITUS (H): ICD-10-CM

## 2017-09-08 DIAGNOSIS — T86.899 COMPLICATION OF TRANSPLANTED PANCREAS: ICD-10-CM

## 2017-09-08 DIAGNOSIS — D84.9 IMMUNOSUPPRESSED STATUS (H): ICD-10-CM

## 2017-09-08 DIAGNOSIS — Z94.83 PANCREAS REPLACED BY TRANSPLANT (H): ICD-10-CM

## 2017-09-08 DIAGNOSIS — Z48.298 AFTERCARE FOLLOWING ORGAN TRANSPLANT: ICD-10-CM

## 2017-09-08 DIAGNOSIS — B25.9 CMV (CYTOMEGALOVIRUS INFECTION) (H): ICD-10-CM

## 2017-09-08 DIAGNOSIS — Z94.0 KIDNEY REPLACED BY TRANSPLANT: ICD-10-CM

## 2017-09-08 DIAGNOSIS — E10.8 TYPE 1 DIABETES MELLITUS WITH COMPLICATION (H): ICD-10-CM

## 2017-09-08 LAB
ALBUMIN SERPL-MCNC: 3.3 G/DL (ref 3.4–5)
ALP SERPL-CCNC: 118 U/L (ref 40–150)
ALT SERPL W P-5'-P-CCNC: 19 U/L (ref 0–50)
AMYLASE SERPL-CCNC: 70 U/L (ref 30–110)
ANION GAP SERPL CALCULATED.3IONS-SCNC: 9 MMOL/L (ref 3–14)
AST SERPL W P-5'-P-CCNC: 21 U/L (ref 0–45)
BASOPHILS # BLD AUTO: 0 10E9/L (ref 0–0.2)
BASOPHILS NFR BLD AUTO: 0.2 %
BILIRUB DIRECT SERPL-MCNC: 0.2 MG/DL (ref 0–0.2)
BILIRUB SERPL-MCNC: 0.4 MG/DL (ref 0.2–1.3)
BUN SERPL-MCNC: 20 MG/DL (ref 7–30)
CALCIUM SERPL-MCNC: 8.7 MG/DL (ref 8.5–10.1)
CHLORIDE SERPL-SCNC: 110 MMOL/L (ref 94–109)
CHOLEST SERPL-MCNC: 133 MG/DL
CO2 SERPL-SCNC: 23 MMOL/L (ref 20–32)
CREAT SERPL-MCNC: 0.85 MG/DL (ref 0.52–1.04)
DIFFERENTIAL METHOD BLD: ABNORMAL
EOSINOPHIL # BLD AUTO: 0.1 10E9/L (ref 0–0.7)
EOSINOPHIL NFR BLD AUTO: 1.3 %
ERYTHROCYTE [DISTWIDTH] IN BLOOD BY AUTOMATED COUNT: 13.3 % (ref 10–15)
GFR SERPL CREATININE-BSD FRML MDRD: 73 ML/MIN/1.7M2
GLUCOSE SERPL-MCNC: 81 MG/DL (ref 70–99)
HCT VFR BLD AUTO: 32.4 % (ref 35–47)
HDLC SERPL-MCNC: 75 MG/DL
HGB BLD-MCNC: 10.1 G/DL (ref 11.7–15.7)
LDLC SERPL CALC-MCNC: 46 MG/DL
LIPASE SERPL-CCNC: 114 U/L (ref 73–393)
LYMPHOCYTES # BLD AUTO: 0.4 10E9/L (ref 0.8–5.3)
LYMPHOCYTES NFR BLD AUTO: 7.7 %
MCH RBC QN AUTO: 29.9 PG (ref 26.5–33)
MCHC RBC AUTO-ENTMCNC: 31.2 G/DL (ref 31.5–36.5)
MCV RBC AUTO: 96 FL (ref 78–100)
MONOCYTES # BLD AUTO: 0.3 10E9/L (ref 0–1.3)
MONOCYTES NFR BLD AUTO: 6.1 %
NEUTROPHILS # BLD AUTO: 4.7 10E9/L (ref 1.6–8.3)
NEUTROPHILS NFR BLD AUTO: 84.7 %
NONHDLC SERPL-MCNC: 58 MG/DL
PLATELET # BLD AUTO: 239 10E9/L (ref 150–450)
POTASSIUM SERPL-SCNC: 4.2 MMOL/L (ref 3.4–5.3)
PROT SERPL-MCNC: 6.9 G/DL (ref 6.8–8.8)
PROT UR-MCNC: 0.4 G/L
PROT/CREAT 24H UR: 0.21 G/G CR (ref 0–0.2)
RBC # BLD AUTO: 3.38 10E12/L (ref 3.8–5.2)
SODIUM SERPL-SCNC: 142 MMOL/L (ref 133–144)
TACROLIMUS BLD-MCNC: 5.3 UG/L (ref 5–15)
TME LAST DOSE: NORMAL H
TRIGL SERPL-MCNC: 62 MG/DL
WBC # BLD AUTO: 5.6 10E9/L (ref 4–11)

## 2017-09-08 PROCEDURE — 83690 ASSAY OF LIPASE: CPT | Performed by: TRANSPLANT SURGERY

## 2017-09-08 PROCEDURE — 86832 HLA CLASS I HIGH DEFIN QUAL: CPT | Performed by: TRANSPLANT SURGERY

## 2017-09-08 PROCEDURE — 80076 HEPATIC FUNCTION PANEL: CPT | Performed by: TRANSPLANT SURGERY

## 2017-09-08 PROCEDURE — 82150 ASSAY OF AMYLASE: CPT | Performed by: TRANSPLANT SURGERY

## 2017-09-08 PROCEDURE — 80197 ASSAY OF TACROLIMUS: CPT | Performed by: TRANSPLANT SURGERY

## 2017-09-08 PROCEDURE — 86833 HLA CLASS II HIGH DEFIN QUAL: CPT | Performed by: TRANSPLANT SURGERY

## 2017-09-08 PROCEDURE — 84156 ASSAY OF PROTEIN URINE: CPT | Performed by: TRANSPLANT SURGERY

## 2017-09-08 PROCEDURE — 80048 BASIC METABOLIC PNL TOTAL CA: CPT | Performed by: TRANSPLANT SURGERY

## 2017-09-08 PROCEDURE — 85025 COMPLETE CBC W/AUTO DIFF WBC: CPT | Performed by: TRANSPLANT SURGERY

## 2017-09-08 PROCEDURE — 36415 COLL VENOUS BLD VENIPUNCTURE: CPT | Performed by: TRANSPLANT SURGERY

## 2017-09-08 PROCEDURE — 87799 DETECT AGENT NOS DNA QUANT: CPT | Performed by: TRANSPLANT SURGERY

## 2017-09-08 PROCEDURE — 80061 LIPID PANEL: CPT | Performed by: TRANSPLANT SURGERY

## 2017-09-08 RX ORDER — CLINDAMYCIN HCL 300 MG
300 CAPSULE ORAL 3 TIMES DAILY
Qty: 21 CAPSULE | Refills: 0 | Status: SHIPPED | OUTPATIENT
Start: 2017-09-08 | End: 2018-02-12

## 2017-09-08 NOTE — LETTER
Return to Work  2017     Seen today: yes    Patient:  Rose Castaneda  :   1975  MRN:     [id number]  Physician: DARIUS PARKINSON    Rose Castaneda may not return to work until at least Date: 17.      The next clinic appointment is scheduled for (date/time) 1 week.    Patient limitations:  Needs to be non-weight bearing             Electronically signed by Darius Parkinson DPM

## 2017-09-08 NOTE — LETTER
9/8/2017       RE: Rose Castaneda  [address] [place] [address]     Dear Colleague,    Thank you for referring your patient, Rose Castaneda, to the [facility] at [facility]. Please see a copy of my visit note below.    Chief Complaint:   Chief Complaint   Patient presents with     RECHECK     F/U left foot wound. States she was wrapping her foot tight and thinks she is rubbed raw or has a blister. States wound was bleeding pretty well.      Allergies   Allergen Reactions     Amoxicillin-Pot Clavulanate Diarrhea     Ciprofloxacin Nausea     Pollen Extract Other (See Comments)     Seasonal allergies     Pyridostigmine Other (See Comments)     Spastic muscle motion in the face and legs, weakness in the arms. Slight swelling of the tongue.     Subjective: Rose is a 41 year old female who presents to the clinic today for a follow up of left foot wound. Rose made an additional appointment today (after canceling her appointment yesterday) because she thinks her wound has opened more. She has been moving this past week and on her feet a lot more than usual. This caused her foot to swell, which she tried to remedy by wrapping the dressing painfully tight for the last 2 days. Yesterday, she started to have severe pain in her lateral left foot and began non-weightbearing with crutches and a tall CAM walker. Since yesterday, has also been feeling ill over all. Got routine labs today, see results below. Admits redness, warmth, lateral left foot pain, odor and body aches. Denies purulent drainage.     Objective  There were no vitals taken for this visit.     Blister noted to dorsal ankle. Serous fluid was completely expelled via small puncture with 18 g needle. Blister roof was left intact.     A diabetic Charcot wound is noted at left plantar 5th met measuring 4.5 cm x 1.5 cm x 0.2m. Tenderness to palpation of the lateral left foot wound and  surrounding areas.      Block Classification: I  Wound base: Red, Pink, gray, yellow/Granulation  Edges: intact  Drainage: small/serous  Odor: yes  Undermining: Yes, at 3 o'clock. Removed with debridement.  Bone Exposure: No  Clinical Signs of Infection: Yes - erythema, edema, odor, calor. No purulent drainage.      After obtaining patient consent, the wound was irrigated with copious amounts of saline. A scalpel was then used to debride the wound into the subcutaneous tissue. The wound edges were debrided to healthy, bleeding tissue. Given the patient's lack of sensation, no anesthesia was necessary for the procedure.       Laboratory Results 9/8/17:  Lab Results   Component Value Date    WBC 5.6 09/08/2017     Lab Results   Component Value Date    RBC 3.38 09/08/2017     Lab Results   Component Value Date    HGB 10.1 09/08/2017     Lab Results   Component Value Date    HCT 32.4 09/08/2017     Lab Results   Component Value Date    MCV 96 09/08/2017     Lab Results   Component Value Date    MCH 29.9 09/08/2017     Lab Results   Component Value Date    MCHC 31.2 09/08/2017     Lab Results   Component Value Date    RDW 13.3 09/08/2017     Lab Results   Component Value Date     09/08/2017       Assessment: Left Charcot foot with chronic overlying ulceration      Plan:   - Pt seen and evaluated. Wound worsened, local signs of infection present.    - Wound was cleansed and debrided as described above. Iodosorb applied and covered with bulky dry gauze dressing. She should continue this dressing change regimen daily.   - Prescribed clindamycin x 10 days  - NWB at all times, ordered roll-a-bout.   - Work excuse note written x 2 weeks and given to patient.  - Discussed with her again that surgery cannot occur until wound is almost fully healed.  - Pt to return to clinic in 1 week.      Again, thank you for allowing me to participate in the care of your patient.      Sincerely,    Darius Parkinson DPM

## 2017-09-08 NOTE — NURSING NOTE
Reason For Visit:   Chief Complaint   Patient presents with     RECHECK     F/U left foot wound. States she was wrapping her foot tight and thinks she is rubbed raw or has a blister. States wound was bleeding pretty well.        Primary MD: Hafsa Orozco    Pain Assessment  Patient Currently in Pain: Yes  0-10 Pain Scale: 5  Primary Pain Location: Foot  Pain Orientation: Left    Current Outpatient Prescriptions   Medication     tacrolimus (PROGRAF - GENERIC EQUIVALENT) 1 mg/mL suspension     mycophenolic acid (MYFORTIC - GENERIC EQUIVALENT) 180 MG EC tablet     levofloxacin (LEVAQUIN) 500 MG tablet     Azithromycin (ZITHROMAX Z-RAMONA PO)     valGANciclovir (VALCYTE) 450 MG tablet     sulfamethoxazole-trimethoprim (BACTRIM/SEPTRA) 400-80 MG per tablet     order for DME     vitamin D (ERGOCALCIFEROL) 42110 UNIT capsule     pravastatin (PRAVACHOL) 20 MG tablet     aspirin  MG EC tablet     itraconazole (SPORANOX) 100 MG capsule     citalopram (CELEXA) 10 MG tablet     Levothyroxine Sodium (SYNTHROID PO)     cetirizine (ZYRTEC ALLERGY) 10 MG tablet     No current facility-administered medications for this visit.           Allergies   Allergen Reactions     Amoxicillin-Pot Clavulanate Diarrhea     Ciprofloxacin Nausea     Pollen Extract Other (See Comments)     Seasonal allergies     Pyridostigmine Other (See Comments)     Spastic muscle motion in the face and legs, weakness in the arms. Slight swelling of the tongue.

## 2017-09-08 NOTE — TELEPHONE ENCOUNTER
Patient called today stating her left foot wound has opened up and she has concerns of bleeding and infection.  Dr. Parkinson consulted and agreed to see her today.

## 2017-09-08 NOTE — MR AVS SNAPSHOT
After Visit Summary   9/8/2017    Rose Castaneda    MRN: [id number]           Patient Information     Date Of Birth          1975        Visit Information        Provider Department      9/8/2017 2:20 PM Darius Parkinson DPM [facility]        Today's Diagnoses     Ulcer of left foot, with fat layer exposed (H)    -  1    Charcot foot due to diabetes mellitus (H)        Type 1 diabetes mellitus with complication (H)        Immunosuppressed status (H)           Follow-ups after your visit        Your next 10 appointments already scheduled     Sep 14, 2017  9:30 AM CDT   (Arrive by 9:15 AM)   Return Visit with MOOSE Rodriguez [facility] ([facility])    [address] [place] [address]  4[address] [facility] [place] [id number] [phone]            Sep 19, 2017  4:25 PM CDT   (Arrive by 3:55 PM)   Return Kidney Transplant with Mauricio Marquez MD   [facility] ([facility])    [address] [place] [address]  3rd [facility] [place] [id number] [phone]            Sep 21, 2017  9:30 AM CDT   (Arrive by 9:15 AM)   Return Visit with MOOSE Rodriguez [facility] ([facility])    [address] [place] [address]  4th [facility] [place] [id number] [phone]            Sep 28, 2017  9:30 AM CDT   (Arrive by 9:15 AM)   Return Visit with MOOSE Rodriguez [facility] ([facility])    [address] [place] [address]  4th [facility] [place] [id number] [phone]              Who to contact     Please call your clinic at [phone] to:    Ask questions about your health    Make or cancel appointments    Discuss your medicines    Learn about your test results    Speak to your doctor   If you have compliments or concerns about an experience at your clinic, or if you wish to file a complaint, please contact [facility] Patient Relations at  [phone] or email us at [email or web address]         Additional Information About Your Visit        [email or web address] Information     Keen Impressions is an electronic gateway that provides easy, online access to your medical records. With Keen Impressions, you can request a clinic appointment, read your test results, renew a prescription or communicate with your care team.     To sign up for Keen Impressions visit the website at [email or web address]   You will be asked to enter the access code listed below, as well as some personal information. Please follow the directions to create your username and password.     Your access code is: [email or web address]  Expires: 10/10/2017  6:31 AM     Your access code will  in 90 days. If you need help or a new code, please contact your [facility] or call [phone] for assistance.        Care EveryWhere ID     This is your Care EveryWhere ID. This could be used by other organizations to access your [place] medical records  [id number]         Blood Pressure from Last 3 Encounters:   17 (!) 86/47   17 144/86   17 101/66    Weight from Last 3 Encounters:   17 50.6 kg (111 lb 9.6 oz)   17 51.3 kg (113 lb)   17 50.3 kg (110 lb 14.4 oz)              We Performed the Following     DEBRIDEMENT WOUND UP TO 20 SQ CM          Today's Medication Changes          These changes are accurate as of: 17  3:05 PM.  If you have any questions, ask your nurse or doctor.               Start taking these medicines.        Dose/Directions    clindamycin 300 MG capsule   Commonly known as:  CLEOCIN   Used for:  Ulcer of left foot, with fat layer exposed (H), Charcot foot due to diabetes mellitus (H)   Started by:  Darius Parkinson DPM        Dose:  300 mg   Take 1 capsule (300 mg) by mouth 3 times daily   Quantity:  21 capsule   Refills:  0         These medicines have changed or have updated prescriptions.        Dose/Directions     * order for DME   This may have changed:  Another medication with the same name was added. Make sure you understand how and when to take each.   Used for:  Diabetic foot ulcer with osteomyelitis (H)        Equipment being ordered: Crutches () Treatment Diagnosis: impaired gait   Quantity:  1 each   Refills:  0       * order for DME   This may have changed:  You were already taking a medication with the same name, and this prescription was added. Make sure you understand how and when to take each.   Used for:  Ulcer of left foot, with fat layer exposed (H), Charcot foot due to diabetes mellitus (H)   Changed by:  Darius Parkinson DPM        Roll-A-Bout Walker. Patient can use for left foot non-weight bearing   Quantity:  1 Units   Refills:  0       vitamin D 00357 UNIT capsule   Commonly known as:  ERGOCALCIFEROL   This may have changed:  when to take this   Used for:  Kidney replaced by transplant, Pancreas replaced by transplant (H), Low calcium levels        Dose:  91705 Units   Take 1 capsule (50,000 Units) by mouth once a week   Quantity:  13 capsule   Refills:  3       * Notice:  This list has 2 medication(s) that are the same as other medications prescribed for you. Read the directions carefully, and ask your doctor or other care provider to review them with you.         Where to get your medicines      These medications were sent to [facility]/pharmacy #[id number] - [place], [place]  [address] [place] [address]     Phone:  [phone]     clindamycin 300 MG capsule         Some of these will need a paper prescription and others can be bought over the counter.  Ask your nurse if you have questions.     Bring a paper prescription for each of these medications     order for DME                Primary Care Provider Office Phone # Fax #    Hafsa Orozco [phone]       [facility] [address] [place] [address] [address] [place] [address]        Equal Access to Services      ANDRESSA ROSSI : Hadii aad ku rehan Lucero, waaxda luqadaha, qaybta kaalmada dariel, son ho hayspike wilderpraneethvicky prasad . So [facility] [phone].    ATENCIÓN: Si habla español, tiene a kessler disposición servicios gratuitos de asistencia lingüística. Llame al [phone].    We comply with applicable federal civil rights laws and [place] laws. We do not discriminate on the basis of race, color, national origin, age, disability sex, sexual orientation or gender identity.            Thank you!     Thank you for choosing [facility]  for your care. Our goal is always to provide you with excellent care. Hearing back from our patients is one way we can continue to improve our services. Please take a few minutes to complete the written survey that you may receive in the mail after your visit with us. Thank you!             Your Updated Medication List - Protect others around you: Learn how to safely use, store and throw away your medicines at www.disposemymeds.org.          This list is accurate as of: 9/8/17  3:05 PM.  Always use your most recent med list.                   Brand Name Dispense Instructions for use Diagnosis    aspirin 325 MG EC tablet     60 tablet    Take 1 tablet (325 mg) by mouth daily    Kidney replaced by transplant       citalopram 10 MG tablet    celeXA     Take 20 mg by mouth daily        clindamycin 300 MG capsule    CLEOCIN    21 capsule    Take 1 capsule (300 mg) by mouth 3 times daily    Ulcer of left foot, with fat layer exposed (H), Charcot foot due to diabetes mellitus (H)       itraconazole 100 MG capsule    SPORANOX     Take by mouth 2 times daily        levofloxacin 500 MG tablet    LEVAQUIN    7 tablet    Take 1 tablet (500 mg) by mouth daily        mycophenolic acid 180 MG EC tablet    GENERIC EQUIVALENT    120 tablet    Take 2 tablets (360 mg) by mouth 2 times daily    Kidney replaced by transplant, Pancreas replaced by transplant (H)       * order for DME      1 each    Equipment being ordered: Crutches () Treatment Diagnosis: impaired gait    Diabetic foot ulcer with osteomyelitis (H)       * order for DME     1 Units    Roll-A-Bout Walker. Patient can use for left foot non-weight bearing    Ulcer of left foot, with fat layer exposed (H), Charcot foot due to diabetes mellitus (H)       pravastatin 20 MG tablet    PRAVACHOL    90 tablet    Take 1 tablet (20 mg) by mouth daily    Dyslipidemia       sulfamethoxazole-trimethoprim 400-80 MG per tablet    BACTRIM/SEPTRA    40 tablet    Take 1 tablet by mouth Every Mon, Wed, Fri Morning    Kidney replaced by transplant, Pancreas transplanted (H)       SYNTHROID PO      Take 50 mcg by mouth daily        tacrolimus 1 mg/mL suspension    GENERIC EQUIVALENT    20 mL    Take 0.3 mLs (0.3 mg) by mouth 2 times daily    Kidney replaced by transplant       valGANciclovir 450 MG tablet    VALCYTE    180 tablet    Take 1 tablet (450 mg) by mouth 2 times daily    CMV (cytomegalovirus infection) (H)       vitamin D 43415 UNIT capsule    ERGOCALCIFEROL    13 capsule    Take 1 capsule (50,000 Units) by mouth once a week    Kidney replaced by transplant, Pancreas replaced by transplant (H), Low calcium levels       ZITHROMAX Z-RAMONA PO      Take 250 mg by mouth daily        ZYRTEC ALLERGY 10 MG tablet   Generic drug:  cetirizine      Take 1 tablet by mouth daily.        * Notice:  This list has 2 medication(s) that are the same as other medications prescribed for you. Read the directions carefully, and ask your doctor or other care provider to review them with you.

## 2017-09-08 NOTE — PROGRESS NOTES
Chief Complaint:   Chief Complaint   Patient presents with     RECHECK     F/U left foot wound. States she was wrapping her foot tight and thinks she is rubbed raw or has a blister. States wound was bleeding pretty well.      Allergies   Allergen Reactions     Amoxicillin-Pot Clavulanate Diarrhea     Ciprofloxacin Nausea     Pollen Extract Other (See Comments)     Seasonal allergies     Pyridostigmine Other (See Comments)     Spastic muscle motion in the face and legs, weakness in the arms. Slight swelling of the tongue.     Subjective: Rose is a 41 year old female who presents to the clinic today for a follow up of left foot wound. Rose made an additional appointment today (after canceling her appointment yesterday) because she thinks her wound has opened more. She has been moving this past week and on her feet a lot more than usual. This caused her foot to swell, which she tried to remedy by wrapping the dressing painfully tight for the last 2 days. Yesterday, she started to have severe pain in her lateral left foot and began non-weightbearing with crutches and a tall CAM walker. Since yesterday, has also been feeling ill over all. Got routine labs today, see results below. Admits redness, warmth, lateral left foot pain, odor and body aches. Denies purulent drainage.     Objective  There were no vitals taken for this visit.     Blister noted to dorsal ankle. Serous fluid was completely expelled via small puncture with 18 g needle. Blister roof was left intact.     A diabetic Charcot wound is noted at left plantar 5th met measuring 4.5 cm x 1.5 cm x 0.2m. Tenderness to palpation of the lateral left foot wound and surrounding areas.      Block Classification: I  Wound base: Red, Pink, gray, yellow/Granulation  Edges: intact  Drainage: small/serous  Odor: yes  Undermining: Yes, at 3 o'clock. Removed with debridement.  Bone Exposure: No  Clinical Signs of Infection: Yes - erythema, edema, odor, calor. No  purulent drainage.      After obtaining patient consent, the wound was irrigated with copious amounts of saline. A scalpel was then used to debride the wound into the subcutaneous tissue. The wound edges were debrided to healthy, bleeding tissue. Given the patient's lack of sensation, no anesthesia was necessary for the procedure.       Laboratory Results 9/8/17:  Lab Results   Component Value Date    WBC 5.6 09/08/2017     Lab Results   Component Value Date    RBC 3.38 09/08/2017     Lab Results   Component Value Date    HGB 10.1 09/08/2017     Lab Results   Component Value Date    HCT 32.4 09/08/2017     Lab Results   Component Value Date    MCV 96 09/08/2017     Lab Results   Component Value Date    MCH 29.9 09/08/2017     Lab Results   Component Value Date    MCHC 31.2 09/08/2017     Lab Results   Component Value Date    RDW 13.3 09/08/2017     Lab Results   Component Value Date     09/08/2017       Assessment: Left Charcot foot with chronic overlying ulceration      Plan:   - Pt seen and evaluated. Wound worsened, local signs of infection present.    - Wound was cleansed and debrided as described above. Iodosorb applied and covered with bulky dry gauze dressing. She should continue this dressing change regimen daily.   - Prescribed clindamycin x 10 days  - NWB at all times, ordered roll-a-bout.   - Work excuse note written x 2 weeks and given to patient.  - Discussed with her again that surgery cannot occur until wound is almost fully healed.  - Pt to return to clinic in 1 week.

## 2017-09-09 LAB
CMV DNA SPEC NAA+PROBE-ACNC: <137 [IU]/ML
CMV DNA SPEC NAA+PROBE-LOG#: <2.1 {LOG_IU}/ML
SPECIMEN SOURCE: ABNORMAL

## 2017-09-10 DIAGNOSIS — Z94.0 KIDNEY REPLACED BY TRANSPLANT: ICD-10-CM

## 2017-09-10 DIAGNOSIS — Z94.83 PANCREAS REPLACED BY TRANSPLANT (H): ICD-10-CM

## 2017-09-10 DIAGNOSIS — E55.9 VITAMIN D DEFICIENCY: Primary | ICD-10-CM

## 2017-09-10 DIAGNOSIS — E83.51 LOW CALCIUM LEVELS: ICD-10-CM

## 2017-09-11 ENCOUNTER — TELEPHONE (OUTPATIENT)
Dept: ORTHOPEDICS | Facility: CLINIC | Age: 42
End: 2017-09-11

## 2017-09-11 LAB
BKV DNA # SPEC NAA+PROBE: NORMAL COPIES/ML
BKV DNA SPEC NAA+PROBE-LOG#: NORMAL LOG COPIES/ML
PRA DONOR SPECIFIC ABY: NORMAL
SPECIMEN SOURCE: NORMAL

## 2017-09-11 RX ORDER — ERGOCALCIFEROL 1.25 MG/1
CAPSULE, LIQUID FILLED ORAL
Qty: 13 CAPSULE | Refills: 3 | OUTPATIENT
Start: 2017-09-11

## 2017-09-11 NOTE — TELEPHONE ENCOUNTER
Per 6/9 appointment note:  5. Vitamin D deficiency - low vitamin D level and will continue ergocalciferol.  After finished with ergocalciferol course, patient can start cholecalciferol 2000 iu daily.  Will recheck vitamin D level with next labs.    New rx sent.    Ana Jerome RN

## 2017-09-11 NOTE — TELEPHONE ENCOUNTER
Pt calls with request to initiate PA for roll-about scooter. Writer contacted [facility] on behalf of pt and was told to have DME supplier contact insurance to complete PA. Pt is aware and will contact [facility] to complete process.

## 2017-09-14 ENCOUNTER — TELEPHONE (OUTPATIENT)
Dept: ORTHOPEDICS | Facility: CLINIC | Age: 42
End: 2017-09-14

## 2017-09-14 ENCOUNTER — OFFICE VISIT (OUTPATIENT)
Dept: WOUND CARE | Facility: CLINIC | Age: 42
End: 2017-09-14

## 2017-09-14 DIAGNOSIS — L97.522 ULCER OF LEFT FOOT, WITH FAT LAYER EXPOSED (H): Primary | ICD-10-CM

## 2017-09-14 NOTE — LETTER
9/14/2017       RE: Rose Castaneda  [address] [place] [address]     Dear Colleague,    Thank you for referring your patient, Rose Castaneda, to the [facility] at [facility]. Please see a copy of my visit note below.    Chief Complaint:   Chief Complaint   Patient presents with     WOUND CARE     check     Allergies   Allergen Reactions     Amoxicillin-Pot Clavulanate Diarrhea     Ciprofloxacin Nausea     Pollen Extract Other (See Comments)     Seasonal allergies     Pyridostigmine Other (See Comments)     Spastic muscle motion in the face and legs, weakness in the arms. Slight swelling of the tongue.     Subjective: Rose is a 41 year old female who presents to the clinic today for a follow up of left foot wound. She has been compliant with NWB. Working on getting a knee scooter. Dressing the wound daily with iodosorb and gauze dressing. Moving to [place] next week. Relates that the wound has gotten much better over the past week. Denies erythema, edema, wound or foot pain, odor, purulent drainage.     Objective:  There were no vitals taken for this visit.      Healing blister noted to dorsal left foot, no signs of new wound or infection.     A diabetic Charcot wound is noted at left plantar 5th met measuring 4.5 cm x 1.5 cm x 0.1m. No tenderness to palpation      Block Classification: I  Wound base: Red, Pink, gray, yellow/Granulation  Edges: intact  Drainage: small/serous  Odor: No  Undermining: No  Bone Exposure: No  Clinical Signs of Infection: No      After obtaining patient consent, the wound was irrigated with copious amounts of saline. A scalpel was then used to debride the wound into the subcutaneous tissue. The wound edges were debrided to healthy, bleeding tissue. Given the patient's lack of sensation, no anesthesia was necessary for the procedure.     Assessment: Left Charcot foot with chronic overlying ulceration      Plan:   - Pt  seen and evaluated. Wound much improved.  - Wound was cleansed and debrided as described above. Iodosorb applied and covered with bulky dry gauze dressing. She should continue this dressing change regimen daily.   - Continue NWB   - Pt to return to clinic in 2 weeks    Again, thank you for allowing me to participate in the care of your patient.      Sincerely,    Nini Mckeon PA-C

## 2017-09-14 NOTE — MR AVS SNAPSHOT
After Visit Summary   9/14/2017    Rose Castaneda    MRN: [id number]           Patient Information     Date Of Birth          1975        Visit Information        Provider Department      9/14/2017 9:30 AM Nini Mckeon PA-C [facility]        Today's Diagnoses     Ulcer of left foot, with fat layer exposed (H)    -  1       Follow-ups after your visit        Your next 10 appointments already scheduled     Sep 19, 2017  4:25 PM CDT   (Arrive by 3:55 PM)   Return Kidney Transplant with Mauricio Marquez MD   [facility] ([facility])    [address] [place] [id number] [phone]            Sep 21, 2017  9:30 AM CDT   (Arrive by 9:15 AM)   Return Visit with MOOSE Rodriguez [facility] ([facility])    [address] [place] [id number] [phone]            Sep 28, 2017  9:30 AM CDT   (Arrive by 9:15 AM)   Return Visit with MOOSE Rodriguez [facility] ([facility])    [address] [place] [id number] [phone]              Who to contact     Please call your clinic at [phone] to:    Ask questions about your health    Make or cancel appointments    Discuss your medicines    Learn about your test results    Speak to your doctor   If you have compliments or concerns about an experience at your clinic, or if you wish to file a complaint, please contact [facility] Patient Relations at [phone] or email us at [email or web address]         Additional Information About Your Visit        MyChart Information     Jaxtr is an electronic gateway that provides easy, online access to your medical records. With Jaxtr, you can request a clinic appointment, read your test results, renew a prescription or communicate with your care team.     To sign up for  Jess[facility] visit the website at [email or web address]   You will be asked to enter the access code listed below, as well as some personal information. Please follow the directions to create your username and password.     Your access code is: [email or web address]  Expires: 10/10/2017  6:31 AM     Your access code will  in 90 days. If you need help or a new code, please contact your [facility] or call [phone] for assistance.        Care EveryWhere ID     This is your Care EveryWhere ID. This could be used by other organizations to access your [place] medical records  [id number]         Blood Pressure from Last 3 Encounters:   17 (!) 86/47   17 144/86   17 101/66    Weight from Last 3 Encounters:   17 111 lb 9.6 oz   17 113 lb   17 110 lb 14.4 oz              We Performed the Following     DEBRIDEMENT WOUND UP TO 20 SQ CM          Today's Medication Changes          These changes are accurate as of: 17 10:13 AM.  If you have any questions, ask your nurse or doctor.               These medicines have changed or have updated prescriptions.        Dose/Directions    vitamin D 87511 UNIT capsule   Commonly known as:  ERGOCALCIFEROL   This may have changed:  when to take this   Used for:  Kidney replaced by transplant, Pancreas replaced by transplant (H), Low calcium levels        Dose:  92179 Units   Take 1 capsule (50,000 Units) by mouth once a week   Quantity:  13 capsule   Refills:  3                Primary Care Provider Office Phone # Fax #    Hafsa Orozco [phone]       [facility] [address] [place] [address] [address] [place] [address]        Equal Access to Services     [facility] Kennedy Medical CenterARLEN AH: Hadii wiliam van Sosherley, waaxda luqadaha, qaybta kaalmada adeegyada, son tipton. So [facility] [phone].    ATENCIÓN: Si habla español, tiene a kessler disposición servicios gratuitos de asistencia lingüística.  Moodykirill shah [phone].    We comply with applicable federal civil rights laws and [place] laws. We do not discriminate on the basis of race, color, national origin, age, disability sex, sexual orientation or gender identity.            Thank you!     Thank you for choosing [facility]  for your care. Our goal is always to provide you with excellent care. Hearing back from our patients is one way we can continue to improve our services. Please take a few minutes to complete the written survey that you may receive in the mail after your visit with us. Thank you!             Your Updated Medication List - Protect others around you: Learn how to safely use, store and throw away your medicines at www.disposemymeds.org.          This list is accurate as of: 9/14/17 10:13 AM.  Always use your most recent med list.                   Brand Name Dispense Instructions for use Diagnosis    aspirin 325 MG EC tablet     60 tablet    Take 1 tablet (325 mg) by mouth daily    Kidney replaced by transplant       cholecalciferol 1000 UNITS capsule    vitamin  -D    180 capsule    Take 2 capsules (2,000 Units) by mouth daily    Low calcium levels, Vitamin D deficiency       citalopram 10 MG tablet    celeXA     Take 20 mg by mouth daily        clindamycin 300 MG capsule    CLEOCIN    21 capsule    Take 1 capsule (300 mg) by mouth 3 times daily    Ulcer of left foot, with fat layer exposed (H), Charcot foot due to diabetes mellitus (H)       itraconazole 100 MG capsule    SPORANOX     Take by mouth 2 times daily        levofloxacin 500 MG tablet    LEVAQUIN    7 tablet    Take 1 tablet (500 mg) by mouth daily        mycophenolic acid 180 MG EC tablet    GENERIC EQUIVALENT    120 tablet    Take 2 tablets (360 mg) by mouth 2 times daily    Kidney replaced by transplant, Pancreas replaced by transplant (H)       * order for DME     1 each    Equipment being ordered: Crutches () Treatment Diagnosis: impaired gait    Diabetic  foot ulcer with osteomyelitis (H)       * order for DME     1 Units    Roll-A-Bout Walker. Patient can use for left foot non-weight bearing    Ulcer of left foot, with fat layer exposed (H), Charcot foot due to diabetes mellitus (H)       pravastatin 20 MG tablet    PRAVACHOL    90 tablet    Take 1 tablet (20 mg) by mouth daily    Dyslipidemia       sulfamethoxazole-trimethoprim 400-80 MG per tablet    BACTRIM/SEPTRA    40 tablet    Take 1 tablet by mouth Every Mon, Wed, Fri Morning    Kidney replaced by transplant, Pancreas transplanted (H)       SYNTHROID PO      Take 50 mcg by mouth daily        tacrolimus 1 mg/mL suspension    GENERIC EQUIVALENT    20 mL    Take 0.3 mLs (0.3 mg) by mouth 2 times daily    Kidney replaced by transplant       valGANciclovir 450 MG tablet    VALCYTE    180 tablet    Take 1 tablet (450 mg) by mouth 2 times daily    CMV (cytomegalovirus infection) (H)       vitamin D 68380 UNIT capsule    ERGOCALCIFEROL    13 capsule    Take 1 capsule (50,000 Units) by mouth once a week    Kidney replaced by transplant, Pancreas replaced by transplant (H), Low calcium levels       ZITHROMAX Z-RAMONA PO      Take 250 mg by mouth daily        ZYRTEC ALLERGY 10 MG tablet   Generic drug:  cetirizine      Take 1 tablet by mouth daily.        * Notice:  This list has 2 medication(s) that are the same as other medications prescribed for you. Read the directions carefully, and ask your doctor or other care provider to review them with you.

## 2017-09-14 NOTE — PROGRESS NOTES
Chief Complaint:   Chief Complaint   Patient presents with     WOUND CARE     check     Allergies   Allergen Reactions     Amoxicillin-Pot Clavulanate Diarrhea     Ciprofloxacin Nausea     Pollen Extract Other (See Comments)     Seasonal allergies     Pyridostigmine Other (See Comments)     Spastic muscle motion in the face and legs, weakness in the arms. Slight swelling of the tongue.     Subjective: Rose is a 41 year old female who presents to the clinic today for a follow up of left foot wound. She has been compliant with NWB. Working on getting a knee scooter. Dressing the wound daily with iodosorb and gauze dressing. Moving to [place] next week. Relates that the wound has gotten much better over the past week. Denies erythema, edema, wound or foot pain, odor, purulent drainage.     Objective:  There were no vitals taken for this visit.      Healing blister noted to dorsal left foot, no signs of new wound or infection.     A diabetic Charcot wound is noted at left plantar 5th met measuring 4.5 cm x 1.5 cm x 0.1m. No tenderness to palpation      Block Classification: I  Wound base: Red, Pink, gray, yellow/Granulation  Edges: intact  Drainage: small/serous  Odor: No  Undermining: No  Bone Exposure: No  Clinical Signs of Infection: No      After obtaining patient consent, the wound was irrigated with copious amounts of saline. A scalpel was then used to debride the wound into the subcutaneous tissue. The wound edges were debrided to healthy, bleeding tissue. Given the patient's lack of sensation, no anesthesia was necessary for the procedure.     Assessment: Left Charcot foot with chronic overlying ulceration      Plan:   - Pt seen and evaluated. Wound much improved.  - Wound was cleansed and debrided as described above. Iodosorb applied and covered with bulky dry gauze dressing. She should continue this dressing change regimen daily.   - Continue NWB   - Pt to return to clinic in 2 weeks

## 2017-09-14 NOTE — NURSING NOTE
Chief Complaint   Patient presents with     WOUND CARE     check       There were no vitals filed for this visit.    There is no height or weight on file to calculate BMI.    Regis ENAMORADO

## 2017-09-19 DIAGNOSIS — E55.9 VITAMIN D DEFICIENCY: ICD-10-CM

## 2017-09-19 DIAGNOSIS — Z94.0 KIDNEY REPLACED BY TRANSPLANT: ICD-10-CM

## 2017-09-19 DIAGNOSIS — E83.51 LOW CALCIUM LEVELS: ICD-10-CM

## 2017-09-19 DIAGNOSIS — Z94.83 PANCREAS REPLACED BY TRANSPLANT (H): ICD-10-CM

## 2017-09-19 RX ORDER — MYCOPHENOLIC ACID 180 MG/1
360 TABLET, DELAYED RELEASE ORAL 2 TIMES DAILY
Qty: 120 TABLET | Refills: 3 | Status: SHIPPED | OUTPATIENT
Start: 2017-09-19 | End: 2018-02-28

## 2017-09-20 ENCOUNTER — OFFICE VISIT (OUTPATIENT)
Dept: ORTHOPEDICS | Facility: CLINIC | Age: 42
End: 2017-09-20

## 2017-09-20 DIAGNOSIS — D84.9 IMMUNOSUPPRESSED STATUS (H): ICD-10-CM

## 2017-09-20 DIAGNOSIS — E10.8 TYPE 1 DIABETES MELLITUS WITH COMPLICATION (H): ICD-10-CM

## 2017-09-20 DIAGNOSIS — Z94.0 KIDNEY REPLACED BY TRANSPLANT: ICD-10-CM

## 2017-09-20 DIAGNOSIS — L97.522 ULCER OF LEFT FOOT, WITH FAT LAYER EXPOSED (H): Primary | ICD-10-CM

## 2017-09-20 NOTE — MR AVS SNAPSHOT
After Visit Summary   9/20/2017    Rose Castaneda    MRN: [id number]           Patient Information     Date Of Birth          1975        Visit Information        Provider Department      9/20/2017 12:20 PM Darius Parkinson DPM [facility]        Today's Diagnoses     Ulcer of left foot, with fat layer exposed (H)    -  1    Type 1 diabetes mellitus with complication (H)        Immunosuppressed status (H)        Kidney replaced by transplant           Follow-ups after your visit        Your next 10 appointments already scheduled     Sep 28, 2017  9:30 AM CDT   (Arrive by 9:15 AM)   Return Visit with Nini Mckeon PA-C   [facility] ([facility])    [address] [place] [address]  4th [facility] [address] [phone]            Feb 13, 2018  4:25 PM CST   (Arrive by 3:55 PM)   Return Kidney Transplant with Mauricio Marquez MD   [facility] ([facility])    [address] [place] [address]  3rd [facility] [address] [phone]              Who to contact     Please call your clinic at [phone] to:    Ask questions about your health    Make or cancel appointments    Discuss your medicines    Learn about your test results    Speak to your doctor   If you have compliments or concerns about an experience at your clinic, or if you wish to file a complaint, please contact [facility] Patient Relations at [phone] or email us at [email or web address]         Additional Information About Your Visit        MyChart Information     mGaadi is an electronic gateway that provides easy, online access to your medical records. With mGaadi, you can request a clinic appointment, read your test results, renew a prescription or communicate with your care team.     To sign up for [employer] visit the website at [email or web address]   You will be asked to enter the access  code listed below, as well as some personal information. Please follow the directions to create your username and password.     Your access code is: [email or web address]  Expires: 10/10/2017  6:31 AM     Your access code will  in 90 days. If you need help or a new code, please contact your [facility] or call [phone] for assistance.        Care EveryWhere ID     This is your Care EveryWhere ID. This could be used by other organizations to access your [facility] medical records  [id number]         Blood Pressure from Last 3 Encounters:   17 (!) 86/47   17 144/86   17 101/66    Weight from Last 3 Encounters:   17 50.6 kg (111 lb 9.6 oz)   17 51.3 kg (113 lb)   17 50.3 kg (110 lb 14.4 oz)              We Performed the Following     DEBRIDEMENT WOUND UP TO 20 SQ CM     TISSUE APLIGRAF SUPPLY, PER SQ CM          Today's Medication Changes          These changes are accurate as of: 17  1:30 PM.  If you have any questions, ask your nurse or doctor.               These medicines have changed or have updated prescriptions.        Dose/Directions    vitamin D 59433 UNIT capsule   Commonly known as:  ERGOCALCIFEROL   This may have changed:  when to take this   Used for:  Kidney replaced by transplant, Pancreas replaced by transplant (H), Low calcium levels        Dose:  07333 Units   Take 1 capsule (50,000 Units) by mouth once a week   Quantity:  13 capsule   Refills:  3                Primary Care Provider Office Phone # Fax #    Hafsa Orozco [phone]       [facility] [address] [place] [address] [address] [place] [address]        Equal Access to Services     MISSY ROSSI AH: Hadii wiliam Lucero, waaxda luqadaha, qaybta kaalmada dariel, son tipton. So [facility] [phone].    ATENCIÓN: Si habla español, tiene a kessler disposición servicios gratuitos de asistencia lingüística. Llame al [phone].    We  comply with applicable federal civil rights laws and [place] laws. We do not discriminate on the basis of race, color, national origin, age, disability sex, sexual orientation or gender identity.            Thank you!     Thank you for choosing [facility]  for your care. Our goal is always to provide you with excellent care. Hearing back from our patients is one way we can continue to improve our services. Please take a few minutes to complete the written survey that you may receive in the mail after your visit with us. Thank you!             Your Updated Medication List - Protect others around you: Learn how to safely use, store and throw away your medicines at www.disposemymeds.org.          This list is accurate as of: 9/20/17  1:30 PM.  Always use your most recent med list.                   Brand Name Dispense Instructions for use Diagnosis    aspirin 325 MG EC tablet     60 tablet    Take 1 tablet (325 mg) by mouth daily    Kidney replaced by transplant       cholecalciferol 1000 UNITS capsule    vitamin  -D    180 capsule    Take 2 capsules (2,000 Units) by mouth daily    Low calcium levels, Vitamin D deficiency       citalopram 10 MG tablet    celeXA     Take 20 mg by mouth daily        clindamycin 300 MG capsule    CLEOCIN    21 capsule    Take 1 capsule (300 mg) by mouth 3 times daily    Ulcer of left foot, with fat layer exposed (H), Charcot foot due to diabetes mellitus (H)       itraconazole 100 MG capsule    SPORANOX     Take by mouth 2 times daily        levofloxacin 500 MG tablet    LEVAQUIN    7 tablet    Take 1 tablet (500 mg) by mouth daily        mycophenolic acid 180 MG EC tablet    GENERIC EQUIVALENT    120 tablet    Take 2 tablets (360 mg) by mouth 2 times daily    Kidney replaced by transplant, Pancreas replaced by transplant (H)       * order for DME     1 each    Equipment being ordered: Crutches () Treatment Diagnosis: impaired gait    Diabetic foot ulcer with  osteomyelitis (H)       * order for DME     1 Units    Roll-A-Bout Walker. Patient can use for left foot non-weight bearing    Ulcer of left foot, with fat layer exposed (H), Charcot foot due to diabetes mellitus (H)       pravastatin 20 MG tablet    PRAVACHOL    90 tablet    Take 1 tablet (20 mg) by mouth daily    Dyslipidemia       sulfamethoxazole-trimethoprim 400-80 MG per tablet    BACTRIM/SEPTRA    40 tablet    Take 1 tablet by mouth Every Mon, Wed, Fri Morning    Kidney replaced by transplant, Pancreas transplanted (H)       SYNTHROID PO      Take 50 mcg by mouth daily        tacrolimus 1 mg/mL suspension    GENERIC EQUIVALENT    20 mL    Take 0.3 mLs (0.3 mg) by mouth 2 times daily    Kidney replaced by transplant       valGANciclovir 450 MG tablet    VALCYTE    180 tablet    Take 1 tablet (450 mg) by mouth 2 times daily    CMV (cytomegalovirus infection) (H)       vitamin D 44470 UNIT capsule    ERGOCALCIFEROL    13 capsule    Take 1 capsule (50,000 Units) by mouth once a week    Kidney replaced by transplant, Pancreas replaced by transplant (H), Low calcium levels       ZITHROMAX Z-RAMONA PO      Take 250 mg by mouth daily        ZYRTEC ALLERGY 10 MG tablet   Generic drug:  cetirizine      Take 1 tablet by mouth daily.        * Notice:  This list has 2 medication(s) that are the same as other medications prescribed for you. Read the directions carefully, and ask your doctor or other care provider to review them with you.

## 2017-09-20 NOTE — PROGRESS NOTES
Chief Complaint:   Chief Complaint   Patient presents with     Wound Check     Follow up. Apligraf application today to Left foot wound. Pt stated that she isnt sure if she needs the apligraf. Pt also stated that the blister on her anterior left foot looks worse then her wound on her posterior left foot.           Allergies   Allergen Reactions     Amoxicillin-Pot Clavulanate Diarrhea     Ciprofloxacin Nausea     Pollen Extract Other (See Comments)     Seasonal allergies     Pyridostigmine Other (See Comments)     Spastic muscle motion in the face and legs, weakness in the arms. Slight swelling of the tongue.     Subjective: Rose is a 41 year old female who presents to the clinic today for a follow up of left foot wound. She has been compliant with NWB. She got the knee scooter and this has been going well. Dressing the wound daily with iodosorb and gauze dressing. Relates that the wound has again improved since last week. Denies erythema, edema, wound or foot pain, odor, purulent drainage.      Objective:  Healing blister noted to dorsal left foot, no signs of new wound or infection.      A diabetic Charcot wound is noted at left plantar 5th met. New epithelium has grown in the middle of the wound, creating two distinct wounds measuring approximately 1.5 cm x 1 cm x 0.1 cm each.      Block Classification: I  Wound base: Red, Pink, gray, yellow/Granulation  Edges: intact, hyperkeratotic   Drainage: small/serous  Odor: No  Undermining: No  Bone Exposure: No  Clinical Signs of Infection: No      After obtaining patient consent, the wound was irrigated with copious amounts of saline. A scalpel was then used to debride the wound into the subcutaneous tissue. The wound edges were debrided to healthy, bleeding tissue. Given the patient's lack of sensation, no anesthesia was necessary for the procedure.      Assessment: Left Charcot foot with chronic overlying ulceration      Plan:   - Pt seen and evaluated. Wound much  improved.  - Wound was cleansed and debrided as described above. Apligraf (44sq cm) was then applied in a sterile fashion to both wound areas on lateral left foot. It was secured with steristrips and mepitel one. A bulky gauze dressing and ACE wrap were then applied.   - Rose is to leave this bandage in place until re-evaluated next week.    - Pt to return to clinic in 1 week unless she has concerns arise earlier.

## 2017-09-20 NOTE — LETTER
9/20/2017       RE: Rose Castaneda  [address] [place] [address]     Dear Colleague,    Thank you for referring your patient, Rose Castaneda, to the [facility] at [facility]. Please see a copy of my visit note below.    Chief Complaint:   Chief Complaint   Patient presents with     Wound Check     Follow up. Apligraf application today to Left foot wound. Pt stated that she isnt sure if she needs the apligraf. Pt also stated that the blister on her anterior left foot looks worse then her wound on her posterior left foot.           Allergies   Allergen Reactions     Amoxicillin-Pot Clavulanate Diarrhea     Ciprofloxacin Nausea     Pollen Extract Other (See Comments)     Seasonal allergies     Pyridostigmine Other (See Comments)     Spastic muscle motion in the face and legs, weakness in the arms. Slight swelling of the tongue.     Subjective: Rose is a 41 year old female who presents to the clinic today for a follow up of left foot wound. She has been compliant with NWB. She got the knee scooter and this has been going well. Dressing the wound daily with iodosorb and gauze dressing. Relates that the wound has again improved since last week. Denies erythema, edema, wound or foot pain, odor, purulent drainage.      Objective:  Healing blister noted to dorsal left foot, no signs of new wound or infection.      A diabetic Charcot wound is noted at left plantar 5th met. New epithelium has grown in the middle of the wound, creating two distinct wounds measuring approximately 1.5 cm x 1 cm x 0.1 cm each.      Block Classification: I  Wound base: Red, Pink, gray, yellow/Granulation  Edges: intact, hyperkeratotic   Drainage: small/serous  Odor: No  Undermining: No  Bone Exposure: No  Clinical Signs of Infection: No      After obtaining patient consent, the wound was irrigated with copious amounts of saline. A scalpel was then used to debride the wound  into the subcutaneous tissue. The wound edges were debrided to healthy, bleeding tissue. Given the patient's lack of sensation, no anesthesia was necessary for the procedure.      Assessment: Left Charcot foot with chronic overlying ulceration      Plan:   - Pt seen and evaluated. Wound much improved.  - Wound was cleansed and debrided as described above. Apligraf (44sq cm) was then applied in a sterile fashion to both wound areas on lateral left foot. It was secured with steristrips and mepitel one. A bulky gauze dressing and ACE wrap were then applied.   - Rose is to leave this bandage in place until re-evaluated next week.    - Pt to return to clinic in 1 week unless she has concerns arise earlier.      Again, thank you for allowing me to participate in the care of your patient.      Sincerely,    Darius Parkinson DPM

## 2017-09-20 NOTE — NURSING NOTE
Reason For Visit:   Chief Complaint   Patient presents with     Wound Check     Follow up. Apligraf application today to Left foot wound. Pt stated that she isnt sure if she needs the apligraf. Pt also stated that the blister on her anterior left foot looks worse then her wound on her posterior left foot.        Pain Assessment  Patient Currently in Pain: Denies         Current Outpatient Prescriptions   Medication Sig Dispense Refill     mycophenolic acid (GENERIC EQUIVALENT) 180 MG EC tablet Take 2 tablets (360 mg) by mouth 2 times daily 120 tablet 3     cholecalciferol (VITAMIN  -D) 1000 UNITS capsule Take 2 capsules (2,000 Units) by mouth daily 180 capsule 3     clindamycin (CLEOCIN) 300 MG capsule Take 1 capsule (300 mg) by mouth 3 times daily 21 capsule 0     order for DME Roll-A-Bout Walker. Patient can use for left foot non-weight bearing 1 Units 0     tacrolimus (PROGRAF - GENERIC EQUIVALENT) 1 mg/mL suspension Take 0.3 mLs (0.3 mg) by mouth 2 times daily 20 mL 6     levofloxacin (LEVAQUIN) 500 MG tablet Take 1 tablet (500 mg) by mouth daily 7 tablet 0     Azithromycin (ZITHROMAX Z-RAMONA PO) Take 250 mg by mouth daily       valGANciclovir (VALCYTE) 450 MG tablet Take 1 tablet (450 mg) by mouth 2 times daily 180 tablet 3     sulfamethoxazole-trimethoprim (BACTRIM/SEPTRA) 400-80 MG per tablet Take 1 tablet by mouth Every Mon, Wed, Fri Morning 40 tablet 3     order for DME Equipment being ordered: Crutches ()  Treatment Diagnosis: impaired gait 1 each 0     vitamin D (ERGOCALCIFEROL) 94487 UNIT capsule Take 1 capsule (50,000 Units) by mouth once a week (Patient taking differently: Take 50,000 Units by mouth every 30 days ) 13 capsule 3     pravastatin (PRAVACHOL) 20 MG tablet Take 1 tablet (20 mg) by mouth daily 90 tablet 3     aspirin  MG EC tablet Take 1 tablet (325 mg) by mouth daily 60 tablet 3     itraconazole (SPORANOX) 100 MG capsule Take by mouth 2 times daily        citalopram (CELEXA) 10 MG  tablet Take 20 mg by mouth daily        Levothyroxine Sodium (SYNTHROID PO) Take 50 mcg by mouth daily        cetirizine (ZYRTEC ALLERGY) 10 MG tablet Take 1 tablet by mouth daily.            Allergies   Allergen Reactions     Amoxicillin-Pot Clavulanate Diarrhea     Ciprofloxacin Nausea     Pollen Extract Other (See Comments)     Seasonal allergies     Pyridostigmine Other (See Comments)     Spastic muscle motion in the face and legs, weakness in the arms. Slight swelling of the tongue.

## 2017-09-28 ENCOUNTER — OFFICE VISIT (OUTPATIENT)
Dept: WOUND CARE | Facility: CLINIC | Age: 42
End: 2017-09-28

## 2017-09-28 DIAGNOSIS — E11.610 CHARCOT FOOT DUE TO DIABETES MELLITUS (H): ICD-10-CM

## 2017-09-28 DIAGNOSIS — L97.522 ULCER OF LEFT FOOT, WITH FAT LAYER EXPOSED (H): Primary | ICD-10-CM

## 2017-09-28 NOTE — LETTER
9/28/2017       RE: Rose Castaneda  [address] [place] [address]     Dear Colleague,    Thank you for referring your patient, Rose Castaneda, to the [facility] at [facility]. Please see a copy of my visit note below.    Chief Complaint:   Chief Complaint   Patient presents with     WOUND CARE     wound care and check     Allergies   Allergen Reactions     Amoxicillin-Pot Clavulanate Diarrhea     Ciprofloxacin Nausea     Pollen Extract Other (See Comments)     Seasonal allergies     Pyridostigmine Other (See Comments)     Spastic muscle motion in the face and legs, weakness in the arms. Slight swelling of the tongue.     Subjective: Rose is a 41 year old female who presents to the clinic today for a follow up of left foot wound. Continued with NWB using knee scooter for the past week. The dressing stayed on x 1 week. Denies erythema, edema, wound or foot pain, odor, purulent drainage.      Objective:  Healing blister noted to dorsal left foot, no signs of new wound or infection.      A diabetic Charcot wound is noted at left plantar 5th met. Two distinct wounds measuring 1.4 cm x 0.6 cm x 0.1 cm ( lateral) and 0.4 cm x 0.6 cm x 0.1 cm (plantar).      Block Classification: I  Wound base: Pink, yellow/Granulation  Edges: intact, hyperkeratotic   Drainage: small/serous  Odor: No  Undermining: No  Bone Exposure: No  Clinical Signs of Infection: No      Assessment: Left Charcot foot with chronic overlying ulceration      Plan:   - Pt seen and evaluated. Wounds cleansed and dried. Improved again, no need for further Apligraf at this time.   - Devitalized tissue of wounds was sharply debrided with a #15 blade to the level of subcutaneous tissue at the deepest.   - Wound care instructions: 1. Clean with microklenz, pat dry. 2. Apply Medihoney to open areas, followed by Adaptic 3. Cover with 4x4s and rolled gauze. Secure in place.   - Okay to shower if foot  is sealed in plastic - avoid water running from top to bottom, then onto wounds.   - Continue NWB.   - Pt to return to clinic in 2 weeks         Again, thank you for allowing me to participate in the care of your patient.      Sincerely,    Nini Mckeon PA-C

## 2017-09-28 NOTE — PROGRESS NOTES
Chief Complaint:   Chief Complaint   Patient presents with     WOUND CARE     wound care and check     Allergies   Allergen Reactions     Amoxicillin-Pot Clavulanate Diarrhea     Ciprofloxacin Nausea     Pollen Extract Other (See Comments)     Seasonal allergies     Pyridostigmine Other (See Comments)     Spastic muscle motion in the face and legs, weakness in the arms. Slight swelling of the tongue.     Subjective: Rose is a 41 year old female who presents to the clinic today for a follow up of left foot wound. Continued with NWB using knee scooter for the past week. The dressing stayed on x 1 week. Denies erythema, edema, wound or foot pain, odor, purulent drainage.      Objective:  Healing blister noted to dorsal left foot, no signs of new wound or infection.      A diabetic Charcot wound is noted at left plantar 5th met. Two distinct wounds measuring 1.4 cm x 0.6 cm x 0.1 cm ( lateral) and 0.4 cm x 0.6 cm x 0.1 cm (plantar).      Block Classification: I  Wound base: Pink, yellow/Granulation  Edges: intact, hyperkeratotic   Drainage: small/serous  Odor: No  Undermining: No  Bone Exposure: No  Clinical Signs of Infection: No      Assessment: Left Charcot foot with chronic overlying ulceration      Plan:   - Pt seen and evaluated. Wounds cleansed and dried. Improved again, no need for further Apligraf at this time.   - Devitalized tissue of wounds was sharply debrided with a #15 blade to the level of subcutaneous tissue at the deepest.   - Wound care instructions: 1. Clean with microklenz, pat dry. 2. Apply Medihoney to open areas, followed by Adaptic 3. Cover with 4x4s and rolled gauze. Secure in place.   - Okay to shower if foot is sealed in plastic - avoid water running from top to bottom, then onto wounds.   - Continue NWB.   - Pt to return to clinic in 2 weeks

## 2017-09-28 NOTE — MR AVS SNAPSHOT
After Visit Summary   2017    Rose Castaneda    MRN: [id number]           Patient Information     Date Of Birth          1975        Visit Information        Provider Department      2017 9:30 AM Nini Mckeon PA-C [facility]        Today's Diagnoses     Ulcer of left foot, with fat layer exposed (H)    -  1    Charcot foot due to diabetes mellitus (H)           Follow-ups after your visit        Your next 10 appointments already scheduled     2018  4:25 PM CST   (Arrive by 3:55 PM)   Return Kidney Transplant with Mauricio Marquez MD   [facility] ([facility])    [address] [place] [address] [phone]              Who to contact     Please call your clinic at [phone] to:    Ask questions about your health    Make or cancel appointments    Discuss your medicines    Learn about your test results    Speak to your doctor   If you have compliments or concerns about an experience at your clinic, or if you wish to file a complaint, please contact [facility] Patient Relations at [phone] or email us at [email or web address]         Additional Information About Your Visit        MyChart Information     Ordr.inhart is an electronic gateway that provides easy, online access to your medical records. With Bitstamp, you can request a clinic appointment, read your test results, renew a prescription or communicate with your care team.     To sign up for meQuilibriumt visit the website at [email or web address]   You will be asked to enter the access code listed below, as well as some personal information. Please follow the directions to create your username and password.     Your access code is: [email or web address]  Expires: 10/10/2017  6:31 AM     Your access code will  in 90 days. If you need help or a new code, please contact your [facility]  Clinic or call [phone] for assistance.        Care EveryWhere ID     This is your Care EveryWhere ID. This could be used by other organizations to access your [facility] medical records  [id number]         Blood Pressure from Last 3 Encounters:   06/09/17 (!) 86/47   05/20/17 144/86   02/07/17 101/66    Weight from Last 3 Encounters:   06/09/17 111 lb 9.6 oz   06/09/17 113 lb   02/07/17 110 lb 14.4 oz              We Performed the Following     DEBRIDEMENT WOUND UP TO 20 SQ CM          Today's Medication Changes          These changes are accurate as of: 9/28/17 10:43 AM.  If you have any questions, ask your nurse or doctor.               These medicines have changed or have updated prescriptions.        Dose/Directions    vitamin D 41431 UNIT capsule   Commonly known as:  ERGOCALCIFEROL   This may have changed:  when to take this   Used for:  Kidney replaced by transplant, Pancreas replaced by transplant (H), Low calcium levels        Dose:  24524 Units   Take 1 capsule (50,000 Units) by mouth once a week   Quantity:  13 capsule   Refills:  3                Primary Care Provider Office Phone # Fax #    Hafsa JOSEPH Orozco [phone]       [facility] [address] [place] [address] [address] [place] [phone]        Equal Access to Services     ANDRESSA ROSSI AH: Hadii wiliam faith hadasho Soomaali, waaxda luqadaha, qaybta kaalmada adeegyada, waxay ho tipton. So [facility] [place] [phone].    ATENCIÓN: Si habla español, tiene a kessler disposición servicios gratuitos de asistencia lingüística. Llame al [phone].    We comply with applicable federal civil rights laws and [place] laws. We do not discriminate on the basis of race, color, national origin, age, disability sex, sexual orientation or gender identity.            Thank you!     Thank you for choosing [facility]  for your care. Our goal is always to provide you with excellent care. Hearing back from our patients is one way we can continue  to improve our services. Please take a few minutes to complete the written survey that you may receive in the mail after your visit with us. Thank you!             Your Updated Medication List - Protect others around you: Learn how to safely use, store and throw away your medicines at www.disposemymeds.org.          This list is accurate as of: 9/28/17 10:43 AM.  Always use your most recent med list.                   Brand Name Dispense Instructions for use Diagnosis    aspirin 325 MG EC tablet     60 tablet    Take 1 tablet (325 mg) by mouth daily    Kidney replaced by transplant       cholecalciferol 1000 UNITS capsule    vitamin  -D    180 capsule    Take 2 capsules (2,000 Units) by mouth daily    Low calcium levels, Vitamin D deficiency       citalopram 10 MG tablet    celeXA     Take 20 mg by mouth daily        clindamycin 300 MG capsule    CLEOCIN    21 capsule    Take 1 capsule (300 mg) by mouth 3 times daily    Ulcer of left foot, with fat layer exposed (H), Charcot foot due to diabetes mellitus (H)       itraconazole 100 MG capsule    SPORANOX     Take by mouth 2 times daily        levofloxacin 500 MG tablet    LEVAQUIN    7 tablet    Take 1 tablet (500 mg) by mouth daily        mycophenolic acid 180 MG EC tablet    GENERIC EQUIVALENT    120 tablet    Take 2 tablets (360 mg) by mouth 2 times daily    Kidney replaced by transplant, Pancreas replaced by transplant (H)       * order for DME     1 each    Equipment being ordered: Crutches () Treatment Diagnosis: impaired gait    Diabetic foot ulcer with osteomyelitis (H)       * order for DME     1 Units    Roll-A-Bout Walker. Patient can use for left foot non-weight bearing    Ulcer of left foot, with fat layer exposed (H), Charcot foot due to diabetes mellitus (H)       pravastatin 20 MG tablet    PRAVACHOL    90 tablet    Take 1 tablet (20 mg) by mouth daily    Dyslipidemia       sulfamethoxazole-trimethoprim 400-80 MG per tablet    BACTRIM/SEPTRA     40 tablet    Take 1 tablet by mouth Every Mon, Wed, Fri Morning    Kidney replaced by transplant, Pancreas transplanted (H)       SYNTHROID PO      Take 50 mcg by mouth daily        tacrolimus 1 mg/mL suspension    GENERIC EQUIVALENT    20 mL    Take 0.3 mLs (0.3 mg) by mouth 2 times daily    Kidney replaced by transplant       valGANciclovir 450 MG tablet    VALCYTE    180 tablet    Take 1 tablet (450 mg) by mouth 2 times daily    CMV (cytomegalovirus infection) (H)       vitamin D 43122 UNIT capsule    ERGOCALCIFEROL    13 capsule    Take 1 capsule (50,000 Units) by mouth once a week    Kidney replaced by transplant, Pancreas replaced by transplant (H), Low calcium levels       ZITHROMAX Z-RAMONA PO      Take 250 mg by mouth daily        ZYRTEC ALLERGY 10 MG tablet   Generic drug:  cetirizine      Take 1 tablet by mouth daily.        * Notice:  This list has 2 medication(s) that are the same as other medications prescribed for you. Read the directions carefully, and ask your doctor or other care provider to review them with you.

## 2017-10-04 LAB
DONOR IDENTIFICATION: NORMAL
DSA COMMENTS: NORMAL
DSA PRESENT: NO
DSA TEST METHOD: NORMAL
ORGAN: NORMAL
SA1 CELL: NORMAL
SA1 COMMENTS: NORMAL
SA1 HI RISK ABY: NORMAL
SA1 MOD RISK ABY: NORMAL
SA1 TEST METHOD: NORMAL

## 2017-10-19 ENCOUNTER — OFFICE VISIT (OUTPATIENT)
Dept: WOUND CARE | Facility: CLINIC | Age: 42
End: 2017-10-19

## 2017-10-19 DIAGNOSIS — M14.60 CHARCOT'S ARTHROPATHY: ICD-10-CM

## 2017-10-19 DIAGNOSIS — L97.522 ULCER OF LEFT FOOT, WITH FAT LAYER EXPOSED (H): Primary | ICD-10-CM

## 2017-10-19 NOTE — MR AVS SNAPSHOT
After Visit Summary   10/19/2017    Rose Castaneda    MRN: [id number]           Patient Information     Date Of Birth          1975        Visit Information        Provider Department      10/19/2017 9:30 AM Nini Mckeon PA-C [facility]        Today's Diagnoses     Ulcer of left foot, with fat layer exposed (H)    -  1    Charcot's arthropathy           Follow-ups after your visit        Your next 10 appointments already scheduled     2018  4:25 PM CST   (Arrive by 3:55 PM)   Return Kidney Transplant with Mauricio Marquez MD   [facility] ([facility])    [address] [place] [address] [phone]              Who to contact     Please call your clinic at [phone] to:    Ask questions about your health    Make or cancel appointments    Discuss your medicines    Learn about your test results    Speak to your doctor   If you have compliments or concerns about an experience at your clinic, or if you wish to file a complaint, please contact [facility] Patient Relations at [phone] or email us at [email or web address]         Additional Information About Your Visit        MyChart Information     Vinglehart is an electronic gateway that provides easy, online access to your medical records. With Fontself, you can request a clinic appointment, read your test results, renew a prescription or communicate with your care team.     To sign up for Lacrosse All Starst visit the website at [email or web address]   You will be asked to enter the access code listed below, as well as some personal information. Please follow the directions to create your username and password.     Your access code is: [email or web address]  Expires: 2018  9:48 AM     Your access code will  in 90 days. If you need help or a new code, please contact your [facility] Clinic or call  [phone] for assistance.        Care EveryWhere ID     This is your Care EveryWhere ID. This could be used by other organizations to access your [place] medical records  [id number]         Blood Pressure from Last 3 Encounters:   06/09/17 (!) 86/47   05/20/17 144/86   02/07/17 101/66    Weight from Last 3 Encounters:   06/09/17 111 lb 9.6 oz   06/09/17 113 lb   02/07/17 110 lb 14.4 oz              We Performed the Following     DEBRIDEMENT WOUND UP TO 20 SQ CM          Today's Medication Changes          These changes are accurate as of: 10/19/17  9:48 AM.  If you have any questions, ask your nurse or doctor.               These medicines have changed or have updated prescriptions.        Dose/Directions    vitamin D 18874 UNIT capsule   Commonly known as:  ERGOCALCIFEROL   This may have changed:  when to take this   Used for:  Kidney replaced by transplant, Pancreas replaced by transplant (H), Low calcium levels        Dose:  90411 Units   Take 1 capsule (50,000 Units) by mouth once a week   Quantity:  13 capsule   Refills:  3                Primary Care Provider Office Phone # Fax #    Hafsa RUIZ Pam [phone]       [facility] [address] [place] [address] [address] [place] [address]        Equal Access to Services     ANDRESSA ROSSI AH: Hadii wiliam ku hadasho Soomaali, waaxda luqadaha, qaybta kaalmada adeegyada, waxay ho tipton. So [facility] [phone].    ATENCIÓN: Si habla español, tiene a kessler disposición servicios gratuitos de asistencia lingüística. Llame al [phone].    We comply with applicable federal civil rights laws and [place] laws. We do not discriminate on the basis of race, color, national origin, age, disability, sex, sexual orientation, or gender identity.            Thank you!     Thank you for choosing [facility]  for your care. Our goal is always to provide you with excellent care. Hearing back from our patients is one way we can continue to improve  our services. Please take a few minutes to complete the written survey that you may receive in the mail after your visit with us. Thank you!             Your Updated Medication List - Protect others around you: Learn how to safely use, store and throw away your medicines at www.disposemymeds.org.          This list is accurate as of: 10/19/17  9:48 AM.  Always use your most recent med list.                   Brand Name Dispense Instructions for use Diagnosis    aspirin 325 MG EC tablet     60 tablet    Take 1 tablet (325 mg) by mouth daily    Kidney replaced by transplant       cholecalciferol 1000 UNITS capsule    vitamin  -D    180 capsule    Take 2 capsules (2,000 Units) by mouth daily    Low calcium levels, Vitamin D deficiency       citalopram 10 MG tablet    celeXA     Take 20 mg by mouth daily        clindamycin 300 MG capsule    CLEOCIN    21 capsule    Take 1 capsule (300 mg) by mouth 3 times daily    Ulcer of left foot, with fat layer exposed (H), Charcot foot due to diabetes mellitus (H)       itraconazole 100 MG capsule    SPORANOX     Take by mouth 2 times daily        levofloxacin 500 MG tablet    LEVAQUIN    7 tablet    Take 1 tablet (500 mg) by mouth daily        mycophenolic acid 180 MG EC tablet    GENERIC EQUIVALENT    120 tablet    Take 2 tablets (360 mg) by mouth 2 times daily    Kidney replaced by transplant, Pancreas replaced by transplant (H)       * order for DME     1 each    Equipment being ordered: Crutches () Treatment Diagnosis: impaired gait    Diabetic foot ulcer with osteomyelitis (H)       * order for DME     1 Units    Roll-A-Bout Walker. Patient can use for left foot non-weight bearing    Ulcer of left foot, with fat layer exposed (H), Charcot foot due to diabetes mellitus (H)       pravastatin 20 MG tablet    PRAVACHOL    90 tablet    Take 1 tablet (20 mg) by mouth daily    Dyslipidemia       sulfamethoxazole-trimethoprim 400-80 MG per tablet    BACTRIM/SEPTRA    40 tablet     Take 1 tablet by mouth Every Mon, Wed, Fri Morning    Kidney replaced by transplant, Pancreas transplanted (H)       SYNTHROID PO      Take 50 mcg by mouth daily        tacrolimus 1 mg/mL suspension    GENERIC EQUIVALENT    20 mL    Take 0.3 mLs (0.3 mg) by mouth 2 times daily    Kidney replaced by transplant       valGANciclovir 450 MG tablet    VALCYTE    180 tablet    Take 1 tablet (450 mg) by mouth 2 times daily    CMV (cytomegalovirus infection) (H)       vitamin D 76412 UNIT capsule    ERGOCALCIFEROL    13 capsule    Take 1 capsule (50,000 Units) by mouth once a week    Kidney replaced by transplant, Pancreas replaced by transplant (H), Low calcium levels       ZITHROMAX Z-RAMONA PO      Take 250 mg by mouth daily        ZYRTEC ALLERGY 10 MG tablet   Generic drug:  cetirizine      Take 1 tablet by mouth daily.        * Notice:  This list has 2 medication(s) that are the same as other medications prescribed for you. Read the directions carefully, and ask your doctor or other care provider to review them with you.

## 2017-10-19 NOTE — NURSING NOTE
Chief Complaint   Patient presents with     Wound Check       There were no vitals filed for this visit.    There is no height or weight on file to calculate BMI.      Regis ENAMORADO

## 2017-10-19 NOTE — LETTER
10/19/2017       RE: Rose Castaneda  [address]    [facility] [address]     Dear Colleague,    Thank you for referring your patient, Rose Castaneda, to the [facility] at [facility]. Please see a copy of my visit note below.    Chief Complaint:   Chief Complaint   Patient presents with     Wound Check     Allergies   Allergen Reactions     Amoxicillin-Pot Clavulanate Diarrhea     Ciprofloxacin Nausea     Pollen Extract Other (See Comments)     Seasonal allergies     Pyridostigmine Other (See Comments)     Spastic muscle motion in the face and legs, weakness in the arms. Slight swelling of the tongue.     Subjective: Rose is a 41 year old female who presents to the clinic today for a follow up of left foot wound. Continued with NWB using knee scooter for the past week. Believes the wounds have healed over. She avoiding picking the scabs.  Denies erythema, edema, wound or foot pain, odor, purulent drainage.      Objective:  Healing blister noted to dorsal left foot, no signs of new wound or infection. Completely scabbed over today.       Both wounds on lateral and plantar foot are scabbed over today. Scabs are tightly adhered without fluctuance, drainage, erythema, edema, odor.      Assessment: Left Charcot foot   Chronic plantar/lateral left foot ulcerations - resolved      Plan:   - Pt seen and evaluated. Loose skin and scab was sharply debrided with #15 blade to level of dermis at deepest.  - Use moisturizing lotion to dry, scabbed skin daily. Keep covered with bandage so scabs do not rip off traumatically. No picking. Okay to shower normally, but do not submerge foot in water.   - Sent message to [facility] to assist with scheduling surgery with Dr. Norton, hopefully within the next couple weeks.  - Continue NWB with knee scooter until surgery occurs as preventive measure.   - Pt to return to clinic after surgery for routine diabetic foot  care or sooner if wounds re-open prior to surgery.       Again, thank you for allowing me to participate in the care of your patient.      Sincerely,    Nini Mckeon PA-C

## 2017-10-19 NOTE — PROGRESS NOTES
Chief Complaint:   Chief Complaint   Patient presents with     Wound Check     Allergies   Allergen Reactions     Amoxicillin-Pot Clavulanate Diarrhea     Ciprofloxacin Nausea     Pollen Extract Other (See Comments)     Seasonal allergies     Pyridostigmine Other (See Comments)     Spastic muscle motion in the face and legs, weakness in the arms. Slight swelling of the tongue.     Subjective: oRse is a 41 year old female who presents to the clinic today for a follow up of left foot wound. Continued with NWB using knee scooter for the past week. Believes the wounds have healed over. She avoiding picking the scabs.  Denies erythema, edema, wound or foot pain, odor, purulent drainage.      Objective:  Healing blister noted to dorsal left foot, no signs of new wound or infection. Completely scabbed over today.       Both wounds on lateral and plantar foot are scabbed over today. Scabs are tightly adhered without fluctuance, drainage, erythema, edema, odor.      Assessment: Left Charcot foot   Chronic plantar/lateral left foot ulcerations - resolved      Plan:   - Pt seen and evaluated. Loose skin and scab was sharply debrided with #15 blade to level of dermis at deepest.  - Use moisturizing lotion to dry, scabbed skin daily. Keep covered with bandage so scabs do not rip off traumatically. No picking. Okay to shower normally, but do not submerge foot in water.   - Sent message to [facility] to assist with scheduling surgery with Dr. Norton, hopefully within the next couple weeks.  - Continue NWB with knee scooter until surgery occurs as preventive measure.   - Pt to return to clinic after surgery for routine diabetic foot care or sooner if wounds re-open prior to surgery.

## 2017-11-01 ENCOUNTER — TELEPHONE (OUTPATIENT)
Dept: ORTHOPEDICS | Facility: CLINIC | Age: 42
End: 2017-11-01

## 2017-11-01 NOTE — TELEPHONE ENCOUNTER
Called patient back in regards to questions she had prior to scheduling her surgery. Pre-op instructions given over the phone and surgery packet mailed to her home. Patient would like to schedule surgery for this month.  Patient verbalized understanding of pre-op instructions, she denies any further questions at this time. She was instructed to call with any questions or concerns that may arise prior to her surgery.

## 2017-11-06 ENCOUNTER — TELEPHONE (OUTPATIENT)
Dept: TRANSPLANT | Facility: CLINIC | Age: 42
End: 2017-11-06

## 2017-11-08 ENCOUNTER — TELEPHONE (OUTPATIENT)
Dept: ORTHOPEDICS | Facility: CLINIC | Age: 42
End: 2017-11-08

## 2017-11-08 ENCOUNTER — TELEPHONE (OUTPATIENT)
Dept: TRANSPLANT | Facility: CLINIC | Age: 42
End: 2017-11-08

## 2017-11-08 NOTE — TELEPHONE ENCOUNTER
Called Rose and left a message to let her know transplant MDs ok with her proceeding with surgery.

## 2017-11-08 NOTE — TELEPHONE ENCOUNTER
Called patient to see if she has been able to contact her transplant clinic regarding clearance for surgery with Dr. Norton. Patient stated that she did call them, and they will be reviewing her case to see if she needs to be seen prior to the surgery. Per the patient's understanding, they will contact her with her their decision, or may reach out to the Ortho clinic directly. We will wait to hear from the patient to schedule her surgery, unless we hear from transplant clinic first. Patient was in agreement with the plan.

## 2017-11-08 NOTE — TELEPHONE ENCOUNTER
Patient reviewed at weekly acute pancreas meeting, all [facility] nephrology and surgery staff present. Decided to continue to monitor labs and will discuss/review at a later date if needed.  Ok to pursue foot surgery.

## 2018-01-04 ENCOUNTER — TELEPHONE (OUTPATIENT)
Dept: ORTHOPEDICS | Facility: CLINIC | Age: 43
End: 2018-01-04

## 2018-01-23 ENCOUNTER — TELEPHONE (OUTPATIENT)
Dept: TRANSPLANT | Facility: CLINIC | Age: 43
End: 2018-01-23

## 2018-01-23 DIAGNOSIS — Z94.83 PANCREAS TRANSPLANTED (H): ICD-10-CM

## 2018-01-23 DIAGNOSIS — Z94.0 KIDNEY REPLACED BY TRANSPLANT: ICD-10-CM

## 2018-01-23 RX ORDER — SULFAMETHOXAZOLE AND TRIMETHOPRIM 400; 80 MG/1; MG/1
TABLET ORAL
Qty: 12 TABLET | Refills: 0 | Status: SHIPPED | OUTPATIENT
Start: 2018-01-23 | End: 2018-02-13

## 2018-01-23 NOTE — LETTER
OUTPATIENT LABORATORY TEST ORDER:  After First Year    Patient Name: Rose Castaneda                           Transplant Date: 8-5-2016  YOB: 1975                                   Issue Date & Time:January 24, 201812:54 PM    [facility] MR: [id number]                                   Exp. Date (1 year after date issued)        Diagnoses:   Kidney Transplant (ICD-10 Z94.0)      Pancreas Transplant (ICD-10 Z94.83)     Long term use of medications (ICD-10  Z79.899)                Lab results to be available on the same day drawn.   Please release results to patient upon their request    Please fax to the Transplant Center at [phone].      Monthly    Complete Blood Count with Platelets    Basic Metabolic Panel (Sodium, Potassium, Chloride, CO2, Creatinine, Urea Nitrogen, Glucose, Calcium)   Amylase, Lipase   /Tacrolimus/Prograf drug level (mail to [facility] mailers and instructions provided by the pt)        Every 6 months,     Due: February and August            BK PCR Quantitative/Serum               Complete Lipid Panel fasting (Cholesterol, Triglycerides, HDL, LDL)            Glycosylated Hemoglobin (A1c)              Urine for Protein/Creatinine      Yearly      Due: August    DSA/PRA      If you have any questions, please call [facility] at [phone] or [phone].

## 2018-01-23 NOTE — TELEPHONE ENCOUNTER
Returned call to patient. She wanted to clarify when her f/u appt w/ Dr Marquez was. Informed her of date and time. Reminded her that she is overdue for transplant labs and should get them done asap.

## 2018-01-24 NOTE — TELEPHONE ENCOUNTER
Call placed to patient: Confirmed that patient has an appt scheduled 2/2018. Patient verbalize understanding to complete lab work monthly. Order sent

## 2018-02-01 ENCOUNTER — RESULTS ONLY (OUTPATIENT)
Dept: OTHER | Facility: CLINIC | Age: 43
End: 2018-02-01

## 2018-02-01 DIAGNOSIS — Z48.298 AFTERCARE FOLLOWING ORGAN TRANSPLANT: ICD-10-CM

## 2018-02-01 DIAGNOSIS — Z94.0 KIDNEY REPLACED BY TRANSPLANT: ICD-10-CM

## 2018-02-01 DIAGNOSIS — Z94.83 PANCREAS REPLACED BY TRANSPLANT (H): ICD-10-CM

## 2018-02-01 DIAGNOSIS — T86.899 COMPLICATION OF TRANSPLANTED PANCREAS: ICD-10-CM

## 2018-02-01 DIAGNOSIS — B25.9 CMV (CYTOMEGALOVIRUS INFECTION) (H): ICD-10-CM

## 2018-02-01 DIAGNOSIS — Z76.82 ORGAN TRANSPLANT CANDIDATE: ICD-10-CM

## 2018-02-01 LAB
AMYLASE SERPL-CCNC: 89 U/L (ref 30–110)
ANION GAP SERPL CALCULATED.3IONS-SCNC: 7 MMOL/L (ref 3–14)
BASOPHILS # BLD AUTO: 0 10E9/L (ref 0–0.2)
BASOPHILS NFR BLD AUTO: 1 %
BUN SERPL-MCNC: 16 MG/DL (ref 7–30)
CALCIUM SERPL-MCNC: 8.8 MG/DL (ref 8.5–10.1)
CHLORIDE SERPL-SCNC: 112 MMOL/L (ref 94–109)
CO2 SERPL-SCNC: 23 MMOL/L (ref 20–32)
CREAT SERPL-MCNC: 1.01 MG/DL (ref 0.52–1.04)
DIFFERENTIAL METHOD BLD: ABNORMAL
EOSINOPHIL # BLD AUTO: 0.2 10E9/L (ref 0–0.7)
EOSINOPHIL NFR BLD AUTO: 4.9 %
ERYTHROCYTE [DISTWIDTH] IN BLOOD BY AUTOMATED COUNT: 13.1 % (ref 10–15)
GFR SERPL CREATININE-BSD FRML MDRD: 60 ML/MIN/1.7M2
GLUCOSE SERPL-MCNC: 78 MG/DL (ref 70–99)
HCT VFR BLD AUTO: 33.1 % (ref 35–47)
HGB BLD-MCNC: 10.9 G/DL (ref 11.7–15.7)
LIPASE SERPL-CCNC: 209 U/L (ref 73–393)
LYMPHOCYTES # BLD AUTO: 0.5 10E9/L (ref 0.8–5.3)
LYMPHOCYTES NFR BLD AUTO: 17.2 %
MCH RBC QN AUTO: 31.9 PG (ref 26.5–33)
MCHC RBC AUTO-ENTMCNC: 32.9 G/DL (ref 31.5–36.5)
MCV RBC AUTO: 97 FL (ref 78–100)
MONOCYTES # BLD AUTO: 0.2 10E9/L (ref 0–1.3)
MONOCYTES NFR BLD AUTO: 7.8 %
NEUTROPHILS # BLD AUTO: 2.1 10E9/L (ref 1.6–8.3)
NEUTROPHILS NFR BLD AUTO: 69.1 %
PLATELET # BLD AUTO: 211 10E9/L (ref 150–450)
POTASSIUM SERPL-SCNC: 4 MMOL/L (ref 3.4–5.3)
RBC # BLD AUTO: 3.42 10E12/L (ref 3.8–5.2)
SODIUM SERPL-SCNC: 142 MMOL/L (ref 133–144)
TACROLIMUS BLD-MCNC: <3 UG/L (ref 5–15)
TME LAST DOSE: 800 H
WBC # BLD AUTO: 3.1 10E9/L (ref 4–11)

## 2018-02-01 PROCEDURE — 86832 HLA CLASS I HIGH DEFIN QUAL: CPT | Performed by: TRANSPLANT SURGERY

## 2018-02-01 PROCEDURE — 80048 BASIC METABOLIC PNL TOTAL CA: CPT | Performed by: TRANSPLANT SURGERY

## 2018-02-01 PROCEDURE — 82150 ASSAY OF AMYLASE: CPT | Performed by: TRANSPLANT SURGERY

## 2018-02-01 PROCEDURE — 80180 DRUG SCRN QUAN MYCOPHENOLATE: CPT | Performed by: TRANSPLANT SURGERY

## 2018-02-01 PROCEDURE — 36415 COLL VENOUS BLD VENIPUNCTURE: CPT | Performed by: TRANSPLANT SURGERY

## 2018-02-01 PROCEDURE — 87799 DETECT AGENT NOS DNA QUANT: CPT | Performed by: TRANSPLANT SURGERY

## 2018-02-01 PROCEDURE — 80197 ASSAY OF TACROLIMUS: CPT | Performed by: TRANSPLANT SURGERY

## 2018-02-01 PROCEDURE — 83690 ASSAY OF LIPASE: CPT | Performed by: TRANSPLANT SURGERY

## 2018-02-01 PROCEDURE — 85025 COMPLETE CBC W/AUTO DIFF WBC: CPT | Performed by: TRANSPLANT SURGERY

## 2018-02-01 PROCEDURE — 86833 HLA CLASS II HIGH DEFIN QUAL: CPT | Performed by: TRANSPLANT SURGERY

## 2018-02-02 LAB
BKV DNA # SPEC NAA+PROBE: NORMAL COPIES/ML
BKV DNA SPEC NAA+PROBE-LOG#: NORMAL LOG COPIES/ML
EBV DNA # SPEC NAA+PROBE: <500 {COPIES}/ML
EBV DNA SPEC NAA+PROBE-LOG#: <2.7 {LOG_COPIES}/ML
PRA DONOR SPECIFIC ABY: NORMAL
PRA SINGLE ANTIGEN IGG ANTIBODY: NORMAL
SPECIMEN SOURCE: NORMAL

## 2018-02-03 LAB
CMV DNA SPEC NAA+PROBE-ACNC: NORMAL [IU]/ML
CMV DNA SPEC NAA+PROBE-LOG#: NORMAL {LOG_IU}/ML
SPECIMEN SOURCE: NORMAL

## 2018-02-05 ENCOUNTER — TELEPHONE (OUTPATIENT)
Dept: TRANSPLANT | Facility: CLINIC | Age: 43
End: 2018-02-05

## 2018-02-05 DIAGNOSIS — Z94.0 KIDNEY REPLACED BY TRANSPLANT: ICD-10-CM

## 2018-02-05 NOTE — TELEPHONE ENCOUNTER
ISSUE:   Tac <3.0  Amylase 89  Lipase 209 (levels around  past year)  Cr stable at 1.01  No labs in 5 months  Attempted to call patient, no answer, left voicemail to return call to tx office.     PLAN:  Increase tacrolimus to 0.4mL (0.4mg) twice daily.   Check if patient is constipated? Any new or missed medications? Eating spicy, greasy foods prior to labs? Any alcohol consumption?    Recheck all labs in 1 week.     LPN TASK:  Call patient again with questions and instructions per plan.   Send updated prescription. Order labs.

## 2018-02-05 NOTE — TELEPHONE ENCOUNTER
Call placed to patient: No answer. Detailed message left with information and instructions listed below. Will try back

## 2018-02-05 NOTE — LETTER
PHYSICIAN ORDERS      DATE & TIME ISSUED: 2018 12:48 PM  PATIENT NAME: Rose Castaneda   : 1975     [facility] MR# [if applicable]: [id number]     DIAGNOSIS:  Kidney and pancreas transplant   ICD-9 CODE: Z94.0 and Z94.83     Please repeat the following lab within one week  Tacrolimus level  Amylase and lipase level  Creatinine level    Any questions please call: [phone] Option #5    Please fax these results to [phone].

## 2018-02-06 LAB
MYCOPHENOLATE SERPL LC/MS/MS-MCNC: 2.17 MG/L (ref 1–3.5)
MYCOPHENOLATE-G SERPL LC/MS/MS-MCNC: 46.4 MG/L (ref 30–95)
TME LAST DOSE: 800 H

## 2018-02-07 LAB
DONOR IDENTIFICATION: NORMAL
DSA COMMENTS: NORMAL
DSA PRESENT: NO
DSA TEST METHOD: NORMAL
ORGAN: NORMAL
SA1 CELL: NORMAL
SA1 COMMENTS: NORMAL
SA1 HI RISK ABY: NORMAL
SA1 MOD RISK ABY: NORMAL
SA1 TEST METHOD: NORMAL
SA2 CELL: NORMAL
SA2 CELL: NORMAL
SA2 COMMENTS: NORMAL
SA2 COMMENTS: NORMAL
SA2 HI RISK ABY UA: NORMAL
SA2 HI RISK ABY UA: NORMAL
SA2 MOD RISK ABY: NORMAL
SA2 MOD RISK ABY: NORMAL
SA2 TEST METHOD: NORMAL
SA2 TEST METHOD: NORMAL

## 2018-02-07 NOTE — TELEPHONE ENCOUNTER
Fu Call placed to patient: No answer. Detailed voice message left with lab results and requesting a return call to access lipase level. Patient instructed to increase tacrolimus dose to 0.4 ml  BID and repeat all lab work in 1 week. Order/rx sent

## 2018-02-07 NOTE — TELEPHONE ENCOUNTER
Patient returned call. States that her infectious disease provider at [facility] had her taking itraconazole prophylactically for recurrent histoplasmosis. However patient Rx ran out and she didn't follow back with the ID doctor. Writer instruction to f\u with ID and inform [facility] if she is restarted on the itraconazole.  In regards to her pancreatic enzymes she states that the night before her blood draw she went out for dinner and had pasta with cheese and wine. Patient verbalize understanding to increase tacrolimus dose to 0.4 mg BID and recheck level in 1-2 weeks of dose change.

## 2018-02-08 ENCOUNTER — TELEPHONE (OUTPATIENT)
Dept: ORTHOPEDICS | Facility: CLINIC | Age: 43
End: 2018-02-08

## 2018-02-08 NOTE — TELEPHONE ENCOUNTER
Called patient back to let her know that it is okay to proceed with surgery as planned, per Leatha BOLAND to Dr. Norton.

## 2018-02-08 NOTE — TELEPHONE ENCOUNTER
Received call from patient wanting to let us know that she had a root canal done on 2/7/18 and is intending to get her flu shot done on 2/12/18 with her regular doctor. She has surgery scheduled with Dr. Norton for 2/14/18, and wanted to be sure that these things would not affect her surgery. She is not currently taking any medication for the root canal, and reports to have never had a negative reaction to flu shot in the past. I told Renetta I would consult with Dr. Norton' nurse and let her know if we'll need to reschedule her surgery or not. She was agreeable to the plan.

## 2018-02-13 ENCOUNTER — OFFICE VISIT (OUTPATIENT)
Dept: NEPHROLOGY | Facility: CLINIC | Age: 43
End: 2018-02-13
Attending: INTERNAL MEDICINE
Payer: COMMERCIAL

## 2018-02-13 VITALS
TEMPERATURE: 98.7 F | HEIGHT: 61 IN | BODY MASS INDEX: 21.37 KG/M2 | SYSTOLIC BLOOD PRESSURE: 94 MMHG | HEART RATE: 59 BPM | WEIGHT: 113.2 LBS | DIASTOLIC BLOOD PRESSURE: 57 MMHG | OXYGEN SATURATION: 96 %

## 2018-02-13 DIAGNOSIS — Z94.0 KIDNEY REPLACED BY TRANSPLANT: ICD-10-CM

## 2018-02-13 DIAGNOSIS — N18.30 ANEMIA IN STAGE 3 CHRONIC KIDNEY DISEASE (H): ICD-10-CM

## 2018-02-13 DIAGNOSIS — Z94.0 KIDNEY REPLACED BY TRANSPLANT: Primary | ICD-10-CM

## 2018-02-13 DIAGNOSIS — D63.1 ANEMIA IN STAGE 3 CHRONIC KIDNEY DISEASE (H): ICD-10-CM

## 2018-02-13 DIAGNOSIS — G90.3 NEUROGENIC ORTHOSTATIC HYPOTENSION (H): ICD-10-CM

## 2018-02-13 DIAGNOSIS — B25.9 CYTOMEGALOVIRUS (CMV) VIREMIA (H): ICD-10-CM

## 2018-02-13 DIAGNOSIS — N25.81 SECONDARY RENAL HYPERPARATHYROIDISM (H): ICD-10-CM

## 2018-02-13 DIAGNOSIS — Z94.83 PANCREAS REPLACED BY TRANSPLANT (H): ICD-10-CM

## 2018-02-13 DIAGNOSIS — D84.9 IMMUNOSUPPRESSED STATUS (H): ICD-10-CM

## 2018-02-13 DIAGNOSIS — Z20.828 EXPOSURE TO INFLUENZA: ICD-10-CM

## 2018-02-13 DIAGNOSIS — E55.9 VITAMIN D DEFICIENCY: ICD-10-CM

## 2018-02-13 DIAGNOSIS — Z94.83 PANCREAS TRANSPLANTED (H): ICD-10-CM

## 2018-02-13 DIAGNOSIS — Z48.298 AFTERCARE FOLLOWING ORGAN TRANSPLANT: ICD-10-CM

## 2018-02-13 LAB
AMYLASE SERPL-CCNC: 96 U/L (ref 30–110)
ANION GAP SERPL CALCULATED.3IONS-SCNC: 5 MMOL/L (ref 3–14)
BUN SERPL-MCNC: 26 MG/DL (ref 7–30)
CALCIUM SERPL-MCNC: 8.8 MG/DL (ref 8.5–10.1)
CHLORIDE SERPL-SCNC: 109 MMOL/L (ref 94–109)
CO2 SERPL-SCNC: 27 MMOL/L (ref 20–32)
CREAT SERPL-MCNC: 0.96 MG/DL (ref 0.52–1.04)
ERYTHROCYTE [DISTWIDTH] IN BLOOD BY AUTOMATED COUNT: 13.4 % (ref 10–15)
GFR SERPL CREATININE-BSD FRML MDRD: 63 ML/MIN/1.7M2
GLUCOSE SERPL-MCNC: 90 MG/DL (ref 70–99)
HCT VFR BLD AUTO: 33.7 % (ref 35–47)
HGB BLD-MCNC: 10.5 G/DL (ref 11.7–15.7)
LIPASE SERPL-CCNC: 180 U/L (ref 73–393)
MCH RBC QN AUTO: 31 PG (ref 26.5–33)
MCHC RBC AUTO-ENTMCNC: 31.2 G/DL (ref 31.5–36.5)
MCV RBC AUTO: 99 FL (ref 78–100)
PLATELET # BLD AUTO: 202 10E9/L (ref 150–450)
POTASSIUM SERPL-SCNC: 4.4 MMOL/L (ref 3.4–5.3)
RBC # BLD AUTO: 3.39 10E12/L (ref 3.8–5.2)
SODIUM SERPL-SCNC: 140 MMOL/L (ref 133–144)
WBC # BLD AUTO: 2.8 10E9/L (ref 4–11)

## 2018-02-13 PROCEDURE — 36415 COLL VENOUS BLD VENIPUNCTURE: CPT | Performed by: INTERNAL MEDICINE

## 2018-02-13 PROCEDURE — G0463 HOSPITAL OUTPT CLINIC VISIT: HCPCS | Mod: ZF

## 2018-02-13 PROCEDURE — 82150 ASSAY OF AMYLASE: CPT | Performed by: INTERNAL MEDICINE

## 2018-02-13 PROCEDURE — 82306 VITAMIN D 25 HYDROXY: CPT | Performed by: INTERNAL MEDICINE

## 2018-02-13 PROCEDURE — 83690 ASSAY OF LIPASE: CPT | Performed by: INTERNAL MEDICINE

## 2018-02-13 PROCEDURE — 85027 COMPLETE CBC AUTOMATED: CPT | Performed by: INTERNAL MEDICINE

## 2018-02-13 PROCEDURE — 80048 BASIC METABOLIC PNL TOTAL CA: CPT | Performed by: INTERNAL MEDICINE

## 2018-02-13 RX ORDER — SULFAMETHOXAZOLE AND TRIMETHOPRIM 400; 80 MG/1; MG/1
1 TABLET ORAL DAILY
Qty: 90 TABLET | Refills: 3 | Status: SHIPPED | OUTPATIENT
Start: 2018-02-13 | End: 2018-10-01

## 2018-02-13 RX ORDER — OSELTAMIVIR PHOSPHATE 75 MG/1
75 CAPSULE ORAL DAILY
Qty: 10 CAPSULE | Refills: 0 | Status: SHIPPED | OUTPATIENT
Start: 2018-02-13 | End: 2019-10-31

## 2018-02-13 ASSESSMENT — PAIN SCALES - GENERAL: PAINLEVEL: NO PAIN (0)

## 2018-02-13 NOTE — LETTER
2/13/2018      RE: Rose Castaneda  [address]  [facility] [address]       Assessment and Plan:  1. DDKT (SPK) - baseline Cr ~ 1.1-1.3, which has remained stable.  Normal proteinuria.  No DSA.  With recently stopping of itraconazole, feel her tacrolimus dose will need to be a bit higher and will increase her to 0.5 mg bid.  Will make no other changes in immunosuppression.  2. Pancreas Tx (SPK) - euglycemic off insulin with normal HbA1c.  Slightly increased pancreatic enzymes with last check.  Will slighty increase tacrolimus dose , as noted above.  3. Neurogenic orthostatic hypotension - low BP, but mostly asymptomatic.  Patient would really like to avoid Florinef as it gave her swelling.  Recommend patient push fluids, especially in the morning.  4. Anemia in chronic kidney disease - stable Hgb, near normal.  Iron replete.  Will follow.  5. Vitamin D deficiency - low, decreased vitamin D level and recommend starting cholecalciferol 2000 iu daily.    6. Chronic diarrhea - symptoms resolved.  7. 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  Will recheck CMV PCR and if negative, patient can stop Valcyte.  8. 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  9. H/o pulmonary histoplasmosis - asymptomatic and recently stopped itraconazole.  Patient to follow up with ID.  10. Medical noncompliance - patient has had infrequent labs, at one point going over 5 months in between labs.  She reports taking her medications.  Discussed importance of obtaining labs, taking medications as prescribed and attending regular medical appointments.  11. Skin cancer risk - no new skin lesions.  Recommend regular follow up with Dermatology.  12. Medication review - with good kidney function, patient should be taking Bactrim SS daily.  13. Possible influenza exposure - recommend patient's son be evaluated and if positive for influenza patient should start Tamiflu 75  "mg daily for prophylaxis.  Will prescribe Tamiflu in case patient needs to fill it.  14. Recommend return visit in 6 months.    Assessment and plan was discussed with patient and she voiced her understanding and agreement.    Reason for Visit:  Ms. Castaneda is here for routine follow up.    HPI:   Rose Castaneda is a 42 year old female with ESKD from DM and is status post SPK on 8/5/16.         Transplant Hx:       Tx: SPK  Date: 8/5/16       Present Maintenance IS: Tacrolimus and Mycophenolic acid       Baseline Creatinine: 1.1-1.3       Recent DSA: No  Date last checked: 2/2018       Biopsy: No    Ms. Castaneda reports feeling \"crazy good\" overall with some medical complaints.  Her energy level is good and remains normal.  She is active, although really gets minimal exercise, especially with cold weather.  Denies any chest pain or shortness of breath with exertion.  Appetite is good and weight is unchanged.  No nausea, vomiting or diarrhea.  No fever, sweats or chills.  No further night sweats.  No leg swelling.    Patient has been following with ID for h/o pulmonary histoplasmosis and was recently taken off itraconazole.  With stopping the antifungal medication, her tacrolimus dose was increased a little.    Of note, patient's son in the last couple of days developed a fever, cough and fatigue.  His school has had a lot of influenza going around and he never got the flu vaccine.  Patient reports she did get vaccinated, but just yesterday.    Home BP: 90/60s, but rare lightheadedness.  Patient reports significant swelling with Florinef and midodrine contraindicated with CAD.      ROS:   A comprehensive review of systems was obtained and negative, except as noted in the HPI or PMH.    Active Medical Problems:  Patient Active Problem List   Diagnosis     Diabetes mellitus type 1 (H)     Immunosuppressed status (H)     Kidney replaced by transplant     Neurogenic orthostatic hypotension (H)     Osteoporosis     " Dyslipidemia     Secondary renal hyperparathyroidism (H)     Hypothyroidism     Anemia in chronic renal disease     Pulmonary histoplasmosis (H)     Pancreas replaced by transplant (H)     Hypomagnesemia     Vitamin D deficiency     Aftercare following organ transplant     Cytomegalovirus (CMV) viremia (H)       Personal Hx:  Social History     Social History     Marital status:      Spouse name: N/A     Number of children: N/A     Years of education: N/A     Occupational History     Not on file.     Social History Main Topics     Smoking status: Never Smoker     Smokeless tobacco: Never Used     Alcohol use Yes      Comment: rare     Drug use: No     Sexual activity: Yes     Partners: Male     Birth control/ protection: Condom     Other Topics Concern     Blood Transfusions Yes     2006, 2003 during sepsis     Social History Narrative       Allergies:  Allergies   Allergen Reactions     Amoxicillin-Pot Clavulanate Diarrhea     Ciprofloxacin Nausea     Pollen Extract Other (See Comments)     Seasonal allergies     Pyridostigmine Other (See Comments)     Spastic muscle motion in the face and legs, weakness in the arms. Slight swelling of the tongue.       Medications:  Prior to Admission medications    Medication Sig Start Date End Date Taking? Authorizing Provider   phosphorus tablet 250 mg (PHOSPHA 250 NEUTRAL) 250 MG tablet Take 2 tablets (500 mg) by mouth 3 times daily 8/19/16  Yes Mauricio Marquez MD   PROGRAF 0.5 MG PO CAPSULE Take 1 capsule (0.5 mg) by mouth 2 times daily Take as directed by physician 8/22/16   Gilson Doe MD   magnesium oxide 400 MG CAPS Take 800 mg by mouth 2 times daily 8/16/16   Mauricio Marquez MD   fludrocortisone (FLORINEF) 0.1 MG tablet Take 1 tablet (0.1 mg) by mouth 2 times daily 8/15/16   Ilan Rodriges MD   oxyCODONE (ROXICODONE) 5 MG immediate release tablet Take 1 tablet (5 mg) by mouth every 4 hours as needed for moderate to severe pain 8/12/16    "Chantelle Ramon PA-C   acetaminophen (TYLENOL) 325 MG tablet Take 2 tablets (650 mg) by mouth every 4 hours as needed for mild pain or fever 8/12/16   Chantelle Ramon PA-C   aspirin  MG EC tablet Take 1 tablet (325 mg) by mouth daily 8/12/16   Chantelle Ramon PA-C   ondansetron (ZOFRAN-ODT) 4 MG disintegrating tablet Take 1 tablet (4 mg) by mouth every 6 hours as needed for nausea 8/12/16   Chantelle Ramon PA-C   valGANciclovir (VALCYTE) 450 MG tablet Take 2 tablets (900 mg) by mouth daily 8/12/16   Chantelle Ramon PA-C   senna-docusate (SENOKOT-S;PERICOLACE) 8.6-50 MG per tablet Take 1 tablet by mouth 2 times daily 8/12/16   Chantelle Ramon PA-C   sulfamethoxazole-trimethoprim (BACTRIM,SEPTRA) 400-80 MG per tablet Take 1 tablet by mouth daily 8/12/16   Chantelle Ramon PA-C   clotrimazole (MYCELEX) 10 MG LOZG Place 1 lozenge (10 mg) inside cheek 4 times daily 8/12/16   Chantelle Ramon PA-C   MYFORTIC 360 MG PO EC TABLET Take 2 tablets (720 mg) by mouth 2 times daily 8/12/16   Chantelle Ramon PA-C   itraconazole (SPORANOX) 100 MG capsule Take by mouth 2 times daily     Reported, Patient   pravastatin (PRAVACHOL) 80 MG tablet Take 20 mg by mouth daily 4/10/13   Dao Bills MD   citalopram (CELEXA) 10 MG tablet Take 20 mg by mouth daily     Reported, Patient   Levothyroxine Sodium (SYNTHROID PO) Take 50 mcg by mouth daily     Reported, Patient   cetirizine (ZYRTEC ALLERGY) 10 MG tablet Take 1 tablet by mouth daily.    Reported, Patient       Vitals:  BP 94/57 (BP Location: Right arm, Patient Position: Sitting, Cuff Size: Adult Regular)  Pulse 59  Temp 98.7  F (37.1  C) (Oral)  Ht 1.549 m (5' 1\")  Wt 51.3 kg (113 lb 3.2 oz)  SpO2 96%  BMI 21.39 kg/m2    Exam:   GENERAL APPEARANCE: alert and no distress  HENT: mouth without ulcers or lesions  LYMPHATICS: no cervical or supraclavicular nodes  RESP: lungs clear to auscultation - no rales, rhonchi or " wheezes  CV: regular rhythm, normal rate, no rub, no murmur  EDEMA: no LE edema bilaterally  ABDOMEN: soft, nondistended, nontender, bowel sounds normal  MS: extremities normal - no gross deformities noted, no evidence of inflammation in joints, no muscle tenderness  SKIN: no rash  TX KIDNEY: normal    Results:   Recent Results (from the past 672 hour(s))   PRA Single Antigen IgG Antibody    Collection Time: 02/01/18  8:56 AM   Result Value Ref Range    PRA Single Antigen IgG Antibody       Specimen received - Immunology report to follow upon completion.   BK virus PCR quantitative    Collection Time: 02/01/18  8:56 AM   Result Value Ref Range    BK Virus Specimen Plasma     BK Virus Result BK Virus DNA Not Detected BKNEG^BK Virus DNA Not Detected copies/mL    BK Virus Log Not Calculated <2.7 Log copies/mL   PRA Donor Specific Antibody    Collection Time: 02/01/18  8:56 AM   Result Value Ref Range    PRA Donor Specific Anna       Specimen received - Immunology report to follow upon completion.   CBC with platelets differential    Collection Time: 02/01/18  8:56 AM   Result Value Ref Range    WBC 3.1 (L) 4.0 - 11.0 10e9/L    RBC Count 3.42 (L) 3.8 - 5.2 10e12/L    Hemoglobin 10.9 (L) 11.7 - 15.7 g/dL    Hematocrit 33.1 (L) 35.0 - 47.0 %    MCV 97 78 - 100 fl    MCH 31.9 26.5 - 33.0 pg    MCHC 32.9 31.5 - 36.5 g/dL    RDW 13.1 10.0 - 15.0 %    Platelet Count 211 150 - 450 10e9/L    Diff Method Automated Method     % Neutrophils 69.1 %    % Lymphocytes 17.2 %    % Monocytes 7.8 %    % Eosinophils 4.9 %    % Basophils 1.0 %    Absolute Neutrophil 2.1 1.6 - 8.3 10e9/L    Absolute Lymphocytes 0.5 (L) 0.8 - 5.3 10e9/L    Absolute Monocytes 0.2 0.0 - 1.3 10e9/L    Absolute Eosinophils 0.2 0.0 - 0.7 10e9/L    Absolute Basophils 0.0 0.0 - 0.2 10e9/L   Basic metabolic panel    Collection Time: 02/01/18  8:56 AM   Result Value Ref Range    Sodium 142 133 - 144 mmol/L    Potassium 4.0 3.4 - 5.3 mmol/L    Chloride 112 (H) 94 - 109  mmol/L    Carbon Dioxide 23 20 - 32 mmol/L    Anion Gap 7 3 - 14 mmol/L    Glucose 78 70 - 99 mg/dL    Urea Nitrogen 16 7 - 30 mg/dL    Creatinine 1.01 0.52 - 1.04 mg/dL    GFR Estimate 60 (L) >60 mL/min/1.7m2    GFR Estimate If Black 72 >60 mL/min/1.7m2    Calcium 8.8 8.5 - 10.1 mg/dL   Amylase    Collection Time: 02/01/18  8:56 AM   Result Value Ref Range    Amylase 89 30 - 110 U/L   Lipase    Collection Time: 02/01/18  8:56 AM   Result Value Ref Range    Lipase 209 73 - 393 U/L   EBV DNA PCR Quantitative Whole Blood    Collection Time: 02/01/18  8:56 AM   Result Value Ref Range    EBV DNA Copies/mL <500 (A) EBVNEG^EBV DNA Not Detected [Copies]/mL    EBV DNA Log of Copies <2.7 <2.7 [Log_copies]/mL   HLA Donor Specific Antibody    Collection Time: 02/01/18  8:56 AM   Result Value Ref Range    Donor Identification 08/05/2016     Organ Kidney/Pancreas     DSA Present NO     DSA Comments        Flow Single Antigen Beads assays are intended for   detection/identification of IgG anti-HLA antibodies. Mfi values may not   accurately quantify donor-specific antibody levels in all instances.      DSA Test Method SA FCS    HLA Anna Class I Single Antigen    Collection Time: 02/01/18  8:56 AM   Result Value Ref Range    SA1 Test Method  FCS     SA1 Cell Class I     SA1 Hi Risk Anna None     SA1 Mod Risk Anna A:23 29 B:13 78     SA1 Comments       Test performed by modified procedure. Serum heat inactivated and tested by   a modified ([place]) protocol including fetal calf serum addition.   High-risk, mfi >3,000. Mod-risk, mfi 500-3,000.      HLA Anna Class II Single Antigen    Collection Time: 02/01/18  8:56 AM   Result Value Ref Range    SA2 Test Method  FCS     SA2 Cell Class II     SA2 Hi Risk Anna None     SA2 Mod Risk Anna DQ:7 DQA:05     SA2 Comments       Test performed by modified procedure. Serum heat inactivated and tested by   a modified ([place]) protocol including fetal calf serum addition.   High-risk, mfi >3,000.  Mod-risk, mfi 500-3,000.      Tacrolimus level    Collection Time: 02/01/18  8:57 AM   Result Value Ref Range    Tacrolimus Last Dose 800     Tacrolimus Level <3.0 (L) 5.0 - 15.0 ug/L   CMV DNA quantification    Collection Time: 02/01/18  8:57 AM   Result Value Ref Range    CMV DNA Quantitation Specimen EDTA PLASMA     CMV Quant IU/mL CMV DNA Not Detected CMVND^CMV DNA Not Detected [IU]/mL    Log IU/mL of CMVQNT Not Calculated <2.1 [Log_IU]/mL   Mycophenolic acid    Collection Time: 02/01/18  8:57 AM   Result Value Ref Range    Last Dose Mycophenolic Acid 800     Mycophenolic Acid Mg/L 2.17 1.00 - 3.50 mg/L    MPA Glucuronide Level 46.4 30.0 - 95.0 mg/L   UNOS Unacceptable Antigens    Collection Time: 02/07/18 12:00 AM   Result Value Ref Range    Protocol Cutoff Plan A, 500 mfi/cumulative      Unacceptable Antigen A:23 29 B:13 51 78 DQ:7    UNOS cPRA    Collection Time: 02/07/18 12:00 AM   Result Value Ref Range    UNOS cPRA 54    Amylase    Collection Time: 02/12/18  2:34 PM   Result Value Ref Range    Amylase (External) 99 25 - 125 U/L   Lipase    Collection Time: 02/12/18  2:34 PM   Result Value Ref Range    Lipase Level (External) 25 8 - 78 U/L   Creatinine    Collection Time: 02/12/18  2:34 PM   Result Value Ref Range    Creatinine (External) 1.29 (H) 0.55 - 1.02 mg/dL    GFR Est if Black (External) 58 (L) >60 mL/min/1.73m2    GFR Estimated (External) 50 (L) >60 ml/min/1.73m2   Basic metabolic panel    Collection Time: 02/13/18  5:31 PM   Result Value Ref Range    Sodium 140 133 - 144 mmol/L    Potassium 4.4 3.4 - 5.3 mmol/L    Chloride 109 94 - 109 mmol/L    Carbon Dioxide 27 20 - 32 mmol/L    Anion Gap 5 3 - 14 mmol/L    Glucose 90 70 - 99 mg/dL    Urea Nitrogen 26 7 - 30 mg/dL    Creatinine 0.96 0.52 - 1.04 mg/dL    GFR Estimate 63 >60 mL/min/1.7m2    GFR Estimate If Black 76 >60 mL/min/1.7m2    Calcium 8.8 8.5 - 10.1 mg/dL   CBC with platelets    Collection Time: 02/13/18  5:31 PM   Result Value Ref Range     WBC 2.8 (L) 4.0 - 11.0 10e9/L    RBC Count 3.39 (L) 3.8 - 5.2 10e12/L    Hemoglobin 10.5 (L) 11.7 - 15.7 g/dL    Hematocrit 33.7 (L) 35.0 - 47.0 %    MCV 99 78 - 100 fl    MCH 31.0 26.5 - 33.0 pg    MCHC 31.2 (L) 31.5 - 36.5 g/dL    RDW 13.4 10.0 - 15.0 %    Platelet Count 202 150 - 450 10e9/L   Vitamin D Deficiency    Collection Time: 02/13/18  5:31 PM   Result Value Ref Range    Vitamin D Deficiency screening 21 20 - 75 ug/L   Amylase    Collection Time: 02/13/18  5:31 PM   Result Value Ref Range    Amylase 96 30 - 110 U/L   Lipase    Collection Time: 02/13/18  5:31 PM   Result Value Ref Range    Lipase 180 73 - 393 U/L   CMV DNA quantification    Collection Time: 02/13/18  5:32 PM   Result Value Ref Range    CMV DNA Quantitation Specimen Plasma     CMV Quant IU/mL CMV DNA Not Detected CMVND^CMV DNA Not Detected [IU]/mL    Log IU/mL of CMVQNT Not Calculated <2.1 [Log_IU]/mL       Mauricio Marquez MD

## 2018-02-13 NOTE — NURSING NOTE
"Chief Complaint   Patient presents with     RECHECK     post kidney Tx       Initial BP 94/57 (BP Location: Right arm, Patient Position: Sitting, Cuff Size: Adult Regular)  Pulse 59  Temp 98.7  F (37.1  C) (Oral)  Ht 1.549 m (5' 1\")  Wt 51.3 kg (113 lb 3.2 oz)  SpO2 96%  BMI 21.39 kg/m2 Estimated body mass index is 21.39 kg/(m^2) as calculated from the following:    Height as of this encounter: 1.549 m (5' 1\").    Weight as of this encounter: 51.3 kg (113 lb 3.2 oz).  Medication Reconciliation: complete     Pam Pat MA    "

## 2018-02-13 NOTE — MR AVS SNAPSHOT
After Visit Summary   2/13/2018    Rose Castaneda    MRN: [id number]           Patient Information     Date Of Birth          1975        Visit Information        Provider Department      2/13/2018 4:25 PM Mauricio Marquez MD [facility]        Today's Diagnoses     Kidney replaced by transplant    -  1    Anemia in stage 3 chronic kidney disease        Neurogenic orthostatic hypotension (H)        Pancreas replaced by transplant (H)        Immunosuppressed status (H)        Vitamin D deficiency        Aftercare following organ transplant        Cytomegalovirus (CMV) viremia (H)        Pancreas transplanted (H)        Exposure to influenza          Care Instructions    If son is diagnosed with influenza, start Tamiflu 75 mg daily x 10 days.          Follow-ups after your visit        Follow-up notes from your care team     Return in about 6 months (around 8/13/2018).      Your next 10 appointments already scheduled     Feb 13, 2018  5:45 PM CST   Lab with  LAB    Health Lab ([facility] [place])    [address] [place] [address]  1st [facility] [id number] [phone]            Feb 14, 2018   Procedure with Xu Norotn MD   [facility] ([facility] [place])    [address] [place] [address]  5th [facility] [id number] [phone]           Located in the Clinics and Surgery Center at [address] [place] [address].   parking is very convenient and highly recommended.  is a $6 flat rate fee.  Both  and self parkers should enter the main arrival plaza from [facility]; parking attendants will direct you based on your parking preference.            Feb 28, 2018 11:00 AM CST   (Arrive by 10:45 AM)   Post-Op with  U Ortho Nurse   [facility] ([facility] [place] [facility])    [address] [place] [address]  4th Floor  [facility] [id number] [phone]             "Mar 27, 2018  2:10 PM CDT   (Arrive by 1:55 PM)   POST-OP FOOT/ANKLE with Xu Norton MD   [facility] ([facility])    [address]  4th Floor  [facility] [id number] [phone]            Aug 21, 2018  2:45 PM CDT   (Arrive by 2:15 PM)   Return Kidney Transplant with Mauricio Marquez MD   [facility] ([facility])    [address]  [address]  [facility] [id number] [phone]              Future tests that were ordered for you today     Open Future Orders        Priority Expected Expires Ordered    Basic metabolic panel Routine  3/15/2018 2/13/2018    CBC with platelets Routine  3/15/2018 2/13/2018    Vitamin D Deficiency Routine  3/15/2018 2/13/2018    CMV DNA quantification Routine  3/15/2018 2/13/2018    Amylase Routine  3/15/2018 2/13/2018    Lipase Routine  3/15/2018 2/13/2018            Who to contact     If you have questions or need follow up information about today's clinic visit or your schedule please contact [facility] directly at [phone].  Normal or non-critical lab and imaging results will be communicated to you by MyChart, letter or phone within 4 business days after the clinic has received the results. If you do not hear from us within 7 days, please contact the clinic through [email or web address] or phone. If you have a critical or abnormal lab result, we will notify you by phone as soon as possible.  Submit refill requests through [employer] or call your pharmacy and they will forward the refill request to us. Please allow 3 business days for your refill to be completed.          Additional Information About Your Visit        [email or web address] Information     PPI lets you send messages to your doctor, view your test results, renew your prescriptions, schedule appointments and more. To sign up, go to [email or web address] . Click on \"Log in\" on the left side of the screen, which will " "take you to the Welcome page. Then click on \"Sign up Now\" on the right side of the page.     You will be asked to enter the access code listed below, as well as some personal information. Please follow the directions to create your username and password.     Your access code is: [email or web address]  Expires: 2018  6:31 AM     Your access code will  in 90 days. If you need help or a new code, please call your [place] clinic or [phone].        Care EveryWhere ID     This is your Care EveryWhere ID. This could be used by other organizations to access your [place] medical records  [id number]        Your Vitals Were     Pulse Temperature Height Pulse Oximetry BMI (Body Mass Index)       59 98.7  F (37.1  C) (Oral) 1.549 m (5' 1\") 96% 21.39 kg/m2        Blood Pressure from Last 3 Encounters:   18 94/57   17 (!) 86/47   17 144/86    Weight from Last 3 Encounters:   18 51.3 kg (113 lb 3.2 oz)   17 50.6 kg (111 lb 9.6 oz)   17 51.3 kg (113 lb)                 Today's Medication Changes          These changes are accurate as of 18  5:11 PM.  If you have any questions, ask your nurse or doctor.               Start taking these medicines.        Dose/Directions    oseltamivir 75 MG capsule   Commonly known as:  TAMIFLU   Used for:  Exposure to influenza   Started by:  Mauricio Marquez MD        Dose:  75 mg   Take 1 capsule (75 mg) by mouth daily   Quantity:  10 capsule   Refills:  0         These medicines have changed or have updated prescriptions.        Dose/Directions    aspirin 325 MG EC tablet   This may have changed:  how much to take   Used for:  Kidney replaced by transplant        Dose:  325 mg   Take 1 tablet (325 mg) by mouth daily   Quantity:  60 tablet   Refills:  3       sulfamethoxazole-trimethoprim 400-80 MG per tablet   Commonly known as:  BACTRIM/SEPTRA   This may have changed:  See the new instructions.   Used for:  Kidney replaced by transplant, " Pancreas transplanted (H)   Changed by:  Mauricio Marquez MD        Dose:  1 tablet   Take 1 tablet by mouth daily   Quantity:  90 tablet   Refills:  3       tacrolimus 1 mg/mL suspension   Commonly known as:  GENERIC EQUIVALENT   This may have changed:  how much to take   Used for:  Kidney replaced by transplant   Changed by:  Mauricio Marquez MD        Dose:  0.5 mg   Take 0.5 mLs (0.5 mg) by mouth 2 times daily   Quantity:  30 mL   Refills:  11            Where to get your medicines      These medications were sent to [id number] - [place], [place] - [address]  [address] [address]     Phone:  [phone]     oseltamivir 75 MG capsule         These medications were sent to [facility] - [address] [address]     Phone:  [phone]     sulfamethoxazole-trimethoprim 400-80 MG per tablet    tacrolimus 1 mg/mL suspension                Primary Care Provider Office Phone # Fax #    Hafsa Orozco [phone]       [facility] [address] [place] [address]        Equal Access to Services     MISSY ROSSI AH: Hadii wiliam ku hadasho Soomaali, waaxda luqadaha, qaybta kaalmada adeegyada, son tipton. So [facility] [phone].    ATENCIÓN: Si habla español, tiene a kessler disposición servicios gratuitos de asistencia lingüística. [facility] [phone].    We comply with applicable federal civil rights laws and [place] laws. We do not discriminate on the basis of race, color, national origin, age, disability, sex, sexual orientation, or gender identity.            Thank you!     Thank you for choosing [facility]  for your care. Our goal is always to provide you with excellent care. Hearing back from our patients is one way we can continue to improve our services. Please take a few minutes to complete the written survey that you may  receive in the mail after your visit with us. Thank you!             Your Updated Medication List - Protect others around you: Learn how to safely use, store and throw away your medicines at www.disposemymeds.org.          This list is accurate as of 2/13/18  5:11 PM.  Always use your most recent med list.                   Brand Name Dispense Instructions for use Diagnosis    aspirin 325 MG EC tablet     60 tablet    Take 1 tablet (325 mg) by mouth daily    Kidney replaced by transplant       citalopram 10 MG tablet    celeXA     Take 20 mg by mouth daily        mycophenolic acid 180 MG EC tablet    GENERIC EQUIVALENT    120 tablet    Take 2 tablets (360 mg) by mouth 2 times daily    Kidney replaced by transplant, Pancreas replaced by transplant (H)       * order for DME     1 each    Equipment being ordered: Crutches () Treatment Diagnosis: impaired gait    Diabetic foot ulcer with osteomyelitis (H)       * order for DME     1 Units    Roll-A-Bout Walker. Patient can use for left foot non-weight bearing    Ulcer of left foot, with fat layer exposed (H), Charcot foot due to diabetes mellitus (H)       oseltamivir 75 MG capsule    TAMIFLU    10 capsule    Take 1 capsule (75 mg) by mouth daily    Exposure to influenza       pravastatin 20 MG tablet    PRAVACHOL    90 tablet    Take 1 tablet (20 mg) by mouth daily    Dyslipidemia       sulfamethoxazole-trimethoprim 400-80 MG per tablet    BACTRIM/SEPTRA    90 tablet    Take 1 tablet by mouth daily    Kidney replaced by transplant, Pancreas transplanted (H)       SYNTHROID PO      Take 50 mcg by mouth daily        tacrolimus 1 mg/mL suspension    GENERIC EQUIVALENT    30 mL    Take 0.5 mLs (0.5 mg) by mouth 2 times daily    Kidney replaced by transplant       valGANciclovir 450 MG tablet    VALCYTE    180 tablet    Take 1 tablet (450 mg) by mouth 2 times daily    CMV (cytomegalovirus infection) (H)       * Notice:  This list has 2 medication(s) that are the same  as other medications prescribed for you. Read the directions carefully, and ask your doctor or other care provider to review them with you.

## 2018-02-14 ENCOUNTER — SURGERY (OUTPATIENT)
Age: 43
End: 2018-02-14

## 2018-02-14 ENCOUNTER — HOSPITAL ENCOUNTER (OUTPATIENT)
Facility: AMBULATORY SURGERY CENTER | Age: 43
End: 2018-02-14
Attending: ORTHOPAEDIC SURGERY
Payer: COMMERCIAL

## 2018-02-14 VITALS
RESPIRATION RATE: 16 BRPM | SYSTOLIC BLOOD PRESSURE: 95 MMHG | WEIGHT: 113.3 LBS | OXYGEN SATURATION: 100 % | HEIGHT: 61 IN | TEMPERATURE: 98.1 F | BODY MASS INDEX: 21.39 KG/M2 | DIASTOLIC BLOOD PRESSURE: 62 MMHG

## 2018-02-14 DIAGNOSIS — Z89.429 S/P AMPUTATION OF LESSER TOE, UNSPECIFIED LATERALITY (H): Primary | ICD-10-CM

## 2018-02-14 LAB
CMV DNA SPEC NAA+PROBE-ACNC: NORMAL [IU]/ML
CMV DNA SPEC NAA+PROBE-LOG#: NORMAL {LOG_IU}/ML
DEPRECATED CALCIDIOL+CALCIFEROL SERPL-MC: 21 UG/L (ref 20–75)
SPECIMEN SOURCE: NORMAL

## 2018-02-14 RX ORDER — ACETAMINOPHEN 325 MG/1
975 TABLET ORAL ONCE
Status: COMPLETED | OUTPATIENT
Start: 2018-02-14 | End: 2018-02-14

## 2018-02-14 RX ORDER — FENTANYL CITRATE 50 UG/ML
25-50 INJECTION, SOLUTION INTRAMUSCULAR; INTRAVENOUS
Status: CANCELLED | OUTPATIENT
Start: 2018-02-14

## 2018-02-14 RX ORDER — GABAPENTIN 300 MG/1
300 CAPSULE ORAL ONCE
Status: COMPLETED | OUTPATIENT
Start: 2018-02-14 | End: 2018-02-14

## 2018-02-14 RX ORDER — MEPERIDINE HYDROCHLORIDE 25 MG/ML
12.5 INJECTION INTRAMUSCULAR; INTRAVENOUS; SUBCUTANEOUS
Status: CANCELLED | OUTPATIENT
Start: 2018-02-14

## 2018-02-14 RX ORDER — ONDANSETRON 2 MG/ML
4 INJECTION INTRAMUSCULAR; INTRAVENOUS EVERY 30 MIN PRN
Status: CANCELLED | OUTPATIENT
Start: 2018-02-14

## 2018-02-14 RX ORDER — HYDROMORPHONE HYDROCHLORIDE 1 MG/ML
.3-.5 INJECTION, SOLUTION INTRAMUSCULAR; INTRAVENOUS; SUBCUTANEOUS EVERY 10 MIN PRN
Status: CANCELLED | OUTPATIENT
Start: 2018-02-14

## 2018-02-14 RX ORDER — NALOXONE HYDROCHLORIDE 0.4 MG/ML
.1-.4 INJECTION, SOLUTION INTRAMUSCULAR; INTRAVENOUS; SUBCUTANEOUS
Status: CANCELLED | OUTPATIENT
Start: 2018-02-14 | End: 2018-02-15

## 2018-02-14 RX ORDER — OXYCODONE HYDROCHLORIDE 5 MG/1
5-10 TABLET ORAL EVERY 4 HOURS PRN
Qty: 20 TABLET | Refills: 0 | Status: SHIPPED | OUTPATIENT
Start: 2018-02-14 | End: 2019-10-31

## 2018-02-14 RX ORDER — SODIUM CHLORIDE, SODIUM LACTATE, POTASSIUM CHLORIDE, CALCIUM CHLORIDE 600; 310; 30; 20 MG/100ML; MG/100ML; MG/100ML; MG/100ML
INJECTION, SOLUTION INTRAVENOUS CONTINUOUS
Status: DISCONTINUED | OUTPATIENT
Start: 2018-02-14 | End: 2018-02-15 | Stop reason: HOSPADM

## 2018-02-14 RX ORDER — SODIUM CHLORIDE, SODIUM LACTATE, POTASSIUM CHLORIDE, CALCIUM CHLORIDE 600; 310; 30; 20 MG/100ML; MG/100ML; MG/100ML; MG/100ML
INJECTION, SOLUTION INTRAVENOUS CONTINUOUS
Status: CANCELLED | OUTPATIENT
Start: 2018-02-14

## 2018-02-14 RX ORDER — ONDANSETRON 4 MG/1
4 TABLET, ORALLY DISINTEGRATING ORAL EVERY 30 MIN PRN
Status: CANCELLED | OUTPATIENT
Start: 2018-02-14

## 2018-02-14 RX ADMIN — ACETAMINOPHEN 975 MG: 325 TABLET ORAL at 11:48

## 2018-02-14 RX ADMIN — GABAPENTIN 300 MG: 300 CAPSULE ORAL at 11:50

## 2018-02-16 ENCOUNTER — TELEPHONE (OUTPATIENT)
Dept: TRANSPLANT | Facility: CLINIC | Age: 43
End: 2018-02-16

## 2018-02-16 DIAGNOSIS — E55.9 VITAMIN D DEFICIENCY: Primary | ICD-10-CM

## 2018-02-16 NOTE — TELEPHONE ENCOUNTER
Called reviewed that large dose of calcium(tums) might increase serum calcium      She states that she has had  heart burn prior  Only takes 1 or `2 tabs tums symptom resolve  Reviewed the use   ranitidine    Reviewed to monitor symptoms  over the weekend     Reviewed to call transplant center to report symptoms  - #of tums

## 2018-02-16 NOTE — TELEPHONE ENCOUNTER
? on if safe to take Tums/Antacid if her Tac level has been upped and also notify that not having foot surgery this week. Requesting call today to advise.

## 2018-02-18 PROBLEM — L97.522 ULCER OF LEFT FOOT, WITH FAT LAYER EXPOSED (H): Status: RESOLVED | Noted: 2017-02-17 | Resolved: 2018-02-18

## 2018-02-18 NOTE — PROGRESS NOTES
Assessment and Plan:  1. DDKT (K) - baseline Cr ~ 1.1-1.3, which has remained stable.  Normal proteinuria.  No DSA.  With recently stopping of itraconazole, feel her tacrolimus dose will need to be a bit higher and will increase her to 0.5 mg bid.  Will make no other changes in immunosuppression.  2. Pancreas Tx (K) - euglycemic off insulin with normal HbA1c.  Slightly increased pancreatic enzymes with last check.  Will slighty increase tacrolimus dose , as noted above.  3. Neurogenic orthostatic hypotension - low BP, but mostly asymptomatic.  Patient would really like to avoid Florinef as it gave her swelling.  Recommend patient push fluids, especially in the morning.  4. Anemia in chronic kidney disease - stable Hgb, near normal.  Iron replete.  Will follow.  5. Vitamin D deficiency - low, decreased vitamin D level and recommend starting cholecalciferol 2000 iu daily.    6. Chronic diarrhea - symptoms resolved.  7. 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  Will recheck CMV PCR and if negative, patient can stop Valcyte.  8. 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  9. H/o pulmonary histoplasmosis - asymptomatic and recently stopped itraconazole.  Patient to follow up with ID.  10. Medical noncompliance - patient has had infrequent labs, at one point going over 5 months in between labs.  She reports taking her medications.  Discussed importance of obtaining labs, taking medications as prescribed and attending regular medical appointments.  11. Skin cancer risk - no new skin lesions.  Recommend regular follow up with Dermatology.  12. Medication review - with good kidney function, patient should be taking Bactrim SS daily.  13. Possible influenza exposure - recommend patient's son be evaluated and if positive for influenza patient should start Tamiflu 75 mg daily for prophylaxis.  Will prescribe Tamiflu in case patient needs to fill  "it.  14. Recommend return visit in 6 months.    Assessment and plan was discussed with patient and she voiced her understanding and agreement.    Reason for Visit:  Ms. Castaneda is here for routine follow up.    HPI:   Rose Castaneda is a 42 year old female with ESKD from DM and is status post SPK on 8/5/16.         Transplant Hx:       Tx: SPK  Date: 8/5/16       Present Maintenance IS: Tacrolimus and Mycophenolic acid       Baseline Creatinine: 1.1-1.3       Recent DSA: No  Date last checked: 2/2018       Biopsy: No    Ms. Castaneda reports feeling \"crazy good\" overall with some medical complaints.  Her energy level is good and remains normal.  She is active, although really gets minimal exercise, especially with cold weather.  Denies any chest pain or shortness of breath with exertion.  Appetite is good and weight is unchanged.  No nausea, vomiting or diarrhea.  No fever, sweats or chills.  No further night sweats.  No leg swelling.    Patient has been following with ID for h/o pulmonary histoplasmosis and was recently taken off itraconazole.  With stopping the antifungal medication, her tacrolimus dose was increased a little.    Of note, patient's son in the last couple of days developed a fever, cough and fatigue.  His school has had a lot of influenza going around and he never got the flu vaccine.  Patient reports she did get vaccinated, but just yesterday.    Home BP: 90/60s, but rare lightheadedness.  Patient reports significant swelling with Florinef and midodrine contraindicated with CAD.      ROS:   A comprehensive review of systems was obtained and negative, except as noted in the HPI or PMH.    Active Medical Problems:  Patient Active Problem List   Diagnosis     Diabetes mellitus type 1 (H)     Immunosuppressed status (H)     Kidney replaced by transplant     Neurogenic orthostatic hypotension (H)     Osteoporosis     Dyslipidemia     Secondary renal hyperparathyroidism (H)     Hypothyroidism     Anemia in " chronic renal disease     Pulmonary histoplasmosis (H)     Pancreas replaced by transplant (H)     Hypomagnesemia     Vitamin D deficiency     Aftercare following organ transplant     Cytomegalovirus (CMV) viremia (H)       Personal Hx:  Social History     Social History     Marital status:      Spouse name: N/A     Number of children: N/A     Years of education: N/A     Occupational History     Not on file.     Social History Main Topics     Smoking status: Never Smoker     Smokeless tobacco: Never Used     Alcohol use Yes      Comment: rare     Drug use: No     Sexual activity: Yes     Partners: Male     Birth control/ protection: Condom     Other Topics Concern     Blood Transfusions Yes     2006, 2003 during sepsis     Social History Narrative       Allergies:  Allergies   Allergen Reactions     Amoxicillin-Pot Clavulanate Diarrhea     Ciprofloxacin Nausea     Pollen Extract Other (See Comments)     Seasonal allergies     Pyridostigmine Other (See Comments)     Spastic muscle motion in the face and legs, weakness in the arms. Slight swelling of the tongue.       Medications:  Prior to Admission medications    Medication Sig Start Date End Date Taking? Authorizing Provider   phosphorus tablet 250 mg (PHOSPHA 250 NEUTRAL) 250 MG tablet Take 2 tablets (500 mg) by mouth 3 times daily 8/19/16  Yes Mauricio Marquez MD   PROGRAF 0.5 MG PO CAPSULE Take 1 capsule (0.5 mg) by mouth 2 times daily Take as directed by physician 8/22/16   Gilson Doe MD   magnesium oxide 400 MG CAPS Take 800 mg by mouth 2 times daily 8/16/16   Mauricio Marquez MD   fludrocortisone (FLORINEF) 0.1 MG tablet Take 1 tablet (0.1 mg) by mouth 2 times daily 8/15/16   Ilan Rodriges MD   oxyCODONE (ROXICODONE) 5 MG immediate release tablet Take 1 tablet (5 mg) by mouth every 4 hours as needed for moderate to severe pain 8/12/16   Chantelle Ramon PA-C   acetaminophen (TYLENOL) 325 MG tablet Take 2 tablets (650 mg) by  "mouth every 4 hours as needed for mild pain or fever 8/12/16   Chantelle Ramon PA-C   aspirin  MG EC tablet Take 1 tablet (325 mg) by mouth daily 8/12/16   Chantelle Ramon PA-C   ondansetron (ZOFRAN-ODT) 4 MG disintegrating tablet Take 1 tablet (4 mg) by mouth every 6 hours as needed for nausea 8/12/16   Chantelle Ramon PA-C   valGANciclovir (VALCYTE) 450 MG tablet Take 2 tablets (900 mg) by mouth daily 8/12/16   Chantelle Ramon PA-C   senna-docusate (SENOKOT-S;PERICOLACE) 8.6-50 MG per tablet Take 1 tablet by mouth 2 times daily 8/12/16   Chantelle Ramon PA-C   sulfamethoxazole-trimethoprim (BACTRIM,SEPTRA) 400-80 MG per tablet Take 1 tablet by mouth daily 8/12/16   Chantelle Ramon PA-C   clotrimazole (MYCELEX) 10 MG LOZG Place 1 lozenge (10 mg) inside cheek 4 times daily 8/12/16   Chantelle Ramon PA-C   MYFORTIC 360 MG PO EC TABLET Take 2 tablets (720 mg) by mouth 2 times daily 8/12/16   Chantelle Ramon PA-C   itraconazole (SPORANOX) 100 MG capsule Take by mouth 2 times daily     Reported, Patient   pravastatin (PRAVACHOL) 80 MG tablet Take 20 mg by mouth daily 4/10/13   Dao Bills MD   citalopram (CELEXA) 10 MG tablet Take 20 mg by mouth daily     Reported, Patient   Levothyroxine Sodium (SYNTHROID PO) Take 50 mcg by mouth daily     Reported, Patient   cetirizine (ZYRTEC ALLERGY) 10 MG tablet Take 1 tablet by mouth daily.    Reported, Patient       Vitals:  BP 94/57 (BP Location: Right arm, Patient Position: Sitting, Cuff Size: Adult Regular)  Pulse 59  Temp 98.7  F (37.1  C) (Oral)  Ht 1.549 m (5' 1\")  Wt 51.3 kg (113 lb 3.2 oz)  SpO2 96%  BMI 21.39 kg/m2    Exam:   GENERAL APPEARANCE: alert and no distress  HENT: mouth without ulcers or lesions  LYMPHATICS: no cervical or supraclavicular nodes  RESP: lungs clear to auscultation - no rales, rhonchi or wheezes  CV: regular rhythm, normal rate, no rub, no murmur  EDEMA: no LE edema bilaterally  ABDOMEN: " soft, nondistended, nontender, bowel sounds normal  MS: extremities normal - no gross deformities noted, no evidence of inflammation in joints, no muscle tenderness  SKIN: no rash  TX KIDNEY: normal    Results:   Recent Results (from the past 672 hour(s))   PRA Single Antigen IgG Antibody    Collection Time: 02/01/18  8:56 AM   Result Value Ref Range    PRA Single Antigen IgG Antibody       Specimen received - Immunology report to follow upon completion.   BK virus PCR quantitative    Collection Time: 02/01/18  8:56 AM   Result Value Ref Range    BK Virus Specimen Plasma     BK Virus Result BK Virus DNA Not Detected BKNEG^BK Virus DNA Not Detected copies/mL    BK Virus Log Not Calculated <2.7 Log copies/mL   PRA Donor Specific Antibody    Collection Time: 02/01/18  8:56 AM   Result Value Ref Range    PRA Donor Specific Anna       Specimen received - Immunology report to follow upon completion.   CBC with platelets differential    Collection Time: 02/01/18  8:56 AM   Result Value Ref Range    WBC 3.1 (L) 4.0 - 11.0 10e9/L    RBC Count 3.42 (L) 3.8 - 5.2 10e12/L    Hemoglobin 10.9 (L) 11.7 - 15.7 g/dL    Hematocrit 33.1 (L) 35.0 - 47.0 %    MCV 97 78 - 100 fl    MCH 31.9 26.5 - 33.0 pg    MCHC 32.9 31.5 - 36.5 g/dL    RDW 13.1 10.0 - 15.0 %    Platelet Count 211 150 - 450 10e9/L    Diff Method Automated Method     % Neutrophils 69.1 %    % Lymphocytes 17.2 %    % Monocytes 7.8 %    % Eosinophils 4.9 %    % Basophils 1.0 %    Absolute Neutrophil 2.1 1.6 - 8.3 10e9/L    Absolute Lymphocytes 0.5 (L) 0.8 - 5.3 10e9/L    Absolute Monocytes 0.2 0.0 - 1.3 10e9/L    Absolute Eosinophils 0.2 0.0 - 0.7 10e9/L    Absolute Basophils 0.0 0.0 - 0.2 10e9/L   Basic metabolic panel    Collection Time: 02/01/18  8:56 AM   Result Value Ref Range    Sodium 142 133 - 144 mmol/L    Potassium 4.0 3.4 - 5.3 mmol/L    Chloride 112 (H) 94 - 109 mmol/L    Carbon Dioxide 23 20 - 32 mmol/L    Anion Gap 7 3 - 14 mmol/L    Glucose 78 70 - 99 mg/dL     Urea Nitrogen 16 7 - 30 mg/dL    Creatinine 1.01 0.52 - 1.04 mg/dL    GFR Estimate 60 (L) >60 mL/min/1.7m2    GFR Estimate If Black 72 >60 mL/min/1.7m2    Calcium 8.8 8.5 - 10.1 mg/dL   Amylase    Collection Time: 02/01/18  8:56 AM   Result Value Ref Range    Amylase 89 30 - 110 U/L   Lipase    Collection Time: 02/01/18  8:56 AM   Result Value Ref Range    Lipase 209 73 - 393 U/L   EBV DNA PCR Quantitative Whole Blood    Collection Time: 02/01/18  8:56 AM   Result Value Ref Range    EBV DNA Copies/mL <500 (A) EBVNEG^EBV DNA Not Detected [Copies]/mL    EBV DNA Log of Copies <2.7 <2.7 [Log_copies]/mL   HLA Donor Specific Antibody    Collection Time: 02/01/18  8:56 AM   Result Value Ref Range    Donor Identification 08/05/2016     Organ Kidney/Pancreas     DSA Present NO     DSA Comments        Flow Single Antigen Beads assays are intended for   detection/identification of IgG anti-HLA antibodies. Mfi values may not   accurately quantify donor-specific antibody levels in all instances.      DSA Test Method SA FCS    HLA Anna Class I Single Antigen    Collection Time: 02/01/18  8:56 AM   Result Value Ref Range    SA1 Test Method  FCS     SA1 Cell Class I     SA1 Hi Risk Anna None     SA1 Mod Risk Anna A:23 29 B:13 78     SA1 Comments       Test performed by modified procedure. Serum heat inactivated and tested by   a modified ([place]) protocol including fetal calf serum addition.   High-risk, mfi >3,000. Mod-risk, mfi 500-3,000.      HLA Anna Class II Single Antigen    Collection Time: 02/01/18  8:56 AM   Result Value Ref Range    SA2 Test Method  FCS     SA2 Cell Class II     SA2 Hi Risk Anna None     SA2 Mod Risk Anna DQ:7 DQA:05     SA2 Comments       Test performed by modified procedure. Serum heat inactivated and tested by   a modified ([place]) protocol including fetal calf serum addition.   High-risk, mfi >3,000. Mod-risk, mfi 500-3,000.      Tacrolimus level    Collection Time: 02/01/18  8:57 AM   Result Value Ref  Range    Tacrolimus Last Dose 800     Tacrolimus Level <3.0 (L) 5.0 - 15.0 ug/L   CMV DNA quantification    Collection Time: 02/01/18  8:57 AM   Result Value Ref Range    CMV DNA Quantitation Specimen EDTA PLASMA     CMV Quant IU/mL CMV DNA Not Detected CMVND^CMV DNA Not Detected [IU]/mL    Log IU/mL of CMVQNT Not Calculated <2.1 [Log_IU]/mL   Mycophenolic acid    Collection Time: 02/01/18  8:57 AM   Result Value Ref Range    Last Dose Mycophenolic Acid 800     Mycophenolic Acid Mg/L 2.17 1.00 - 3.50 mg/L    MPA Glucuronide Level 46.4 30.0 - 95.0 mg/L   UNOS Unacceptable Antigens    Collection Time: 02/07/18 12:00 AM   Result Value Ref Range    Protocol Cutoff Plan A, 500 mfi/cumulative      Unacceptable Antigen A:23 29 B:13 51 78 DQ:7    UNOS cPRA    Collection Time: 02/07/18 12:00 AM   Result Value Ref Range    UNOS cPRA 54    Amylase    Collection Time: 02/12/18  2:34 PM   Result Value Ref Range    Amylase (External) 99 25 - 125 U/L   Lipase    Collection Time: 02/12/18  2:34 PM   Result Value Ref Range    Lipase Level (External) 25 8 - 78 U/L   Creatinine    Collection Time: 02/12/18  2:34 PM   Result Value Ref Range    Creatinine (External) 1.29 (H) 0.55 - 1.02 mg/dL    GFR Est if Black (External) 58 (L) >60 mL/min/1.73m2    GFR Estimated (External) 50 (L) >60 ml/min/1.73m2   Basic metabolic panel    Collection Time: 02/13/18  5:31 PM   Result Value Ref Range    Sodium 140 133 - 144 mmol/L    Potassium 4.4 3.4 - 5.3 mmol/L    Chloride 109 94 - 109 mmol/L    Carbon Dioxide 27 20 - 32 mmol/L    Anion Gap 5 3 - 14 mmol/L    Glucose 90 70 - 99 mg/dL    Urea Nitrogen 26 7 - 30 mg/dL    Creatinine 0.96 0.52 - 1.04 mg/dL    GFR Estimate 63 >60 mL/min/1.7m2    GFR Estimate If Black 76 >60 mL/min/1.7m2    Calcium 8.8 8.5 - 10.1 mg/dL   CBC with platelets    Collection Time: 02/13/18  5:31 PM   Result Value Ref Range    WBC 2.8 (L) 4.0 - 11.0 10e9/L    RBC Count 3.39 (L) 3.8 - 5.2 10e12/L    Hemoglobin 10.5 (L) 11.7 -  15.7 g/dL    Hematocrit 33.7 (L) 35.0 - 47.0 %    MCV 99 78 - 100 fl    MCH 31.0 26.5 - 33.0 pg    MCHC 31.2 (L) 31.5 - 36.5 g/dL    RDW 13.4 10.0 - 15.0 %    Platelet Count 202 150 - 450 10e9/L   Vitamin D Deficiency    Collection Time: 02/13/18  5:31 PM   Result Value Ref Range    Vitamin D Deficiency screening 21 20 - 75 ug/L   Amylase    Collection Time: 02/13/18  5:31 PM   Result Value Ref Range    Amylase 96 30 - 110 U/L   Lipase    Collection Time: 02/13/18  5:31 PM   Result Value Ref Range    Lipase 180 73 - 393 U/L   CMV DNA quantification    Collection Time: 02/13/18  5:32 PM   Result Value Ref Range    CMV DNA Quantitation Specimen Plasma     CMV Quant IU/mL CMV DNA Not Detected CMVND^CMV DNA Not Detected [IU]/mL    Log IU/mL of CMVQNT Not Calculated <2.1 [Log_IU]/mL

## 2018-02-19 DIAGNOSIS — Z94.83 PANCREAS REPLACED BY TRANSPLANT (H): Primary | ICD-10-CM

## 2018-02-19 DIAGNOSIS — Z94.0 KIDNEY REPLACED BY TRANSPLANT: ICD-10-CM

## 2018-02-19 RX ORDER — PREDNISONE 5 MG/1
10 TABLET ORAL DAILY
Qty: 28 TABLET | Refills: 0 | Status: SHIPPED | OUTPATIENT
Start: 2018-02-19 | End: 2018-03-21

## 2018-02-19 RX ORDER — CHOLECALCIFEROL (VITAMIN D3) 50 MCG
2000 TABLET ORAL DAILY
Qty: 100 TABLET | Refills: 3 | COMMUNITY
Start: 2018-02-19 | End: 2020-10-28

## 2018-02-19 RX ORDER — PREDNISONE 5 MG/1
10 TABLET ORAL DAILY
Qty: 28 TABLET | Refills: 0 | Status: SHIPPED | OUTPATIENT
Start: 2018-02-19 | End: 2018-02-19

## 2018-02-19 NOTE — TELEPHONE ENCOUNTER
Patient reviewed in [facility] review meeing    ISSUE:  Dr aMrquez saw patient 2/13, increased prograf dose. Would like tacrolimus level rechecked asap.   Amylase/lipase slightly elevated.   Patient also needs to start 10mg prednisone daily x 2 weeks d/t recent undetectable tac level    PLAN:  Call made to patient, no answer, left voicemail to return call to tx office.     ADDENDUM: Call returned from patient. Plan discussed. Pt reluctant to take prednisone, reports it makes her extremely irritable. Encouraged her to take it for at least 1 week and we can rediscuss. Patient will  prednisone tomorrow, get labs rechecked on Wednesday.     LPN TASK:   Enter order for prednisone 10mg daily for 14 days  Enter orders for bmp, cbc, amylase, lipase and tacrolimus level to be done weekly x4

## 2018-02-19 NOTE — LETTER
PHYSICIAN ORDERS      DATE & TIME ISSUED: 2018 1:45 PM  PATIENT NAME: Rose Castaneda   : 1975     [facility] MR# [if applicable]: [id number]     DIAGNOSIS:  Kidney and pancreas transplant  ICD-9 CODE: Z94.0 and Z94.83     Please complete the following labs weekly time 4  CBC  BMP  Amylase and Lipase  Tacrolimus level    Any questions please call: [phone] Option #5    Please fax these results to [phone].

## 2018-02-21 DIAGNOSIS — T86.899 COMPLICATION OF TRANSPLANTED PANCREAS: ICD-10-CM

## 2018-02-21 DIAGNOSIS — Z48.298 AFTERCARE FOLLOWING ORGAN TRANSPLANT: ICD-10-CM

## 2018-02-21 DIAGNOSIS — Z94.83 PANCREAS REPLACED BY TRANSPLANT (H): ICD-10-CM

## 2018-02-21 DIAGNOSIS — B25.9 CMV (CYTOMEGALOVIRUS INFECTION) (H): ICD-10-CM

## 2018-02-21 DIAGNOSIS — Z94.0 KIDNEY REPLACED BY TRANSPLANT: ICD-10-CM

## 2018-02-21 LAB
AMYLASE SERPL-CCNC: 79 U/L (ref 30–110)
ANION GAP SERPL CALCULATED.3IONS-SCNC: 6 MMOL/L (ref 3–14)
BASOPHILS # BLD AUTO: 0 10E9/L (ref 0–0.2)
BASOPHILS NFR BLD AUTO: 0.6 %
BUN SERPL-MCNC: 22 MG/DL (ref 7–30)
CALCIUM SERPL-MCNC: 8.9 MG/DL (ref 8.5–10.1)
CHLORIDE SERPL-SCNC: 103 MMOL/L (ref 94–109)
CO2 SERPL-SCNC: 25 MMOL/L (ref 20–32)
CREAT SERPL-MCNC: 0.97 MG/DL (ref 0.52–1.04)
DIFFERENTIAL METHOD BLD: ABNORMAL
EOSINOPHIL # BLD AUTO: 0.1 10E9/L (ref 0–0.7)
EOSINOPHIL NFR BLD AUTO: 4 %
ERYTHROCYTE [DISTWIDTH] IN BLOOD BY AUTOMATED COUNT: 12.8 % (ref 10–15)
GFR SERPL CREATININE-BSD FRML MDRD: 62 ML/MIN/1.7M2
GLUCOSE SERPL-MCNC: 78 MG/DL (ref 70–99)
HCT VFR BLD AUTO: 33 % (ref 35–47)
HGB BLD-MCNC: 10.5 G/DL (ref 11.7–15.7)
LIPASE SERPL-CCNC: 164 U/L (ref 73–393)
LYMPHOCYTES # BLD AUTO: 0.6 10E9/L (ref 0.8–5.3)
LYMPHOCYTES NFR BLD AUTO: 18.5 %
MCH RBC QN AUTO: 30.7 PG (ref 26.5–33)
MCHC RBC AUTO-ENTMCNC: 31.8 G/DL (ref 31.5–36.5)
MCV RBC AUTO: 97 FL (ref 78–100)
MONOCYTES # BLD AUTO: 0.3 10E9/L (ref 0–1.3)
MONOCYTES NFR BLD AUTO: 7.5 %
NEUTROPHILS # BLD AUTO: 2.4 10E9/L (ref 1.6–8.3)
NEUTROPHILS NFR BLD AUTO: 69.4 %
PLATELET # BLD AUTO: 205 10E9/L (ref 150–450)
POTASSIUM SERPL-SCNC: 3.6 MMOL/L (ref 3.4–5.3)
RBC # BLD AUTO: 3.42 10E12/L (ref 3.8–5.2)
SODIUM SERPL-SCNC: 134 MMOL/L (ref 133–144)
TACROLIMUS BLD-MCNC: <3 UG/L (ref 5–15)
TME LAST DOSE: 900 H
WBC # BLD AUTO: 3.5 10E9/L (ref 4–11)

## 2018-02-21 PROCEDURE — 83690 ASSAY OF LIPASE: CPT | Performed by: TRANSPLANT SURGERY

## 2018-02-21 PROCEDURE — 82150 ASSAY OF AMYLASE: CPT | Performed by: TRANSPLANT SURGERY

## 2018-02-21 PROCEDURE — 85025 COMPLETE CBC W/AUTO DIFF WBC: CPT | Performed by: TRANSPLANT SURGERY

## 2018-02-21 PROCEDURE — 80048 BASIC METABOLIC PNL TOTAL CA: CPT | Performed by: TRANSPLANT SURGERY

## 2018-02-21 PROCEDURE — 80197 ASSAY OF TACROLIMUS: CPT | Performed by: TRANSPLANT SURGERY

## 2018-02-21 PROCEDURE — 36415 COLL VENOUS BLD VENIPUNCTURE: CPT | Performed by: TRANSPLANT SURGERY

## 2018-02-22 DIAGNOSIS — Z94.0 KIDNEY REPLACED BY TRANSPLANT: ICD-10-CM

## 2018-02-22 DIAGNOSIS — Z94.0 KIDNEY REPLACED BY TRANSPLANT: Primary | ICD-10-CM

## 2018-02-22 RX ORDER — TACROLIMUS 0.5 MG/1
0.5 CAPSULE ORAL 2 TIMES DAILY
Qty: 180 CAPSULE | Refills: 3 | Status: SHIPPED | OUTPATIENT
Start: 2018-02-22 | End: 2018-02-23

## 2018-02-22 NOTE — TELEPHONE ENCOUNTER
ISSUE:   Tacrolimus Level 5.0 - 15.0 ug/L <3.0 (T) <3.0 (T)CM   Recently increased to 0.5 mg QD and added 10 mg QD prednisone to cover.        PLAN:   Please call pt and confirm 12-hour trough, dose, no new medications.If good trough, increase dose to 1 mg BID - recheck level Monday

## 2018-02-22 NOTE — TELEPHONE ENCOUNTER
Call placed to patient: Patient states that although liquid tacrolimus was prescribed, she had left over pill that she'd been taking and would like a 90 day refill on tacrolimus capsule. Rx sent

## 2018-02-22 NOTE — LETTER
PHYSICIAN ORDERS      DATE & TIME ISSUED: 2018 10:53 AM  PATIENT NAME: Rose Castaneda   : 1975     [facility] MR# [if applicable]: [id number]     DIAGNOSIS:  Kidney and pancreas transplant  ICD-9 CODE: Z94.0 and Z94.83     Please recheck Tacrolimus level with in one week.     Any questions please call: [phone] Option #5    Please fax these results to [phone].    .

## 2018-02-23 RX ORDER — TACROLIMUS 0.5 MG/1
1 CAPSULE ORAL 2 TIMES DAILY
Qty: 360 CAPSULE | Refills: 3 | Status: SHIPPED | OUTPATIENT
Start: 2018-02-23 | End: 2018-03-15

## 2018-02-23 NOTE — TELEPHONE ENCOUNTER
F\U call placed to patient: Patient confirms current dose and accurate lab draw. Patient verbalize understanding to increase her tacrolimus dose to 1 mg twice daily and recheck level on Monday. Order/rx sent

## 2018-02-28 DIAGNOSIS — Z94.83 PANCREAS REPLACED BY TRANSPLANT (H): Primary | ICD-10-CM

## 2018-02-28 DIAGNOSIS — Z94.0 KIDNEY REPLACED BY TRANSPLANT: ICD-10-CM

## 2018-03-01 RX ORDER — MYCOPHENOLIC ACID 180 MG/1
360 TABLET, DELAYED RELEASE ORAL 2 TIMES DAILY
Qty: 120 TABLET | Refills: 11 | Status: SHIPPED | OUTPATIENT
Start: 2018-03-01 | End: 2018-03-08

## 2018-03-07 ENCOUNTER — TELEPHONE (OUTPATIENT)
Dept: TRANSPLANT | Facility: CLINIC | Age: 43
End: 2018-03-07

## 2018-03-07 NOTE — TELEPHONE ENCOUNTER
Provider Call: Transplant Lab  Facility Name: [facility]  Facility Location: [place], [place]  Reason for Call: Clarification  Callback needed? Yes  Return Call Needed  Same as documented in contacts section

## 2018-03-08 ENCOUNTER — TELEPHONE (OUTPATIENT)
Dept: TRANSPLANT | Facility: CLINIC | Age: 43
End: 2018-03-08

## 2018-03-08 DIAGNOSIS — T86.899 COMPLICATION OF TRANSPLANTED PANCREAS: ICD-10-CM

## 2018-03-08 DIAGNOSIS — Z48.298 AFTERCARE FOLLOWING ORGAN TRANSPLANT: ICD-10-CM

## 2018-03-08 DIAGNOSIS — Z79.899 OTHER LONG TERM (CURRENT) DRUG THERAPY: ICD-10-CM

## 2018-03-08 DIAGNOSIS — Z94.0 KIDNEY REPLACED BY TRANSPLANT: ICD-10-CM

## 2018-03-08 DIAGNOSIS — Z94.83 PANCREAS REPLACED BY TRANSPLANT (H): ICD-10-CM

## 2018-03-08 DIAGNOSIS — Z94.83 PANCREAS REPLACED BY TRANSPLANT (H): Primary | ICD-10-CM

## 2018-03-08 DIAGNOSIS — B25.9 CMV (CYTOMEGALOVIRUS INFECTION) (H): ICD-10-CM

## 2018-03-08 LAB
BASOPHILS # BLD AUTO: 0 10E9/L (ref 0–0.2)
BASOPHILS NFR BLD AUTO: 0.3 %
CREAT UR-MCNC: 157 MG/DL
DIFFERENTIAL METHOD BLD: ABNORMAL
EOSINOPHIL # BLD AUTO: 0.1 10E9/L (ref 0–0.7)
EOSINOPHIL NFR BLD AUTO: 1.2 %
ERYTHROCYTE [DISTWIDTH] IN BLOOD BY AUTOMATED COUNT: 13.8 % (ref 10–15)
HCT VFR BLD AUTO: 34.6 % (ref 35–47)
HGB BLD-MCNC: 10.9 G/DL (ref 11.7–15.7)
LIPASE SERPL-CCNC: 176 U/L (ref 73–393)
LYMPHOCYTES # BLD AUTO: 0.9 10E9/L (ref 0.8–5.3)
LYMPHOCYTES NFR BLD AUTO: 15.9 %
MCH RBC QN AUTO: 30.8 PG (ref 26.5–33)
MCHC RBC AUTO-ENTMCNC: 31.5 G/DL (ref 31.5–36.5)
MCV RBC AUTO: 98 FL (ref 78–100)
MICROALBUMIN UR-MCNC: 19 MG/L
MICROALBUMIN/CREAT UR: 11.85 MG/G CR (ref 0–25)
MONOCYTES # BLD AUTO: 0.4 10E9/L (ref 0–1.3)
MONOCYTES NFR BLD AUTO: 6.9 %
NEUTROPHILS # BLD AUTO: 4.4 10E9/L (ref 1.6–8.3)
NEUTROPHILS NFR BLD AUTO: 75.7 %
PLATELET # BLD AUTO: 226 10E9/L (ref 150–450)
RBC # BLD AUTO: 3.54 10E12/L (ref 3.8–5.2)
TACROLIMUS BLD-MCNC: 4.1 UG/L (ref 5–15)
TME LAST DOSE: ABNORMAL H
WBC # BLD AUTO: 5.8 10E9/L (ref 4–11)

## 2018-03-08 PROCEDURE — 82043 UR ALBUMIN QUANTITATIVE: CPT | Performed by: TRANSPLANT SURGERY

## 2018-03-08 PROCEDURE — 83690 ASSAY OF LIPASE: CPT | Performed by: TRANSPLANT SURGERY

## 2018-03-08 PROCEDURE — 82150 ASSAY OF AMYLASE: CPT | Performed by: TRANSPLANT SURGERY

## 2018-03-08 PROCEDURE — 80197 ASSAY OF TACROLIMUS: CPT | Performed by: TRANSPLANT SURGERY

## 2018-03-08 PROCEDURE — 80180 DRUG SCRN QUAN MYCOPHENOLATE: CPT | Performed by: TRANSPLANT SURGERY

## 2018-03-08 PROCEDURE — 85025 COMPLETE CBC W/AUTO DIFF WBC: CPT | Performed by: TRANSPLANT SURGERY

## 2018-03-08 PROCEDURE — 36415 COLL VENOUS BLD VENIPUNCTURE: CPT | Performed by: TRANSPLANT SURGERY

## 2018-03-08 PROCEDURE — 80048 BASIC METABOLIC PNL TOTAL CA: CPT | Performed by: TRANSPLANT SURGERY

## 2018-03-08 RX ORDER — MYCOPHENOLIC ACID 180 MG/1
360 TABLET, DELAYED RELEASE ORAL 2 TIMES DAILY
Qty: 360 TABLET | Refills: 3 | Status: SHIPPED | OUTPATIENT
Start: 2018-03-08 | End: 2019-02-05

## 2018-03-08 NOTE — TELEPHONE ENCOUNTER
Patient Call: Patient Call: Medication Refill  Route to LPN  Instruct the patient to first contact their pharmacy. If they have called their pharmacy and require further assistance, route to LPN.  [facility] Name: [facility] [phone]  Pharmacy Location: ?  Name of Medication: Mycghol Dose: 90 day supply  When will the patient be out of this medication?: Greater than 3 days (Route standard priority)

## 2018-03-09 LAB
AMYLASE SERPL-CCNC: 85 U/L (ref 30–110)
ANION GAP SERPL CALCULATED.3IONS-SCNC: 6 MMOL/L (ref 3–14)
BUN SERPL-MCNC: 16 MG/DL (ref 7–30)
CALCIUM SERPL-MCNC: 8.1 MG/DL (ref 8.5–10.1)
CHLORIDE SERPL-SCNC: 111 MMOL/L (ref 94–109)
CMV DNA SPEC NAA+PROBE-ACNC: <137 [IU]/ML
CMV DNA SPEC NAA+PROBE-LOG#: <2.1 {LOG_IU}/ML
CO2 SERPL-SCNC: 24 MMOL/L (ref 20–32)
CREAT SERPL-MCNC: 1.04 MG/DL (ref 0.52–1.04)
GFR SERPL CREATININE-BSD FRML MDRD: 58 ML/MIN/1.7M2
GLUCOSE SERPL-MCNC: 76 MG/DL (ref 70–99)
POTASSIUM SERPL-SCNC: 4.5 MMOL/L (ref 3.4–5.3)
SODIUM SERPL-SCNC: 141 MMOL/L (ref 133–144)
SPECIMEN SOURCE: ABNORMAL

## 2018-03-10 LAB
MYCOPHENOLATE SERPL LC/MS/MS-MCNC: 2.3 MG/L (ref 1–3.5)
MYCOPHENOLATE-G SERPL LC/MS/MS-MCNC: 40.9 MG/L (ref 30–95)
TME LAST DOSE: NORMAL H

## 2018-03-15 DIAGNOSIS — Z94.0 KIDNEY REPLACED BY TRANSPLANT: ICD-10-CM

## 2018-03-15 RX ORDER — TACROLIMUS 0.5 MG/1
1.5 CAPSULE ORAL 2 TIMES DAILY
Qty: 540 CAPSULE | Refills: 3 | Status: SHIPPED | OUTPATIENT
Start: 2018-03-15 | End: 2018-04-19

## 2018-03-15 NOTE — TELEPHONE ENCOUNTER
Issue:     Tac level still low at 4.1 (13 hour trough).  Goal is 5-8.    Plan:    Increase tac from 1 mg bid to 1.5 mg bid, recheck tac level next week.    LPN task:     Please update med list, send 0.5 capsule [facility] and send tac order for recheck. Thanks.

## 2018-03-15 NOTE — LETTER
PHYSICIAN ORDERS      DATE & TIME ISSUED: March 15, 2018 3:08 PM  PATIENT NAME: Rose Castaneda   : 1975     [facility] MR# [if applicable]: [id number]     DIAGNOSIS:  Kidney and pancreas transplant  ICD-9 CODE: Z94.0 and Z94.83     Please recheck TACROLIMUS  level next week    Any questions please call: [phone] Option # 5    Please fax these results to [phone].    .

## 2018-03-15 NOTE — TELEPHONE ENCOUNTER
Patient Call: Patient Call: Transplant Lab/Orders  Route to LPN  Post Transplant Days: 587  When patient is less than 60 days post-transplant, route high priority  Reason for Call: patient would like to go over lab results to see if she needs to redo any labs  Callback needed? Yes  Return Call Needed  Same as documented in contacts section  When to return call?: Greater than one day: Route standard priority

## 2018-03-19 ENCOUNTER — TELEPHONE (OUTPATIENT)
Dept: TRANSPLANT | Facility: CLINIC | Age: 43
End: 2018-03-19

## 2018-03-19 DIAGNOSIS — Z94.83 PANCREAS REPLACED BY TRANSPLANT (H): Primary | ICD-10-CM

## 2018-03-19 DIAGNOSIS — Z94.0 KIDNEY REPLACED BY TRANSPLANT: ICD-10-CM

## 2018-03-19 DIAGNOSIS — Z79.899 OTHER LONG TERM (CURRENT) DRUG THERAPY: ICD-10-CM

## 2018-03-19 NOTE — TELEPHONE ENCOUNTER
Patient Call: Patient Call: Transplant Illness  Route to RN  If the patient reports CHEST PAIN, SEVERE SHORTNESS OF BREATH, ONE SIDED WEAKNESS, or DIFFICULTY SPEAKING: CONTACT RN FACE-TO-FACE IMMEDIATELY  Duration of illness: past 3 days  Transplanted organ? Pancreas/kidney  Illness: Other Unable to tolerate increased Tacro- Blood sugars drop- pt not aware but  tells her after the episode, achy all over, feels sick     Heart Lung Liver Kidney/Pancreas   Blood pressure Route Routine to RN Route Routine to RN Route Routine to RN Route Routine to RN   Diarrhea Route Routine to RN Route Routine to RN Route Routine to RN Route Routine to RN   Fever Route Routine to RN Route Routine to RN Route Routine to RN Route Routine to RN   Nausea Route Routine to RN Route Routine to RN Route Routine to RN Route Routine to RN   Pain Route Routine to RN Route Routine to RN Route Routine to RN Route Routine to RN   Swelling Route Routine to RN Route Routine to RN Route Routine to RN Route Routine to RN   Vomiting >24 hours Lync, then Page Lync, then Page Lync, then Page Lync, route High   Unable to take medication Lync, then Page Lync, then Page Lync, then Page Lync, route High   Fever > 100.5 Lync, then Page Lync, then Page Lync, then Page Lync, route High   Shortness of breath Lync, then Page Lync, then Page Lync, then Page Lync, route High   Productive cough Route Routine to RN Lync, then Page Route Routine to RN Route Routine to RN   Jaundice Route Routine to RN Route Routine to RN Lync, then Page Route Routine to RN   Unable to urinate Route Routine to RN Route Routine to RN Route Routine to RN Lync, route High   Other Route Routine to RN Route Routine to RN Route Routine to RN Route Routine to RN

## 2018-03-19 NOTE — TELEPHONE ENCOUNTER
Returned call to patient.     ISSUE:  Reports having multiple hypoglycemic episodes this weekend, during one episode she lost consciousness. She also reports feeling generalized body aches/pain and nausea. She thinks this is all related to her increase in tac dose, in the past whenever her dose was >1 mg BID she developed this all over body pain. Her tacrolimus was increased to 1.5mg BID last Thursday, level was 4.1.     PLAN:  Per patient's request, decreased tacrolimus dose back to 1 mg BID. Explained that her tac level should be above 5 to best prevent rejection.   Patient states she felt better when she was on itraconazole and low dose tac. She will contact her ID doc at [facility] to see if restarting itraconazole is a good idea.   Instructed patient to repeat labs tomorrow - BMP, CBC, amylase, lipase, tacrolimus, Hgb A1C, EBV UA/UC  Suggested she f/u with PCP about low BG and generally feeling unwell. Patient declined. Instructed patient to increase her po intake, drink ensure if she is unable to take in more food (poor po intake d/t nausea likely contributing to hypoglycemia)    LPN TASK:  Send annual lab order to [facility]  Send lab order for the following to be done tomorrow: BMP, CBC, amylase, lipase, tacrolimus, Hgb A1C, EBV, UA/UC

## 2018-03-20 ENCOUNTER — TELEPHONE (OUTPATIENT)
Dept: ENDOCRINOLOGY | Facility: CLINIC | Age: 43
End: 2018-03-20

## 2018-03-20 ENCOUNTER — TELEPHONE (OUTPATIENT)
Dept: TRANSPLANT | Facility: CLINIC | Age: 43
End: 2018-03-20

## 2018-03-20 DIAGNOSIS — Z94.83 PANCREAS REPLACED BY TRANSPLANT (H): ICD-10-CM

## 2018-03-20 DIAGNOSIS — Z94.0 KIDNEY REPLACED BY TRANSPLANT: Primary | ICD-10-CM

## 2018-03-20 DIAGNOSIS — Z79.899 OTHER LONG TERM (CURRENT) DRUG THERAPY: ICD-10-CM

## 2018-03-20 DIAGNOSIS — Z94.0 KIDNEY REPLACED BY TRANSPLANT: ICD-10-CM

## 2018-03-20 LAB
ALBUMIN UR-MCNC: ABNORMAL MG/DL
APPEARANCE UR: CLEAR
BACTERIA #/AREA URNS HPF: ABNORMAL /HPF
BASOPHILS # BLD AUTO: 0 10E9/L (ref 0–0.2)
BASOPHILS NFR BLD AUTO: 0.5 %
BILIRUB UR QL STRIP: NEGATIVE
COLOR UR AUTO: YELLOW
DIFFERENTIAL METHOD BLD: ABNORMAL
EOSINOPHIL # BLD AUTO: 0.1 10E9/L (ref 0–0.7)
EOSINOPHIL NFR BLD AUTO: 1.9 %
ERYTHROCYTE [DISTWIDTH] IN BLOOD BY AUTOMATED COUNT: 13.7 % (ref 10–15)
GLUCOSE UR STRIP-MCNC: NEGATIVE MG/DL
HBA1C MFR BLD: 5.1 % (ref 4.3–6)
HCT VFR BLD AUTO: 32.5 % (ref 35–47)
HGB BLD-MCNC: 10.1 G/DL (ref 11.7–15.7)
HGB UR QL STRIP: NEGATIVE
KETONES UR STRIP-MCNC: NEGATIVE MG/DL
LEUKOCYTE ESTERASE UR QL STRIP: NEGATIVE
LIPASE SERPL-CCNC: 148 U/L (ref 73–393)
LYMPHOCYTES # BLD AUTO: 0.6 10E9/L (ref 0.8–5.3)
LYMPHOCYTES NFR BLD AUTO: 17.6 %
MCH RBC QN AUTO: 30.4 PG (ref 26.5–33)
MCHC RBC AUTO-ENTMCNC: 31.1 G/DL (ref 31.5–36.5)
MCV RBC AUTO: 98 FL (ref 78–100)
MONOCYTES # BLD AUTO: 0.2 10E9/L (ref 0–1.3)
MONOCYTES NFR BLD AUTO: 5.5 %
MUCOUS THREADS #/AREA URNS LPF: PRESENT /LPF
NEUTROPHILS # BLD AUTO: 2.7 10E9/L (ref 1.6–8.3)
NEUTROPHILS NFR BLD AUTO: 74.5 %
NITRATE UR QL: NEGATIVE
NON-SQ EPI CELLS #/AREA URNS LPF: ABNORMAL /LPF
PH UR STRIP: 6 PH (ref 5–7)
PLATELET # BLD AUTO: 188 10E9/L (ref 150–450)
RBC # BLD AUTO: 3.32 10E12/L (ref 3.8–5.2)
RBC #/AREA URNS AUTO: ABNORMAL /HPF
SOURCE: ABNORMAL
SP GR UR STRIP: 1.02 (ref 1–1.03)
UROBILINOGEN UR STRIP-ACNC: 0.2 EU/DL (ref 0.2–1)
WBC # BLD AUTO: 3.6 10E9/L (ref 4–11)
WBC #/AREA URNS AUTO: ABNORMAL /HPF

## 2018-03-20 PROCEDURE — 83036 HEMOGLOBIN GLYCOSYLATED A1C: CPT | Performed by: INTERNAL MEDICINE

## 2018-03-20 PROCEDURE — 87799 DETECT AGENT NOS DNA QUANT: CPT | Performed by: INTERNAL MEDICINE

## 2018-03-20 PROCEDURE — 85025 COMPLETE CBC W/AUTO DIFF WBC: CPT | Performed by: INTERNAL MEDICINE

## 2018-03-20 PROCEDURE — 81001 URINALYSIS AUTO W/SCOPE: CPT | Performed by: INTERNAL MEDICINE

## 2018-03-20 PROCEDURE — 80048 BASIC METABOLIC PNL TOTAL CA: CPT | Performed by: INTERNAL MEDICINE

## 2018-03-20 PROCEDURE — 80197 ASSAY OF TACROLIMUS: CPT | Performed by: INTERNAL MEDICINE

## 2018-03-20 PROCEDURE — 36415 COLL VENOUS BLD VENIPUNCTURE: CPT | Performed by: INTERNAL MEDICINE

## 2018-03-20 PROCEDURE — 82150 ASSAY OF AMYLASE: CPT | Performed by: INTERNAL MEDICINE

## 2018-03-20 PROCEDURE — 83690 ASSAY OF LIPASE: CPT | Performed by: INTERNAL MEDICINE

## 2018-03-20 NOTE — TELEPHONE ENCOUNTER
Patient left a voicemail message on 3/20/18 at 11:29 am stating she needs new lab orders. Patient would like a call back.

## 2018-03-20 NOTE — TELEPHONE ENCOUNTER
To schedulers: Please schedule  for lst available endocrine or diabetes NDI.     Geovanna Rome MD  Endocrine triage    ----- Message from Nazia Ayala RN sent at 3/20/2018 11:23 AM CDT -----  Regarding: FW: Endocrinology appt referral  We don't schedule for Shayla and [facility] isn't taking news.  Are we to  Schedule this patient in Adult Endocrine or is Dr Mcguire needed first? I don't know what to do with this   ----- Message -----     From: Tamiko Kaiser     Sent: 3/20/2018  11:06 AM       To: [facility] Triage-  Subject: FW: Endocrinology appt                            pls see below message and orders to schedule pt  ----- Message -----     From: Denia Kelly, KYA     Sent: 3/20/2018  10:47 AM       To: Tamiko Kaiser  Subject: Endocrinology appt                               Can you please schedule her with transplant endocrinology, either Dr Mcguire or Dr Spaulding. Needs to be seen for hypoglycemia s/p kidney/pancreas transplant.     Thanks,  Denia

## 2018-03-20 NOTE — TELEPHONE ENCOUNTER
Mauricio Marquez MD Ylitalo, Kim Michelle, RN                     See if she would be interested in changing to tacrolimus extended release.  She should be seen by Dr. Mcguire for her hypoglycemia.     Let's discuss Wednesday once she replies on once daily tacrolimus.       Spoke with patient, she is willing to try envarsus.   Suggested to patient that she see Transplant Endocrinologist for hypoglycemia, referral placed, will message  to schedule appt.   She is repeating labs today. She still has not checked her blood sugars, reminded pt to do so.   Will discuss in pancreas meeting tomorrow.

## 2018-03-21 DIAGNOSIS — Z94.0 KIDNEY REPLACED BY TRANSPLANT: ICD-10-CM

## 2018-03-21 DIAGNOSIS — Z94.83 PANCREAS REPLACED BY TRANSPLANT (H): Primary | ICD-10-CM

## 2018-03-21 LAB
AMYLASE SERPL-CCNC: 91 U/L (ref 30–110)
ANION GAP SERPL CALCULATED.3IONS-SCNC: 6 MMOL/L (ref 3–14)
BUN SERPL-MCNC: 24 MG/DL (ref 7–30)
CALCIUM SERPL-MCNC: 8.2 MG/DL (ref 8.5–10.1)
CHLORIDE SERPL-SCNC: 110 MMOL/L (ref 94–109)
CO2 SERPL-SCNC: 23 MMOL/L (ref 20–32)
CREAT SERPL-MCNC: 1.17 MG/DL (ref 0.52–1.04)
EBV DNA # SPEC NAA+PROBE: <500 {COPIES}/ML
EBV DNA SPEC NAA+PROBE-LOG#: <2.7 {LOG_COPIES}/ML
GFR SERPL CREATININE-BSD FRML MDRD: 50 ML/MIN/1.7M2
GLUCOSE SERPL-MCNC: 94 MG/DL (ref 70–99)
POTASSIUM SERPL-SCNC: 4 MMOL/L (ref 3.4–5.3)
SODIUM SERPL-SCNC: 139 MMOL/L (ref 133–144)
TACROLIMUS BLD-MCNC: 5.5 UG/L (ref 5–15)
TME LAST DOSE: NORMAL H

## 2018-03-21 NOTE — TELEPHONE ENCOUNTER
"Patient reviewed in PANCREAS review meeting, SOT nephrology, surgeons, RNCC present    ISSUE:   Patient c/o generalized body \"pain\", nausea, feeling unwell with increased dose of tacrolimus up to 1.5 mg BID to get levels to goal  Also was having episodes of hypoglycemia at the same time    PLAN:  Change from tacrolimus to envarsus 2.25 mg daily  Hypoglycemia likely not related to tac dose, pt should monitor blood sugars at home and report numbers to transplant team. F/u w/ Dr Mcguire if appointment available.     Called patient and explained plan. Once she receives envarsus, she can stop tacrolimus and start envarsus the next day. Pt in agreement with plan.   Instructed her to get labs done next week and then weekly x 4 once change made to envarsus (total weekly x5)  Pt agreeable to taking tacrolimus 1mg every morning and 1.5 mg every evening until envarsus arrives    LPN TASK:   Send rx for envarsus 2.25mg daily  Send lab order for bmp, cbc, amylase, lipase, tacrolimus level weekly x5           "

## 2018-03-22 ENCOUNTER — TELEPHONE (OUTPATIENT)
Dept: TRANSPLANT | Facility: CLINIC | Age: 43
End: 2018-03-22

## 2018-03-22 NOTE — TELEPHONE ENCOUNTER
[facility] Prior Authorization Team   Phone: [phone]  Fax: [phone]    PA Initiation    Medication: ENVARSUS XR 0.75 MG 24 hr tablet   Insurance Company: [employer] - Phone [phone] Fax [phone]  Pharmacy Filling the Rx: [facility] - [place], [place] - [address]  Filling Pharmacy Phone: [phone]  Filling Pharmacy Fax: [phone]  Start Date: 3/22/2018

## 2018-03-26 NOTE — TELEPHONE ENCOUNTER
[facility] Prior Authorization Team   Phone: [phone]  Fax: [phone]    Prior Authorization Approval    Authorization Effective Date: 3/22/2018  Authorization Expiration Date: 3/22/2019  Medication: ENVARSUS XR 0.75 MG 24 hr tablet   Approved Dose/Quantity: Take 3 tablets (2.25 mg) by mouth every morning (before breakfast) / #90  Reference #: CMM KEY#: [id number]   Insurance Company: [employer] - Phone [phone] Fax [phone]  Expected CoPay: $0.00     CoPay Card Available:      Foundation Assistance Needed:    Which Pharmacy is filling the prescription (Not needed for infusion/clinic administered): [facility] - [place], [place] - [facility] [address]  Pharmacy Notified: Yes  Patient Notified: Yes    Approval letter not available at this time.

## 2018-04-12 ENCOUNTER — TELEPHONE (OUTPATIENT)
Dept: TRANSPLANT | Facility: CLINIC | Age: 43
End: 2018-04-12

## 2018-04-12 DIAGNOSIS — Z94.0 KIDNEY REPLACED BY TRANSPLANT: ICD-10-CM

## 2018-04-12 DIAGNOSIS — Z94.83 PANCREAS REPLACED BY TRANSPLANT (H): ICD-10-CM

## 2018-04-12 LAB
BASOPHILS # BLD AUTO: 0 10E9/L (ref 0–0.2)
BASOPHILS NFR BLD AUTO: 0.6 %
DIFFERENTIAL METHOD BLD: ABNORMAL
EOSINOPHIL # BLD AUTO: 0.1 10E9/L (ref 0–0.7)
EOSINOPHIL NFR BLD AUTO: 2.1 %
ERYTHROCYTE [DISTWIDTH] IN BLOOD BY AUTOMATED COUNT: 13.2 % (ref 10–15)
HCT VFR BLD AUTO: 32.8 % (ref 35–47)
HGB BLD-MCNC: 10.1 G/DL (ref 11.7–15.7)
LIPASE SERPL-CCNC: 136 U/L (ref 73–393)
LYMPHOCYTES # BLD AUTO: 0.6 10E9/L (ref 0.8–5.3)
LYMPHOCYTES NFR BLD AUTO: 18.1 %
MCH RBC QN AUTO: 30.2 PG (ref 26.5–33)
MCHC RBC AUTO-ENTMCNC: 30.8 G/DL (ref 31.5–36.5)
MCV RBC AUTO: 98 FL (ref 78–100)
MONOCYTES # BLD AUTO: 0.3 10E9/L (ref 0–1.3)
MONOCYTES NFR BLD AUTO: 7.7 %
NEUTROPHILS # BLD AUTO: 2.3 10E9/L (ref 1.6–8.3)
NEUTROPHILS NFR BLD AUTO: 71.5 %
PLATELET # BLD AUTO: 214 10E9/L (ref 150–450)
RBC # BLD AUTO: 3.34 10E12/L (ref 3.8–5.2)
TACROLIMUS BLD-MCNC: 3.8 UG/L (ref 5–15)
TME LAST DOSE: ABNORMAL H
WBC # BLD AUTO: 3.3 10E9/L (ref 4–11)

## 2018-04-12 PROCEDURE — 85025 COMPLETE CBC W/AUTO DIFF WBC: CPT | Performed by: INTERNAL MEDICINE

## 2018-04-12 PROCEDURE — 36415 COLL VENOUS BLD VENIPUNCTURE: CPT | Performed by: INTERNAL MEDICINE

## 2018-04-12 PROCEDURE — 82150 ASSAY OF AMYLASE: CPT | Performed by: INTERNAL MEDICINE

## 2018-04-12 PROCEDURE — 83690 ASSAY OF LIPASE: CPT | Performed by: INTERNAL MEDICINE

## 2018-04-12 PROCEDURE — 80197 ASSAY OF TACROLIMUS: CPT | Performed by: INTERNAL MEDICINE

## 2018-04-12 PROCEDURE — 80048 BASIC METABOLIC PNL TOTAL CA: CPT | Performed by: INTERNAL MEDICINE

## 2018-04-12 NOTE — TELEPHONE ENCOUNTER
Patient Call: Patient Call: Medication Clarification  Route to LPN  Name of Medication: Tacro was refilled and also had labs drawn today 04/12/18 labs should be done by Friday 04/13/18   Dose: 1.0mg  Call back needed? No

## 2018-04-13 LAB
AMYLASE SERPL-CCNC: 85 U/L (ref 30–110)
ANION GAP SERPL CALCULATED.3IONS-SCNC: 9 MMOL/L (ref 3–14)
BUN SERPL-MCNC: 26 MG/DL (ref 7–30)
CALCIUM SERPL-MCNC: 8.9 MG/DL (ref 8.5–10.1)
CHLORIDE SERPL-SCNC: 111 MMOL/L (ref 94–109)
CO2 SERPL-SCNC: 20 MMOL/L (ref 20–32)
CREAT SERPL-MCNC: 1.11 MG/DL (ref 0.52–1.04)
GFR SERPL CREATININE-BSD FRML MDRD: 53 ML/MIN/1.7M2
GLUCOSE SERPL-MCNC: 84 MG/DL (ref 70–99)
POTASSIUM SERPL-SCNC: 4.6 MMOL/L (ref 3.4–5.3)
SODIUM SERPL-SCNC: 140 MMOL/L (ref 133–144)

## 2018-04-19 ENCOUNTER — TELEPHONE (OUTPATIENT)
Dept: TRANSPLANT | Facility: CLINIC | Age: 43
End: 2018-04-19

## 2018-04-19 DIAGNOSIS — Z94.0 KIDNEY REPLACED BY TRANSPLANT: ICD-10-CM

## 2018-04-19 DIAGNOSIS — Z94.83 PANCREAS REPLACED BY TRANSPLANT (H): ICD-10-CM

## 2018-04-19 NOTE — TELEPHONE ENCOUNTER
Patient called to follow up with the medication clarification and lab results from 4/12/18 patient stated she never received a return call back. Patient would like a call today.

## 2018-04-19 NOTE — TELEPHONE ENCOUNTER
Call returned to patient: Patient concerned regarding her recent lab results. Tacrolimus level 3.8 ( goal 5-8) and creatinine 1.11. Patient confirms current Envarsus dose of 2.25 mg QD and accurate trough level. Patient would like to know if she should  Increase her dose?

## 2018-04-19 NOTE — TELEPHONE ENCOUNTER
ISSUE:  Tacrolimus level 3.8.  Goal 6-8    PLAN:   Call pt to review medication and labs.    Pt to increase dose to 3 mg daily and recheck lab 4/23/18.  Pt to take extra .75 mg envarsus this morning.      OUTCOME:   Unable to reach pt.  Left message for pt to call back so we could review dose changes.      Pt returned call.  Reviewed plan with pt.  She v/u.  Pt also states she has been retaining water, and is up about 10 lbs.  Pt will check blood pressure this weekend and f/u.

## 2018-04-23 ENCOUNTER — CARE COORDINATION (OUTPATIENT)
Dept: NEPHROLOGY | Facility: CLINIC | Age: 43
End: 2018-04-23

## 2018-04-23 NOTE — PROGRESS NOTES
Called patient to follow up on BP / edema concerns. Said she's doing better and has no concerns or questions at this time. Feels this was resolved after speaking with coordinator last week.    Ana Jerome RN

## 2018-05-02 DIAGNOSIS — Z79.899 OTHER LONG TERM (CURRENT) DRUG THERAPY: ICD-10-CM

## 2018-05-02 DIAGNOSIS — Z94.83 PANCREAS REPLACED BY TRANSPLANT (H): ICD-10-CM

## 2018-05-02 DIAGNOSIS — Z94.0 KIDNEY REPLACED BY TRANSPLANT: ICD-10-CM

## 2018-05-02 LAB
AMYLASE SERPL-CCNC: 75 U/L (ref 30–110)
ANION GAP SERPL CALCULATED.3IONS-SCNC: 7 MMOL/L (ref 3–14)
BASOPHILS # BLD AUTO: 0 10E9/L (ref 0–0.2)
BASOPHILS NFR BLD AUTO: 0.6 %
BUN SERPL-MCNC: 30 MG/DL (ref 7–30)
CALCIUM SERPL-MCNC: 8.8 MG/DL (ref 8.5–10.1)
CHLORIDE SERPL-SCNC: 114 MMOL/L (ref 94–109)
CHOLEST SERPL-MCNC: 128 MG/DL
CO2 SERPL-SCNC: 21 MMOL/L (ref 20–32)
CREAT SERPL-MCNC: 1.29 MG/DL (ref 0.52–1.04)
DIFFERENTIAL METHOD BLD: ABNORMAL
EOSINOPHIL # BLD AUTO: 0.1 10E9/L (ref 0–0.7)
EOSINOPHIL NFR BLD AUTO: 3.2 %
ERYTHROCYTE [DISTWIDTH] IN BLOOD BY AUTOMATED COUNT: 12.5 % (ref 10–15)
GFR SERPL CREATININE-BSD FRML MDRD: 45 ML/MIN/1.7M2
GLUCOSE SERPL-MCNC: 79 MG/DL (ref 70–99)
HBA1C MFR BLD: 5.1 % (ref 0–5.6)
HCT VFR BLD AUTO: 33.3 % (ref 35–47)
HDLC SERPL-MCNC: 79 MG/DL
HGB BLD-MCNC: 10.7 G/DL (ref 11.7–15.7)
LDLC SERPL CALC-MCNC: 39 MG/DL
LIPASE SERPL-CCNC: 220 U/L (ref 73–393)
LYMPHOCYTES # BLD AUTO: 0.6 10E9/L (ref 0.8–5.3)
LYMPHOCYTES NFR BLD AUTO: 17.5 %
MCH RBC QN AUTO: 31 PG (ref 26.5–33)
MCHC RBC AUTO-ENTMCNC: 32.1 G/DL (ref 31.5–36.5)
MCV RBC AUTO: 97 FL (ref 78–100)
MONOCYTES # BLD AUTO: 0.3 10E9/L (ref 0–1.3)
MONOCYTES NFR BLD AUTO: 7.6 %
NEUTROPHILS # BLD AUTO: 2.4 10E9/L (ref 1.6–8.3)
NEUTROPHILS NFR BLD AUTO: 71.1 %
NONHDLC SERPL-MCNC: 49 MG/DL
PLATELET # BLD AUTO: 191 10E9/L (ref 150–450)
POTASSIUM SERPL-SCNC: 4.8 MMOL/L (ref 3.4–5.3)
RBC # BLD AUTO: 3.45 10E12/L (ref 3.8–5.2)
SODIUM SERPL-SCNC: 142 MMOL/L (ref 133–144)
TACROLIMUS BLD-MCNC: 15.5 UG/L (ref 5–15)
TME LAST DOSE: ABNORMAL H
TRIGL SERPL-MCNC: 49 MG/DL
WBC # BLD AUTO: 3.4 10E9/L (ref 4–11)

## 2018-05-02 PROCEDURE — 80061 LIPID PANEL: CPT | Performed by: INTERNAL MEDICINE

## 2018-05-02 PROCEDURE — 80197 ASSAY OF TACROLIMUS: CPT | Performed by: INTERNAL MEDICINE

## 2018-05-02 PROCEDURE — 82150 ASSAY OF AMYLASE: CPT | Performed by: INTERNAL MEDICINE

## 2018-05-02 PROCEDURE — 83690 ASSAY OF LIPASE: CPT | Performed by: INTERNAL MEDICINE

## 2018-05-02 PROCEDURE — 36415 COLL VENOUS BLD VENIPUNCTURE: CPT | Performed by: INTERNAL MEDICINE

## 2018-05-02 PROCEDURE — 83036 HEMOGLOBIN GLYCOSYLATED A1C: CPT | Performed by: INTERNAL MEDICINE

## 2018-05-02 PROCEDURE — 87799 DETECT AGENT NOS DNA QUANT: CPT | Performed by: INTERNAL MEDICINE

## 2018-05-02 PROCEDURE — 80048 BASIC METABOLIC PNL TOTAL CA: CPT | Performed by: INTERNAL MEDICINE

## 2018-05-02 PROCEDURE — 85025 COMPLETE CBC W/AUTO DIFF WBC: CPT | Performed by: INTERNAL MEDICINE

## 2018-05-03 ENCOUNTER — TELEPHONE (OUTPATIENT)
Dept: TRANSPLANT | Facility: CLINIC | Age: 43
End: 2018-05-03

## 2018-05-03 DIAGNOSIS — Z94.0 KIDNEY REPLACED BY TRANSPLANT: Primary | ICD-10-CM

## 2018-05-03 NOTE — TELEPHONE ENCOUNTER
Patient returned call: States that she's up and down with her water weight 2-3 lbs a day; has increased body pains; confirms current dose and accurate 24 hour trough level. Patient verbalize understanding to repeat her lab early next week. However would like to discuss dose change and body ache with RNCC. Order placed

## 2018-05-03 NOTE — TELEPHONE ENCOUNTER
Call placed to patient: No answer. Voice message left with instructions listed below. Will try back

## 2018-05-03 NOTE — TELEPHONE ENCOUNTER
Tacrolimus level 15.5, reported as 39-minute level (peak)  Goal 12-hour level 8 (trough)  Slightly increased creatinine, amylase, lipase    PLAN:  Confirm that tac level is, in fact, a peak and not a trough  Repeat tac level taking care to get a good 12-hour level (11-13 hours)  Repeat creatinine, amylase, lipase    LPN TASK:  Call with questions and instructions to repeat labs early next week.

## 2018-05-04 LAB
BKV DNA # SPEC NAA+PROBE: NORMAL COPIES/ML
BKV DNA SPEC NAA+PROBE-LOG#: NORMAL LOG COPIES/ML
SPECIMEN SOURCE: NORMAL

## 2018-05-04 NOTE — TELEPHONE ENCOUNTER
Patient reports no fevers, no fatigue. Only body aches. She states that she has had these body aches previously with high drug levels. She does report that she may have been dosing envarsus AM and PM rather than just PM.    No dose change at this time, as Renetta has corrected her dosing back to daily. Will recheck labs next week, and if her body aches continue, she will notify SOT and we can test viral panels.    Renetta voiced understanding.

## 2018-05-04 NOTE — TELEPHONE ENCOUNTER
RNCC left  for Rose to call back due to body pains:  No recent CMV or EBV, will check with next lab draw.  If any s/s of UTI, will obtain UA/UC, due to rise in creatinine, although no elevation in WBCs.  Will obtain DSA although there is no previous DSA, cross match: Donor specific antibody present: 1-29-13  DPB1*04:02/647     Last drug level appears to be an accurate trough, current dose envarsus = 3mg daily. Will reduce dose back to previous: 2.25mg daily.    Orders entered.

## 2018-05-10 DIAGNOSIS — Z94.0 KIDNEY REPLACED BY TRANSPLANT: ICD-10-CM

## 2018-05-10 DIAGNOSIS — Z94.83 PANCREAS REPLACED BY TRANSPLANT (H): ICD-10-CM

## 2018-05-10 DIAGNOSIS — B25.9 CMV (CYTOMEGALOVIRUS INFECTION) (H): ICD-10-CM

## 2018-05-10 LAB
AMYLASE SERPL-CCNC: 83 U/L (ref 30–110)
ANION GAP SERPL CALCULATED.3IONS-SCNC: 10 MMOL/L (ref 3–14)
BASOPHILS # BLD AUTO: 0 10E9/L (ref 0–0.2)
BASOPHILS NFR BLD AUTO: 1 %
BUN SERPL-MCNC: 22 MG/DL (ref 7–30)
CALCIUM SERPL-MCNC: 9.3 MG/DL (ref 8.5–10.1)
CHLORIDE SERPL-SCNC: 111 MMOL/L (ref 94–109)
CO2 SERPL-SCNC: 22 MMOL/L (ref 20–32)
CREAT SERPL-MCNC: 1.34 MG/DL (ref 0.52–1.04)
DIFFERENTIAL METHOD BLD: ABNORMAL
EOSINOPHIL # BLD AUTO: 0.1 10E9/L (ref 0–0.7)
EOSINOPHIL NFR BLD AUTO: 3.5 %
ERYTHROCYTE [DISTWIDTH] IN BLOOD BY AUTOMATED COUNT: 12.5 % (ref 10–15)
GFR SERPL CREATININE-BSD FRML MDRD: 43 ML/MIN/1.7M2
GLUCOSE SERPL-MCNC: 78 MG/DL (ref 70–99)
HCT VFR BLD AUTO: 34 % (ref 35–47)
HGB BLD-MCNC: 10.8 G/DL (ref 11.7–15.7)
LIPASE SERPL-CCNC: 111 U/L (ref 73–393)
LYMPHOCYTES # BLD AUTO: 0.5 10E9/L (ref 0.8–5.3)
LYMPHOCYTES NFR BLD AUTO: 18.5 %
MCH RBC QN AUTO: 30.7 PG (ref 26.5–33)
MCHC RBC AUTO-ENTMCNC: 31.8 G/DL (ref 31.5–36.5)
MCV RBC AUTO: 97 FL (ref 78–100)
MONOCYTES # BLD AUTO: 0.2 10E9/L (ref 0–1.3)
MONOCYTES NFR BLD AUTO: 6.6 %
NEUTROPHILS # BLD AUTO: 2 10E9/L (ref 1.6–8.3)
NEUTROPHILS NFR BLD AUTO: 70.4 %
PLATELET # BLD AUTO: 209 10E9/L (ref 150–450)
POTASSIUM SERPL-SCNC: 4.4 MMOL/L (ref 3.4–5.3)
RBC # BLD AUTO: 3.52 10E12/L (ref 3.8–5.2)
SODIUM SERPL-SCNC: 143 MMOL/L (ref 133–144)
TACROLIMUS BLD-MCNC: 6.8 UG/L (ref 5–15)
TME LAST DOSE: NORMAL H
WBC # BLD AUTO: 2.9 10E9/L (ref 4–11)

## 2018-05-10 PROCEDURE — 82150 ASSAY OF AMYLASE: CPT | Performed by: INTERNAL MEDICINE

## 2018-05-10 PROCEDURE — 80197 ASSAY OF TACROLIMUS: CPT | Performed by: INTERNAL MEDICINE

## 2018-05-10 PROCEDURE — 36415 COLL VENOUS BLD VENIPUNCTURE: CPT | Performed by: INTERNAL MEDICINE

## 2018-05-10 PROCEDURE — 80048 BASIC METABOLIC PNL TOTAL CA: CPT | Performed by: INTERNAL MEDICINE

## 2018-05-10 PROCEDURE — 85025 COMPLETE CBC W/AUTO DIFF WBC: CPT | Performed by: INTERNAL MEDICINE

## 2018-05-10 PROCEDURE — 83690 ASSAY OF LIPASE: CPT | Performed by: INTERNAL MEDICINE

## 2018-05-10 RX ORDER — VALGANCICLOVIR 450 MG/1
TABLET, FILM COATED ORAL
Qty: 180 TABLET | Refills: 3 | OUTPATIENT
Start: 2018-05-10

## 2018-05-11 ENCOUNTER — TELEPHONE (OUTPATIENT)
Dept: TRANSPLANT | Facility: CLINIC | Age: 43
End: 2018-05-11

## 2018-05-11 NOTE — TELEPHONE ENCOUNTER
Call placed to patient: Patient verbalize understanding to improve hydration and continue with current lab schedule otherwise no changes.

## 2018-05-15 DIAGNOSIS — Z94.0 KIDNEY REPLACED BY TRANSPLANT: ICD-10-CM

## 2018-05-15 DIAGNOSIS — Z94.83 PANCREAS REPLACED BY TRANSPLANT (H): ICD-10-CM

## 2018-05-15 LAB
BASOPHILS # BLD AUTO: 0 10E9/L (ref 0–0.2)
BASOPHILS NFR BLD AUTO: 0.6 %
DIFFERENTIAL METHOD BLD: ABNORMAL
EOSINOPHIL # BLD AUTO: 0.2 10E9/L (ref 0–0.7)
EOSINOPHIL NFR BLD AUTO: 5.5 %
ERYTHROCYTE [DISTWIDTH] IN BLOOD BY AUTOMATED COUNT: 12.2 % (ref 10–15)
HCT VFR BLD AUTO: 31.8 % (ref 35–47)
HGB BLD-MCNC: 10.1 G/DL (ref 11.7–15.7)
LIPASE SERPL-CCNC: 150 U/L (ref 73–393)
LYMPHOCYTES # BLD AUTO: 0.6 10E9/L (ref 0.8–5.3)
LYMPHOCYTES NFR BLD AUTO: 18.3 %
MCH RBC QN AUTO: 30.5 PG (ref 26.5–33)
MCHC RBC AUTO-ENTMCNC: 31.8 G/DL (ref 31.5–36.5)
MCV RBC AUTO: 96 FL (ref 78–100)
MONOCYTES # BLD AUTO: 0.2 10E9/L (ref 0–1.3)
MONOCYTES NFR BLD AUTO: 7.4 %
NEUTROPHILS # BLD AUTO: 2.1 10E9/L (ref 1.6–8.3)
NEUTROPHILS NFR BLD AUTO: 68.2 %
PLATELET # BLD AUTO: 157 10E9/L (ref 150–450)
RBC # BLD AUTO: 3.31 10E12/L (ref 3.8–5.2)
TACROLIMUS BLD-MCNC: 6.1 UG/L (ref 5–15)
TME LAST DOSE: NORMAL H
WBC # BLD AUTO: 3.1 10E9/L (ref 4–11)

## 2018-05-15 PROCEDURE — 80048 BASIC METABOLIC PNL TOTAL CA: CPT | Performed by: INTERNAL MEDICINE

## 2018-05-15 PROCEDURE — 80197 ASSAY OF TACROLIMUS: CPT | Performed by: INTERNAL MEDICINE

## 2018-05-15 PROCEDURE — 83690 ASSAY OF LIPASE: CPT | Performed by: INTERNAL MEDICINE

## 2018-05-15 PROCEDURE — 82150 ASSAY OF AMYLASE: CPT | Performed by: INTERNAL MEDICINE

## 2018-05-15 PROCEDURE — 36415 COLL VENOUS BLD VENIPUNCTURE: CPT | Performed by: INTERNAL MEDICINE

## 2018-05-15 PROCEDURE — 85025 COMPLETE CBC W/AUTO DIFF WBC: CPT | Performed by: INTERNAL MEDICINE

## 2018-05-16 LAB
AMYLASE SERPL-CCNC: 77 U/L (ref 30–110)
ANION GAP SERPL CALCULATED.3IONS-SCNC: 11 MMOL/L (ref 3–14)
BUN SERPL-MCNC: 25 MG/DL (ref 7–30)
CALCIUM SERPL-MCNC: 8.6 MG/DL (ref 8.5–10.1)
CHLORIDE SERPL-SCNC: 111 MMOL/L (ref 94–109)
CO2 SERPL-SCNC: 20 MMOL/L (ref 20–32)
CREAT SERPL-MCNC: 1.21 MG/DL (ref 0.52–1.04)
GFR SERPL CREATININE-BSD FRML MDRD: 48 ML/MIN/1.7M2
GLUCOSE SERPL-MCNC: 72 MG/DL (ref 70–99)
POTASSIUM SERPL-SCNC: 4.6 MMOL/L (ref 3.4–5.3)
SODIUM SERPL-SCNC: 142 MMOL/L (ref 133–144)

## 2018-05-21 DIAGNOSIS — Z94.83 PANCREAS TRANSPLANTED (H): ICD-10-CM

## 2018-05-21 DIAGNOSIS — B25.9 CMV (CYTOMEGALOVIRUS INFECTION) (H): Primary | ICD-10-CM

## 2018-05-21 DIAGNOSIS — Z94.0 KIDNEY TRANSPLANTED: ICD-10-CM

## 2018-05-21 DIAGNOSIS — Z94.0 KIDNEY TRANSPLANTED: Primary | ICD-10-CM

## 2018-05-21 RX ORDER — VALGANCICLOVIR 450 MG/1
450 TABLET, FILM COATED ORAL 2 TIMES DAILY
Qty: 180 TABLET | Refills: 3 | OUTPATIENT
Start: 2018-05-21

## 2018-05-21 NOTE — TELEPHONE ENCOUNTER
Valcyte refill received.  CMV labs reviewed with Dr Marquez.  Per Dr Marquez, pt can stop Valcyte and cont getting CMV checked quarterly.      RNCC spoke with pt letting her know the above plan.  Pt will stop valcyte.  Order updated in pt chart to have CMV lab checked quarterly.  Pt educated to call if she experiences and s/s of CMV so we can check level sooner.      Pt questions answered, pt v/u.      Mauricio Marquez MD Etcheverry, Katherine, RN                   Yes, okay to stop Valcyte.            Previous Messages       ----- Message -----      From: Gretta Rincon, RN      Sent: 5/21/2018  11:07 AM        To: Mauricio Marquez MD   Subject: Valcyte                                           Hi Dr Marquez   Rose is requesting a refill on her Valcyte.  It looks like when you met with her 2/2018 her CMV was negative.  In your note it said as long as her checks were negative we could stop Valcyte.  Her 2/21/18 and 3/8/18 check her level has been <137.  She is coming in on 5/29 for labs again.  I will add on for another CMV check.  If still negative, are we OK to stop Valcyte?  Do you want to cont quarterly checks for CMV?

## 2018-05-21 NOTE — TELEPHONE ENCOUNTER
Health Call Center    Phone Message    May a detailed message be left on voicemail: yes    Reason for Call: Medication Refill Request    Has the patient contacted the pharmacy for the refill? Yes   Name of medication being requested: valGANciclovir (VALCYTE) 450 MG tablet (Per Anel from [facility] - Refill was voided due to patient needing appt with MD. Patient stated she already has one scheduled for 8/21/2018. Please send approval to fax# [phone]. Anel also stated that it will take about a week once RX is received, therefore can an additional prescription be sent to a local pharmacy for patient to . Therefore she does not miss her dose. Please follow up)    Provider who prescribed the medication: Dr. Marquez  Pharmacy: [facility]  Date medication is needed: 5/21/2018    Action Taken: Message routed to:  [facility] ([facility] Nephrology

## 2018-05-29 DIAGNOSIS — Z94.0 KIDNEY REPLACED BY TRANSPLANT: ICD-10-CM

## 2018-05-29 DIAGNOSIS — Z94.83 PANCREAS TRANSPLANTED (H): ICD-10-CM

## 2018-05-29 DIAGNOSIS — Z94.0 KIDNEY TRANSPLANTED: ICD-10-CM

## 2018-05-29 DIAGNOSIS — Z94.83 PANCREAS REPLACED BY TRANSPLANT (H): ICD-10-CM

## 2018-05-29 LAB
AMYLASE SERPL-CCNC: 100 U/L (ref 30–110)
ANION GAP SERPL CALCULATED.3IONS-SCNC: 11 MMOL/L (ref 3–14)
BASOPHILS # BLD AUTO: 0 10E9/L (ref 0–0.2)
BASOPHILS NFR BLD AUTO: 0.6 %
BUN SERPL-MCNC: 28 MG/DL (ref 7–30)
CALCIUM SERPL-MCNC: 8.9 MG/DL (ref 8.5–10.1)
CHLORIDE SERPL-SCNC: 113 MMOL/L (ref 94–109)
CO2 SERPL-SCNC: 21 MMOL/L (ref 20–32)
CREAT SERPL-MCNC: 1.27 MG/DL (ref 0.52–1.04)
DIFFERENTIAL METHOD BLD: ABNORMAL
EOSINOPHIL # BLD AUTO: 0.2 10E9/L (ref 0–0.7)
EOSINOPHIL NFR BLD AUTO: 5.6 %
ERYTHROCYTE [DISTWIDTH] IN BLOOD BY AUTOMATED COUNT: 11.9 % (ref 10–15)
GFR SERPL CREATININE-BSD FRML MDRD: 46 ML/MIN/1.7M2
GLUCOSE SERPL-MCNC: 84 MG/DL (ref 70–99)
HCT VFR BLD AUTO: 34.1 % (ref 35–47)
HGB BLD-MCNC: 10.8 G/DL (ref 11.7–15.7)
LIPASE SERPL-CCNC: 362 U/L (ref 73–393)
LYMPHOCYTES # BLD AUTO: 0.6 10E9/L (ref 0.8–5.3)
LYMPHOCYTES NFR BLD AUTO: 17.9 %
MCH RBC QN AUTO: 30.5 PG (ref 26.5–33)
MCHC RBC AUTO-ENTMCNC: 31.7 G/DL (ref 31.5–36.5)
MCV RBC AUTO: 96 FL (ref 78–100)
MONOCYTES # BLD AUTO: 0.5 10E9/L (ref 0–1.3)
MONOCYTES NFR BLD AUTO: 12.9 %
NEUTROPHILS # BLD AUTO: 2.3 10E9/L (ref 1.6–8.3)
NEUTROPHILS NFR BLD AUTO: 63 %
PLATELET # BLD AUTO: 196 10E9/L (ref 150–450)
POTASSIUM SERPL-SCNC: 4.8 MMOL/L (ref 3.4–5.3)
RBC # BLD AUTO: 3.54 10E12/L (ref 3.8–5.2)
SODIUM SERPL-SCNC: 145 MMOL/L (ref 133–144)
WBC # BLD AUTO: 3.6 10E9/L (ref 4–11)

## 2018-05-29 PROCEDURE — 85025 COMPLETE CBC W/AUTO DIFF WBC: CPT | Performed by: INTERNAL MEDICINE

## 2018-05-29 PROCEDURE — 83690 ASSAY OF LIPASE: CPT | Performed by: INTERNAL MEDICINE

## 2018-05-29 PROCEDURE — 82150 ASSAY OF AMYLASE: CPT | Performed by: INTERNAL MEDICINE

## 2018-05-29 PROCEDURE — 80048 BASIC METABOLIC PNL TOTAL CA: CPT | Performed by: INTERNAL MEDICINE

## 2018-05-29 PROCEDURE — 36415 COLL VENOUS BLD VENIPUNCTURE: CPT | Performed by: INTERNAL MEDICINE

## 2018-05-29 PROCEDURE — 80197 ASSAY OF TACROLIMUS: CPT | Performed by: INTERNAL MEDICINE

## 2018-05-30 ENCOUNTER — TELEPHONE (OUTPATIENT)
Dept: TRANSPLANT | Facility: CLINIC | Age: 43
End: 2018-05-30

## 2018-05-30 DIAGNOSIS — Z94.83 PANCREAS TRANSPLANTED (H): ICD-10-CM

## 2018-05-30 DIAGNOSIS — Z94.0 KIDNEY TRANSPLANTED: Primary | ICD-10-CM

## 2018-05-30 LAB
CMV DNA SPEC NAA+PROBE-ACNC: 208 [IU]/ML
CMV DNA SPEC NAA+PROBE-LOG#: 2.3 {LOG_IU}/ML
SPECIMEN SOURCE: ABNORMAL
TACROLIMUS BLD-MCNC: 5.8 UG/L (ref 5–15)
TME LAST DOSE: NORMAL H

## 2018-05-30 NOTE — TELEPHONE ENCOUNTER
ISSUE:  CMV elevated along with amylase and lipase    OUTCOME:   Reviewed results with pt.  Pt verbalized she is taking imodium and having BM every 2-3 days.  Pt will stop imodium and cont to hydrate 2-3L/day.  Pt to repeat labs next week.    Pt questions answered, pt v/u.     Jimmy, MD Mckenzie Brownlee Katherine, RN                   Slightly detectable CMV viremia and would recheck in a week, but hold off on treatment at this time.

## 2018-06-05 DIAGNOSIS — Z94.83 PANCREAS REPLACED BY TRANSPLANT (H): ICD-10-CM

## 2018-06-05 DIAGNOSIS — Z94.83 PANCREAS TRANSPLANTED (H): ICD-10-CM

## 2018-06-05 DIAGNOSIS — Z94.0 KIDNEY TRANSPLANTED: ICD-10-CM

## 2018-06-05 DIAGNOSIS — Z94.0 KIDNEY REPLACED BY TRANSPLANT: ICD-10-CM

## 2018-06-05 LAB
AMYLASE SERPL-CCNC: 75 U/L (ref 30–110)
ANION GAP SERPL CALCULATED.3IONS-SCNC: 10 MMOL/L (ref 3–14)
BUN SERPL-MCNC: 20 MG/DL (ref 7–30)
CALCIUM SERPL-MCNC: 8.9 MG/DL (ref 8.5–10.1)
CHLORIDE SERPL-SCNC: 110 MMOL/L (ref 94–109)
CO2 SERPL-SCNC: 24 MMOL/L (ref 20–32)
CREAT SERPL-MCNC: 1.23 MG/DL (ref 0.52–1.04)
GFR SERPL CREATININE-BSD FRML MDRD: 47 ML/MIN/1.7M2
GLUCOSE SERPL-MCNC: 83 MG/DL (ref 70–99)
LIPASE SERPL-CCNC: 119 U/L (ref 73–393)
POTASSIUM SERPL-SCNC: 4.3 MMOL/L (ref 3.4–5.3)
SODIUM SERPL-SCNC: 144 MMOL/L (ref 133–144)
TACROLIMUS BLD-MCNC: 4 UG/L (ref 5–15)
TME LAST DOSE: 1200 H

## 2018-06-05 PROCEDURE — 83690 ASSAY OF LIPASE: CPT | Performed by: INTERNAL MEDICINE

## 2018-06-05 PROCEDURE — 80197 ASSAY OF TACROLIMUS: CPT | Performed by: INTERNAL MEDICINE

## 2018-06-05 PROCEDURE — 36415 COLL VENOUS BLD VENIPUNCTURE: CPT | Performed by: INTERNAL MEDICINE

## 2018-06-05 PROCEDURE — 82150 ASSAY OF AMYLASE: CPT | Performed by: INTERNAL MEDICINE

## 2018-06-05 PROCEDURE — 80048 BASIC METABOLIC PNL TOTAL CA: CPT | Performed by: INTERNAL MEDICINE

## 2018-06-06 ENCOUNTER — TELEPHONE (OUTPATIENT)
Dept: TRANSPLANT | Facility: CLINIC | Age: 43
End: 2018-06-06

## 2018-06-06 DIAGNOSIS — Z94.0 KIDNEY TRANSPLANTED: Primary | ICD-10-CM

## 2018-06-06 NOTE — TELEPHONE ENCOUNTER
ISSUE:  Tacro 4 goal 5-8    OUTCOME:   Reviewed labs with pt.  Pt verbalized her tac got off schedule and wasn't a true trough.  Rest of labs reviewed with pt.  Pt will repeat Tacro and CMV level in 2 weeks.     Pt questions answered, pt v/u

## 2018-06-19 DIAGNOSIS — Z94.0 KIDNEY TRANSPLANTED: ICD-10-CM

## 2018-06-19 DIAGNOSIS — Z94.83 PANCREAS REPLACED BY TRANSPLANT (H): ICD-10-CM

## 2018-06-19 DIAGNOSIS — Z94.0 KIDNEY REPLACED BY TRANSPLANT: ICD-10-CM

## 2018-06-19 LAB
ERYTHROCYTE [DISTWIDTH] IN BLOOD BY AUTOMATED COUNT: 12.4 % (ref 10–15)
HCT VFR BLD AUTO: 31.1 % (ref 35–47)
HGB BLD-MCNC: 10 G/DL (ref 11.7–15.7)
LIPASE SERPL-CCNC: 149 U/L (ref 73–393)
MCH RBC QN AUTO: 30.5 PG (ref 26.5–33)
MCHC RBC AUTO-ENTMCNC: 32.2 G/DL (ref 31.5–36.5)
MCV RBC AUTO: 95 FL (ref 78–100)
PLATELET # BLD AUTO: 185 10E9/L (ref 150–450)
RBC # BLD AUTO: 3.28 10E12/L (ref 3.8–5.2)
WBC # BLD AUTO: 4.3 10E9/L (ref 4–11)

## 2018-06-19 PROCEDURE — 80048 BASIC METABOLIC PNL TOTAL CA: CPT | Performed by: INTERNAL MEDICINE

## 2018-06-19 PROCEDURE — 85027 COMPLETE CBC AUTOMATED: CPT | Performed by: INTERNAL MEDICINE

## 2018-06-19 PROCEDURE — 83690 ASSAY OF LIPASE: CPT | Performed by: INTERNAL MEDICINE

## 2018-06-19 PROCEDURE — 80197 ASSAY OF TACROLIMUS: CPT | Performed by: INTERNAL MEDICINE

## 2018-06-19 PROCEDURE — 82150 ASSAY OF AMYLASE: CPT | Performed by: INTERNAL MEDICINE

## 2018-06-19 PROCEDURE — 36415 COLL VENOUS BLD VENIPUNCTURE: CPT | Performed by: INTERNAL MEDICINE

## 2018-06-20 ENCOUNTER — TELEPHONE (OUTPATIENT)
Dept: TRANSPLANT | Facility: CLINIC | Age: 43
End: 2018-06-20

## 2018-06-20 DIAGNOSIS — Z94.83 PANCREAS TRANSPLANTED (H): ICD-10-CM

## 2018-06-20 DIAGNOSIS — Z94.83 PANCREAS REPLACED BY TRANSPLANT (H): ICD-10-CM

## 2018-06-20 DIAGNOSIS — Z94.0 KIDNEY REPLACED BY TRANSPLANT: ICD-10-CM

## 2018-06-20 DIAGNOSIS — Z94.0 KIDNEY TRANSPLANTED: Primary | ICD-10-CM

## 2018-06-20 LAB
AMYLASE SERPL-CCNC: 99 U/L (ref 30–110)
ANION GAP SERPL CALCULATED.3IONS-SCNC: 9 MMOL/L (ref 3–14)
BUN SERPL-MCNC: 23 MG/DL (ref 7–30)
CALCIUM SERPL-MCNC: 8.4 MG/DL (ref 8.5–10.1)
CHLORIDE SERPL-SCNC: 113 MMOL/L (ref 94–109)
CO2 SERPL-SCNC: 23 MMOL/L (ref 20–32)
CREAT SERPL-MCNC: 1.14 MG/DL (ref 0.52–1.04)
GFR SERPL CREATININE-BSD FRML MDRD: 52 ML/MIN/1.7M2
GLUCOSE SERPL-MCNC: 100 MG/DL (ref 70–99)
POTASSIUM SERPL-SCNC: 4.4 MMOL/L (ref 3.4–5.3)
SODIUM SERPL-SCNC: 145 MMOL/L (ref 133–144)
TACROLIMUS BLD-MCNC: 4.7 UG/L (ref 5–15)
TME LAST DOSE: ABNORMAL H

## 2018-06-20 NOTE — TELEPHONE ENCOUNTER
ISSUE:  Tacrolimus level 4.7  Goal 5-8    OUTCOME:   Reviewed labs with pt.  Pt confirms accurate trough and current Envarsus dose 3 mg QD.  Pt denies recent illnesses, missed doses or medication changes. Pt instructed to increase dose to 3.75 mg QD and will repeat labs in 1-2 weeks following dose change.   Pt questions answered, pt v/u.

## 2018-06-22 ENCOUNTER — TELEPHONE (OUTPATIENT)
Dept: TRANSPLANT | Facility: CLINIC | Age: 43
End: 2018-06-22

## 2018-06-22 DIAGNOSIS — Z94.0 KIDNEY REPLACED BY TRANSPLANT: ICD-10-CM

## 2018-06-22 DIAGNOSIS — Z94.83 PANCREAS REPLACED BY TRANSPLANT (H): Primary | ICD-10-CM

## 2018-06-22 NOTE — TELEPHONE ENCOUNTER
You sent dose change to 5 tabs daily with qty 120. We need rx for qty 150 for medicare.     Need for rush del today     Thank you  Yesi Diego [facility]

## 2018-06-22 NOTE — TELEPHONE ENCOUNTER
ISSUE:  Called pt to let her know her CMV was low level.  Pt verbalized she has some diarrhea the last week, but has since resolved.      PLAN:   Pt will repeat CMV in 1 month.  Pt has lab orders.      Pt questions answered, pt v/u.

## 2018-08-08 NOTE — TELEPHONE ENCOUNTER
Pt called an left a voice message asking what her c-diff results are.   I called back and left her a message on her cell VM letting her know that her C-diff results were negative   no vomiting/no fever/no diaphoresis/no dizziness/no nausea/no syncope/no congestion/no chills/no shortness of breath

## 2018-08-21 ENCOUNTER — OFFICE VISIT (OUTPATIENT)
Dept: NEPHROLOGY | Facility: CLINIC | Age: 43
End: 2018-08-21
Attending: INTERNAL MEDICINE
Payer: COMMERCIAL

## 2018-08-21 VITALS
OXYGEN SATURATION: 98 % | DIASTOLIC BLOOD PRESSURE: 71 MMHG | SYSTOLIC BLOOD PRESSURE: 127 MMHG | WEIGHT: 118.2 LBS | BODY MASS INDEX: 22.33 KG/M2 | HEART RATE: 59 BPM | TEMPERATURE: 98.6 F

## 2018-08-21 DIAGNOSIS — D63.1 ANEMIA IN CHRONIC KIDNEY DISEASE, UNSPECIFIED CKD STAGE: ICD-10-CM

## 2018-08-21 DIAGNOSIS — D84.9 IMMUNOSUPPRESSION (H): ICD-10-CM

## 2018-08-21 DIAGNOSIS — B25.9 CYTOMEGALOVIRUS (CMV) VIREMIA (H): ICD-10-CM

## 2018-08-21 DIAGNOSIS — N18.9 ANEMIA IN CHRONIC KIDNEY DISEASE, UNSPECIFIED CKD STAGE: ICD-10-CM

## 2018-08-21 DIAGNOSIS — B39.2 PULMONARY HISTOPLASMOSIS (H): ICD-10-CM

## 2018-08-21 DIAGNOSIS — Z94.0 KIDNEY REPLACED BY TRANSPLANT: Primary | ICD-10-CM

## 2018-08-21 DIAGNOSIS — Z94.83 PANCREAS REPLACED BY TRANSPLANT (H): ICD-10-CM

## 2018-08-21 DIAGNOSIS — D84.9 IMMUNOSUPPRESSED STATUS (H): ICD-10-CM

## 2018-08-21 DIAGNOSIS — B27.00 EBV (EPSTEIN-BARR VIRUS) VIREMIA: ICD-10-CM

## 2018-08-21 DIAGNOSIS — G90.3 NEUROGENIC ORTHOSTATIC HYPOTENSION (H): ICD-10-CM

## 2018-08-21 PROCEDURE — G0463 HOSPITAL OUTPT CLINIC VISIT: HCPCS | Mod: ZF

## 2018-08-21 ASSESSMENT — PAIN SCALES - GENERAL: PAINLEVEL: NO PAIN (0)

## 2018-08-21 NOTE — PROGRESS NOTES
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 The pt has not been timely with her routine labs and is aware she is due for at least a tacro (Envarsus) check. She is on Envarsus, she states because of GI distress (diarrhea she says) from Prograf. She also reports diarrhea with increased MPA dosing. She is on myfortic, which was the case from her prior LDKT in 2007. She is unwilling to increase Envarsus dosing with levels recently and several throughout 2019 below 5 in this SPK patient. She continues off itraconazole, which was for pulmonary histoplasmosis, and had her tacro increased when she went off it.     Amylase and lipase have both risen/fluctuated over time. Amylase last was 99 and lipase 149. A1c was last 5.1.     She had CMV viremia and was treated with Valcyte but was taken off of it and CMV levels have been \"< 137.\" She also has EBV viremia, last level <500. No BK.     No DSA. Minimal no no proteinuria. On no BP meds. She has had prior issues with orthostatic hypotension but reports none recently. No takes no meds to raise BP.     She knows she is due for either a Pap or mammogram soon based on a notification from her PCP's office. She has had both before, unsure of date. She has never seen a dermatologist but has no skin complaints today.     No fevers, chills, night sweats, weight loss. No CP or SOB or LE swelling. No abd pain, N/V/D/C today.     Review of systems:  10 point ROS negative except as noted above.    Past Medical History:   Diagnosis Date     Anemia in chronic renal disease      Charcot foot due to diabetes mellitus (H) Dec 2006    left     Diabetes mellitus type 1 (H) 1988 "    12yr old     Diabetic retinopathy      Dyslipidemia      ESRD on hemodialysis (H)     Feb 2006 to 2007     History of acute pyelonephritis     2003     History of blood transfusion      Hypothyroidism     on and off synthroid     Immunosuppressed status (H)      Kidney replaced by transplant 2007     Neurogenic orthostatic hypotension (H)      Osteoporosis      Pulmonary histoplasmosis (H)      Secondary renal hyperparathyroidism (H)           Current Outpatient Prescriptions on File Prior to Visit:  aspirin  MG EC tablet Take 1 tablet (325 mg) by mouth daily (Patient taking differently: Take 81 mg by mouth daily )   citalopram (CELEXA) 10 MG tablet Take 20 mg by mouth daily    Levothyroxine Sodium (SYNTHROID PO) Take 50 mcg by mouth daily    mycophenolic acid (GENERIC EQUIVALENT) 180 MG EC tablet Take 2 tablets (360 mg) by mouth 2 times daily   order for DME Roll-A-Bout Walker. Patient can use for left foot non-weight bearing   order for DME Equipment being ordered: Crutches ()Treatment Diagnosis: impaired gait   pravastatin (PRAVACHOL) 20 MG tablet Take 1 tablet (20 mg) by mouth daily   sulfamethoxazole-trimethoprim (BACTRIM/SEPTRA) 400-80 MG per tablet Take 1 tablet by mouth daily   tacrolimus (ENVARSUS XR) 0.75 MG 24 hr tablet Take 5 tablets (3.75 mg) by mouth every morning (before breakfast)   Cholecalciferol (VITAMIN D) 2000 UNITS tablet Take 2,000 Units by mouth daily (Patient not taking: Reported on 8/21/2018)   oseltamivir (TAMIFLU) 75 MG capsule Take 1 capsule (75 mg) by mouth daily (Patient not taking: Reported on 8/21/2018)   oxyCODONE IR (ROXICODONE) 5 MG tablet Take 1-2 tablets (5-10 mg) by mouth every 4 hours as needed for pain or other (Moderate to Severe) (Patient not taking: Reported on 8/21/2018)   valGANciclovir (VALCYTE) 450 MG tablet Take 1 tablet (450 mg) by mouth 2 times daily (Patient not taking: Reported on 8/21/2018)     No current facility-administered medications on file  prior to visit.      OBJECTIVE:  Physical exam:  /71 (BP Location: Left arm)  Pulse 59  Temp 98.6  F (37  C) (Oral)  Wt 53.6 kg (118 lb 3.2 oz)  SpO2 98%  BMI 22.33 kg/m2  Body mass index is 22.33 kg/(m^2).   Gen: Well-developed, well-nourished and in no apparent distress  HEENT: normocephalic, oropharynx clear, anicteric   Neck: supple, no LAD, no thyromegaly  CV: regular rate and rhythm, normal S1 S2, no S3 or S4 and no murmur, click, or rub  Resp: clear to ausculation bilaterally, normal respiratory effort  Abd: bowel sounds presents, soft, non-tender, non-distended, no masses or hepatosplenomegaly  Ext: WWP, no LE edema   Skin: warm and dry, no rashes or ecchymoses  Psych: normal mood, normal affect  Neuro:  alert and oriented x3    Recent Labs   Lab Test  06/19/18   1128  06/05/18   0833   NA  145*  144   POTASSIUM  4.4  4.3   CHLORIDE  113*  110*   CO2  23  24   ANIONGAP  9  10   GLC  100*  83   BUN  23  20   CR  1.14*  1.23*   SCOT  8.4*  8.9       Lab Results   Component Value Date    WBC 4.3 06/19/2018     Lab Results   Component Value Date    RBC 3.28 06/19/2018     Lab Results   Component Value Date    HGB 10.0 06/19/2018     Lab Results   Component Value Date    HCT 31.1 06/19/2018     No components found for: MCT  Lab Results   Component Value Date    MCV 95 06/19/2018     Lab Results   Component Value Date    MCH 30.5 06/19/2018     Lab Results   Component Value Date    MCHC 32.2 06/19/2018     Lab Results   Component Value Date    RDW 12.4 06/19/2018     Lab Results   Component Value Date     06/19/2018       Color Urine (no units)   Date Value   03/20/2018 Yellow     Appearance Urine (no units)   Date Value   03/20/2018 Clear     Glucose Urine (mg/dL)   Date Value   03/20/2018 Negative     Bilirubin Urine (no units)   Date Value   03/20/2018 Negative     Ketones Urine (mg/dL)   Date Value   03/20/2018 Negative     Specific Gravity Urine (no units)   Date Value   03/20/2018 1.025      pH Urine (pH)   Date Value   03/20/2018 6.0     Protein Albumin Urine (mg/dL)   Date Value   03/20/2018 Trace (A)     Urobilinogen Urine (EU/dL)   Date Value   03/20/2018 0.2     Nitrite Urine (no units)   Date Value   03/20/2018 Negative     Leukocyte Esterase Urine (no units)   Date Value   03/20/2018 Negative           ASSESSMENT and PLAN:   Rose was seen today for recheck.    Diagnoses and all orders for this visit:    Kidney replaced by transplant  Pt with some lability in creatinine, baseline at best 0.8 but overall up to 1.5. She relates this to issues with hypovolemia. Creat 1.14 today and pt without orthostatic symptoms, see below. No DSA, insignificant proteinuria.   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 A1c shows ongoing endocrine function. With low tacro levels, will consider additional immunosuppression as below. Continue her Envarsus (which she is not willing to increase today) and myfortic 360mg bid.     I spoke with the patient regarding the fact that her tacrolimus level is subtherapeutic.  She notes that if she increases the tacrolimus that she has horrible diarrhea.    I query whether switching off of Myfortic and onto everolimus to achieve a combined trough of 10 would be better for the patient's bowels.  I will discuss this at our multidisciplinary meeting on Wednesday.    Immunosuppression (H)  Will discuss in weekly conference next steps for immunosuppression, such as add everolimus or sirolimus or low dose prednisone (pt has osteoporosis in problem list, no DXA in our system, will need to learn more about this if considering steroids - pt is opposed). Pt aware she is due for tacro trough.   -     DERMATOLOGY REFERRAL    PCP ppx  Pt continues on Bactrim.     Cytomegalovirus (CMV) viremia (H)  Stable levels below level of quantification off Valcyte. Continue to monitor viral levels.     EBV viremia  Continue to monitor  viral levels.     Neurogenic orthostatic hypotension (H)  No symptoms at this time, no indication for pharmacologic intervention. Likely from long-standing type 1 DM prior to panc transplant.     Anemia in chronic kidney disease, unspecified CKD stage  Hgb last 10.0, stable.     Pulmonary histoplasmosis (H)  Pt continues off itraconazole.         Return to clinic in 6 months.     Patient seen and discussed with Dr. Tomlinson who agrees with this plan.    Tima Weber MD  Renal Fellow   Pager: [phone]    Attestation:  This patient has been seen and evaluated by me, Tima Tomlinson MD.  I have reviewed the note and agree with plan of care as documented by the fellow.

## 2018-08-21 NOTE — NURSING NOTE
Chief Complaint   Patient presents with     RECHECK     annual 2 year kidney tx     /71 (BP Location: Left arm)  Pulse 59  Temp 98.6  F (37  C) (Oral)  Wt 53.6 kg (118 lb 3.2 oz)  SpO2 98%  BMI 22.33 kg/m2   Gayatri Negrete

## 2018-08-21 NOTE — MR AVS SNAPSHOT
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 - [place] [phone]   [email or web address]    Please be aware that coverage of these services is subject to the terms and limitations of your health insurance plan.  Call member services at your health plan with any benefit or coverage questions.      Please bring the following with you to your appointment:    (1) Any X-Rays, CTs or MRIs which have been performed.  Contact the facility where they were done to arrange for  prior to your scheduled appointment.    (2) List of current medications  (3) This referral request   (4) Any documents/labs given to you for this referral                  Follow-up notes from your care team     Return in about 6 months (around 2/21/2019).      Who to contact     If you have questions or need follow up information about today's clinic visit or your schedule please contact [facility] NEPHROLOGY directly at [phone].  Normal or non-critical lab and imaging results will be communicated to you by MyChart, letter or phone within 4 business days after the clinic has received the results. If you do not hear from us within 7 days, please  contact the clinic through whoactuallyt or phone. If you have a critical or abnormal lab result, we will notify you by phone as soon as possible.  Submit refill requests through [email or web address] or call your pharmacy and they will forward the refill request to us. Please allow 3 business days for your refill to be completed.          Additional Information About Your Visit        Care EveryWhere ID     This is your Care EveryWhere ID. This could be used by other organizations to access your [facility] medical records  [id number]        Your Vitals Were     Pulse Temperature Pulse Oximetry BMI (Body Mass Index)          59 98.6  F (37  C) (Oral) 98% 22.33 kg/m2         Blood Pressure from Last 3 Encounters:   08/21/18 127/71   02/14/18 95/62   02/13/18 94/57    Weight from Last 3 Encounters:   08/21/18 53.6 kg (118 lb 3.2 oz)   02/14/18 51.4 kg (113 lb 4.8 oz)   02/13/18 51.3 kg (113 lb 3.2 oz)              We Performed the Following     DERMATOLOGY REFERRAL          Today's Medication Changes          These changes are accurate as of 8/21/18 11:59 PM.  If you have any questions, ask your nurse or doctor.               These medicines have changed or have updated prescriptions.        Dose/Directions    aspirin 325 MG EC tablet   This may have changed:  how much to take   Used for:  Kidney replaced by transplant        Dose:  325 mg   Take 1 tablet (325 mg) by mouth daily   Quantity:  60 tablet   Refills:  3                Primary Care Provider Office Phone # Fax #    Hafsa JOSEPH Orozco [phone]       [facility] [address] [place] [address] [address] [place] [address]        Equal Access to Services     [facility] AH: Hadii wiliam faith hadashhalley Sosherley, waaxda luqadaha, qaybta kaalmada dariel, son tipton. So [facility] [phone].    ATENCIÓN: Si habla español, tiene a kessler disposición servicios gratuitos de asistencia lingüística. Llame al [phone].    We comply with applicable federal civil rights  laws and [place] laws. We do not discriminate on the basis of race, color, national origin, age, disability, sex, sexual orientation, or gender identity.            Thank you!     Thank you for choosing [facility] NEPHROLOGY  for your care. Our goal is always to provide you with excellent care. Hearing back from our patients is one way we can continue to improve our services. Please take a few minutes to complete the written survey that you may receive in the mail after your visit with us. Thank you!             Your Updated Medication List - Protect others around you: Learn how to safely use, store and throw away your medicines at www.disposemymeds.org.          This list is accurate as of 8/21/18 11:59 PM.  Always use your most recent med list.                   Brand Name Dispense Instructions for use Diagnosis    aspirin 325 MG EC tablet     60 tablet    Take 1 tablet (325 mg) by mouth daily    Kidney replaced by transplant       citalopram 10 MG tablet    celeXA     Take 20 mg by mouth daily        mycophenolic acid 180 MG EC tablet    GENERIC EQUIVALENT    360 tablet    Take 2 tablets (360 mg) by mouth 2 times daily    Kidney replaced by transplant, Pancreas replaced by transplant (H)       * order for DME     1 each    Equipment being ordered: Crutches () Treatment Diagnosis: impaired gait    Diabetic foot ulcer with osteomyelitis (H)       * order for DME     1 Units    Roll-A-Bout Walker. Patient can use for left foot non-weight bearing    Ulcer of left foot, with fat layer exposed (H), Charcot foot due to diabetes mellitus (H)       oseltamivir 75 MG capsule    TAMIFLU    10 capsule    Take 1 capsule (75 mg) by mouth daily    Exposure to influenza       oxyCODONE IR 5 MG tablet    ROXICODONE    20 tablet    Take 1-2 tablets (5-10 mg) by mouth every 4 hours as needed for pain or other (Moderate to Severe)    S/P amputation of lesser toe, unspecified laterality (H)       pravastatin 20 MG tablet     PRAVACHOL    90 tablet    Take 1 tablet (20 mg) by mouth daily    Dyslipidemia       sulfamethoxazole-trimethoprim 400-80 MG per tablet    BACTRIM/SEPTRA    90 tablet    Take 1 tablet by mouth daily    Kidney replaced by transplant, Pancreas transplanted (H)       SYNTHROID PO      Take 50 mcg by mouth daily        tacrolimus 0.75 MG 24 hr tablet    ENVARSUS XR    150 tablet    Take 5 tablets (3.75 mg) by mouth every morning (before breakfast)    Kidney replaced by transplant, Pancreas replaced by transplant (H)       valGANciclovir 450 MG tablet    VALCYTE    180 tablet    Take 1 tablet (450 mg) by mouth 2 times daily    CMV (cytomegalovirus infection) (H)       vitamin D 2000 units tablet     100 tablet    Take 2,000 Units by mouth daily    Vitamin D deficiency       * Notice:  This list has 2 medication(s) that are the same as other medications prescribed for you. Read the directions carefully, and ask your doctor or other care provider to review them with you.

## 2018-08-21 NOTE — LETTER
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 The pt has not been timely with her routine labs and is aware she is due for at least a tacro (Envarsus) check. She is on Envarsus, she states because of GI distress (diarrhea she says) from Prograf. She also reports diarrhea with increased MPA dosing. She is on myfortic, which was the case from her prior LDKT in 2007. She is unwilling to increase Envarsus dosing with levels recently and several throughout 2019 below 5 in this SPK patient. She continues off itraconazole, which was for pulmonary histoplasmosis, and had her tacro increased when she went off it.     Amylase and lipase have both risen/fluctuated over time. Amylase last was 99 and lipase 149. A1c was last 5.1.     She had CMV viremia and was treated with Valcyte but was taken off of it and CMV levels have been \"< 137.\" She also has EBV viremia, last level <500. No BK.     No DSA. Minimal no no proteinuria. On no BP meds. She has had prior issues with orthostatic hypotension but reports none recently. No takes no meds to raise BP.     She knows she is due for either a Pap or mammogram soon based on a notification from her PCP's office. She has had both before, unsure of date. She has never seen a dermatologist but has no skin complaints today.     No fevers, chills, night sweats, weight loss. No CP or SOB or LE swelling. No abd pain, N/V/D/C today.     Review of systems:  10 point ROS negative except as noted above.    Past Medical History:   Diagnosis Date     Anemia in chronic renal disease      Charcot foot due " to diabetes mellitus (H) Dec 2006    left     Diabetes mellitus type 1 (H) 1988    12yr old     Diabetic retinopathy      Dyslipidemia      ESRD on hemodialysis (H)     Feb 2006 to 2007     History of acute pyelonephritis     2003     History of blood transfusion      Hypothyroidism     on and off synthroid     Immunosuppressed status (H)      Kidney replaced by transplant 2007     Neurogenic orthostatic hypotension (H)      Osteoporosis      Pulmonary histoplasmosis (H)      Secondary renal hyperparathyroidism (H)           Current Outpatient Prescriptions on File Prior to Visit:  aspirin  MG EC tablet Take 1 tablet (325 mg) by mouth daily (Patient taking differently: Take 81 mg by mouth daily )   citalopram (CELEXA) 10 MG tablet Take 20 mg by mouth daily    Levothyroxine Sodium (SYNTHROID PO) Take 50 mcg by mouth daily    mycophenolic acid (GENERIC EQUIVALENT) 180 MG EC tablet Take 2 tablets (360 mg) by mouth 2 times daily   order for DME Roll-A-Bout Walker. Patient can use for left foot non-weight bearing   order for DME Equipment being ordered: Crutches ()Treatment Diagnosis: impaired gait   pravastatin (PRAVACHOL) 20 MG tablet Take 1 tablet (20 mg) by mouth daily   sulfamethoxazole-trimethoprim (BACTRIM/SEPTRA) 400-80 MG per tablet Take 1 tablet by mouth daily   tacrolimus (ENVARSUS XR) 0.75 MG 24 hr tablet Take 5 tablets (3.75 mg) by mouth every morning (before breakfast)   Cholecalciferol (VITAMIN D) 2000 UNITS tablet Take 2,000 Units by mouth daily (Patient not taking: Reported on 8/21/2018)   oseltamivir (TAMIFLU) 75 MG capsule Take 1 capsule (75 mg) by mouth daily (Patient not taking: Reported on 8/21/2018)   oxyCODONE IR (ROXICODONE) 5 MG tablet Take 1-2 tablets (5-10 mg) by mouth every 4 hours as needed for pain or other (Moderate to Severe) (Patient not taking: Reported on 8/21/2018)   valGANciclovir (VALCYTE) 450 MG tablet Take 1 tablet (450 mg) by mouth 2 times daily (Patient not taking:  Reported on 8/21/2018)     No current facility-administered medications on file prior to visit.      OBJECTIVE:  Physical exam:  /71 (BP Location: Left arm)  Pulse 59  Temp 98.6  F (37  C) (Oral)  Wt 53.6 kg (118 lb 3.2 oz)  SpO2 98%  BMI 22.33 kg/m2  Body mass index is 22.33 kg/(m^2).   Gen: Well-developed, well-nourished and in no apparent distress  HEENT: normocephalic, oropharynx clear, anicteric   Neck: supple, no LAD, no thyromegaly  CV: regular rate and rhythm, normal S1 S2, no S3 or S4 and no murmur, click, or rub  Resp: clear to ausculation bilaterally, normal respiratory effort  Abd: bowel sounds presents, soft, non-tender, non-distended, no masses or hepatosplenomegaly  Ext: WWP, no LE edema   Skin: warm and dry, no rashes or ecchymoses  Psych: normal mood, normal affect  Neuro:  alert and oriented x3    Recent Labs   Lab Test  06/19/18   1128  06/05/18   0833   NA  145*  144   POTASSIUM  4.4  4.3   CHLORIDE  113*  110*   CO2  23  24   ANIONGAP  9  10   GLC  100*  83   BUN  23  20   CR  1.14*  1.23*   SCOT  8.4*  8.9       Lab Results   Component Value Date    WBC 4.3 06/19/2018     Lab Results   Component Value Date    RBC 3.28 06/19/2018     Lab Results   Component Value Date    HGB 10.0 06/19/2018     Lab Results   Component Value Date    HCT 31.1 06/19/2018     No components found for: MCT  Lab Results   Component Value Date    MCV 95 06/19/2018     Lab Results   Component Value Date    MCH 30.5 06/19/2018     Lab Results   Component Value Date    MCHC 32.2 06/19/2018     Lab Results   Component Value Date    RDW 12.4 06/19/2018     Lab Results   Component Value Date     06/19/2018       Color Urine (no units)   Date Value   03/20/2018 Yellow     Appearance Urine (no units)   Date Value   03/20/2018 Clear     Glucose Urine (mg/dL)   Date Value   03/20/2018 Negative     Bilirubin Urine (no units)   Date Value   03/20/2018 Negative     Ketones Urine (mg/dL)   Date Value   03/20/2018  Negative     Specific Gravity Urine (no units)   Date Value   03/20/2018 1.025     pH Urine (pH)   Date Value   03/20/2018 6.0     Protein Albumin Urine (mg/dL)   Date Value   03/20/2018 Trace (A)     Urobilinogen Urine (EU/dL)   Date Value   03/20/2018 0.2     Nitrite Urine (no units)   Date Value   03/20/2018 Negative     Leukocyte Esterase Urine (no units)   Date Value   03/20/2018 Negative           ASSESSMENT and PLAN:   Rose was seen today for recheck.    Diagnoses and all orders for this visit:    Kidney replaced by transplant  Pt with some lability in creatinine, baseline at best 0.8 but overall up to 1.5. She relates this to issues with hypovolemia. Creat 1.14 today and pt without orthostatic symptoms, see below. No DSA, insignificant proteinuria.   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 A1c shows ongoing endocrine function. With low tacro levels, will consider additional immunosuppression as below. Continue her Envarsus (which she is not willing to increase today) and myfortic 360mg bid.     I spoke with the patient regarding the fact that her tacrolimus level is subtherapeutic.  She notes that if she increases the tacrolimus that she has horrible diarrhea.    I query whether switching off of Myfortic and onto everolimus to achieve a combined trough of 10 would be better for the patient's bowels.  I will discuss this at our multidisciplinary meeting on Wednesday.    Immunosuppression (H)  Will discuss in weekly conference next steps for immunosuppression, such as add everolimus or sirolimus or low dose prednisone (pt has osteoporosis in problem list, no DXA in our system, will need to learn more about this if considering steroids - pt is opposed). Pt aware she is due for tacro trough.   -     DERMATOLOGY REFERRAL    PCP ppx  Pt continues on Bactrim.     Cytomegalovirus (CMV) viremia (H)  Stable levels below level of quantification off  Valcyte. Continue to monitor viral levels.     EBV viremia  Continue to monitor viral levels.     Neurogenic orthostatic hypotension (H)  No symptoms at this time, no indication for pharmacologic intervention. Likely from long-standing type 1 DM prior to panc transplant.     Anemia in chronic kidney disease, unspecified CKD stage  Hgb last 10.0, stable.     Pulmonary histoplasmosis (H)  Pt continues off itraconazole.         Return to clinic in 6 months.     Patient seen and discussed with Dr. Tomlinson who agrees with this plan.    Tima Weber MD  Renal Fellow   Pager: [phone]    Attestation:  This patient has been seen and evaluated by me, Tima Tomlinson MD.  I have reviewed the note and agree with plan of care as documented by the fellow.       Kidney/Pancreas Recipient

## 2018-08-22 ENCOUNTER — TEAM CONFERENCE (OUTPATIENT)
Dept: TRANSPLANT | Facility: CLINIC | Age: 43
End: 2018-08-22

## 2018-08-23 NOTE — TELEPHONE ENCOUNTER
Spoke with pt regarding plan per post KP conference.  Pt aware we will cont with everolimus 3.75 mg every day.  Pt aware we will want her to get transplant labs done this month.     Pt questions answered, pt v/u.

## 2018-08-23 NOTE — TELEPHONE ENCOUNTER
Post Kidney and Pancreas Transplant Team Conference  Date: 8/22/2018  Transplant Coordinator: Gretta Rincon     Attendees:  [x]  Dr. Marquez [x] Mary Jane Rincon, KYA  [] Ambika Miller LPN     []  Dr. Doe [] Trina Vaughn RN [x] Kim Hanna LPN   [x]  Dr. Tomlinson [] Hellen Alexis RN    [x]  Dr. Shah [x] Leatha Bartholomew RN    [x] Dr. Wells [x] Fabiana Baron RN    [] Dr. Doe [x] Rell Nelson RN    [] Dr. Kwan [] Bela Domínguez, RN    [] Surgery Fellow [x] Denia Kelly RN    [] Pura Hanna NP              Verbal Plan Read Back:   Tacrolimus goal 4-6  No drug changes    Routed to RN Coordinator   Kim Hanna

## 2018-09-28 DIAGNOSIS — Z94.0 KIDNEY REPLACED BY TRANSPLANT: ICD-10-CM

## 2018-09-28 DIAGNOSIS — Z94.83 PANCREAS REPLACED BY TRANSPLANT (H): ICD-10-CM

## 2018-09-28 LAB
AMYLASE SERPL-CCNC: 95 U/L (ref 30–110)
ANION GAP SERPL CALCULATED.3IONS-SCNC: 7 MMOL/L (ref 3–14)
BUN SERPL-MCNC: 23 MG/DL (ref 7–30)
CALCIUM SERPL-MCNC: 9 MG/DL (ref 8.5–10.1)
CHLORIDE SERPL-SCNC: 111 MMOL/L (ref 94–109)
CO2 SERPL-SCNC: 22 MMOL/L (ref 20–32)
CREAT SERPL-MCNC: 1.3 MG/DL (ref 0.52–1.04)
ERYTHROCYTE [DISTWIDTH] IN BLOOD BY AUTOMATED COUNT: 12.9 % (ref 10–15)
GFR SERPL CREATININE-BSD FRML MDRD: 44 ML/MIN/1.7M2
GLUCOSE SERPL-MCNC: 81 MG/DL (ref 70–99)
HCT VFR BLD AUTO: 34.6 % (ref 35–47)
HGB BLD-MCNC: 10.7 G/DL (ref 11.7–15.7)
LIPASE SERPL-CCNC: 324 U/L (ref 73–393)
MCH RBC QN AUTO: 28.2 PG (ref 26.5–33)
MCHC RBC AUTO-ENTMCNC: 30.9 G/DL (ref 31.5–36.5)
MCV RBC AUTO: 91 FL (ref 78–100)
PLATELET # BLD AUTO: 176 10E9/L (ref 150–450)
POTASSIUM SERPL-SCNC: 4.8 MMOL/L (ref 3.4–5.3)
RBC # BLD AUTO: 3.8 10E12/L (ref 3.8–5.2)
SODIUM SERPL-SCNC: 140 MMOL/L (ref 133–144)
WBC # BLD AUTO: 3.4 10E9/L (ref 4–11)

## 2018-09-28 PROCEDURE — 80197 ASSAY OF TACROLIMUS: CPT | Performed by: INTERNAL MEDICINE

## 2018-09-28 PROCEDURE — 83690 ASSAY OF LIPASE: CPT | Performed by: INTERNAL MEDICINE

## 2018-09-28 PROCEDURE — 36415 COLL VENOUS BLD VENIPUNCTURE: CPT | Performed by: INTERNAL MEDICINE

## 2018-09-28 PROCEDURE — 80048 BASIC METABOLIC PNL TOTAL CA: CPT | Performed by: INTERNAL MEDICINE

## 2018-09-28 PROCEDURE — 85027 COMPLETE CBC AUTOMATED: CPT | Performed by: INTERNAL MEDICINE

## 2018-09-28 PROCEDURE — 82150 ASSAY OF AMYLASE: CPT | Performed by: INTERNAL MEDICINE

## 2018-09-29 LAB
TACROLIMUS BLD-MCNC: 5.5 UG/L (ref 5–15)
TME LAST DOSE: NORMAL H

## 2018-10-01 ENCOUNTER — TELEPHONE (OUTPATIENT)
Dept: TRANSPLANT | Facility: CLINIC | Age: 43
End: 2018-10-01

## 2018-10-01 DIAGNOSIS — Z94.0 KIDNEY REPLACED BY TRANSPLANT: ICD-10-CM

## 2018-10-01 DIAGNOSIS — Z94.83 PANCREAS TRANSPLANTED (H): ICD-10-CM

## 2018-10-01 DIAGNOSIS — R82.90 ABNORMAL FINDING IN URINE: Primary | ICD-10-CM

## 2018-10-01 DIAGNOSIS — N39.0 URINARY TRACT INFECTION: Primary | ICD-10-CM

## 2018-10-01 LAB
ALBUMIN UR-MCNC: ABNORMAL MG/DL
APPEARANCE UR: ABNORMAL
BACTERIA #/AREA URNS HPF: ABNORMAL /HPF
BILIRUB UR QL STRIP: NEGATIVE
COLOR UR AUTO: YELLOW
GLUCOSE UR STRIP-MCNC: NEGATIVE MG/DL
HGB UR QL STRIP: NEGATIVE
HYALINE CASTS #/AREA URNS LPF: ABNORMAL /LPF
KETONES UR STRIP-MCNC: NEGATIVE MG/DL
LEUKOCYTE ESTERASE UR QL STRIP: ABNORMAL
NITRATE UR QL: NEGATIVE
NON-SQ EPI CELLS #/AREA URNS LPF: ABNORMAL /LPF
PH UR STRIP: 5.5 PH (ref 5–7)
RBC #/AREA URNS AUTO: ABNORMAL /HPF
SOURCE: ABNORMAL
SP GR UR STRIP: 1.02 (ref 1–1.03)
TRANS CELLS #/AREA URNS HPF: ABNORMAL /HPF
UROBILINOGEN UR STRIP-ACNC: 0.2 EU/DL (ref 0.2–1)
WBC #/AREA URNS AUTO: ABNORMAL /HPF

## 2018-10-01 PROCEDURE — 81001 URINALYSIS AUTO W/SCOPE: CPT | Performed by: INTERNAL MEDICINE

## 2018-10-01 PROCEDURE — 87086 URINE CULTURE/COLONY COUNT: CPT | Performed by: INTERNAL MEDICINE

## 2018-10-01 RX ORDER — VALGANCICLOVIR 450 MG/1
450 TABLET, FILM COATED ORAL 2 TIMES DAILY
Qty: 180 TABLET | Refills: 3 | Status: SHIPPED | OUTPATIENT
Start: 2018-10-01 | End: 2018-10-01

## 2018-10-01 RX ORDER — SULFAMETHOXAZOLE AND TRIMETHOPRIM 400; 80 MG/1; MG/1
1 TABLET ORAL DAILY
Qty: 90 TABLET | Refills: 3 | Status: SHIPPED | OUTPATIENT
Start: 2018-10-01 | End: 2019-11-08

## 2018-10-01 RX ORDER — LEVOFLOXACIN 250 MG/1
250 TABLET, FILM COATED ORAL DAILY
Qty: 14 TABLET | Refills: 0 | Status: SHIPPED | OUTPATIENT
Start: 2018-10-01 | End: 2020-02-05

## 2018-10-01 NOTE — TELEPHONE ENCOUNTER
ISSUE:  Lipase elevated at 324    OUTCOME:   Spoke with pt regarding lab results.  Pt denies constipation, abd pain or fever.  Pt verbalizes they have were eating fatty and greasy food yesterday.  Pt educated to try to avoid these foods this week and cont hydrating well and repeat lab Monday next week.      Pt reports s/s UTI.  Pt verbalizes decreased urine output yesterday but now able to void, pain with urination and flank pain.  Pt will come in today for a UA/UC and aware we will start her on abx depending on UA results.       Pt questions answered, pt v/u.

## 2018-10-01 NOTE — TELEPHONE ENCOUNTER
ISSUE:  Pt positive for UTI.    Pt continuing to take valcyte with negative CMV.  Per Dr Marquez, pt was to stop this medication 5/2018.  Pt verbalized she restarted medication, but doesn't remember who told her to.      PLAN:   Pt reviewed with Dr Doe during rounds.  Per Dr Doe, pt to start Levofloxacin 250 mg daily x 14 days.  Pt to stop valcyte and repeat CMV with next set of transplant labs.      OUTCOME:   Call placed to pt to review the above plan. Pt aware RX called into her local pharmacy.  Pt educated to repeat UA/UC and creatinine 3-5 days following completion of ABX.  Pt also aware she no longer needs to take valcyte.  Pt will plan on repeating a CMV level with next set of labs and then quarterly.      Pt questions answered, pt v/u.

## 2018-10-02 LAB
BACTERIA SPEC CULT: NORMAL
SPECIMEN SOURCE: NORMAL

## 2018-10-16 DIAGNOSIS — Z94.83 PANCREAS REPLACED BY TRANSPLANT (H): ICD-10-CM

## 2018-10-16 DIAGNOSIS — Z94.0 KIDNEY REPLACED BY TRANSPLANT: ICD-10-CM

## 2018-10-16 DIAGNOSIS — Z94.83 PANCREAS TRANSPLANTED (H): ICD-10-CM

## 2018-10-16 DIAGNOSIS — N39.0 URINARY TRACT INFECTION: ICD-10-CM

## 2018-10-16 LAB
ALBUMIN UR-MCNC: NEGATIVE MG/DL
AMYLASE SERPL-CCNC: 96 U/L (ref 30–110)
ANION GAP SERPL CALCULATED.3IONS-SCNC: 7 MMOL/L (ref 3–14)
APPEARANCE UR: CLEAR
BILIRUB UR QL STRIP: NEGATIVE
BUN SERPL-MCNC: 22 MG/DL (ref 7–30)
CALCIUM SERPL-MCNC: 9 MG/DL (ref 8.5–10.1)
CHLORIDE SERPL-SCNC: 112 MMOL/L (ref 94–109)
CO2 SERPL-SCNC: 23 MMOL/L (ref 20–32)
COLOR UR AUTO: YELLOW
CREAT SERPL-MCNC: 1.22 MG/DL (ref 0.52–1.04)
ERYTHROCYTE [DISTWIDTH] IN BLOOD BY AUTOMATED COUNT: 13.7 % (ref 10–15)
GFR SERPL CREATININE-BSD FRML MDRD: 48 ML/MIN/1.7M2
GLUCOSE SERPL-MCNC: 81 MG/DL (ref 70–99)
GLUCOSE UR STRIP-MCNC: NEGATIVE MG/DL
HCT VFR BLD AUTO: 32.8 % (ref 35–47)
HGB BLD-MCNC: 10.4 G/DL (ref 11.7–15.7)
HGB UR QL STRIP: NEGATIVE
KETONES UR STRIP-MCNC: NEGATIVE MG/DL
LEUKOCYTE ESTERASE UR QL STRIP: NEGATIVE
LIPASE SERPL-CCNC: 161 U/L (ref 73–393)
MCH RBC QN AUTO: 28.6 PG (ref 26.5–33)
MCHC RBC AUTO-ENTMCNC: 31.7 G/DL (ref 31.5–36.5)
MCV RBC AUTO: 90 FL (ref 78–100)
NITRATE UR QL: NEGATIVE
PH UR STRIP: 5.5 PH (ref 5–7)
PLATELET # BLD AUTO: 200 10E9/L (ref 150–450)
POTASSIUM SERPL-SCNC: 5.2 MMOL/L (ref 3.4–5.3)
RBC # BLD AUTO: 3.64 10E12/L (ref 3.8–5.2)
SODIUM SERPL-SCNC: 142 MMOL/L (ref 133–144)
SOURCE: NORMAL
SP GR UR STRIP: 1.02 (ref 1–1.03)
TACROLIMUS BLD-MCNC: 4.7 UG/L (ref 5–15)
TME LAST DOSE: ABNORMAL H
UROBILINOGEN UR STRIP-ACNC: 0.2 EU/DL (ref 0.2–1)
WBC # BLD AUTO: 4.4 10E9/L (ref 4–11)

## 2018-10-16 PROCEDURE — 85027 COMPLETE CBC AUTOMATED: CPT | Performed by: INTERNAL MEDICINE

## 2018-10-16 PROCEDURE — 80197 ASSAY OF TACROLIMUS: CPT | Performed by: INTERNAL MEDICINE

## 2018-10-16 PROCEDURE — 36415 COLL VENOUS BLD VENIPUNCTURE: CPT | Performed by: INTERNAL MEDICINE

## 2018-10-16 PROCEDURE — 83690 ASSAY OF LIPASE: CPT | Performed by: INTERNAL MEDICINE

## 2018-10-16 PROCEDURE — 81003 URINALYSIS AUTO W/O SCOPE: CPT | Performed by: INTERNAL MEDICINE

## 2018-10-16 PROCEDURE — 82150 ASSAY OF AMYLASE: CPT | Performed by: INTERNAL MEDICINE

## 2018-10-16 PROCEDURE — 80048 BASIC METABOLIC PNL TOTAL CA: CPT | Performed by: INTERNAL MEDICINE

## 2019-02-05 ENCOUNTER — RESULTS ONLY (OUTPATIENT)
Dept: OTHER | Facility: CLINIC | Age: 44
End: 2019-02-05

## 2019-02-05 DIAGNOSIS — Z94.0 KIDNEY REPLACED BY TRANSPLANT: ICD-10-CM

## 2019-02-05 DIAGNOSIS — Z94.0 KIDNEY REPLACED BY TRANSPLANT: Primary | ICD-10-CM

## 2019-02-05 DIAGNOSIS — Z94.83 PANCREAS REPLACED BY TRANSPLANT (H): ICD-10-CM

## 2019-02-05 DIAGNOSIS — Z94.0 KIDNEY TRANSPLANTED: ICD-10-CM

## 2019-02-05 DIAGNOSIS — Z79.899 OTHER LONG TERM (CURRENT) DRUG THERAPY: ICD-10-CM

## 2019-02-05 DIAGNOSIS — Z94.83 PANCREAS TRANSPLANTED (H): ICD-10-CM

## 2019-02-05 LAB
AMYLASE SERPL-CCNC: 89 U/L (ref 30–110)
ANION GAP SERPL CALCULATED.3IONS-SCNC: 6 MMOL/L (ref 3–14)
BUN SERPL-MCNC: 21 MG/DL (ref 7–30)
CALCIUM SERPL-MCNC: 8.7 MG/DL (ref 8.5–10.1)
CHLORIDE SERPL-SCNC: 113 MMOL/L (ref 94–109)
CHOLEST SERPL-MCNC: 146 MG/DL
CO2 SERPL-SCNC: 21 MMOL/L (ref 20–32)
CREAT SERPL-MCNC: 1.17 MG/DL (ref 0.52–1.04)
ERYTHROCYTE [DISTWIDTH] IN BLOOD BY AUTOMATED COUNT: 13.8 % (ref 10–15)
GFR SERPL CREATININE-BSD FRML MDRD: 56 ML/MIN/{1.73_M2}
GLUCOSE SERPL-MCNC: 86 MG/DL (ref 70–99)
HBA1C MFR BLD: 5.2 % (ref 0–5.6)
HCT VFR BLD AUTO: 32.5 % (ref 35–47)
HDLC SERPL-MCNC: 82 MG/DL
HGB BLD-MCNC: 10.1 G/DL (ref 11.7–15.7)
LDLC SERPL CALC-MCNC: 56 MG/DL
LIPASE SERPL-CCNC: 164 U/L (ref 73–393)
MCH RBC QN AUTO: 28.2 PG (ref 26.5–33)
MCHC RBC AUTO-ENTMCNC: 31.1 G/DL (ref 31.5–36.5)
MCV RBC AUTO: 91 FL (ref 78–100)
NONHDLC SERPL-MCNC: 64 MG/DL
PLATELET # BLD AUTO: 202 10E9/L (ref 150–450)
POTASSIUM SERPL-SCNC: 4.7 MMOL/L (ref 3.4–5.3)
PROT UR-MCNC: 0.44 G/L
PROT/CREAT 24H UR: 0.18 G/G CR (ref 0–0.2)
RBC # BLD AUTO: 3.58 10E12/L (ref 3.8–5.2)
SODIUM SERPL-SCNC: 140 MMOL/L (ref 133–144)
TACROLIMUS BLD-MCNC: 5.5 UG/L (ref 5–15)
TME LAST DOSE: NORMAL H
TRIGL SERPL-MCNC: 39 MG/DL
WBC # BLD AUTO: 4.3 10E9/L (ref 4–11)

## 2019-02-05 PROCEDURE — 87799 DETECT AGENT NOS DNA QUANT: CPT | Performed by: INTERNAL MEDICINE

## 2019-02-05 PROCEDURE — 80061 LIPID PANEL: CPT | Performed by: INTERNAL MEDICINE

## 2019-02-05 PROCEDURE — 36415 COLL VENOUS BLD VENIPUNCTURE: CPT | Performed by: INTERNAL MEDICINE

## 2019-02-05 PROCEDURE — 80197 ASSAY OF TACROLIMUS: CPT | Performed by: INTERNAL MEDICINE

## 2019-02-05 PROCEDURE — 82150 ASSAY OF AMYLASE: CPT | Performed by: INTERNAL MEDICINE

## 2019-02-05 PROCEDURE — 80048 BASIC METABOLIC PNL TOTAL CA: CPT | Performed by: INTERNAL MEDICINE

## 2019-02-05 PROCEDURE — 86833 HLA CLASS II HIGH DEFIN QUAL: CPT | Performed by: INTERNAL MEDICINE

## 2019-02-05 PROCEDURE — 86832 HLA CLASS I HIGH DEFIN QUAL: CPT | Performed by: INTERNAL MEDICINE

## 2019-02-05 PROCEDURE — 84156 ASSAY OF PROTEIN URINE: CPT | Performed by: INTERNAL MEDICINE

## 2019-02-05 PROCEDURE — 85027 COMPLETE CBC AUTOMATED: CPT | Performed by: INTERNAL MEDICINE

## 2019-02-05 PROCEDURE — 83690 ASSAY OF LIPASE: CPT | Performed by: INTERNAL MEDICINE

## 2019-02-05 PROCEDURE — 83036 HEMOGLOBIN GLYCOSYLATED A1C: CPT | Performed by: INTERNAL MEDICINE

## 2019-02-05 RX ORDER — MYCOPHENOLIC ACID 180 MG/1
360 TABLET, DELAYED RELEASE ORAL 2 TIMES DAILY
Qty: 360 TABLET | Refills: 0 | Status: SHIPPED | OUTPATIENT
Start: 2019-02-05 | End: 2019-07-16

## 2019-02-06 ENCOUNTER — TELEPHONE (OUTPATIENT)
Dept: TRANSPLANT | Facility: CLINIC | Age: 44
End: 2019-02-06

## 2019-02-06 DIAGNOSIS — Z94.0 KIDNEY REPLACED BY TRANSPLANT: Primary | ICD-10-CM

## 2019-02-06 DIAGNOSIS — Z79.899 LONG TERM USE OF DRUG: ICD-10-CM

## 2019-02-06 DIAGNOSIS — Z94.83 PANCREAS REPLACED BY TRANSPLANT (H): ICD-10-CM

## 2019-02-06 LAB
BKV DNA # SPEC NAA+PROBE: NORMAL COPIES/ML
BKV DNA SPEC NAA+PROBE-LOG#: NORMAL LOG COPIES/ML
CMV DNA SPEC NAA+PROBE-ACNC: <137 [IU]/ML
CMV DNA SPEC NAA+PROBE-LOG#: <2.1 {LOG_IU}/ML
SPECIMEN SOURCE: ABNORMAL
SPECIMEN SOURCE: NORMAL

## 2019-02-06 NOTE — TELEPHONE ENCOUNTER
Call placed to pt regarding need for monthly labs.  Pt educated on importance of lab compliance and cont to get monthly labs.  Pt v/u.     LPN TASK:  Please update pt standing lab orders.

## 2019-02-07 LAB
DONOR IDENTIFICATION: NORMAL
DSA COMMENTS: NORMAL
DSA PRESENT: NO
DSA TEST METHOD: NORMAL
ORGAN: NORMAL
SA1 CELL: NORMAL
SA1 COMMENTS: NORMAL
SA1 HI RISK ABY: NORMAL
SA1 MOD RISK ABY: NORMAL
SA1 TEST METHOD: NORMAL
SA2 CELL: NORMAL
SA2 COMMENTS: NORMAL
SA2 HI RISK ABY UA: NORMAL
SA2 MOD RISK ABY: NORMAL
SA2 TEST METHOD: NORMAL
UNACCEPTABLE ANTIGEN: NORMAL
UNOS CPRA: 54

## 2019-03-05 ENCOUNTER — TELEPHONE (OUTPATIENT)
Dept: TRANSPLANT | Facility: CLINIC | Age: 44
End: 2019-03-05

## 2019-03-05 DIAGNOSIS — Z94.83 PANCREAS TRANSPLANTED (H): ICD-10-CM

## 2019-03-05 DIAGNOSIS — Z94.83 PANCREAS REPLACED BY TRANSPLANT (H): ICD-10-CM

## 2019-03-05 DIAGNOSIS — Z94.0 KIDNEY TRANSPLANTED: ICD-10-CM

## 2019-03-05 DIAGNOSIS — Z94.0 KIDNEY REPLACED BY TRANSPLANT: ICD-10-CM

## 2019-03-05 LAB
AMYLASE SERPL-CCNC: 88 U/L (ref 30–110)
ANION GAP SERPL CALCULATED.3IONS-SCNC: 9 MMOL/L (ref 3–14)
BUN SERPL-MCNC: 22 MG/DL (ref 7–30)
CALCIUM SERPL-MCNC: 8.7 MG/DL (ref 8.5–10.1)
CHLORIDE SERPL-SCNC: 113 MMOL/L (ref 94–109)
CO2 SERPL-SCNC: 18 MMOL/L (ref 20–32)
CREAT SERPL-MCNC: 1.28 MG/DL (ref 0.52–1.04)
ERYTHROCYTE [DISTWIDTH] IN BLOOD BY AUTOMATED COUNT: 13.9 % (ref 10–15)
GFR SERPL CREATININE-BSD FRML MDRD: 50 ML/MIN/{1.73_M2}
GLUCOSE SERPL-MCNC: 81 MG/DL (ref 70–99)
HCT VFR BLD AUTO: 33.9 % (ref 35–47)
HGB BLD-MCNC: 10.6 G/DL (ref 11.7–15.7)
LIPASE SERPL-CCNC: 118 U/L (ref 73–393)
MCH RBC QN AUTO: 28.6 PG (ref 26.5–33)
MCHC RBC AUTO-ENTMCNC: 31.3 G/DL (ref 31.5–36.5)
MCV RBC AUTO: 91 FL (ref 78–100)
PLATELET # BLD AUTO: 211 10E9/L (ref 150–450)
POTASSIUM SERPL-SCNC: 3.9 MMOL/L (ref 3.4–5.3)
PROT UR-MCNC: 0.38 G/L
PROT/CREAT 24H UR: 0.26 G/G CR (ref 0–0.2)
RBC # BLD AUTO: 3.71 10E12/L (ref 3.8–5.2)
SODIUM SERPL-SCNC: 140 MMOL/L (ref 133–144)
TACROLIMUS BLD-MCNC: 6.3 UG/L (ref 5–15)
TME LAST DOSE: NORMAL H
WBC # BLD AUTO: 4.2 10E9/L (ref 4–11)

## 2019-03-05 PROCEDURE — 82150 ASSAY OF AMYLASE: CPT | Performed by: INTERNAL MEDICINE

## 2019-03-05 PROCEDURE — 85027 COMPLETE CBC AUTOMATED: CPT | Performed by: INTERNAL MEDICINE

## 2019-03-05 PROCEDURE — 80197 ASSAY OF TACROLIMUS: CPT | Performed by: INTERNAL MEDICINE

## 2019-03-05 PROCEDURE — 36415 COLL VENOUS BLD VENIPUNCTURE: CPT | Performed by: INTERNAL MEDICINE

## 2019-03-05 PROCEDURE — 80048 BASIC METABOLIC PNL TOTAL CA: CPT | Performed by: INTERNAL MEDICINE

## 2019-03-05 PROCEDURE — 83690 ASSAY OF LIPASE: CPT | Performed by: INTERNAL MEDICINE

## 2019-03-05 PROCEDURE — 84156 ASSAY OF PROTEIN URINE: CPT | Performed by: INTERNAL MEDICINE

## 2019-03-05 NOTE — TELEPHONE ENCOUNTER
ISSUE:  CO2 18     OUTCOME:   Call placed to pt regarding low CO2 level.  Pt reports 3 episodes of diarrhea yesterday.  Pt reports diarrhea has since resolved.  Pt will plan on monthly labs and aware if level remains low we may start her on sodium bicarb supplement as needed.     Pt questions answered, pt v/u.

## 2019-03-13 ENCOUNTER — TELEPHONE (OUTPATIENT)
Dept: TRANSPLANT | Facility: CLINIC | Age: 44
End: 2019-03-13

## 2019-03-13 NOTE — TELEPHONE ENCOUNTER
"Pt called reporting she has been having numbness in her hands and arms that started within last 6 months and has cont to worsen. Pt reports her arms \"fall asleep\" when holding onto something or driving.  Pt verbalized she is f/u with her PCP for workup and is getting imaging done to see if she has a pinched nerve and if she has trigger finger.  Pt wondering if this could be a cause of her envarsus.  Pt aware envarsus toxicity would cause some tingling in hands, but not to this extreme.  Pt encouraged to cont f/u with PCP for workup.      Pt questions answered, pt v/u.   "

## 2019-04-02 DIAGNOSIS — Z94.83 PANCREAS REPLACED BY TRANSPLANT (H): ICD-10-CM

## 2019-04-02 DIAGNOSIS — Z94.0 KIDNEY REPLACED BY TRANSPLANT: ICD-10-CM

## 2019-04-02 DIAGNOSIS — Z79.899 LONG TERM USE OF DRUG: ICD-10-CM

## 2019-04-02 LAB
AMYLASE SERPL-CCNC: 97 U/L (ref 30–110)
ANION GAP SERPL CALCULATED.3IONS-SCNC: 7 MMOL/L (ref 3–14)
BUN SERPL-MCNC: 26 MG/DL (ref 7–30)
CALCIUM SERPL-MCNC: 8.9 MG/DL (ref 8.5–10.1)
CHLORIDE SERPL-SCNC: 111 MMOL/L (ref 94–109)
CO2 SERPL-SCNC: 23 MMOL/L (ref 20–32)
CREAT SERPL-MCNC: 1.45 MG/DL (ref 0.52–1.04)
ERYTHROCYTE [DISTWIDTH] IN BLOOD BY AUTOMATED COUNT: 13.3 % (ref 10–15)
GFR SERPL CREATININE-BSD FRML MDRD: 43 ML/MIN/{1.73_M2}
GLUCOSE SERPL-MCNC: 80 MG/DL (ref 70–99)
HCT VFR BLD AUTO: 33.7 % (ref 35–47)
HGB BLD-MCNC: 10.6 G/DL (ref 11.7–15.7)
LIPASE SERPL-CCNC: 152 U/L (ref 73–393)
MCH RBC QN AUTO: 28.4 PG (ref 26.5–33)
MCHC RBC AUTO-ENTMCNC: 31.5 G/DL (ref 31.5–36.5)
MCV RBC AUTO: 90 FL (ref 78–100)
PLATELET # BLD AUTO: 213 10E9/L (ref 150–450)
POTASSIUM SERPL-SCNC: 4.6 MMOL/L (ref 3.4–5.3)
RBC # BLD AUTO: 3.73 10E12/L (ref 3.8–5.2)
SODIUM SERPL-SCNC: 141 MMOL/L (ref 133–144)
TACROLIMUS BLD-MCNC: 7.1 UG/L (ref 5–15)
TME LAST DOSE: NORMAL H
WBC # BLD AUTO: 3.9 10E9/L (ref 4–11)

## 2019-04-02 PROCEDURE — 83690 ASSAY OF LIPASE: CPT | Performed by: INTERNAL MEDICINE

## 2019-04-02 PROCEDURE — 80197 ASSAY OF TACROLIMUS: CPT | Performed by: INTERNAL MEDICINE

## 2019-04-02 PROCEDURE — 85027 COMPLETE CBC AUTOMATED: CPT | Performed by: INTERNAL MEDICINE

## 2019-04-02 PROCEDURE — 82150 ASSAY OF AMYLASE: CPT | Performed by: INTERNAL MEDICINE

## 2019-04-02 PROCEDURE — 80048 BASIC METABOLIC PNL TOTAL CA: CPT | Performed by: INTERNAL MEDICINE

## 2019-04-02 PROCEDURE — 36415 COLL VENOUS BLD VENIPUNCTURE: CPT | Performed by: INTERNAL MEDICINE

## 2019-04-03 ENCOUNTER — TELEPHONE (OUTPATIENT)
Dept: TRANSPLANT | Facility: CLINIC | Age: 44
End: 2019-04-03

## 2019-04-03 DIAGNOSIS — Z94.0 KIDNEY REPLACED BY TRANSPLANT: Primary | ICD-10-CM

## 2019-04-03 NOTE — TELEPHONE ENCOUNTER
INCREASED CREATININE: 1.45  Baseline creatinine over past year 1.1-1.3.    PLAN: per Dr. Marquez     Message   Received: Today   Message Contents   Mauricio Marquez MD Etcheverry, Katherine, RN             Slightly increased serum creatinine and recommend good hydration and would recheck.    Associated Results     Result Notes for Tacrolimus level     Notes recorded by Mauricio Marquez MD on 4/3/2019 at 8:02 AM CDT  Slightly increased serum creatinine and recommend good hydration and would recheck       LPN TASK  Please call with instructions per Dr. Marquez's recommendation in Results Note.

## 2019-04-03 NOTE — TELEPHONE ENCOUNTER
Call placed to patient. Patient v\u to improve her hydration and repeat creatine level in 2 weeks.

## 2019-04-10 ENCOUNTER — TELEPHONE (OUTPATIENT)
Dept: TRANSPLANT | Facility: CLINIC | Age: 44
End: 2019-04-10

## 2019-04-10 NOTE — TELEPHONE ENCOUNTER
REFERRAL FOR BILATERAL CARPEL TUNNEL RELEASE    QUESTION: Patient called to say her primary care provider would be making a referral for carpel tunnel surgery. She wants to know if the surgery needs to be done at [facility] because of her simultaneous pancreas and kidney transplants, of if it can be done by a [facility] surgeon.    RESPONSE: She does not need to have the surgery at [facility]; however, she is welcome to request a referral to have the surgery here. It's okay to have the surgery at [facility].

## 2019-06-25 DIAGNOSIS — Z94.83 PANCREAS REPLACED BY TRANSPLANT (H): ICD-10-CM

## 2019-06-25 DIAGNOSIS — Z94.0 KIDNEY REPLACED BY TRANSPLANT: ICD-10-CM

## 2019-06-25 RX ORDER — MYCOPHENOLIC ACID 180 MG/1
360 TABLET, DELAYED RELEASE ORAL 2 TIMES DAILY
Qty: 360 TABLET | Refills: 3 | Status: CANCELLED | OUTPATIENT
Start: 2019-06-25

## 2019-07-12 DIAGNOSIS — Z94.0 KIDNEY REPLACED BY TRANSPLANT: ICD-10-CM

## 2019-07-12 DIAGNOSIS — Z94.83 PANCREAS REPLACED BY TRANSPLANT (H): ICD-10-CM

## 2019-07-12 RX ORDER — TACROLIMUS 0.75 MG/1
TABLET, EXTENDED RELEASE ORAL
Qty: 150 TABLET | Refills: 11 | Status: SHIPPED | OUTPATIENT
Start: 2019-07-12 | End: 2019-12-05

## 2019-07-16 DIAGNOSIS — Z94.83 PANCREAS REPLACED BY TRANSPLANT (H): ICD-10-CM

## 2019-07-16 DIAGNOSIS — Z94.0 KIDNEY REPLACED BY TRANSPLANT: ICD-10-CM

## 2019-07-16 RX ORDER — MYCOPHENOLIC ACID 180 MG/1
360 TABLET, DELAYED RELEASE ORAL 2 TIMES DAILY
Qty: 120 TABLET | Refills: 0 | Status: SHIPPED | OUTPATIENT
Start: 2019-07-16 | End: 2019-08-02

## 2019-08-02 DIAGNOSIS — Z94.0 KIDNEY REPLACED BY TRANSPLANT: ICD-10-CM

## 2019-08-02 DIAGNOSIS — Z94.83 PANCREAS REPLACED BY TRANSPLANT (H): ICD-10-CM

## 2019-08-02 RX ORDER — MYCOPHENOLIC ACID 180 MG/1
360 TABLET, DELAYED RELEASE ORAL 2 TIMES DAILY
Qty: 120 TABLET | Refills: 0 | Status: SHIPPED | OUTPATIENT
Start: 2019-08-02 | End: 2019-08-22

## 2019-08-22 DIAGNOSIS — Z94.0 KIDNEY REPLACED BY TRANSPLANT: ICD-10-CM

## 2019-08-22 DIAGNOSIS — Z94.83 PANCREAS REPLACED BY TRANSPLANT (H): ICD-10-CM

## 2019-08-22 RX ORDER — MYCOPHENOLIC ACID 180 MG/1
360 TABLET, DELAYED RELEASE ORAL 2 TIMES DAILY
Qty: 120 TABLET | Refills: 0 | Status: SHIPPED | OUTPATIENT
Start: 2019-08-22 | End: 2019-09-06

## 2019-08-26 ENCOUNTER — TELEPHONE (OUTPATIENT)
Dept: TRANSPLANT | Facility: CLINIC | Age: 44
End: 2019-08-26

## 2019-09-06 DIAGNOSIS — Z94.83 PANCREAS REPLACED BY TRANSPLANT (H): ICD-10-CM

## 2019-09-06 DIAGNOSIS — Z94.0 KIDNEY REPLACED BY TRANSPLANT: ICD-10-CM

## 2019-09-06 RX ORDER — MYCOPHENOLIC ACID 180 MG/1
TABLET, DELAYED RELEASE ORAL
Qty: 120 TABLET | Refills: 12 | Status: SHIPPED | OUTPATIENT
Start: 2019-09-06 | End: 2020-08-05

## 2019-09-19 ENCOUNTER — DOCUMENTATION ONLY (OUTPATIENT)
Dept: CARE COORDINATION | Facility: CLINIC | Age: 44
End: 2019-09-19

## 2019-10-21 ENCOUNTER — TELEPHONE (OUTPATIENT)
Dept: TRANSPLANT | Facility: CLINIC | Age: 44
End: 2019-10-21

## 2019-10-21 DIAGNOSIS — Z94.83 PANCREAS REPLACED BY TRANSPLANT (H): ICD-10-CM

## 2019-10-21 DIAGNOSIS — Z79.899 OTHER LONG TERM (CURRENT) DRUG THERAPY: ICD-10-CM

## 2019-10-21 DIAGNOSIS — Z94.0 KIDNEY REPLACED BY TRANSPLANT: Primary | ICD-10-CM

## 2019-10-21 NOTE — TELEPHONE ENCOUNTER
Clinic appt 10/31, due for labs.    LPN task:    Please update  orders. Call Rose Castaneda to have her complete monthly tx labs before appt next week. She is also due for A1C.

## 2019-10-22 NOTE — TELEPHONE ENCOUNTER
Call placed to patient. No answer. Voice message left instructing patient to complete transplant labs prior to her clinic visit next week

## 2019-10-25 ENCOUNTER — RESULTS ONLY (OUTPATIENT)
Dept: OTHER | Facility: CLINIC | Age: 44
End: 2019-10-25

## 2019-10-25 ENCOUNTER — ALLIED HEALTH/NURSE VISIT (OUTPATIENT)
Dept: NURSING | Facility: CLINIC | Age: 44
End: 2019-10-25
Payer: COMMERCIAL

## 2019-10-25 DIAGNOSIS — Z79.899 LONG TERM USE OF DRUG: ICD-10-CM

## 2019-10-25 DIAGNOSIS — Z94.83 PANCREAS REPLACED BY TRANSPLANT (H): ICD-10-CM

## 2019-10-25 DIAGNOSIS — Z79.899 OTHER LONG TERM (CURRENT) DRUG THERAPY: ICD-10-CM

## 2019-10-25 DIAGNOSIS — Z23 NEED FOR PROPHYLACTIC VACCINATION AND INOCULATION AGAINST INFLUENZA: Primary | ICD-10-CM

## 2019-10-25 DIAGNOSIS — Z94.0 KIDNEY REPLACED BY TRANSPLANT: ICD-10-CM

## 2019-10-25 LAB
ERYTHROCYTE [DISTWIDTH] IN BLOOD BY AUTOMATED COUNT: 13.9 % (ref 10–15)
HBA1C MFR BLD: 5 % (ref 0–5.6)
HCT VFR BLD AUTO: 31.3 % (ref 35–47)
HGB BLD-MCNC: 10 G/DL (ref 11.7–15.7)
LIPASE SERPL-CCNC: 128 U/L (ref 73–393)
MCH RBC QN AUTO: 29.7 PG (ref 26.5–33)
MCHC RBC AUTO-ENTMCNC: 31.9 G/DL (ref 31.5–36.5)
MCV RBC AUTO: 93 FL (ref 78–100)
PLATELET # BLD AUTO: 218 10E9/L (ref 150–450)
PROT UR-MCNC: 0.34 G/L
PROT/CREAT 24H UR: 0.17 G/G CR (ref 0–0.2)
RBC # BLD AUTO: 3.37 10E12/L (ref 3.8–5.2)
TACROLIMUS BLD-MCNC: 10.4 UG/L (ref 5–15)
TME LAST DOSE: NORMAL H
WBC # BLD AUTO: 4.7 10E9/L (ref 4–11)

## 2019-10-25 PROCEDURE — 87799 DETECT AGENT NOS DNA QUANT: CPT | Performed by: INTERNAL MEDICINE

## 2019-10-25 PROCEDURE — 90686 IIV4 VACC NO PRSV 0.5 ML IM: CPT

## 2019-10-25 PROCEDURE — 86833 HLA CLASS II HIGH DEFIN QUAL: CPT | Performed by: INTERNAL MEDICINE

## 2019-10-25 PROCEDURE — 86832 HLA CLASS I HIGH DEFIN QUAL: CPT | Performed by: INTERNAL MEDICINE

## 2019-10-25 PROCEDURE — 82150 ASSAY OF AMYLASE: CPT | Performed by: INTERNAL MEDICINE

## 2019-10-25 PROCEDURE — 84156 ASSAY OF PROTEIN URINE: CPT | Performed by: INTERNAL MEDICINE

## 2019-10-25 PROCEDURE — 80048 BASIC METABOLIC PNL TOTAL CA: CPT | Performed by: INTERNAL MEDICINE

## 2019-10-25 PROCEDURE — 80061 LIPID PANEL: CPT | Performed by: INTERNAL MEDICINE

## 2019-10-25 PROCEDURE — 99207 ZZC NO CHARGE NURSE ONLY: CPT

## 2019-10-25 PROCEDURE — 36415 COLL VENOUS BLD VENIPUNCTURE: CPT | Performed by: INTERNAL MEDICINE

## 2019-10-25 PROCEDURE — 85027 COMPLETE CBC AUTOMATED: CPT | Performed by: INTERNAL MEDICINE

## 2019-10-25 PROCEDURE — 83690 ASSAY OF LIPASE: CPT | Performed by: INTERNAL MEDICINE

## 2019-10-25 PROCEDURE — 83036 HEMOGLOBIN GLYCOSYLATED A1C: CPT | Performed by: INTERNAL MEDICINE

## 2019-10-25 PROCEDURE — 80197 ASSAY OF TACROLIMUS: CPT | Performed by: INTERNAL MEDICINE

## 2019-10-25 PROCEDURE — 90471 IMMUNIZATION ADMIN: CPT

## 2019-10-26 LAB
AMYLASE SERPL-CCNC: 86 U/L (ref 30–110)
ANION GAP SERPL CALCULATED.3IONS-SCNC: 10 MMOL/L (ref 3–14)
BUN SERPL-MCNC: 23 MG/DL (ref 7–30)
CALCIUM SERPL-MCNC: 8.3 MG/DL (ref 8.5–10.1)
CHLORIDE SERPL-SCNC: 109 MMOL/L (ref 94–109)
CHOLEST SERPL-MCNC: 150 MG/DL
CO2 SERPL-SCNC: 19 MMOL/L (ref 20–32)
CREAT SERPL-MCNC: 1.51 MG/DL (ref 0.52–1.04)
GFR SERPL CREATININE-BSD FRML MDRD: 41 ML/MIN/{1.73_M2}
GLUCOSE SERPL-MCNC: 80 MG/DL (ref 70–99)
HDLC SERPL-MCNC: 80 MG/DL
LDLC SERPL CALC-MCNC: 59 MG/DL
NONHDLC SERPL-MCNC: 70 MG/DL
POTASSIUM SERPL-SCNC: 4.5 MMOL/L (ref 3.4–5.3)
SODIUM SERPL-SCNC: 138 MMOL/L (ref 133–144)
TRIGL SERPL-MCNC: 54 MG/DL

## 2019-10-28 ENCOUNTER — TELEPHONE (OUTPATIENT)
Dept: TRANSPLANT | Facility: CLINIC | Age: 44
End: 2019-10-28

## 2019-10-28 NOTE — TELEPHONE ENCOUNTER
Issue:    Cr above baseline at 1.5  Tac level elevated at 10.4    Plan:    Call placed to patient, left detailed VM regarding labs above. RNCC asked for call back to discuss, would possible decrease envarsus dose.     Reminded her of upcoming appt date/time this week with nephrology.

## 2019-10-29 LAB
SA1 CELL: NORMAL
SA1 COMMENTS: NORMAL
SA1 HI RISK ABY: NORMAL
SA1 MOD RISK ABY: NORMAL
SA1 TEST METHOD: NORMAL
SA2 CELL: NORMAL
SA2 COMMENTS: NORMAL
SA2 HI RISK ABY UA: NORMAL
SA2 MOD RISK ABY: NORMAL
SA2 TEST METHOD: NORMAL
UNACCEPTABLE ANTIGEN: NORMAL
UNOS CPRA: 54

## 2019-10-30 LAB
DONOR IDENTIFICATION: NORMAL
DSA COMMENTS: NORMAL
DSA PRESENT: NO
DSA TEST METHOD: NORMAL
ORGAN: NORMAL

## 2019-10-31 ENCOUNTER — OFFICE VISIT (OUTPATIENT)
Dept: NEPHROLOGY | Facility: CLINIC | Age: 44
End: 2019-10-31
Attending: INTERNAL MEDICINE
Payer: COMMERCIAL

## 2019-10-31 VITALS
DIASTOLIC BLOOD PRESSURE: 62 MMHG | TEMPERATURE: 98.3 F | WEIGHT: 115.3 LBS | HEART RATE: 69 BPM | OXYGEN SATURATION: 98 % | SYSTOLIC BLOOD PRESSURE: 111 MMHG | BODY MASS INDEX: 21.79 KG/M2

## 2019-10-31 DIAGNOSIS — Z94.83 PANCREAS REPLACED BY TRANSPLANT (H): ICD-10-CM

## 2019-10-31 DIAGNOSIS — Z94.0 KIDNEY REPLACED BY TRANSPLANT: Primary | ICD-10-CM

## 2019-10-31 DIAGNOSIS — N18.30 ANEMIA IN STAGE 3 CHRONIC KIDNEY DISEASE (H): ICD-10-CM

## 2019-10-31 DIAGNOSIS — B27.00 EBV (EPSTEIN-BARR VIRUS) VIREMIA: ICD-10-CM

## 2019-10-31 DIAGNOSIS — D63.1 ANEMIA IN STAGE 3 CHRONIC KIDNEY DISEASE (H): ICD-10-CM

## 2019-10-31 DIAGNOSIS — E83.42 HYPOMAGNESEMIA: ICD-10-CM

## 2019-10-31 DIAGNOSIS — E55.9 VITAMIN D DEFICIENCY: ICD-10-CM

## 2019-10-31 DIAGNOSIS — Z48.298 AFTERCARE FOLLOWING ORGAN TRANSPLANT: ICD-10-CM

## 2019-10-31 DIAGNOSIS — D84.9 IMMUNOSUPPRESSION (H): ICD-10-CM

## 2019-10-31 PROCEDURE — G0463 HOSPITAL OUTPT CLINIC VISIT: HCPCS | Mod: ZF

## 2019-10-31 ASSESSMENT — PAIN SCALES - GENERAL: PAINLEVEL: NO PAIN (0)

## 2019-10-31 NOTE — NURSING NOTE
Chief Complaint   Patient presents with     RECHECK     annual follow up         /62 (BP Location: Right arm, Patient Position: Sitting, Cuff Size: Adult Regular)   Pulse 69   Temp 98.3  F (36.8  C)   Wt 52.3 kg (115 lb 4.8 oz)   SpO2 98%   BMI 21.79 kg/m        Dajuan Mcgregor, EMT

## 2019-10-31 NOTE — LETTER
10/31/2019       RE: Rose Castaneda  [address]  [place] [address]     Dear Colleague,    Thank you for referring your patient, Rose Castaneda, to the [facility] at [facility]. Please see a copy of my visit note below.    CHRONIC TRANSPLANT NEPHROLOGY VISIT    Assessment & Plan   # DDKT (SPK): Increased, likely secondary to high tacrolimus drug levels.    - Baseline Cr ~ 1.1-1.3   - Proteinuria: Normal (<0.2 grams)   - Date DSA Last Checked: Oct/2019      Latest DSA: No   - BK Viremia: No   - Kidney Tx Biopsy: No    # Pancreas Tx (SPK):   - Pancreatic Exocrine Drainage: Enteric drained     - Blood glucose: Euglycemia On insulin: No   - HbA1c: Stable      Latest HbA1c: 5.0%   - Pancreatic enzymes: Stable   - Date DSA Last Checked: Oct/2019  Latest DSA: No   - Pancreas Tx Biopsy: No    # Immunosuppression: Tacrolimus extended release (goal 5-8) and Mycophenolic acid (goal not followed)   - Changes: No    # Infection Prophylaxis:   - PJP: Sulfa/TMP (Bactrim)    # Neurogenic Orthostatic Hypotension: Controlled;  Goal BP: > 100, but < 130 systolic   - Changes: No    - Off of Florinef    # Anemia in Chronic Renal Disease: Hgb: Stable      DONNY: No   - Iron studies: Not checked recently    # Mineral Bone Disorder:   - Vitamin D; level: Not checked recently        On Supplement: Yes, will restart at 2000 units daily.   - Calcium; level: Low        On Supplement: No    # Chronic Diarrhea: Resolved.     # CMV Viremia: Detectable, but less than quantifiable at last check. No need to follow unless symptomatic.     # EBV Viremia: Detectable, but less than quantifiable at last check. No need to follow unless symptomatic.     # h/o Pulmonary Histoplasmosis: No off itraconazole.    # CAD: Asymptomatic.     # Carpal Tunnel Syndrome:  S/p release on right hand.     # Skin Cancer Risk:    - Discussed sun protection and recommend regular follow up with Dermatology.    # Medical  Compliance: No.  Evidence of labs less than recommended.  - Discussed importance of checking labs regularly as recommended, taking medications as prescribed and attending scheduled medical appointments.  - Patient was informed to get her labs done once per month.       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    Return visit: Return in about 1 year (around 10/31/2020).    Chief Complaint   Ms. Castaneda is a 43 year old here for routine follow up.    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 She was last seen in clinic on 8/21/18 by Dr. Tomlinson and Dr. Weber; please see that note for further details.    Since her last visit, the patient denies any changes or new symptoms. She reports feeling well overall, with no issues. She describes her energy level as good, besides one week in which she felt the need to sleep all week. She reports getting her flu shot around that time, which may have caused her to feel tired. This occurred approximately two weeks ago, and has gone away. She reports that her previous stomach issues (chronic diarrhea) have completely improved, and she reports feeling great. She describes having a blood glucose reading of 150 after a meal high in carbohydrates, and was worried about this number. She was assured that this is normal and told not to worry.     She describes not enjoying exercise, but does not describe herself as sedentary. She reports that her  blood pressure has been good, even off of the florinef. She denies any symptoms of lightheadedness or dizziness. She denies any pulmonary issues such as a cough, and feels fine off of her itraconazole. The patient reports that she got surgery on her right hand for carpal tunnel syndrome release, but did not do the left due to insurance issues. She reports that her left hand still gets numb when she uses her hand, such as when grabbing her steering wheel or doing other gripping motions with that hand.     Today, the patient reports continuing to feel great. She was reminded today that she need to have labs done every month. The patient denied being aware of this, as she did not know she needed that frequency of labs two years out from her transplant. She reports taking all of her medications at night between 9:30 and 10:00 PM, which has caused issues with her Envarsus lab levels. She reports struggling to plan out how to get a true 24 hour trough, and has sometimes put off taking the medication until the morning, and then doing labs the next day. She was told to instead do her labs as late as possible, and take her medication earlier at night the day before. She can then go back to taking her medications at her usual time.     The patient inquired about her elevated creatinine today, and was informed that she may need to have a biopsy done if her drug levels come back normal, and it continues to stay elevated. She inquired about how rejection is treated, and was informed that it usually takes steroids to treat and does not mean the end of the kidney. The importance of drug levels was emphasized to the patient, as it could give an indicator of why her creatinine is increased. She also asked if her immunosuppression would make her sterile, and was informed that she does still need to take her birth control because the immunosuppression would not affect her fertility. The patient inquired about getting a prescription  for blood sugar measurement tabs, and was told to call her coordinator to get the right kind for her glucose monitoring system at home. She had no other questions or concerns today.      Recent Hospitalizations:  [x] No [] Yes    New Medical Issues: [x] No [] Yes    Decreased energy: [x] No [] Yes    Chest pain or SOB with exertion:  [x] No [] Yes    Appetite change or weight change: [x] No [] Yes    Nausea, vomiting or diarrhea:  [x] No [] Yes    Fever, sweats or chills: [x] No [] Yes    Leg swelling: [x] No [] Yes      Home BP: 110's/60's    Review of Systems   A comprehensive review of systems was obtained and negative, except as noted in the HPI or PMH.    Problem List   Patient Active Problem List   Diagnosis     Diabetes mellitus type 1 (H)     Immunosuppression (H)     Kidney replaced by transplant     Neurogenic orthostatic hypotension (H)     Osteoporosis     Dyslipidemia     Secondary renal hyperparathyroidism (H)     Hypothyroidism     Anemia in chronic renal disease     Pulmonary histoplasmosis (H)     Pancreas replaced by transplant (H)     Hypomagnesemia     Vitamin D deficiency     Aftercare following organ transplant     Cytomegalovirus (CMV) viremia (H)     EBV (Isabelle-Barr virus) viremia       Social History   Social History     Tobacco Use     Smoking status: Never Smoker     Smokeless tobacco: Never Used   Substance Use Topics     Alcohol use: Yes     Comment: rare     Drug use: No       Allergies   Allergies   Allergen Reactions     Amoxicillin-Pot Clavulanate Diarrhea     Ciprofloxacin Nausea     Pollen Extract Other (See Comments)     Seasonal allergies     Pyridostigmine Other (See Comments)     Spastic muscle motion in the face and legs, weakness in the arms. Slight swelling of the tongue.       Medications   Current Outpatient Medications   Medication Sig     aspirin  MG EC tablet Take 1 tablet (325 mg) by mouth daily (Patient taking differently: Take 81 mg by mouth daily )      citalopram (CELEXA) 10 MG tablet Take 20 mg by mouth daily      ENVARSUS XR 0.75 MG 24 hr tablet TAKE 5 TABLETS BY MOUTH EVERY MORNING BEFORE BREAKFAST     levofloxacin (LEVAQUIN) 250 MG tablet Take 1 tablet (250 mg) by mouth daily     Levothyroxine Sodium (SYNTHROID PO) Take 50 mcg by mouth daily      mycophenolic acid (GENERIC EQUIVALENT) 180 MG EC tablet TAKE 2 TABLETS TWICE A DAY (APPOINTMENT REQUIRED FOR FURTHER REFILLS)     pravastatin (PRAVACHOL) 20 MG tablet Take 1 tablet (20 mg) by mouth daily     sulfamethoxazole-trimethoprim (BACTRIM/SEPTRA) 400-80 MG per tablet Take 1 tablet by mouth daily     tacrolimus (ENVARSUS XR) 0.75 MG 24 hr tablet Take 5 tablets (3.75 mg) by mouth every morning (before breakfast)     Cholecalciferol (VITAMIN D) 2000 UNITS tablet Take 2,000 Units by mouth daily (Patient not taking: Reported on 8/21/2018)     No current facility-administered medications for this visit.      Medications Discontinued During This Encounter   Medication Reason     order for DME      order for DME      oxyCODONE IR (ROXICODONE) 5 MG tablet      oseltamivir (TAMIFLU) 75 MG capsule        Physical Exam   Vital Signs: /62 (BP Location: Right arm, Patient Position: Sitting, Cuff Size: Adult Regular)   Pulse 69   Temp 98.3  F (36.8  C)   Wt 52.3 kg (115 lb 4.8 oz)   SpO2 98%   BMI 21.79 kg/m       GENERAL APPEARANCE: alert and no distress  HENT: mouth without ulcers or lesions  LYMPHATICS: no cervical or supraclavicular nodes  RESP: lungs clear to auscultation - no rales, rhonchi or wheezes  CV: regular rhythm, normal rate, no rub, no murmur  EDEMA: no LE edema bilaterally  ABDOMEN: soft, nondistended, nontender, bowel sounds normal  MS: extremities normal - no gross deformities noted, no evidence of inflammation in joints, no muscle tenderness  SKIN: no rash  TX KIDNEY: normal  DIALYSIS ACCESS:  None      Data     Renal Latest Ref Rng & Units 10/25/2019 4/2/2019 3/5/2019   Na 133 - 144 mmol/L 138  141 140   Na (external) 136 - 145 - - -   K 3.4 - 5.3 mmol/L 4.5 4.6 3.9   K (external) 3.5 - 5.1 - - -   Cl 94 - 109 mmol/L 109 111(H) 113(H)   Cl (external) 98 - 112 - - -   CO2 20 - 32 mmol/L 19(L) 23 18(L)   CO2 (external) 21 - 32 - - -   BUN 7 - 30 mg/dL 23 26 22   BUN (external) 7 - 24 - - -   Cr 0.52 - 1.04 mg/dL 1.51(H) 1.45(H) 1.28(H)   Cr (external) 0.55 - 1.02 mg/dL - - -   Glucose 70 - 99 mg/dL 80 80 81   Glucose (external) 74 - 106 - - -   Ca  8.5 - 10.1 mg/dL 8.3(L) 8.9 8.7   Ca (external) 8.5 - 10.1 - - -   Mg 1.6 - 2.3 mg/dL - - -   Mg (external) 1.8 - 2.4 - - -     Bone Health Latest Ref Rng & Units 2/13/2018 6/14/2017 5/19/2017   Phos 2.5 - 4.5 mg/dL - - 3.0   Phos (external) 2.5 - 4.7 mg/dL - - -   PTHi 12 - 72 pg/mL - 34 -   Vit D Def 20 - 75 ug/L 21 31 -     Heme Latest Ref Rng & Units 10/25/2019 4/2/2019 3/5/2019   WBC 4.0 - 11.0 10e9/L 4.7 3.9(L) 4.2   WBC (external) 4.3 - 10.8 - - -   Hgb 11.7 - 15.7 g/dL 10.0(L) 10.6(L) 10.6(L)   Hgb (external) 12.0 - 16.0 - - -   Plt 150 - 450 10e9/L 218 213 211   Plt (external) 150 - 400 - - -     Liver Latest Ref Rng & Units 9/8/2017 5/19/2017 5/11/2017   AP 40 - 150 U/L 118 169(H) 157(H)   AP (external) 38 - 126 IU/L - - -   TBili 0.2 - 1.3 mg/dL 0.4 0.6 0.4   TBili (external) 0.2 - 1.5 mg/dL - - -   DBili 0.0 - 0.2 mg/dL 0.2 - 0.1   DBili (external) <0.4 mg/dL - - -   ALT 0 - 50 U/L 19 29 40   ALT (external) 10.0 - 50 IU/L - - -   AST 0 - 45 U/L 21 24 35   AST (external) 12.0 - 45 IU/L - - -   Tot Protein 6.8 - 8.8 g/dL 6.9 7.1 6.7(L)   Tot Protein (external) 6.2 - 8.1 - - -   Albumin 3.4 - 5.0 g/dL 3.3(L) 3.4 3.5   Albumin (external) 3.4 - 4.9 - - -     Pancreas Latest Ref Rng & Units 10/25/2019 4/2/2019 3/5/2019   A1C 0 - 5.6 % 5.0 - -   A1C (external) <5.7 % - - -   Amylase 30 - 110 U/L 86 97 88   Amylase (external) 25 - 125 U/L - - -   Lipase 73 - 393 U/L 128 152 118   Lipase (external) 25 - 125 U/L - - -     Iron studies Latest Ref Rng & Units  2/12/2018 8/8/2016 6/3/2015   Iron 35 - 180 ug/dL - 55 -   Iron (external) 8 - 78 U/L 25 - -   Iron sat 15 - 46 % - 24 -   Ferritin 12 - 150 ng/mL - 73 -   Ferritin (external) 8 - 388 - - 121     [facility] Txp Virology Latest Ref Rng & Units 10/25/2019 3/5/2019 2/5/2019   CVM DNA Quant - - Plasma Plasma   BK Spec - Plasma - Plasma   BK Res BKNEG:BK Virus DNA Not Detected copies/mL BK Virus DNA Not Detected - BK Virus DNA Not Detected   BK Log <2.7 Log copies/mL Not Calculated - Not Calculated   Hep B Core NR - - -   Hep B Surf - - - -   HIV 1&2 NEG - - -        Recent Labs   Lab Test 03/05/19  0856 04/02/19  0819 10/25/19  1029   DOSTAC  03/04/2019 AT 2100PM 04/01/2019 at 0915 pm 10/24/2019 AT 1000 AM   TACROL 6.3 7.1 10.4     Recent Labs   Lab Test 06/14/17  0958 02/01/18  0857 03/08/18  1029   DOSMPA 2300 ON 6/13/2017 800 03/07/2018 at 0930 pm   MPACID 0.92* 2.17 2.30   MPAG 84.2 46.4 40.9     Scribe Disclosure:  I, Myra You, am serving as a scribe to document services personally performed by Mauricio Marquez M.D. at this visit, based upon the provider's statements to me. All documentation has been reviewed by the aforementioned provider prior to being entered into the official medical record.          Again, thank you for allowing me to participate in the care of your patient.      Sincerely,    Kidney/Pancreas Recipient

## 2019-10-31 NOTE — LETTER
10/31/2019      RE: Rose Castaneda  [address]  [place] [address]       CHRONIC TRANSPLANT NEPHROLOGY VISIT    Assessment & Plan   # DDKT (SPK): Increased, likely secondary to high tacrolimus drug levels.    - Baseline Cr ~ 1.1-1.3   - Proteinuria: Normal (<0.2 grams)   - Date DSA Last Checked: Oct/2019      Latest DSA: No   - BK Viremia: No   - Kidney Tx Biopsy: No    # Pancreas Tx (SPK):   - Pancreatic Exocrine Drainage: Enteric drained     - Blood glucose: Euglycemia On insulin: No   - HbA1c: Stable      Latest HbA1c: 5.0%   - Pancreatic enzymes: Stable   - Date DSA Last Checked: Oct/2019  Latest DSA: No   - Pancreas Tx Biopsy: No    # Immunosuppression: Tacrolimus extended release (goal 5-8) and Mycophenolic acid (goal not followed)   - Changes: No    # Infection Prophylaxis:   - PJP: Sulfa/TMP (Bactrim)    # Neurogenic Orthostatic Hypotension: Controlled;  Goal BP: > 100, but < 130 systolic   - Changes: No    - Off of Florinef    # Anemia in Chronic Renal Disease: Hgb: Stable      DONNY: No   - Iron studies: Not checked recently    # Mineral Bone Disorder:   - Vitamin D; level: Not checked recently        On Supplement: Yes, will restart at 2000 units daily.   - Calcium; level: Low        On Supplement: No    # Chronic Diarrhea: Resolved.     # CMV Viremia: Detectable, but less than quantifiable at last check. No need to follow unless symptomatic.     # EBV Viremia: Detectable, but less than quantifiable at last check. No need to follow unless symptomatic.     # h/o Pulmonary Histoplasmosis: No off itraconazole.    # CAD: Asymptomatic.     # Carpal Tunnel Syndrome:  S/p release on right hand.     # Skin Cancer Risk:    - Discussed sun protection and recommend regular follow up with Dermatology.    # Medical Compliance: No.  Evidence of labs less than recommended.  - Discussed importance of checking labs regularly as recommended, taking medications as prescribed and attending scheduled medical  appointments.  - Patient was informed to get her labs done once per month.       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    Return visit: Return in about 1 year (around 10/31/2020).    Chief Complaint   Ms. Castaneda is a 43 year old here for routine follow up.    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 She was last seen in clinic on 8/21/18 by Dr. Tomlinson and Dr. Weber; please see that note for further details.    Since her last visit, the patient denies any changes or new symptoms. She reports feeling well overall, with no issues. She describes her energy level as good, besides one week in which she felt the need to sleep all week. She reports getting her flu shot around that time, which may have caused her to feel tired. This occurred approximately two weeks ago, and has gone away. She reports that her previous stomach issues (chronic diarrhea) have completely improved, and she reports feeling great. She describes having a blood glucose reading of 150 after a meal high in carbohydrates, and was worried about this number. She was assured that this is normal and told not to worry.     She describes not enjoying exercise, but does not describe herself as sedentary. She reports that her blood pressure has been good, even off of the florinef. She denies any symptoms of lightheadedness or dizziness. She denies any pulmonary issues such as a cough, and feels fine off of her  itraconazole. The patient reports that she got surgery on her right hand for carpal tunnel syndrome release, but did not do the left due to insurance issues. She reports that her left hand still gets numb when she uses her hand, such as when grabbing her steering wheel or doing other gripping motions with that hand.     Today, the patient reports continuing to feel great. She was reminded today that she need to have labs done every month. The patient denied being aware of this, as she did not know she needed that frequency of labs two years out from her transplant. She reports taking all of her medications at night between 9:30 and 10:00 PM, which has caused issues with her Envarsus lab levels. She reports struggling to plan out how to get a true 24 hour trough, and has sometimes put off taking the medication until the morning, and then doing labs the next day. She was told to instead do her labs as late as possible, and take her medication earlier at night the day before. She can then go back to taking her medications at her usual time.     The patient inquired about her elevated creatinine today, and was informed that she may need to have a biopsy done if her drug levels come back normal, and it continues to stay elevated. She inquired about how rejection is treated, and was informed that it usually takes steroids to treat and does not mean the end of the kidney. The importance of drug levels was emphasized to the patient, as it could give an indicator of why her creatinine is increased. She also asked if her immunosuppression would make her sterile, and was informed that she does still need to take her birth control because the immunosuppression would not affect her fertility. The patient inquired about getting a prescription for blood sugar measurement tabs, and was told to call her coordinator to get the right kind for her glucose monitoring system at home. She had no other questions or concerns today.       Recent Hospitalizations:  [x] No [] Yes    New Medical Issues: [x] No [] Yes    Decreased energy: [x] No [] Yes    Chest pain or SOB with exertion:  [x] No [] Yes    Appetite change or weight change: [x] No [] Yes    Nausea, vomiting or diarrhea:  [x] No [] Yes    Fever, sweats or chills: [x] No [] Yes    Leg swelling: [x] No [] Yes      Home BP: 110's/60's    Review of Systems   A comprehensive review of systems was obtained and negative, except as noted in the HPI or PMH.    Problem List   Patient Active Problem List   Diagnosis     Diabetes mellitus type 1 (H)     Immunosuppression (H)     Kidney replaced by transplant     Neurogenic orthostatic hypotension (H)     Osteoporosis     Dyslipidemia     Secondary renal hyperparathyroidism (H)     Hypothyroidism     Anemia in chronic renal disease     Pulmonary histoplasmosis (H)     Pancreas replaced by transplant (H)     Hypomagnesemia     Vitamin D deficiency     Aftercare following organ transplant     Cytomegalovirus (CMV) viremia (H)     EBV (Isabelle-Barr virus) viremia       Social History   Social History     Tobacco Use     Smoking status: Never Smoker     Smokeless tobacco: Never Used   Substance Use Topics     Alcohol use: Yes     Comment: rare     Drug use: No       Allergies   Allergies   Allergen Reactions     Amoxicillin-Pot Clavulanate Diarrhea     Ciprofloxacin Nausea     Pollen Extract Other (See Comments)     Seasonal allergies     Pyridostigmine Other (See Comments)     Spastic muscle motion in the face and legs, weakness in the arms. Slight swelling of the tongue.       Medications   Current Outpatient Medications   Medication Sig     aspirin  MG EC tablet Take 1 tablet (325 mg) by mouth daily (Patient taking differently: Take 81 mg by mouth daily )     citalopram (CELEXA) 10 MG tablet Take 20 mg by mouth daily      ENVARSUS XR 0.75 MG 24 hr tablet TAKE 5 TABLETS BY MOUTH EVERY MORNING BEFORE BREAKFAST     levofloxacin (LEVAQUIN) 250 MG  tablet Take 1 tablet (250 mg) by mouth daily     Levothyroxine Sodium (SYNTHROID PO) Take 50 mcg by mouth daily      mycophenolic acid (GENERIC EQUIVALENT) 180 MG EC tablet TAKE 2 TABLETS TWICE A DAY (APPOINTMENT REQUIRED FOR FURTHER REFILLS)     pravastatin (PRAVACHOL) 20 MG tablet Take 1 tablet (20 mg) by mouth daily     sulfamethoxazole-trimethoprim (BACTRIM/SEPTRA) 400-80 MG per tablet Take 1 tablet by mouth daily     tacrolimus (ENVARSUS XR) 0.75 MG 24 hr tablet Take 5 tablets (3.75 mg) by mouth every morning (before breakfast)     Cholecalciferol (VITAMIN D) 2000 UNITS tablet Take 2,000 Units by mouth daily (Patient not taking: Reported on 8/21/2018)     No current facility-administered medications for this visit.      Medications Discontinued During This Encounter   Medication Reason     order for DME      order for DME      oxyCODONE IR (ROXICODONE) 5 MG tablet      oseltamivir (TAMIFLU) 75 MG capsule        Physical Exam   Vital Signs: /62 (BP Location: Right arm, Patient Position: Sitting, Cuff Size: Adult Regular)   Pulse 69   Temp 98.3  F (36.8  C)   Wt 52.3 kg (115 lb 4.8 oz)   SpO2 98%   BMI 21.79 kg/m       GENERAL APPEARANCE: alert and no distress  HENT: mouth without ulcers or lesions  LYMPHATICS: no cervical or supraclavicular nodes  RESP: lungs clear to auscultation - no rales, rhonchi or wheezes  CV: regular rhythm, normal rate, no rub, no murmur  EDEMA: no LE edema bilaterally  ABDOMEN: soft, nondistended, nontender, bowel sounds normal  MS: extremities normal - no gross deformities noted, no evidence of inflammation in joints, no muscle tenderness  SKIN: no rash  TX KIDNEY: normal  DIALYSIS ACCESS:  None      Data     Renal Latest Ref Rng & Units 10/25/2019 4/2/2019 3/5/2019   Na 133 - 144 mmol/L 138 141 140   Na (external) 136 - 145 - - -   K 3.4 - 5.3 mmol/L 4.5 4.6 3.9   K (external) 3.5 - 5.1 - - -   Cl 94 - 109 mmol/L 109 111(H) 113(H)   Cl (external) 98 - 112 - - -   CO2 20 -  32 mmol/L 19(L) 23 18(L)   CO2 (external) 21 - 32 - - -   BUN 7 - 30 mg/dL 23 26 22   BUN (external) 7 - 24 - - -   Cr 0.52 - 1.04 mg/dL 1.51(H) 1.45(H) 1.28(H)   Cr (external) 0.55 - 1.02 mg/dL - - -   Glucose 70 - 99 mg/dL 80 80 81   Glucose (external) 74 - 106 - - -   Ca  8.5 - 10.1 mg/dL 8.3(L) 8.9 8.7   Ca (external) 8.5 - 10.1 - - -   Mg 1.6 - 2.3 mg/dL - - -   Mg (external) 1.8 - 2.4 - - -     Bone Health Latest Ref Rng & Units 2/13/2018 6/14/2017 5/19/2017   Phos 2.5 - 4.5 mg/dL - - 3.0   Phos (external) 2.5 - 4.7 mg/dL - - -   PTHi 12 - 72 pg/mL - 34 -   Vit D Def 20 - 75 ug/L 21 31 -     Heme Latest Ref Rng & Units 10/25/2019 4/2/2019 3/5/2019   WBC 4.0 - 11.0 10e9/L 4.7 3.9(L) 4.2   WBC (external) 4.3 - 10.8 - - -   Hgb 11.7 - 15.7 g/dL 10.0(L) 10.6(L) 10.6(L)   Hgb (external) 12.0 - 16.0 - - -   Plt 150 - 450 10e9/L 218 213 211   Plt (external) 150 - 400 - - -     Liver Latest Ref Rng & Units 9/8/2017 5/19/2017 5/11/2017   AP 40 - 150 U/L 118 169(H) 157(H)   AP (external) 38 - 126 IU/L - - -   TBili 0.2 - 1.3 mg/dL 0.4 0.6 0.4   TBili (external) 0.2 - 1.5 mg/dL - - -   DBili 0.0 - 0.2 mg/dL 0.2 - 0.1   DBili (external) <0.4 mg/dL - - -   ALT 0 - 50 U/L 19 29 40   ALT (external) 10.0 - 50 IU/L - - -   AST 0 - 45 U/L 21 24 35   AST (external) 12.0 - 45 IU/L - - -   Tot Protein 6.8 - 8.8 g/dL 6.9 7.1 6.7(L)   Tot Protein (external) 6.2 - 8.1 - - -   Albumin 3.4 - 5.0 g/dL 3.3(L) 3.4 3.5   Albumin (external) 3.4 - 4.9 - - -     Pancreas Latest Ref Rng & Units 10/25/2019 4/2/2019 3/5/2019   A1C 0 - 5.6 % 5.0 - -   A1C (external) <5.7 % - - -   Amylase 30 - 110 U/L 86 97 88   Amylase (external) 25 - 125 U/L - - -   Lipase 73 - 393 U/L 128 152 118   Lipase (external) 25 - 125 U/L - - -     Iron studies Latest Ref Rng & Units 2/12/2018 8/8/2016 6/3/2015   Iron 35 - 180 ug/dL - 55 -   Iron (external) 8 - 78 U/L 25 - -   Iron sat 15 - 46 % - 24 -   Ferritin 12 - 150 ng/mL - 73 -   Ferritin (external) 8 - 388 - -  121     [facility] Txp Virology Latest Ref Rng & Units 10/25/2019 3/5/2019 2/5/2019   CVM DNA Quant - - Plasma Plasma   BK Spec - Plasma - Plasma   BK Res BKNEG:BK Virus DNA Not Detected copies/mL BK Virus DNA Not Detected - BK Virus DNA Not Detected   BK Log <2.7 Log copies/mL Not Calculated - Not Calculated   Hep B Core NR - - -   Hep B Surf - - - -   HIV 1&2 NEG - - -        Recent Labs   Lab Test 03/05/19  0856 04/02/19  0819 10/25/19  1029   DOSTAC  03/04/2019 AT 2100PM 04/01/2019 at 0915 pm 10/24/2019 AT 1000 AM   TACROL 6.3 7.1 10.4     Recent Labs   Lab Test 06/14/17  0958 02/01/18  0857 03/08/18  1029   DOSMPA 2300 ON 6/13/2017 800 03/07/2018 at 0930 pm   MPACID 0.92* 2.17 2.30   MPAG 84.2 46.4 40.9     Scribe Disclosure:  I, Myra You, am serving as a scribe to document services personally performed by Mauricio Marquez M.D. at this visit, based upon the provider's statements to me. All documentation has been reviewed by the aforementioned provider prior to being entered into the official medical record.        Mauricio Marquez MD

## 2019-10-31 NOTE — PROGRESS NOTES
CHRONIC TRANSPLANT NEPHROLOGY VISIT    Assessment & Plan   # DDKT (SPK): Increased, likely secondary to high tacrolimus drug levels.    - Baseline Cr ~ 1.1-1.3   - Proteinuria: Normal (<0.2 grams)   - Date DSA Last Checked: Oct/2019      Latest DSA: No   - BK Viremia: No   - Kidney Tx Biopsy: No    # Pancreas Tx (SPK):   - Pancreatic Exocrine Drainage: Enteric drained     - Blood glucose: Euglycemia On insulin: No   - HbA1c: Stable      Latest HbA1c: 5.0%   - Pancreatic enzymes: Stable   - Date DSA Last Checked: Oct/2019  Latest DSA: No   - Pancreas Tx Biopsy: No    # Immunosuppression: Tacrolimus extended release (goal 5-8) and Mycophenolic acid (goal not followed)   - Changes: No    # Infection Prophylaxis:   - PJP: Sulfa/TMP (Bactrim)    # Neurogenic Orthostatic Hypotension: Controlled;  Goal BP: > 100, but < 130 systolic   - Changes: No    - Off of Florinef    # Anemia in Chronic Renal Disease: Hgb: Stable      DONNY: No   - Iron studies: Not checked recently    # Mineral Bone Disorder:   - Vitamin D; level: Not checked recently        On Supplement: Yes, will restart at 2000 units daily.   - Calcium; level: Low        On Supplement: No    # Chronic Diarrhea: Resolved.     # CMV Viremia: Detectable, but less than quantifiable at last check. No need to follow unless symptomatic.     # EBV Viremia: Detectable, but less than quantifiable at last check. No need to follow unless symptomatic.     # h/o Pulmonary Histoplasmosis: No off itraconazole.    # CAD: Asymptomatic.     # Carpal Tunnel Syndrome:  S/p release on right hand.     # Skin Cancer Risk:    - Discussed sun protection and recommend regular follow up with Dermatology.    # Medical Compliance: No.  Evidence of labs less than recommended.  - Discussed importance of checking labs regularly as recommended, taking medications as prescribed and attending scheduled medical appointments.  - Patient was informed to get her labs done once per month.       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    Return visit: Return in about 1 year (around 10/31/2020).    Chief Complaint   Ms. Castaneda is a 43 year old here for routine follow up.    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 She was last seen in clinic on 8/21/18 by Dr. Tomlinson and Dr. Weber; please see that note for further details.    Since her last visit, the patient denies any changes or new symptoms. She reports feeling well overall, with no issues. She describes her energy level as good, besides one week in which she felt the need to sleep all week. She reports getting her flu shot around that time, which may have caused her to feel tired. This occurred approximately two weeks ago, and has gone away. She reports that her previous stomach issues (chronic diarrhea) have completely improved, and she reports feeling great. She describes having a blood glucose reading of 150 after a meal high in carbohydrates, and was worried about this number. She was assured that this is normal and told not to worry.     She describes not enjoying exercise, but does not describe herself as sedentary. She reports that her blood pressure has been good, even off of the florinef. She denies any symptoms of lightheadedness or dizziness. She denies any pulmonary issues such as a cough, and feels fine off of her itraconazole. The patient reports that she got surgery on her right hand for carpal  tunnel syndrome release, but did not do the left due to insurance issues. She reports that her left hand still gets numb when she uses her hand, such as when grabbing her steering wheel or doing other gripping motions with that hand.     Today, the patient reports continuing to feel great. She was reminded today that she need to have labs done every month. The patient denied being aware of this, as she did not know she needed that frequency of labs two years out from her transplant. She reports taking all of her medications at night between 9:30 and 10:00 PM, which has caused issues with her Envarsus lab levels. She reports struggling to plan out how to get a true 24 hour trough, and has sometimes put off taking the medication until the morning, and then doing labs the next day. She was told to instead do her labs as late as possible, and take her medication earlier at night the day before. She can then go back to taking her medications at her usual time.     The patient inquired about her elevated creatinine today, and was informed that she may need to have a biopsy done if her drug levels come back normal, and it continues to stay elevated. She inquired about how rejection is treated, and was informed that it usually takes steroids to treat and does not mean the end of the kidney. The importance of drug levels was emphasized to the patient, as it could give an indicator of why her creatinine is increased. She also asked if her immunosuppression would make her sterile, and was informed that she does still need to take her birth control because the immunosuppression would not affect her fertility. The patient inquired about getting a prescription for blood sugar measurement tabs, and was told to call her coordinator to get the right kind for her glucose monitoring system at home. She had no other questions or concerns today.      Recent Hospitalizations:  [x] No [] Yes    New Medical Issues: [x] No [] Yes     Decreased energy: [x] No [] Yes    Chest pain or SOB with exertion:  [x] No [] Yes    Appetite change or weight change: [x] No [] Yes    Nausea, vomiting or diarrhea:  [x] No [] Yes    Fever, sweats or chills: [x] No [] Yes    Leg swelling: [x] No [] Yes      Home BP: 110's/60's    Review of Systems   A comprehensive review of systems was obtained and negative, except as noted in the HPI or PMH.    Problem List   Patient Active Problem List   Diagnosis     Diabetes mellitus type 1 (H)     Immunosuppression (H)     Kidney replaced by transplant     Neurogenic orthostatic hypotension (H)     Osteoporosis     Dyslipidemia     Secondary renal hyperparathyroidism (H)     Hypothyroidism     Anemia in chronic renal disease     Pulmonary histoplasmosis (H)     Pancreas replaced by transplant (H)     Hypomagnesemia     Vitamin D deficiency     Aftercare following organ transplant     Cytomegalovirus (CMV) viremia (H)     EBV (Isabelle-Barr virus) viremia       Social History   Social History     Tobacco Use     Smoking status: Never Smoker     Smokeless tobacco: Never Used   Substance Use Topics     Alcohol use: Yes     Comment: rare     Drug use: No       Allergies   Allergies   Allergen Reactions     Amoxicillin-Pot Clavulanate Diarrhea     Ciprofloxacin Nausea     Pollen Extract Other (See Comments)     Seasonal allergies     Pyridostigmine Other (See Comments)     Spastic muscle motion in the face and legs, weakness in the arms. Slight swelling of the tongue.       Medications   Current Outpatient Medications   Medication Sig     aspirin  MG EC tablet Take 1 tablet (325 mg) by mouth daily (Patient taking differently: Take 81 mg by mouth daily )     citalopram (CELEXA) 10 MG tablet Take 20 mg by mouth daily      ENVARSUS XR 0.75 MG 24 hr tablet TAKE 5 TABLETS BY MOUTH EVERY MORNING BEFORE BREAKFAST     levofloxacin (LEVAQUIN) 250 MG tablet Take 1 tablet (250 mg) by mouth daily     Levothyroxine Sodium (SYNTHROID  PO) Take 50 mcg by mouth daily      mycophenolic acid (GENERIC EQUIVALENT) 180 MG EC tablet TAKE 2 TABLETS TWICE A DAY (APPOINTMENT REQUIRED FOR FURTHER REFILLS)     pravastatin (PRAVACHOL) 20 MG tablet Take 1 tablet (20 mg) by mouth daily     sulfamethoxazole-trimethoprim (BACTRIM/SEPTRA) 400-80 MG per tablet Take 1 tablet by mouth daily     tacrolimus (ENVARSUS XR) 0.75 MG 24 hr tablet Take 5 tablets (3.75 mg) by mouth every morning (before breakfast)     Cholecalciferol (VITAMIN D) 2000 UNITS tablet Take 2,000 Units by mouth daily (Patient not taking: Reported on 8/21/2018)     No current facility-administered medications for this visit.      Medications Discontinued During This Encounter   Medication Reason     order for DME      order for DME      oxyCODONE IR (ROXICODONE) 5 MG tablet      oseltamivir (TAMIFLU) 75 MG capsule        Physical Exam   Vital Signs: /62 (BP Location: Right arm, Patient Position: Sitting, Cuff Size: Adult Regular)   Pulse 69   Temp 98.3  F (36.8  C)   Wt 52.3 kg (115 lb 4.8 oz)   SpO2 98%   BMI 21.79 kg/m      GENERAL APPEARANCE: alert and no distress  HENT: mouth without ulcers or lesions  LYMPHATICS: no cervical or supraclavicular nodes  RESP: lungs clear to auscultation - no rales, rhonchi or wheezes  CV: regular rhythm, normal rate, no rub, no murmur  EDEMA: no LE edema bilaterally  ABDOMEN: soft, nondistended, nontender, bowel sounds normal  MS: extremities normal - no gross deformities noted, no evidence of inflammation in joints, no muscle tenderness  SKIN: no rash  TX KIDNEY: normal  DIALYSIS ACCESS:  None      Data     Renal Latest Ref Rng & Units 10/25/2019 4/2/2019 3/5/2019   Na 133 - 144 mmol/L 138 141 140   Na (external) 136 - 145 - - -   K 3.4 - 5.3 mmol/L 4.5 4.6 3.9   K (external) 3.5 - 5.1 - - -   Cl 94 - 109 mmol/L 109 111(H) 113(H)   Cl (external) 98 - 112 - - -   CO2 20 - 32 mmol/L 19(L) 23 18(L)   CO2 (external) 21 - 32 - - -   BUN 7 - 30 mg/dL 23 26 22    BUN (external) 7 - 24 - - -   Cr 0.52 - 1.04 mg/dL 1.51(H) 1.45(H) 1.28(H)   Cr (external) 0.55 - 1.02 mg/dL - - -   Glucose 70 - 99 mg/dL 80 80 81   Glucose (external) 74 - 106 - - -   Ca  8.5 - 10.1 mg/dL 8.3(L) 8.9 8.7   Ca (external) 8.5 - 10.1 - - -   Mg 1.6 - 2.3 mg/dL - - -   Mg (external) 1.8 - 2.4 - - -     Bone Health Latest Ref Rng & Units 2/13/2018 6/14/2017 5/19/2017   Phos 2.5 - 4.5 mg/dL - - 3.0   Phos (external) 2.5 - 4.7 mg/dL - - -   PTHi 12 - 72 pg/mL - 34 -   Vit D Def 20 - 75 ug/L 21 31 -     Heme Latest Ref Rng & Units 10/25/2019 4/2/2019 3/5/2019   WBC 4.0 - 11.0 10e9/L 4.7 3.9(L) 4.2   WBC (external) 4.3 - 10.8 - - -   Hgb 11.7 - 15.7 g/dL 10.0(L) 10.6(L) 10.6(L)   Hgb (external) 12.0 - 16.0 - - -   Plt 150 - 450 10e9/L 218 213 211   Plt (external) 150 - 400 - - -     Liver Latest Ref Rng & Units 9/8/2017 5/19/2017 5/11/2017   AP 40 - 150 U/L 118 169(H) 157(H)   AP (external) 38 - 126 IU/L - - -   TBili 0.2 - 1.3 mg/dL 0.4 0.6 0.4   TBili (external) 0.2 - 1.5 mg/dL - - -   DBili 0.0 - 0.2 mg/dL 0.2 - 0.1   DBili (external) <0.4 mg/dL - - -   ALT 0 - 50 U/L 19 29 40   ALT (external) 10.0 - 50 IU/L - - -   AST 0 - 45 U/L 21 24 35   AST (external) 12.0 - 45 IU/L - - -   Tot Protein 6.8 - 8.8 g/dL 6.9 7.1 6.7(L)   Tot Protein (external) 6.2 - 8.1 - - -   Albumin 3.4 - 5.0 g/dL 3.3(L) 3.4 3.5   Albumin (external) 3.4 - 4.9 - - -     Pancreas Latest Ref Rng & Units 10/25/2019 4/2/2019 3/5/2019   A1C 0 - 5.6 % 5.0 - -   A1C (external) <5.7 % - - -   Amylase 30 - 110 U/L 86 97 88   Amylase (external) 25 - 125 U/L - - -   Lipase 73 - 393 U/L 128 152 118   Lipase (external) 25 - 125 U/L - - -     Iron studies Latest Ref Rng & Units 2/12/2018 8/8/2016 6/3/2015   Iron 35 - 180 ug/dL - 55 -   Iron (external) 8 - 78 U/L 25 - -   Iron sat 15 - 46 % - 24 -   Ferritin 12 - 150 ng/mL - 73 -   Ferritin (external) 8 - 388 - - 121     [facility] Txp Virology Latest Ref Rng & Units 10/25/2019 3/5/2019 2/5/2019   CVM  DNA Quant - - Plasma Plasma   BK Spec - Plasma - Plasma   BK Res BKNEG:BK Virus DNA Not Detected copies/mL BK Virus DNA Not Detected - BK Virus DNA Not Detected   BK Log <2.7 Log copies/mL Not Calculated - Not Calculated   Hep B Core NR - - -   Hep B Surf - - - -   HIV 1&2 NEG - - -        Recent Labs   Lab Test 03/05/19  0856 04/02/19  0819 10/25/19  1029   DOSTAC  03/04/2019 AT 2100PM 04/01/2019 at 0915 pm 10/24/2019 AT 1000 AM   TACROL 6.3 7.1 10.4     Recent Labs   Lab Test 06/14/17  0958 02/01/18  0857 03/08/18  1029   DOSMPA 2300 ON 6/13/2017 800 03/07/2018 at 0930 pm   MPACID 0.92* 2.17 2.30   MPAG 84.2 46.4 40.9     Scribe Disclosure:  I, Myra You, am serving as a scribe to document services personally performed by Mauricio Marquez M.D. at this visit, based upon the provider's statements to me. All documentation has been reviewed by the aforementioned provider prior to being entered into the official medical record.

## 2019-11-08 ENCOUNTER — TELEPHONE (OUTPATIENT)
Dept: TRANSPLANT | Facility: CLINIC | Age: 44
End: 2019-11-08

## 2019-11-08 DIAGNOSIS — Z94.0 KIDNEY REPLACED BY TRANSPLANT: ICD-10-CM

## 2019-11-08 DIAGNOSIS — Z94.83 PANCREAS TRANSPLANTED (H): ICD-10-CM

## 2019-11-08 RX ORDER — SULFAMETHOXAZOLE AND TRIMETHOPRIM 400; 80 MG/1; MG/1
1 TABLET ORAL DAILY
Qty: 90 TABLET | Refills: 3 | Status: SHIPPED | OUTPATIENT
Start: 2019-11-08 | End: 2020-10-30

## 2019-11-08 NOTE — TELEPHONE ENCOUNTER
Patient Call: Medication Refill  Route to LPN  Instruct the patient to first contact their pharmacy. If they have called their pharmacy and require further assistance, route to LPN.    Pharmacy Name: [id number]    Name of Medication: Bactrim    When will the patient be out of this medication?: Less than 24 hours ([facility], then page if no answer)

## 2019-11-26 DIAGNOSIS — Z94.0 KIDNEY REPLACED BY TRANSPLANT: ICD-10-CM

## 2019-11-26 DIAGNOSIS — Z94.83 PANCREAS REPLACED BY TRANSPLANT (H): ICD-10-CM

## 2019-11-26 LAB
AMYLASE SERPL-CCNC: 94 U/L (ref 30–110)
ANION GAP SERPL CALCULATED.3IONS-SCNC: 9 MMOL/L (ref 3–14)
BUN SERPL-MCNC: 31 MG/DL (ref 7–30)
CALCIUM SERPL-MCNC: 9 MG/DL (ref 8.5–10.1)
CHLORIDE SERPL-SCNC: 111 MMOL/L (ref 94–109)
CO2 SERPL-SCNC: 21 MMOL/L (ref 20–32)
CREAT SERPL-MCNC: 1.51 MG/DL (ref 0.52–1.04)
ERYTHROCYTE [DISTWIDTH] IN BLOOD BY AUTOMATED COUNT: 13.3 % (ref 10–15)
GFR SERPL CREATININE-BSD FRML MDRD: 41 ML/MIN/{1.73_M2}
GLUCOSE SERPL-MCNC: 88 MG/DL (ref 70–99)
HCT VFR BLD AUTO: 34.8 % (ref 35–47)
HGB BLD-MCNC: 11.1 G/DL (ref 11.7–15.7)
LIPASE SERPL-CCNC: 154 U/L (ref 73–393)
MCH RBC QN AUTO: 29.1 PG (ref 26.5–33)
MCHC RBC AUTO-ENTMCNC: 31.9 G/DL (ref 31.5–36.5)
MCV RBC AUTO: 91 FL (ref 78–100)
PLATELET # BLD AUTO: 259 10E9/L (ref 150–450)
POTASSIUM SERPL-SCNC: 4.7 MMOL/L (ref 3.4–5.3)
RBC # BLD AUTO: 3.82 10E12/L (ref 3.8–5.2)
SODIUM SERPL-SCNC: 141 MMOL/L (ref 133–144)
WBC # BLD AUTO: 4 10E9/L (ref 4–11)

## 2019-11-26 PROCEDURE — 82150 ASSAY OF AMYLASE: CPT | Performed by: INTERNAL MEDICINE

## 2019-11-26 PROCEDURE — 80197 ASSAY OF TACROLIMUS: CPT | Performed by: INTERNAL MEDICINE

## 2019-11-26 PROCEDURE — 80048 BASIC METABOLIC PNL TOTAL CA: CPT | Performed by: INTERNAL MEDICINE

## 2019-11-26 PROCEDURE — 83690 ASSAY OF LIPASE: CPT | Performed by: INTERNAL MEDICINE

## 2019-11-26 PROCEDURE — 36415 COLL VENOUS BLD VENIPUNCTURE: CPT | Performed by: INTERNAL MEDICINE

## 2019-11-26 PROCEDURE — 85027 COMPLETE CBC AUTOMATED: CPT | Performed by: INTERNAL MEDICINE

## 2019-11-27 ENCOUNTER — TELEPHONE (OUTPATIENT)
Dept: TRANSPLANT | Facility: CLINIC | Age: 44
End: 2019-11-27

## 2019-11-27 DIAGNOSIS — T86.11 KIDNEY TRANSPLANT REJECTION: ICD-10-CM

## 2019-11-27 DIAGNOSIS — Z94.83 PANCREAS REPLACED BY TRANSPLANT (H): ICD-10-CM

## 2019-11-27 DIAGNOSIS — Z48.298 AFTERCARE FOLLOWING ORGAN TRANSPLANT: Primary | ICD-10-CM

## 2019-11-27 DIAGNOSIS — Z94.0 KIDNEY REPLACED BY TRANSPLANT: ICD-10-CM

## 2019-11-27 DIAGNOSIS — Z94.0 KIDNEY TRANSPLANTED: ICD-10-CM

## 2019-11-27 LAB
TACROLIMUS BLD-MCNC: 7.2 UG/L (ref 5–15)
TME LAST DOSE: NORMAL H

## 2019-11-27 NOTE — TELEPHONE ENCOUNTER
Transplant Coordinator Renal Biopsy Communication    Call placed to Rose Castaneda to discuss indication for kidney transplant biopsy per Dr. Marquez.     Indication for transplant renal biopsy: Elevated Cr  Laterality: right  Date of biopsy:      Patient location within 70 miles of [facility]: Yes.  If no, must stay overnight locally. Pt verbalizes staying  .     Rose Castaneda's medication list was reviewed.   Anticoagulant: aspirin 81 mg  Ibuprofen:  Fish Oil:  No.  Medications held:      Recent blood pressure readings are WNL or not applicable. Instructed to take medication, especially blood pressure medications, before [facility] at 0600 day of procedure.     Procedure expectations and duration of stay discussed. Expressed pt can expect a phone call from LPN/MA to confirm biopsy date/time/location/directions/review of medications.   Patient verbalized understanding they will need to arrive by 6 a.m. on   and plan to stay 4-5 hours post biopsy for monitoring. Pt has no additional questions at this time. Transplant Office phone number given to pt for future questions.      Leatha García RN  Kidney/Pancreas Transplant Coordinator  [phone] option 5

## 2019-11-29 NOTE — TELEPHONE ENCOUNTER
Patient scheduled for kidney tranpslant biopsy in IR on 12/4 - check in at 8:30 AM for 10 AM procedure.     Call placed to patient to confirm this date/time works for her. LVM with information, instructed her to start holding 81 mg aspirin, and return call to transplant office to confirm.

## 2019-12-02 ENCOUNTER — TELEPHONE (OUTPATIENT)
Dept: INTERVENTIONAL RADIOLOGY/VASCULAR | Facility: CLINIC | Age: 44
End: 2019-12-02

## 2019-12-02 NOTE — TELEPHONE ENCOUNTER
Patient Call: Voicemail  Date/Time: 11/01/2019 at 12:30PM  Reason for call: Wednesday, December 4th, 2019 at 0830 is ok for Kidney biopsy she has been holding her ASA since Friday, November 29th, 2019.

## 2019-12-04 ENCOUNTER — APPOINTMENT (OUTPATIENT)
Dept: MEDSURG UNIT | Facility: CLINIC | Age: 44
End: 2019-12-04
Attending: INTERNAL MEDICINE
Payer: COMMERCIAL

## 2019-12-04 ENCOUNTER — HOSPITAL ENCOUNTER (OUTPATIENT)
Facility: CLINIC | Age: 44
Discharge: HOME | End: 2019-12-04
Attending: INTERNAL MEDICINE | Admitting: INTERNAL MEDICINE
Payer: COMMERCIAL

## 2019-12-04 ENCOUNTER — APPOINTMENT (OUTPATIENT)
Dept: INTERVENTIONAL RADIOLOGY/VASCULAR | Facility: CLINIC | Age: 44
End: 2019-12-04
Attending: INTERNAL MEDICINE
Payer: COMMERCIAL

## 2019-12-04 VITALS
RESPIRATION RATE: 16 BRPM | BODY MASS INDEX: 20.77 KG/M2 | OXYGEN SATURATION: 95 % | TEMPERATURE: 97.8 F | HEART RATE: 66 BPM | SYSTOLIC BLOOD PRESSURE: 82 MMHG | WEIGHT: 110 LBS | DIASTOLIC BLOOD PRESSURE: 50 MMHG | HEIGHT: 61 IN

## 2019-12-04 DIAGNOSIS — Z48.298 AFTERCARE FOLLOWING ORGAN TRANSPLANT: ICD-10-CM

## 2019-12-04 DIAGNOSIS — Z94.0 KIDNEY TRANSPLANTED: ICD-10-CM

## 2019-12-04 LAB
B-HCG SERPL-ACNC: 1 IU/L (ref 0–5)
INR PPP: 1.08 (ref 0.86–1.14)

## 2019-12-04 PROCEDURE — 99153 MOD SED SAME PHYS/QHP EA: CPT

## 2019-12-04 PROCEDURE — 88313 SPECIAL STAINS GROUP 2: CPT | Performed by: INTERNAL MEDICINE

## 2019-12-04 PROCEDURE — 76942 ECHO GUIDE FOR BIOPSY: CPT

## 2019-12-04 PROCEDURE — 99152 MOD SED SAME PHYS/QHP 5/>YRS: CPT

## 2019-12-04 PROCEDURE — 88350 IMFLUOR EA ADDL 1ANTB STN PX: CPT | Performed by: INTERNAL MEDICINE

## 2019-12-04 PROCEDURE — 84702 CHORIONIC GONADOTROPIN TEST: CPT | Performed by: RADIOLOGY

## 2019-12-04 PROCEDURE — 40000428 ZZHCL STATISTIC R-RUSH PROCESSING: Performed by: INTERNAL MEDICINE

## 2019-12-04 PROCEDURE — 88305 TISSUE EXAM BY PATHOLOGIST: CPT | Performed by: INTERNAL MEDICINE

## 2019-12-04 PROCEDURE — 40000172 ZZH STATISTIC PROCEDURE PREP ONLY

## 2019-12-04 PROCEDURE — 85610 PROTHROMBIN TIME: CPT | Performed by: RADIOLOGY

## 2019-12-04 PROCEDURE — 88346 IMFLUOR 1ST 1ANTB STAIN PX: CPT | Performed by: INTERNAL MEDICINE

## 2019-12-04 PROCEDURE — 25000128 H RX IP 250 OP 636: Performed by: PHYSICIAN ASSISTANT

## 2019-12-04 PROCEDURE — 25000125 ZZHC RX 250: Performed by: PHYSICIAN ASSISTANT

## 2019-12-04 RX ORDER — SODIUM CHLORIDE 9 MG/ML
INJECTION, SOLUTION INTRAVENOUS CONTINUOUS
Status: DISCONTINUED | OUTPATIENT
Start: 2019-12-04 | End: 2019-12-04 | Stop reason: HOSPADM

## 2019-12-04 RX ORDER — NICOTINE POLACRILEX 4 MG
15-30 LOZENGE BUCCAL
Status: DISCONTINUED | OUTPATIENT
Start: 2019-12-04 | End: 2019-12-04 | Stop reason: HOSPADM

## 2019-12-04 RX ORDER — LIDOCAINE 40 MG/G
CREAM TOPICAL
Status: DISCONTINUED | OUTPATIENT
Start: 2019-12-04 | End: 2019-12-04 | Stop reason: HOSPADM

## 2019-12-04 RX ORDER — FENTANYL CITRATE 50 UG/ML
25-50 INJECTION, SOLUTION INTRAMUSCULAR; INTRAVENOUS EVERY 5 MIN PRN
Status: DISCONTINUED | OUTPATIENT
Start: 2019-12-04 | End: 2019-12-04 | Stop reason: HOSPADM

## 2019-12-04 RX ORDER — DEXTROSE MONOHYDRATE 25 G/50ML
25-50 INJECTION, SOLUTION INTRAVENOUS
Status: DISCONTINUED | OUTPATIENT
Start: 2019-12-04 | End: 2019-12-04 | Stop reason: HOSPADM

## 2019-12-04 RX ORDER — FLUMAZENIL 0.1 MG/ML
0.2 INJECTION, SOLUTION INTRAVENOUS
Status: DISCONTINUED | OUTPATIENT
Start: 2019-12-04 | End: 2019-12-04 | Stop reason: HOSPADM

## 2019-12-04 RX ORDER — NALOXONE HYDROCHLORIDE 0.4 MG/ML
.1-.4 INJECTION, SOLUTION INTRAMUSCULAR; INTRAVENOUS; SUBCUTANEOUS
Status: DISCONTINUED | OUTPATIENT
Start: 2019-12-04 | End: 2019-12-04 | Stop reason: HOSPADM

## 2019-12-04 RX ADMIN — LIDOCAINE HYDROCHLORIDE 8 ML: 10 INJECTION, SOLUTION EPIDURAL; INFILTRATION; INTRACAUDAL; PERINEURAL at 10:55

## 2019-12-04 RX ADMIN — FENTANYL CITRATE 100 MCG: 50 INJECTION, SOLUTION INTRAMUSCULAR; INTRAVENOUS at 10:55

## 2019-12-04 RX ADMIN — MIDAZOLAM HYDROCHLORIDE 2 MG: 2 INJECTION, SOLUTION INTRAMUSCULAR; INTRAVENOUS at 10:55

## 2019-12-04 ASSESSMENT — MIFFLIN-ST. JEOR: SCORE: 1081.08

## 2019-12-04 NOTE — PROCEDURES
[facility], [place]    Procedure: Imaging Procedure Note  Date/Time: 12/4/2019 11:56 AM  Performed by: Beltran Alan MD  Authorized by: Beltran Alan MD     UNIVERSAL PROTOCOL   Site Marked: Yes  Prior Images Obtained and Reviewed:  Yes  Required items: Required blood products, implants, devices and special equipment available    Patient identity confirmed:  Verbally with patient  Patient was reevaluated immediately before administering moderate or deep sedation or anesthesia  Confirmation Checklist:  Patient's identity using two indicators, procedure was appropriate and matched the consent or emergent situation, correct equipment/implants were available and relevant allergies  Time out: Immediately prior to the procedure a time out was called    Preparation: Patient was prepped and draped in usual sterile fashion       ANESTHESIA    Local Anesthetic: Lidocaine 1% without epinephrine      SEDATION    Patient Sedated: Yes    Sedation Type:  Moderate (conscious) sedation  Sedation:  Fentanyl and midazolam  Vital signs: Vital signs monitored during sedation    PROCEDURE   Patient Tolerance:  Patient tolerated the procedure well with no immediate complications  Describe Procedure: RLQ transplant kidney bx  Length of time physician/provider present for 1:1 monitoring during sedation: 30

## 2019-12-04 NOTE — IP AVS SNAPSHOT
[facility], [place], Interventional Radiology  [address] [address]  Phone:  [phone]                                    After Visit Summary   12/4/2019    Rose Castaneda    MRN: [id number]           After Visit Summary Signature Page    I have received my discharge instructions, and my questions have been answered. I have discussed any challenges I see with this plan with the nurse or doctor.    ..........................................................................................................................................  Patient/Patient Representative Signature      ..........................................................................................................................................  Patient Representative Print Name and Relationship to Patient    ..................................................               ................................................  Date                                   Time    ..........................................................................................................................................  Reviewed by Signature/Title    ...................................................              ..............................................  Date                                               Time          22EPIC Rev 08/18

## 2019-12-04 NOTE — IP AVS SNAPSHOT
MRN:[id number]                      After Visit Summary   12/4/2019    Rose Castaneda    MRN: [id number]           Visit Information        Department      12/4/2019  8:29 AM [facility], [place], Interventional Radiology          Review of your medicines      UNREVIEWED medicines. Ask your doctor about these medicines       Dose / Directions   aspirin 325 MG EC tablet  Commonly known as:  ASA  Used for:  Kidney replaced by transplant      Dose:  325 mg  Take 1 tablet (325 mg) by mouth daily  Quantity:  60 tablet  Refills:  3     citalopram 10 MG tablet  Commonly known as:  celeXA      Dose:  20 mg  Take 20 mg by mouth daily  Refills:  0     levofloxacin 250 MG tablet  Commonly known as:  LEVAQUIN  Used for:  Kidney replaced by transplant, Pancreas transplanted (H), Urinary tract infection      Dose:  250 mg  Take 1 tablet (250 mg) by mouth daily  Quantity:  14 tablet  Refills:  0     mycophenolic acid 180 MG EC tablet  Commonly known as:  GENERIC EQUIVALENT  Used for:  Kidney replaced by transplant, Pancreas replaced by transplant (H)      TAKE 2 TABLETS TWICE A DAY (APPOINTMENT REQUIRED FOR FURTHER REFILLS)  Quantity:  120 tablet  Refills:  12     pravastatin 20 MG tablet  Commonly known as:  PRAVACHOL  Used for:  Dyslipidemia      Dose:  20 mg  Take 1 tablet (20 mg) by mouth daily  Quantity:  90 tablet  Refills:  3     sulfamethoxazole-trimethoprim 400-80 MG tablet  Commonly known as:  BACTRIM/SEPTRA  Used for:  Kidney replaced by transplant, Pancreas transplanted (H)      Dose:  1 tablet  Take 1 tablet by mouth daily  Quantity:  90 tablet  Refills:  3     SYNTHROID PO      Dose:  50 mcg  Take 50 mcg by mouth daily  Refills:  0     * tacrolimus 0.75 MG 24 hr tablet  Commonly known as:  ENVARSUS XR  Used for:  Kidney replaced by transplant, Pancreas replaced by transplant (H)      Dose:  3.75 mg  Take 5 tablets (3.75 mg) by mouth every morning (before breakfast)  Quantity:  150 tablet  Refills:  11      * ENVARSUS XR 0.75 MG 24 hr tablet  Used for:  Kidney replaced by transplant, Pancreas replaced by transplant (H)  Generic drug:  tacrolimus      TAKE 5 TABLETS BY MOUTH EVERY MORNING BEFORE BREAKFAST  Quantity:  150 tablet  Refills:  11     vitamin D3 2000 units (50 mcg) tablet  Commonly known as:  CHOLECALCIFEROL  Used for:  Vitamin D deficiency      Dose:  2,000 Units  Take 2,000 Units by mouth daily  Quantity:  100 tablet  Refills:  3         * This list has 2 medication(s) that are the same as other medications prescribed for you. Read the directions carefully, and ask your doctor or other care provider to review them with you.                  Protect others around you: Learn how to safely use, store and throw away your medicines at www.disposemymeds.org.       Follow-ups after your visit       Care Instructions       Further instructions from your care team       [facility]    Interventional Radiology  Patient Instructions Following Biopsy of Transplanted Kidney    AFTER YOU GO HOME  ? If you were given sedation DO NOT drive or operate machinery at home or at work for at least 24 hours  ? DO relax and take it easy for 48 hours, no strenuous activity for 24 hours  ? DO drink plenty of fluids  ? DO resume your regular diet, unless otherwise instructed by your Primary Physician  ? Keep the dressing dry and in place for 24 hours.  ? DO NOT SMOKE FOR AT LEAST 24 HOURS, if you have been given any medications that were to help you relax or sedate you during your procedure  ? DO NOT drink alcoholic beverages the day of your procedure  ? DO NOT do any strenuous exercise or lifting (> 10 lbs) for at least 7 days following your procedure  ? DO NOT take a bath or shower for at least 12 hours following your procedure  ? Remove dressing after shower the next day. Replace with Band aid for 2 days.  Never leave a wet dressing in place.  ? DO NOT make any important or legal decisions for 24 hours  "following your procedure  ? There should be minimum drainage from the biopsy site    CALL THE PHYSICIAN IF:  ? You start bleeding from the procedure site.  If you do start to bleed from that site, lie down flat and hold pressure on the site for a minimum of 10 minutes.  Your physician will tell you if you need to return to the hospital  ? You develop nausea or vomiting  ? You have excessive swelling, redness, or tenderness at the site  ? You have drainage that looks like it is infected.  ? You experience severe pain  ? You develop hives or a rash or unexplained itching  ? You develop a temperature of 101 degrees F or greater  ? You develop bloody clots or red urine after you are discharged          [facility] INTERVENTIONAL RADIOLOGY DEPARTMENT  Procedure Physician:  Dr. Alan                                   Date of procedure: December 4, 2019  Telephone Numbers: [phone] Monday-Friday 8:00 am to 4:30 pm  [phone] After 4:30 pm Monday-Friday, Weekends & Holidays.   Ask for the Interventional Radiologist on call.  Someone is on call 24 hrs/day  [facility] toll free number: [phone] Monday-Friday 8:00 am to 4:30 pm  [facility] Emergency Dept: [phone]          Additional Information About Your Visit       Care EveryWhere ID    This is your Care EveryWhere ID. This could be used by other organizations to access your [facility] medical records  [id number]       Your Vitals Were  Most recent update: 12/4/2019 11:54 AM    Blood Pressure   106/63          Pulse   53          Temperature   97.8  F (36.6  C) (Oral)          Respirations   16          Height   1.549 m (5' 0.98\")             Weight   49.9 kg (110 lb)    Pulse Oximetry   97%    BMI (Body Mass Index)   20.80 kg/m           Primary Care Provider Office Phone # Fax #    Hafsa RUIZ Pam [phone]      Equal Access to Services    ANDRESSA ROSSI AH: Shalom Lucero, francisco javierda ketty, qaybta kaalmerritt vieira, son jara " clifton welchaan ah. So [facility] [phone].    ATENCIÓN: Si franklin aldridge, tiene a kessler disposición servicios gratuitos de asistencia lingüística. Bella al [phone].    We comply with applicable federal and state civil rights laws, including the [place] Human Rights Act. We do not discriminate on the basis of race, color, creed, Taoist, national origin, marital status, age, disability, sex, sexual orientation, or gender identity.       Thank you!    Thank you for choosing [facility] for your care. Our goal is always to provide you with excellent care. Hearing back from our patients is one way we can continue to improve our services. Please take a few minutes to complete the written survey that you may receive in the mail after you visit with us. Thank you!            Medication List      ASK your doctor about these medications          Morning Afternoon Evening Bedtime As Needed    aspirin 325 MG EC tablet  Also known as:  ASA  INSTRUCTIONS:  Take 1 tablet (325 mg) by mouth daily                     citalopram 10 MG tablet  Also known as:  celeXA  INSTRUCTIONS:  Take 20 mg by mouth daily                     levofloxacin 250 MG tablet  Also known as:  LEVAQUIN  INSTRUCTIONS:  Take 1 tablet (250 mg) by mouth daily                     mycophenolic acid 180 MG EC tablet  Also known as:  GENERIC EQUIVALENT  INSTRUCTIONS:  TAKE 2 TABLETS TWICE A DAY (APPOINTMENT REQUIRED FOR FURTHER REFILLS)                     pravastatin 20 MG tablet  Also known as:  PRAVACHOL  INSTRUCTIONS:  Take 1 tablet (20 mg) by mouth daily                     sulfamethoxazole-trimethoprim 400-80 MG tablet  Also known as:  BACTRIM/SEPTRA  INSTRUCTIONS:  Take 1 tablet by mouth daily                     SYNTHROID PO  INSTRUCTIONS:  Take 50 mcg by mouth daily                     * tacrolimus 0.75 MG 24 hr tablet  Also known as:  ENVARSUS XR  INSTRUCTIONS:  Take 5 tablets (3.75 mg) by mouth every morning (before breakfast)                     *  ENVARSUS XR 0.75 MG 24 hr tablet  INSTRUCTIONS:  TAKE 5 TABLETS BY MOUTH EVERY MORNING BEFORE BREAKFAST  Generic drug:  tacrolimus                     vitamin D3 2000 units (50 mcg) tablet  Also known as:  CHOLECALCIFEROL  INSTRUCTIONS:  Take 2,000 Units by mouth daily                        * This list has 2 medication(s) that are the same as other medications prescribed for you. Read the directions carefully, and ask your doctor or other care provider to review them with you.

## 2019-12-04 NOTE — PROGRESS NOTES
Patient Name: Rose Castaneda  Medical Record Number: [id number]  Today's Date: 12/4/2019    Procedure: Kidney Biopsy  Proceduralist: Dr. Alan    Sedation start time: 1030  Sedation end time: 1100  Sedation medications administered: 2 mg Versed, 100 mcg    Total sedation time: 30 min     Procedure start time: 1030  Puncture time: 1035  Procedure end time: 1100    Report given to: 2A RN    Other Notes: Pt arrived to IR room 6 from . Consent reviewed. Pt denies any questions or concerns regarding procedure. Pt positioned supine and monitored per protocol. Pt tolerated procedure without any noted complications. Pt transferred back to .

## 2019-12-04 NOTE — PROGRESS NOTES
Arrived from home for a transplanted renal biopsy.  VSS.  Denies pain.  PIV placed and labs sent.  H&P current.  Consent obtained.  Will be ready for procedure when labs are resulted.

## 2019-12-04 NOTE — DISCHARGE INSTRUCTIONS
[facility]    Interventional Radiology  Patient Instructions Following Biopsy of Transplanted Kidney    AFTER YOU GO HOME  ? If you were given sedation DO NOT drive or operate machinery at home or at work for at least 24 hours  ? DO relax and take it easy for 48 hours, no strenuous activity for 24 hours  ? DO drink plenty of fluids  ? DO resume your regular diet, unless otherwise instructed by your Primary Physician  ? Keep the dressing dry and in place for 24 hours.  ? DO NOT SMOKE FOR AT LEAST 24 HOURS, if you have been given any medications that were to help you relax or sedate you during your procedure  ? DO NOT drink alcoholic beverages the day of your procedure  ? DO NOT do any strenuous exercise or lifting (> 10 lbs) for at least 7 days following your procedure  ? DO NOT take a bath or shower for at least 12 hours following your procedure  ? Remove dressing after shower the next day. Replace with Band aid for 2 days.  Never leave a wet dressing in place.  ? DO NOT make any important or legal decisions for 24 hours following your procedure  ? There should be minimum drainage from the biopsy site    CALL THE PHYSICIAN IF:  ? You start bleeding from the procedure site.  If you do start to bleed from that site, lie down flat and hold pressure on the site for a minimum of 10 minutes.  Your physician will tell you if you need to return to the hospital  ? You develop nausea or vomiting  ? You have excessive swelling, redness, or tenderness at the site  ? You have drainage that looks like it is infected.  ? You experience severe pain  ? You develop hives or a rash or unexplained itching  ? You develop a temperature of 101 degrees F or greater  ? You develop bloody clots or red urine after you are discharged          [facility] INTERVENTIONAL RADIOLOGY DEPARTMENT  Procedure Physician:  Dr. Alan                                   Date of procedure: December 4, 2019  Telephone  Numbers: [phone] Monday-Friday 8:00 am to 4:30 pm  [phone] After 4:30 pm Monday-Friday, Weekends & Holidays.   Ask for the Interventional Radiologist on call.  Someone is on call 24 hrs/day  [facility] toll free number: [phone] Monday-Friday 8:00 am to 4:30 pm  [facility] Emergency Dept: [phone]

## 2019-12-04 NOTE — PROGRESS NOTES
Tolerated bedrest without issues.  Tolerated food, fluids, ambulation and urination without issues.  Voided 300 ml of clear yellow urine.  Denies pain.  Reviewed discharge instructions with patient.  PIV removed.  Will discharge to home when  arrives.

## 2019-12-04 NOTE — PROGRESS NOTES
Pt declined flu shot and TDAP.  Yesy Bledsoe CMA ([facility])   (aka: Rosi Bledsoe)     Returned from IR s/p transplanted kidney biopsy.  VSS.  Denies pain.  RLQ site covered with a Primapore, CDI.  Resting in bed.

## 2019-12-05 LAB — COPATH REPORT: NORMAL

## 2019-12-05 NOTE — TELEPHONE ENCOUNTER
Mauricio Marquez MD Lavelle Peterson, Meghan M, KYA Zhang,     Patient's kidney biopsy showed no acute rejection, but only mild chronic changes.  Would keep her tacrolimus level closer to 5, but otherwise no changes.     Can you let her know please.     Thanks.      RNCC reviewed recommendations. Envarsus decreased from 3 mg daily to 2.25 mg daily. She will repeat labs next week.

## 2020-02-05 ENCOUNTER — TELEPHONE (OUTPATIENT)
Dept: TRANSPLANT | Facility: CLINIC | Age: 45
End: 2020-02-05

## 2020-02-05 DIAGNOSIS — Z94.83 PANCREAS REPLACED BY TRANSPLANT (H): ICD-10-CM

## 2020-02-05 DIAGNOSIS — Z94.0 KIDNEY REPLACED BY TRANSPLANT: ICD-10-CM

## 2020-02-05 DIAGNOSIS — Z94.0 KIDNEY REPLACED BY TRANSPLANT: Primary | ICD-10-CM

## 2020-02-05 NOTE — TELEPHONE ENCOUNTER
RNCC called Rosalina to clarify her dose of Envarsus but had to leave a voicemail requesting a call back. Last level on 11/26/19 was 7.2 and dose was decreased on 12/5/19 to 2.25 mg from 3 mg daily. No repeat labs were received. Pt needs to repeat her tacrolimus level and call to verify her current dose.

## 2020-02-05 NOTE — TELEPHONE ENCOUNTER
Pt states envarsus XR 0.75mg dose has increased to 4 tabs (3mg) daily, please send updated RX    Thank you!  [facility]

## 2020-02-05 NOTE — TELEPHONE ENCOUNTER
----- Message from Otoniel Borja, [facility] sent at 2/5/2020  3:58 PM CST -----  Rose says her envarsus dose is 4 tabs or 3mg daily the rx we got today is for 3 tabs or 2.25 mg daily.  Can you clarify.    Thanks  Keith

## 2020-02-06 NOTE — TELEPHONE ENCOUNTER
Tacrolimus goal 5-8 per Dr. Marquez at [facility] appt 10/31/19.  Refill sent to pharmacy. Will watch for Mondays lab results for further dose change if needed.

## 2020-02-06 NOTE — TELEPHONE ENCOUNTER
Patient Call: Medication Clarification  Is taking Envarsus 0.75mg 4 tabs daily  She stated she will go in to have a full set of transplant labs  Monday, February 10th, 2020    Her son has Pneumonia and her  is out of town for the week.  Call back needed? No

## 2020-03-12 DIAGNOSIS — Z94.83 PANCREAS REPLACED BY TRANSPLANT (H): ICD-10-CM

## 2020-03-12 DIAGNOSIS — Z94.0 KIDNEY REPLACED BY TRANSPLANT: Primary | ICD-10-CM

## 2020-03-12 RX ORDER — TACROLIMUS 0.75 MG/1
3 TABLET, EXTENDED RELEASE ORAL
Qty: 120 TABLET | Refills: 0 | Status: SHIPPED | OUTPATIENT
Start: 2020-03-12 | End: 2020-04-08

## 2020-03-20 NOTE — TELEPHONE ENCOUNTER
Creatinine remains elevated, likely related to recent elevated tac. Tac level now WNL with appropriate dosing. Will repeat labs as scheduled.    Left message requesting patient to call back at [phone] to reschedule due to covid-19.  Please reschedule after 5.1.20.

## 2020-04-08 DIAGNOSIS — Z94.0 KIDNEY REPLACED BY TRANSPLANT: ICD-10-CM

## 2020-04-08 DIAGNOSIS — Z94.83 PANCREAS REPLACED BY TRANSPLANT (H): Primary | ICD-10-CM

## 2020-04-08 RX ORDER — TACROLIMUS 0.75 MG/1
3 TABLET, EXTENDED RELEASE ORAL
Qty: 120 TABLET | Refills: 0 | Status: SHIPPED | OUTPATIENT
Start: 2020-04-08 | End: 2020-05-21

## 2020-04-13 ENCOUNTER — TELEPHONE (OUTPATIENT)
Dept: TRANSPLANT | Facility: CLINIC | Age: 45
End: 2020-04-13

## 2020-04-13 NOTE — TELEPHONE ENCOUNTER
Patient spoke with , asking if she should get her labs done; last done November 2019. Patient is s/p K/P transplants and is to get monthly labs. Instructed patient to get labs.

## 2020-04-14 DIAGNOSIS — Z79.899 OTHER LONG TERM (CURRENT) DRUG THERAPY: ICD-10-CM

## 2020-04-14 DIAGNOSIS — Z94.83 PANCREAS REPLACED BY TRANSPLANT (H): ICD-10-CM

## 2020-04-14 DIAGNOSIS — Z94.0 KIDNEY REPLACED BY TRANSPLANT: ICD-10-CM

## 2020-04-14 LAB
ERYTHROCYTE [DISTWIDTH] IN BLOOD BY AUTOMATED COUNT: 13.5 % (ref 10–15)
HBA1C MFR BLD: 5.4 % (ref 0–5.6)
HCT VFR BLD AUTO: 33.1 % (ref 35–47)
HGB BLD-MCNC: 10.5 G/DL (ref 11.7–15.7)
LIPASE SERPL-CCNC: 125 U/L (ref 73–393)
MCH RBC QN AUTO: 29.2 PG (ref 26.5–33)
MCHC RBC AUTO-ENTMCNC: 31.7 G/DL (ref 31.5–36.5)
MCV RBC AUTO: 92 FL (ref 78–100)
PLATELET # BLD AUTO: 265 10E9/L (ref 150–450)
PROT UR-MCNC: 0.36 G/L
PROT/CREAT 24H UR: 0.12 G/G CR (ref 0–0.2)
RBC # BLD AUTO: 3.6 10E12/L (ref 3.8–5.2)
WBC # BLD AUTO: 5.1 10E9/L (ref 4–11)

## 2020-04-14 PROCEDURE — 36415 COLL VENOUS BLD VENIPUNCTURE: CPT | Performed by: INTERNAL MEDICINE

## 2020-04-14 PROCEDURE — 80048 BASIC METABOLIC PNL TOTAL CA: CPT | Performed by: INTERNAL MEDICINE

## 2020-04-14 PROCEDURE — 82150 ASSAY OF AMYLASE: CPT | Performed by: INTERNAL MEDICINE

## 2020-04-14 PROCEDURE — 84156 ASSAY OF PROTEIN URINE: CPT | Performed by: INTERNAL MEDICINE

## 2020-04-14 PROCEDURE — 83036 HEMOGLOBIN GLYCOSYLATED A1C: CPT | Performed by: INTERNAL MEDICINE

## 2020-04-14 PROCEDURE — 85027 COMPLETE CBC AUTOMATED: CPT | Performed by: INTERNAL MEDICINE

## 2020-04-14 PROCEDURE — 83690 ASSAY OF LIPASE: CPT | Performed by: INTERNAL MEDICINE

## 2020-04-14 PROCEDURE — 80197 ASSAY OF TACROLIMUS: CPT | Performed by: INTERNAL MEDICINE

## 2020-04-15 LAB
AMYLASE SERPL-CCNC: 109 U/L (ref 30–110)
ANION GAP SERPL CALCULATED.3IONS-SCNC: 4 MMOL/L (ref 3–14)
BUN SERPL-MCNC: 21 MG/DL (ref 7–30)
CALCIUM SERPL-MCNC: 8.2 MG/DL (ref 8.5–10.1)
CHLORIDE SERPL-SCNC: 112 MMOL/L (ref 94–109)
CO2 SERPL-SCNC: 25 MMOL/L (ref 20–32)
CREAT SERPL-MCNC: 1.37 MG/DL (ref 0.52–1.04)
GFR SERPL CREATININE-BSD FRML MDRD: 46 ML/MIN/{1.73_M2}
GLUCOSE SERPL-MCNC: 102 MG/DL (ref 70–99)
POTASSIUM SERPL-SCNC: 4.6 MMOL/L (ref 3.4–5.3)
SODIUM SERPL-SCNC: 141 MMOL/L (ref 133–144)
TACROLIMUS BLD-MCNC: 5.6 UG/L (ref 5–15)
TME LAST DOSE: NORMAL H

## 2020-04-17 ENCOUNTER — TELEPHONE (OUTPATIENT)
Dept: TRANSPLANT | Facility: CLINIC | Age: 45
End: 2020-04-17

## 2020-04-17 NOTE — TELEPHONE ENCOUNTER
Patient Call: General      Reason for call: patient would like to discuss labs with coordinator.    Call back needed? Yes    Return Call Needed  Same as documented in contacts section

## 2020-04-22 NOTE — TELEPHONE ENCOUNTER
"Reviewed labs with patient:   Creatinine has improved to 1.37. (Baseline 1.1-1.3). Discussed maintaining hydration which Rose states she is \"not the best at.\" Lipase 125 down from 154 and Amylase 109 up from 94. Discussed patient constipation issues and adding Miralax as needed. Asked patient to repeat monthly labs and continue doing so for the best outcome of her transplant. Discussed patient concerns surrounding COVID-19. Patient verbalized understanding and has no further questions at this time.  "

## 2020-04-23 ENCOUNTER — TELEPHONE (OUTPATIENT)
Dept: NEPHROLOGY | Facility: CLINIC | Age: 45
End: 2020-04-23

## 2020-04-23 NOTE — TELEPHONE ENCOUNTER
Prior Authorization Approval    Authorization Effective Date: 3/24/2020  Authorization Expiration Date: 4/23/2021  Medication: Envarsus  Approved Dose/Quantity:120  Reference #: [id number]   Insurance Company: [employer] - Phone [phone] Fax [phone]  Expected CoPay:       CoPay Card Available:      Foundation Assistance Needed:    Which Pharmacy is filling the prescription (Not needed for infusion/clinic administered): [facility] - [place] [address]  Pharmacy Notified:    Patient Notified:

## 2020-05-21 DIAGNOSIS — Z94.0 KIDNEY REPLACED BY TRANSPLANT: ICD-10-CM

## 2020-05-21 DIAGNOSIS — Z94.83 PANCREAS REPLACED BY TRANSPLANT (H): ICD-10-CM

## 2020-05-21 RX ORDER — TACROLIMUS 0.75 MG/1
TABLET, EXTENDED RELEASE ORAL
Qty: 120 TABLET | Refills: 1 | Status: SHIPPED | OUTPATIENT
Start: 2020-05-21 | End: 2020-07-21

## 2020-07-08 DIAGNOSIS — Z94.83 PANCREAS REPLACED BY TRANSPLANT (H): ICD-10-CM

## 2020-07-08 DIAGNOSIS — Z94.0 KIDNEY REPLACED BY TRANSPLANT: ICD-10-CM

## 2020-07-08 LAB
ERYTHROCYTE [DISTWIDTH] IN BLOOD BY AUTOMATED COUNT: 12.8 % (ref 10–15)
HCT VFR BLD AUTO: 32.3 % (ref 35–47)
HGB BLD-MCNC: 10.5 G/DL (ref 11.7–15.7)
LIPASE SERPL-CCNC: 130 U/L (ref 73–393)
MCH RBC QN AUTO: 29.7 PG (ref 26.5–33)
MCHC RBC AUTO-ENTMCNC: 32.5 G/DL (ref 31.5–36.5)
MCV RBC AUTO: 91 FL (ref 78–100)
PLATELET # BLD AUTO: 253 10E9/L (ref 150–450)
RBC # BLD AUTO: 3.54 10E12/L (ref 3.8–5.2)
WBC # BLD AUTO: 5.6 10E9/L (ref 4–11)

## 2020-07-08 PROCEDURE — 80048 BASIC METABOLIC PNL TOTAL CA: CPT | Performed by: INTERNAL MEDICINE

## 2020-07-08 PROCEDURE — 82150 ASSAY OF AMYLASE: CPT | Performed by: INTERNAL MEDICINE

## 2020-07-08 PROCEDURE — 36415 COLL VENOUS BLD VENIPUNCTURE: CPT | Performed by: INTERNAL MEDICINE

## 2020-07-08 PROCEDURE — 83690 ASSAY OF LIPASE: CPT | Performed by: INTERNAL MEDICINE

## 2020-07-08 PROCEDURE — 80061 LIPID PANEL: CPT | Performed by: INTERNAL MEDICINE

## 2020-07-08 PROCEDURE — 80197 ASSAY OF TACROLIMUS: CPT | Performed by: INTERNAL MEDICINE

## 2020-07-08 PROCEDURE — 85027 COMPLETE CBC AUTOMATED: CPT | Performed by: INTERNAL MEDICINE

## 2020-07-09 ENCOUNTER — TELEPHONE (OUTPATIENT)
Dept: TRANSPLANT | Facility: CLINIC | Age: 45
End: 2020-07-09

## 2020-07-09 DIAGNOSIS — Z79.60 LONG-TERM USE OF IMMUNOSUPPRESSANT MEDICATION: Primary | ICD-10-CM

## 2020-07-09 DIAGNOSIS — Z94.83 PANCREAS REPLACED BY TRANSPLANT (H): ICD-10-CM

## 2020-07-09 DIAGNOSIS — Z94.0 KIDNEY REPLACED BY TRANSPLANT: ICD-10-CM

## 2020-07-09 LAB
AMYLASE SERPL-CCNC: 92 U/L (ref 30–110)
ANION GAP SERPL CALCULATED.3IONS-SCNC: 4 MMOL/L (ref 3–14)
BUN SERPL-MCNC: 18 MG/DL (ref 7–30)
CALCIUM SERPL-MCNC: 8.2 MG/DL (ref 8.5–10.1)
CHLORIDE SERPL-SCNC: 107 MMOL/L (ref 94–109)
CHOLEST SERPL-MCNC: 167 MG/DL
CO2 SERPL-SCNC: 27 MMOL/L (ref 20–32)
CREAT SERPL-MCNC: 1.19 MG/DL (ref 0.52–1.04)
GFR SERPL CREATININE-BSD FRML MDRD: 55 ML/MIN/{1.73_M2}
GLUCOSE SERPL-MCNC: 80 MG/DL (ref 70–99)
HDLC SERPL-MCNC: 91 MG/DL
LDLC SERPL CALC-MCNC: 66 MG/DL
NONHDLC SERPL-MCNC: 76 MG/DL
POTASSIUM SERPL-SCNC: 4.3 MMOL/L (ref 3.4–5.3)
SODIUM SERPL-SCNC: 138 MMOL/L (ref 133–144)
TACROLIMUS BLD-MCNC: 4 UG/L (ref 5–15)
TME LAST DOSE: ABNORMAL H
TRIGL SERPL-MCNC: 50 MG/DL

## 2020-07-09 NOTE — TELEPHONE ENCOUNTER
"ISSUE:   Tacrolimus XR level 4.0 on 7/8/20 at 1044 AM, goal 5-8, dose Envarsus 3 mg (4 tabs) daily. Last dose recorded as 7/7/20 at 10:30 AM.    PLAN:   Please call patient and confirm this was an accurate 24-hour trough. Verify Tacrolimus XR dose 3 mg daily. Confirm no new medications or illness. Confirm no missed doses. If accurate trough and accurate dose, increase dose Tacrolimus XR dose to 3.75 mg (5 tabs) daily and repeat labs in 1-2 weeks.    OUTCOME:   Spoke with Rosalina. She generally takes her medication in the evening instead of morning because she remembers to take it better. She states she spoke with the doctor on her last visit regarding this and was told if that is what it takes to be more consistent. However, for labs she has to adjust the dose time which causes it to be less than ideal. She will take her medication in the morning for a week to ensure good tacrolimus level and repeat level in 1-2 weeks. So no dose change was made at this time. Order entered into Epic.     Creatinine is not back yet but Rosalina admits that she was dehydrated after recent trip to [place] where it was \"steaming hot.\" Will await creatinine level to determine if repeat is necessary after hydration.     [facility] [phone] (Phone)  [phone] (Fax)       "

## 2020-07-21 DIAGNOSIS — Z94.83 PANCREAS REPLACED BY TRANSPLANT (H): Primary | ICD-10-CM

## 2020-07-21 DIAGNOSIS — Z94.0 KIDNEY REPLACED BY TRANSPLANT: ICD-10-CM

## 2020-07-21 DIAGNOSIS — Z94.83 PANCREAS REPLACED BY TRANSPLANT (H): ICD-10-CM

## 2020-07-21 DIAGNOSIS — Z79.60 LONG-TERM USE OF IMMUNOSUPPRESSANT MEDICATION: ICD-10-CM

## 2020-07-21 PROCEDURE — 80197 ASSAY OF TACROLIMUS: CPT | Performed by: INTERNAL MEDICINE

## 2020-07-21 PROCEDURE — 36415 COLL VENOUS BLD VENIPUNCTURE: CPT | Performed by: INTERNAL MEDICINE

## 2020-07-21 RX ORDER — TACROLIMUS 0.75 MG/1
3 TABLET, EXTENDED RELEASE ORAL
Qty: 120 TABLET | Refills: 11 | Status: SHIPPED | OUTPATIENT
Start: 2020-07-21 | End: 2020-07-22

## 2020-07-22 DIAGNOSIS — Z94.83 PANCREAS REPLACED BY TRANSPLANT (H): ICD-10-CM

## 2020-07-22 DIAGNOSIS — Z94.0 KIDNEY REPLACED BY TRANSPLANT: ICD-10-CM

## 2020-07-22 DIAGNOSIS — Z94.0 KIDNEY TRANSPLANTED: Primary | ICD-10-CM

## 2020-07-22 LAB
TACROLIMUS BLD-MCNC: 4.9 UG/L (ref 5–15)
TME LAST DOSE: ABNORMAL H

## 2020-07-22 RX ORDER — TACROLIMUS 0.75 MG/1
3 TABLET, EXTENDED RELEASE ORAL
Qty: 120 TABLET | Refills: 11 | Status: SHIPPED | OUTPATIENT
Start: 2020-07-22 | End: 2021-02-15

## 2020-07-22 NOTE — TELEPHONE ENCOUNTER
ISSUE:   Tacrolimus XR level 4.9 on 7/21, goal 5-8, dose 3 mg daily.    PLAN:   This trough level appears to be approximately a 12h trough, rather than a 24h trough.  CONFIRM TROUGH.  Stay on the same dose Tacrolimus XR dose and repeat labs in 1 week with true 24h trough level.    Fabiana Baron RN, BSN, [facility]  Transplant Care Coordinator      OUTCOME:   Spoke with patient, they confirm accurate trough level and current dose 4 mg daily. Patient confirmed no dose change at this time and agrees to repeat labs in 3 weeks. Orders sent to preferred pharmacy for dose change and lab for repeat labs. Patient voiced understanding of plan.

## 2020-07-23 ENCOUNTER — TRANSFERRED RECORDS (OUTPATIENT)
Dept: HEALTH INFORMATION MANAGEMENT | Facility: CLINIC | Age: 45
End: 2020-07-23

## 2020-08-05 DIAGNOSIS — Z94.0 KIDNEY REPLACED BY TRANSPLANT: ICD-10-CM

## 2020-08-05 DIAGNOSIS — Z94.83 PANCREAS REPLACED BY TRANSPLANT (H): ICD-10-CM

## 2020-08-05 RX ORDER — MYCOPHENOLIC ACID 180 MG/1
TABLET, DELAYED RELEASE ORAL
Qty: 120 TABLET | Refills: 11 | Status: SHIPPED | OUTPATIENT
Start: 2020-08-05 | End: 2021-02-15

## 2020-08-18 ENCOUNTER — OFFICE VISIT (OUTPATIENT)
Dept: OPHTHALMOLOGY | Facility: CLINIC | Age: 45
End: 2020-08-18
Attending: OPHTHALMOLOGY
Payer: COMMERCIAL

## 2020-08-18 DIAGNOSIS — E10.3513 TYPE 1 DIABETES MELLITUS WITH PROLIFERATIVE RETINOPATHY OF BOTH EYES AND MACULAR EDEMA (H): Primary | ICD-10-CM

## 2020-08-18 DIAGNOSIS — H53.15 SUBJECTIVE VISUAL DISTURBANCE OF IMAGE SIZE: ICD-10-CM

## 2020-08-18 PROCEDURE — G0463 HOSPITAL OUTPT CLINIC VISIT: HCPCS | Mod: ZF

## 2020-08-18 PROCEDURE — 92134 CPTRZ OPH DX IMG PST SGM RTA: CPT | Mod: ZF | Performed by: OPHTHALMOLOGY

## 2020-08-18 PROCEDURE — 92250 FUNDUS PHOTOGRAPHY W/I&R: CPT | Mod: 59 | Performed by: OPHTHALMOLOGY

## 2020-08-18 RX ORDER — CYCLOSPORINE 0.5 MG/ML
EMULSION OPHTHALMIC
COMMUNITY
Start: 2020-07-23 | End: 2020-10-07

## 2020-08-18 ASSESSMENT — TONOMETRY
IOP_METHOD: TONOPEN
OS_IOP_MMHG: 15
OD_IOP_MMHG: 15

## 2020-08-18 ASSESSMENT — CUP TO DISC RATIO
OS_RATIO: 0.4
OD_RATIO: 0.4

## 2020-08-18 ASSESSMENT — SLIT LAMP EXAM - LIDS
COMMENTS: NORMAL
COMMENTS: NORMAL

## 2020-08-18 ASSESSMENT — CONF VISUAL FIELD
OD_SUPERIOR_NASAL_RESTRICTION: 1
OS_SUPERIOR_TEMPORAL_RESTRICTION: 1
OS_SUPERIOR_NASAL_RESTRICTION: 1
OS_INFERIOR_NASAL_RESTRICTION: 1
OD_SUPERIOR_TEMPORAL_RESTRICTION: 1
OD_INFERIOR_NASAL_RESTRICTION: 1
OS_INFERIOR_TEMPORAL_RESTRICTION: 1
OD_INFERIOR_TEMPORAL_RESTRICTION: 1

## 2020-08-18 ASSESSMENT — VISUAL ACUITY
OS_CC+: -1
OD_CC+: -1
METHOD: SNELLEN - LINEAR
OD_CC: 20/60
OS_CC: 20/40
CORRECTION_TYPE: CONTACTS

## 2020-08-18 ASSESSMENT — EXTERNAL EXAM - LEFT EYE: OS_EXAM: NORMAL

## 2020-08-18 ASSESSMENT — EXTERNAL EXAM - RIGHT EYE: OD_EXAM: NORMAL

## 2020-08-18 NOTE — PROGRESS NOTES
"CC -  PDR    INTERVAL HISTORY - Last saw me 2015 @ [facility].  Having more trouble with seeing things distinctly, \"flat\", \"washed out\", trouble with contrast if lighting not good  More trouble with ADLs, not driving    2 months ago noticed a sudden change in which it \"felt like my eyes weren't working together\" associated with decreased contrast sensitivity and worsening blurriness.     Rim of [place] she is seeing is \"electrified\" that happens 3-4x/week.       HPI -   Rose Castaneda is a  44 year old year-old patient referred BY Dr Yancey from [facility] for evlauation and treat  of PDR. Previously seen by me 4/2015 @ [facility]. [facility].  History of pancreatic transplant, kidney transplant x2, ~ 2017  [facility] @ [facility] ([id number]) was 20/40 - 20/50 & 20/30 - 20/40       PAST OCULAR SURGERY  PRP OU 2005 in [place]        RETINAL IMAGING:  OCT 8-18-20  OD - vitreous condensations; diffuse retinal atrophy with loss or foveal contour, non-central CME, mild ERM   -Wide-angle - vitreous adhesion to ONH and superior arcade  OS - vitreous condensations; diffuse retinal atrophy with disrupted foveal contour, mild CME, mild ERM   -Wide-angle - vitreous adhesion to ONH            ASSESSMENT & PLAN    1. DM I with PDR and DME   - now s/p pancreas transplant   - quiescent   - dense laser scars & outer atrophy diffuse   - likely causing loss of acuity & contrast   - likely exacerbated by cataract      - has focal VRT on OCT but no traction on exam       2. Mild DME, OS > OD   - had AVastin x 1 OD in 2007   - Unlikely to be visually significant today      3. NSC OU, PSC OS    - BCVA 20/60    - suspect affecting contrast sensitivity   - may benefit from CE/IOL        3. Myopia OU w/ presbyopia    F/u 6-12 months, OCT OU    return to clinic:      ATTESTATION     Attending Attestation:     Complete documentation of historical and exam elements from today's encounter can be found in " the full encounter summary report (not reduplicated in this progress note).  I personally obtained the chief complaint(s) and history of present illness.  I confirmed and edited as necessary the review of systems, past medical/surgical history, family history, social history, and examination findings as documented by others; and I examined the patient myself.  I personally reviewed the relevant tests, images, and reports as documented above.  I formulated and edited as necessary the assessment and plan and discussed the findings and management plan with the patient and family    Jaja Zuniga MD, PhD  , Vitreoretinal Surgery  Department of Ophthalmology  [facility]

## 2020-08-18 NOTE — NURSING NOTE
Chief Complaints and History of Present Illnesses   Patient presents with     Retinal Evaluation     Chief Complaint(s) and History of Present Illness(es)     Retinal Evaluation     Laterality: both eyes    Onset: 2 months ago    Course: gradually worsening    Associated symptoms: flashes, floaters and dryness.  Negative for eye pain    Treatments tried: eye drops    Pain scale: 0/10              Comments     Two months ago she started clouded vision in both eyes.  After she started Restasis her vision seemed less clouded.  For a couple days she felt like her balance was off, she felt like she was walking diagonally, even though she knew she was walking in a straight line.  Her depth perception continues to seem a bit off.  Until two months ago her right eye was her good eye, now she is seeing better with her left eye.      Over the past couple months she has seen floaters and flashes commonly in both eyes.      She wear GP contacts but does not wear glasses.    Lab Results       Component                Value               Date                       A1C                      5.4                 04/14/2020                 A1C                      5.0                 10/25/2019                 A1C                      5.2                 02/05/2019                 A1C                      5.1                 05/02/2018                 A1C                      5.1                 03/20/2018      Muriel Shah, COT 2:47 PM  August 18, 2020

## 2020-08-26 ENCOUNTER — OFFICE VISIT (OUTPATIENT)
Dept: OPHTHALMOLOGY | Facility: CLINIC | Age: 45
End: 2020-08-26
Attending: OPHTHALMOLOGY
Payer: COMMERCIAL

## 2020-08-26 DIAGNOSIS — H04.123 BILATERAL DRY EYES: ICD-10-CM

## 2020-08-26 DIAGNOSIS — H04.123 BILATERAL DRY EYES: Primary | ICD-10-CM

## 2020-08-26 DIAGNOSIS — Z86.39: ICD-10-CM

## 2020-08-26 DIAGNOSIS — H25.813 COMBINED FORM OF SENILE CATARACT OF BOTH EYES: ICD-10-CM

## 2020-08-26 PROCEDURE — 76519 ECHO EXAM OF EYE: CPT | Mod: ZF | Performed by: OPHTHALMOLOGY

## 2020-08-26 PROCEDURE — 92025 CPTRIZED CORNEAL TOPOGRAPHY: CPT | Mod: ZF | Performed by: OPHTHALMOLOGY

## 2020-08-26 PROCEDURE — G0463 HOSPITAL OUTPT CLINIC VISIT: HCPCS | Mod: ZF

## 2020-08-26 ASSESSMENT — CONF VISUAL FIELD
OS_SUPERIOR_TEMPORAL_RESTRICTION: 1
OD_SUPERIOR_TEMPORAL_RESTRICTION: 1
OD_INFERIOR_TEMPORAL_RESTRICTION: 1
OS_SUPERIOR_NASAL_RESTRICTION: 3
OS_INFERIOR_NASAL_RESTRICTION: 3
OD_SUPERIOR_NASAL_RESTRICTION: 3
OD_INFERIOR_NASAL_RESTRICTION: 3
OS_INFERIOR_TEMPORAL_RESTRICTION: 1

## 2020-08-26 ASSESSMENT — REFRACTION_MANIFEST
OS_SPHERE: -2.50
OS_CYLINDER: SPHERE
OD_SPHERE: -2.00
OD_CYLINDER: SPHERE

## 2020-08-26 ASSESSMENT — TONOMETRY
OS_IOP_MMHG: 14
IOP_METHOD: ICARE
OD_IOP_MMHG: 14

## 2020-08-26 ASSESSMENT — SLIT LAMP EXAM - LIDS
COMMENTS: NORMAL
COMMENTS: NORMAL

## 2020-08-26 ASSESSMENT — CUP TO DISC RATIO
OD_RATIO: 0.3
OS_RATIO: 0.3

## 2020-08-26 ASSESSMENT — VISUAL ACUITY
OD_CC: 20/60ECC
CORRECTION_TYPE: CONTACTS
OD_CC+: -2
METHOD: SNELLEN - LINEAR
OS_CC: 20/40

## 2020-08-26 ASSESSMENT — EXTERNAL EXAM - RIGHT EYE: OD_EXAM: NORMAL

## 2020-08-26 ASSESSMENT — EXTERNAL EXAM - LEFT EYE: OS_EXAM: NORMAL

## 2020-08-26 NOTE — LETTER
8/26/2020       RE: Rose Castaneda  [address]  [place] [address]     Dear Colleague,    Thank you for referring your patient, Rose Castaneda, to the EYE CLINIC at [facility]. Please see a copy of my visit note below.    CC:  Here for cataract evaluation    HPI:  45 y/o F wit h/o DM1 w/ PDR and DME each eye (mild)  s/p pancreas transplant, NSC each eye, PSC left eye, BARBARA referred by Dr. Zuniga for cataract evaluation. Patient is on mycophenolate, and envartise for her pancreatic transplant.    Interval Hx: First time visit with Dr. Thurston. Vision has deteriorated over time (over the last couple months), light is bothering her more with glare issues and poor contrast between light and dark. Feels difficult to function with vision, difficulty walking, bumping into things, near vision.  Family feels vision worse than previous.  Vision now muted, used to be much brighter. Diplopia. No tearing, discharge, photophobia.      POHx:  DM1 w/ PDR each eye, NSC each eye, PSC left eye     Last eye exam: 8/2020 Kristin  Prior eye surgery/laser: PRP each eye, LANIE x1 right eye in 2007  CTL wearer: yes, [place] RGP x 30 years  Glasses: no    Fam hx of eye disease: denies    Gtts:  Restasis each eye Qday     All:  Augmentin, ciprofloxacin (GI disturbance)     A/P:    # Nuclear Sclerotic Cataract w/ PSC left eye > right eye   - patient notes worsening vision each eye with decreased light and contrast sensitivity, difficulty navigating without bumping into things, near vision  - patient understand ultimate BCVA may be limited by atrophic retina / diabetic eye disease.  - s/p pancreas transplant x 3, now DM1 no longer an issue, glucose OK off meds.   - discussed r/b/a of CEIOL, patient amenable would like to proceed.  - also for local SK w/ possible AMT for temporal scar   Denies trauma   No glaucoma   H/o DM1, now quiescent s/p pancreas transplant   Target distance   Dilates poorly 4.5mm  right eye, 5.3mm left eye    3D of WTR astigmatism, amenable to toric lens - given 30 years RGP use will repeat biometry / topography in 1-2 weeks, if stable, can proceed with toric, will repeat again if changing    Patient will call to schedule after biometry to decide btwn monofocal vs. Toric     #h/o PDR with DME  - 8/2020 OCT with atrophy, mild DME  - s/p PRP each eye  - following with Dr. Foster     Follow up:  1-2 weeks for tech visit repeat topography and IOL master   OR for CEIOL + SK +/- AMT left eye           Again, thank you for allowing me to participate in the care of your patient.      Sincerely,    Willy Thurston MD

## 2020-08-26 NOTE — PROGRESS NOTES
CC:  Here for cataract evaluation    HPI:  45 y/o F wit h/o DM1 w/ PDR and DME each eye (mild)  s/p pancreas transplant, NSC each eye, PSC left eye, BARBARA referred by Dr. Zuniga for cataract evaluation. Patient is on mycophenolate, and envartise for her pancreatic transplant.    Interval Hx: First time visit with Dr. Thurston. Vision has deteriorated over time (over the last couple months), light is bothering her more with glare issues and poor contrast between light and dark. Feels difficult to function with vision, difficulty walking, bumping into things, near vision.  Family feels vision worse than previous.  Vision now muted, used to be much brighter. Diplopia. No tearing, discharge, photophobia.      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  - s/p pancreas transplant x 3, now DM1 no longer an issue, glucose OK off meds.   - discussed r/b/a of CEIOL, patient amenable would like to proceed.  - also for local SK w/ possible AMT for temporal scar   Denies trauma   No glaucoma   H/o DM1, now quiescent s/p pancreas transplant   Target distance   Dilates poorly 4.5mm right eye, 5.3mm left eye    3D of WTR astigmatism, amenable to toric lens - given 30 years RGP use will repeat biometry / topography in 1-2 weeks, if stable, can proceed with toric, will repeat again if changing    Patient will call to schedule after biometry to decide  btwn monofocal vs. Toric     #h/o PDR with DME  - 8/2020 OCT with atrophy, mild DME  - s/p PRP each eye  - following with Dr. Foster     Follow up:  1-2 weeks for tech visit repeat topography and IOL master   OR for CEIOL + SK +/- AMT left eye       Jason Goldberg, MD  Cornea, External Disease & Refractive Surgery Fellow  Department of Ophthalmology and Visual Neurosciences    ADDENDUM 9/2/2020: Repeat topography is very stable in both eyes. OK to move forward with CEIOL. I called and spoke with patient who confirmed she is interested in toric lens- will have billing department call her. She also confirmed distance target. She is interested in scheduling the second eye as soon as possible. Will put case request in left eye, and right eye to follow 2 weeks later. All of her questions were answered.    Reviewed IOL Calcs and noted change in cylinder from 2.95 to 1.26 diopters in the left eye. We will have her come in for another set of repeat IOL calcs/Pentacam in 1-2 weeks. She MUST be out of RGP lenses to have accurate measurements. Left voicemail.      Anabel Gaitan MD  Cornea & External Disease Fellow        Attending Physician Attestation:  Complete documentation of historical and exam elements from today's encounter can be found in the full encounter summary report (not reduplicated in this progress note).  I personally obtained the chief complaint(s) and history of present illness.  I confirmed and edited as necessary the review of systems, past medical/surgical history, family history, social history, and examination findings as documented by others; and I examined the patient myself.  I personally reviewed the relevant tests, images, and reports as documented above.  I formulated and edited as necessary the assessment and plan and discussed the findings and management plan with the patient and family. I personally reviewed the ophthalmic test(s) associated with this encounter, agree with the  interpretation(s) as documented by the resident/fellow, and have edited the corresponding report(s) as necessary. - Willy Thurston MD    I personally spent great than 40min with the patient, of which >50% of the time was spent face to face with the patient, counseling and coordinating care with the patient. We discussed the complexity of her diagnosis, the need for further information prior to proceeding with yet another surgery, and the unknown prognosis for the patient at this time.    Willy Thurston MD

## 2020-08-26 NOTE — NURSING NOTE
Chief Complaints and History of Present Illnesses   Patient presents with     Cataract Evaluation     Chief Complaint(s) and History of Present Illness(es)     Cataract Evaluation     Laterality: both eyes    Associated symptoms: glare, blurred vision and haloes    Severity: moderate    Onset: gradual    Course: stable    Treatments tried: eye drops    Pain scale: 0/10              Comments     Referred by Dr Zuniga for CE/IOL Eval    Vision stable since LV.   Ocular meds: Restasis qDaraceli OU    Nadia Pastor COT 9:27 AM August 26, 2020

## 2020-09-02 ENCOUNTER — PREP FOR PROCEDURE (OUTPATIENT)
Dept: OPHTHALMOLOGY | Facility: CLINIC | Age: 45
End: 2020-09-02

## 2020-09-02 ENCOUNTER — ALLIED HEALTH/NURSE VISIT (OUTPATIENT)
Dept: OPHTHALMOLOGY | Facility: CLINIC | Age: 45
End: 2020-09-02
Attending: OPHTHALMOLOGY
Payer: COMMERCIAL

## 2020-09-02 DIAGNOSIS — H17.9 CORNEAL SCAR, LEFT EYE: ICD-10-CM

## 2020-09-02 DIAGNOSIS — H25.13 AGE-RELATED NUCLEAR CATARACT OF BOTH EYES: Primary | ICD-10-CM

## 2020-09-02 DIAGNOSIS — H25.042 POSTERIOR SUBCAPSULAR POLAR AGE-RELATED CATARACT OF LEFT EYE: Primary | ICD-10-CM

## 2020-09-02 DIAGNOSIS — H25.11 AGE-RELATED NUCLEAR CATARACT OF RIGHT EYE: ICD-10-CM

## 2020-09-02 PROCEDURE — 40000269 ZZH STATISTIC NO CHARGE FACILITY FEE: Mod: ZF

## 2020-09-02 NOTE — PROGRESS NOTES
Consistent measurements obtained today on topography and IOL Master.  Pt was instructed she can now wear her RGP's again until getting the surgery set - she will be informed if she needs to have a CL holiday prior to surgery date.  Financial counseling will also call regarding a toric lens payment.    TYRA Travis 9:44 AM September 2, 2020       ~~~~~~~~~~~~~~~~~~~~~~~~~~~~~~  Patient was not seen by Dr. Thurston. Tech visit only.    Willy Thurston MD

## 2020-09-16 ENCOUNTER — ALLIED HEALTH/NURSE VISIT (OUTPATIENT)
Dept: OPHTHALMOLOGY | Facility: CLINIC | Age: 45
End: 2020-09-16
Attending: OPHTHALMOLOGY
Payer: COMMERCIAL

## 2020-09-16 DIAGNOSIS — H52.213 IRREGULAR ASTIGMATISM OF BOTH EYES: ICD-10-CM

## 2020-09-16 DIAGNOSIS — H25.813 COMBINED FORM OF SENILE CATARACT OF BOTH EYES: ICD-10-CM

## 2020-09-16 DIAGNOSIS — H25.813 COMBINED FORM OF SENILE CATARACT OF BOTH EYES: Primary | ICD-10-CM

## 2020-09-16 PROCEDURE — 92025 CPTRIZED CORNEAL TOPOGRAPHY: CPT | Mod: ZF

## 2020-09-16 PROCEDURE — 40000269 ZZH STATISTIC NO CHARGE FACILITY FEE: Mod: ZF

## 2020-09-16 NOTE — Clinical Note
Repeat minoo after CL are out. Please review and close chart.    ELIZABETH Oliveira 9:53 AM September 16, 2020

## 2020-09-18 ENCOUNTER — TELEPHONE (OUTPATIENT)
Dept: OPHTHALMOLOGY | Facility: CLINIC | Age: 45
End: 2020-09-18

## 2020-09-18 NOTE — TELEPHONE ENCOUNTER
Rose called about scheduling her procedure with Dr. Thurston. I explained our basic plan for both surgeries and the associated appointments we would need to set and her responsibility. Rose is requesting to have both eyes completed at the same time. I have passed the request along to the team and told her I would get back to her as soon as I hear back on Monday.

## 2020-09-22 ENCOUNTER — TELEPHONE (OUTPATIENT)
Dept: OPHTHALMOLOGY | Facility: CLINIC | Age: 45
End: 2020-09-22

## 2020-09-22 DIAGNOSIS — Z11.59 ENCOUNTER FOR SCREENING FOR OTHER VIRAL DISEASES: Primary | ICD-10-CM

## 2020-09-22 PROBLEM — H25.11 AGE-RELATED NUCLEAR CATARACT OF RIGHT EYE: Status: ACTIVE | Noted: 2020-09-22

## 2020-09-22 PROBLEM — H25.042 POSTERIOR SUBCAPSULAR POLAR AGE-RELATED CATARACT OF LEFT EYE: Status: ACTIVE | Noted: 2020-09-22

## 2020-09-22 PROBLEM — H17.9 CORNEAL SCAR, LEFT EYE: Status: ACTIVE | Noted: 2020-09-22

## 2020-09-22 NOTE — TELEPHONE ENCOUNTER
Patient is scheduled for surgery with Dr. Willy Thurston     Spoke with: Renetta     Date of Surgery: 09/29/20 and 10/20/20     Location: [facility]:  [address] [place] [address]     Informed patient they will need an adult : Yes     H&P will be completed at: [facility]     Post Op scheduled on 09/30, 10/07, 10/21, 10/28, and 11/23     Surgery packet was mailed 09/22     Additional comments: Advised RN will call 1 - 2 business days prior with arrival time and instructions.

## 2020-09-25 DIAGNOSIS — Z11.59 ENCOUNTER FOR SCREENING FOR OTHER VIRAL DISEASES: ICD-10-CM

## 2020-09-25 LAB
LABORATORY COMMENT REPORT: NORMAL
SARS-COV-2 RNA SPEC QL NAA+PROBE: NEGATIVE
SARS-COV-2 RNA SPEC QL NAA+PROBE: NORMAL
SPECIMEN SOURCE: NORMAL
SPECIMEN SOURCE: NORMAL

## 2020-09-28 ENCOUNTER — ANESTHESIA EVENT (OUTPATIENT)
Dept: SURGERY | Facility: AMBULATORY SURGERY CENTER | Age: 45
End: 2020-09-28

## 2020-09-28 DIAGNOSIS — H25.813 COMBINED FORM OF SENILE CATARACT OF BOTH EYES: Primary | ICD-10-CM

## 2020-09-28 RX ORDER — TOBRAMYCIN 3 MG/ML
1 SOLUTION/ DROPS OPHTHALMIC 4 TIMES DAILY
Qty: 5 ML | Refills: 4 | Status: SHIPPED | OUTPATIENT
Start: 2020-09-28 | End: 2020-10-28

## 2020-09-28 RX ORDER — KETOROLAC TROMETHAMINE 5 MG/ML
1 SOLUTION OPHTHALMIC 4 TIMES DAILY
Qty: 5 ML | Refills: 3 | Status: SHIPPED | OUTPATIENT
Start: 2020-09-28 | End: 2020-10-28

## 2020-09-28 RX ORDER — PREDNISOLONE ACETATE 10 MG/ML
1 SUSPENSION/ DROPS OPHTHALMIC 4 TIMES DAILY
Qty: 5 ML | Refills: 1 | Status: SHIPPED | OUTPATIENT
Start: 2020-09-28 | End: 2020-10-28

## 2020-09-29 ENCOUNTER — HOSPITAL ENCOUNTER (OUTPATIENT)
Facility: AMBULATORY SURGERY CENTER | Age: 45
End: 2020-09-29
Attending: OPHTHALMOLOGY
Payer: MEDICARE

## 2020-09-29 ENCOUNTER — ANESTHESIA (OUTPATIENT)
Dept: SURGERY | Facility: AMBULATORY SURGERY CENTER | Age: 45
End: 2020-09-29

## 2020-09-29 VITALS
DIASTOLIC BLOOD PRESSURE: 83 MMHG | HEART RATE: 59 BPM | OXYGEN SATURATION: 96 % | TEMPERATURE: 97.3 F | RESPIRATION RATE: 14 BRPM | SYSTOLIC BLOOD PRESSURE: 132 MMHG

## 2020-09-29 DIAGNOSIS — H25.042 POSTERIOR SUBCAPSULAR POLAR AGE-RELATED CATARACT OF LEFT EYE: Primary | ICD-10-CM

## 2020-09-29 DIAGNOSIS — H17.9 CORNEAL SCAR, LEFT EYE: ICD-10-CM

## 2020-09-29 DIAGNOSIS — H25.042 POSTERIOR SUBCAPSULAR POLAR AGE-RELATED CATARACT OF LEFT EYE: ICD-10-CM

## 2020-09-29 LAB
HCG UR QL: NEGATIVE
INTERNAL QC OK POCT: YES

## 2020-09-29 DEVICE — EYE IMP AMNIOTIC MEMBRANE 2.0X1.5CM AG-2015: Type: IMPLANTABLE DEVICE | Site: EYE | Status: FUNCTIONAL

## 2020-09-29 DEVICE — IMPLANTABLE DEVICE: Type: IMPLANTABLE DEVICE | Site: EYE | Status: FUNCTIONAL

## 2020-09-29 RX ORDER — PROPOFOL 10 MG/ML
INJECTION, EMULSION INTRAVENOUS PRN
Status: DISCONTINUED | OUTPATIENT
Start: 2020-09-29 | End: 2020-09-29

## 2020-09-29 RX ORDER — KETOROLAC TROMETHAMINE 30 MG/ML
30 INJECTION, SOLUTION INTRAMUSCULAR; INTRAVENOUS EVERY 6 HOURS PRN
Status: DISCONTINUED | OUTPATIENT
Start: 2020-09-29 | End: 2020-09-30 | Stop reason: HOSPADM

## 2020-09-29 RX ORDER — SODIUM CHLORIDE, SODIUM LACTATE, POTASSIUM CHLORIDE, CALCIUM CHLORIDE 600; 310; 30; 20 MG/100ML; MG/100ML; MG/100ML; MG/100ML
INJECTION, SOLUTION INTRAVENOUS CONTINUOUS
Status: DISCONTINUED | OUTPATIENT
Start: 2020-09-29 | End: 2020-09-30 | Stop reason: HOSPADM

## 2020-09-29 RX ORDER — PROPARACAINE HYDROCHLORIDE 5 MG/ML
1 SOLUTION/ DROPS OPHTHALMIC ONCE
Status: COMPLETED | OUTPATIENT
Start: 2020-09-29 | End: 2020-09-29

## 2020-09-29 RX ORDER — ONDANSETRON 2 MG/ML
4 INJECTION INTRAMUSCULAR; INTRAVENOUS EVERY 30 MIN PRN
Status: DISCONTINUED | OUTPATIENT
Start: 2020-09-29 | End: 2020-09-30 | Stop reason: HOSPADM

## 2020-09-29 RX ORDER — SODIUM CHLORIDE, SODIUM LACTATE, POTASSIUM CHLORIDE, CALCIUM CHLORIDE 600; 310; 30; 20 MG/100ML; MG/100ML; MG/100ML; MG/100ML
INJECTION, SOLUTION INTRAVENOUS CONTINUOUS
Status: DISCONTINUED | OUTPATIENT
Start: 2020-09-29 | End: 2020-09-29 | Stop reason: HOSPADM

## 2020-09-29 RX ORDER — TOBRAMYCIN 3 MG/ML
1 SOLUTION/ DROPS OPHTHALMIC
Status: COMPLETED | OUTPATIENT
Start: 2020-09-29 | End: 2020-09-29

## 2020-09-29 RX ORDER — ONDANSETRON 2 MG/ML
INJECTION INTRAMUSCULAR; INTRAVENOUS PRN
Status: DISCONTINUED | OUTPATIENT
Start: 2020-09-29 | End: 2020-09-29

## 2020-09-29 RX ORDER — NALOXONE HYDROCHLORIDE 0.4 MG/ML
.1-.4 INJECTION, SOLUTION INTRAMUSCULAR; INTRAVENOUS; SUBCUTANEOUS
Status: DISCONTINUED | OUTPATIENT
Start: 2020-09-29 | End: 2020-09-30 | Stop reason: HOSPADM

## 2020-09-29 RX ORDER — CYCLOPENTOLAT/TROPIC/PHENYLEPH 1%-1%-2.5%
1 DROPS (EA) OPHTHALMIC (EYE)
Status: COMPLETED | OUTPATIENT
Start: 2020-09-29 | End: 2020-09-29

## 2020-09-29 RX ORDER — DEXAMETHASONE SODIUM PHOSPHATE 4 MG/ML
INJECTION, SOLUTION INTRA-ARTICULAR; INTRALESIONAL; INTRAMUSCULAR; INTRAVENOUS; SOFT TISSUE PRN
Status: DISCONTINUED | OUTPATIENT
Start: 2020-09-29 | End: 2020-09-29 | Stop reason: HOSPADM

## 2020-09-29 RX ORDER — ONDANSETRON 4 MG/1
4 TABLET, ORALLY DISINTEGRATING ORAL EVERY 30 MIN PRN
Status: DISCONTINUED | OUTPATIENT
Start: 2020-09-29 | End: 2020-09-30 | Stop reason: HOSPADM

## 2020-09-29 RX ORDER — BALANCED SALT SOLUTION 6.4; .75; .48; .3; 3.9; 1.7 MG/ML; MG/ML; MG/ML; MG/ML; MG/ML; MG/ML
SOLUTION OPHTHALMIC PRN
Status: DISCONTINUED | OUTPATIENT
Start: 2020-09-29 | End: 2020-09-29 | Stop reason: HOSPADM

## 2020-09-29 RX ORDER — MEPERIDINE HYDROCHLORIDE 25 MG/ML
12.5 INJECTION INTRAMUSCULAR; INTRAVENOUS; SUBCUTANEOUS
Status: DISCONTINUED | OUTPATIENT
Start: 2020-09-29 | End: 2020-09-30 | Stop reason: HOSPADM

## 2020-09-29 RX ORDER — LIDOCAINE HYDROCHLORIDE 20 MG/ML
INJECTION, SOLUTION INFILTRATION; PERINEURAL PRN
Status: DISCONTINUED | OUTPATIENT
Start: 2020-09-29 | End: 2020-09-29

## 2020-09-29 RX ORDER — OXYCODONE HYDROCHLORIDE 5 MG/1
5 TABLET ORAL EVERY 4 HOURS PRN
Status: DISCONTINUED | OUTPATIENT
Start: 2020-09-29 | End: 2020-09-30 | Stop reason: HOSPADM

## 2020-09-29 RX ORDER — FIBRINOGEN HUMAN, HUMAN THROMBIN 2 ML
KIT TOPICAL PRN
Status: DISCONTINUED | OUTPATIENT
Start: 2020-09-29 | End: 2020-09-29 | Stop reason: HOSPADM

## 2020-09-29 RX ORDER — TOBRAMYCIN 3 MG/ML
SOLUTION/ DROPS OPHTHALMIC PRN
Status: DISCONTINUED | OUTPATIENT
Start: 2020-09-29 | End: 2020-09-29 | Stop reason: HOSPADM

## 2020-09-29 RX ORDER — FENTANYL CITRATE 50 UG/ML
25-50 INJECTION, SOLUTION INTRAMUSCULAR; INTRAVENOUS
Status: DISCONTINUED | OUTPATIENT
Start: 2020-09-29 | End: 2020-09-29 | Stop reason: HOSPADM

## 2020-09-29 RX ORDER — LIDOCAINE 40 MG/G
CREAM TOPICAL
Status: DISCONTINUED | OUTPATIENT
Start: 2020-09-29 | End: 2020-09-29 | Stop reason: HOSPADM

## 2020-09-29 RX ORDER — HYDROMORPHONE HYDROCHLORIDE 1 MG/ML
.3-.5 INJECTION, SOLUTION INTRAMUSCULAR; INTRAVENOUS; SUBCUTANEOUS EVERY 10 MIN PRN
Status: DISCONTINUED | OUTPATIENT
Start: 2020-09-29 | End: 2020-09-30 | Stop reason: HOSPADM

## 2020-09-29 RX ORDER — FENTANYL CITRATE 50 UG/ML
INJECTION, SOLUTION INTRAMUSCULAR; INTRAVENOUS PRN
Status: DISCONTINUED | OUTPATIENT
Start: 2020-09-29 | End: 2020-09-29

## 2020-09-29 RX ORDER — ACETAMINOPHEN 325 MG/1
975 TABLET ORAL ONCE
Status: COMPLETED | OUTPATIENT
Start: 2020-09-29 | End: 2020-09-29

## 2020-09-29 RX ORDER — PREDNISOLONE ACETATE 1 %
SUSPENSION, DROPS(FINAL DOSAGE FORM)(ML) OPHTHALMIC (EYE) PRN
Status: DISCONTINUED | OUTPATIENT
Start: 2020-09-29 | End: 2020-09-29 | Stop reason: HOSPADM

## 2020-09-29 RX ADMIN — Medication 1 DROP: at 10:17

## 2020-09-29 RX ADMIN — TOBRAMYCIN 1 DROP: 3 SOLUTION/ DROPS OPHTHALMIC at 10:17

## 2020-09-29 RX ADMIN — Medication 1 DROP: at 10:08

## 2020-09-29 RX ADMIN — PROPARACAINE HYDROCHLORIDE 1 DROP: 5 SOLUTION/ DROPS OPHTHALMIC at 10:08

## 2020-09-29 RX ADMIN — FENTANYL CITRATE 50 MCG: 50 INJECTION, SOLUTION INTRAMUSCULAR; INTRAVENOUS at 10:50

## 2020-09-29 RX ADMIN — ONDANSETRON 4 MG: 2 INJECTION INTRAMUSCULAR; INTRAVENOUS at 10:50

## 2020-09-29 RX ADMIN — SODIUM CHLORIDE, SODIUM LACTATE, POTASSIUM CHLORIDE, CALCIUM CHLORIDE: 600; 310; 30; 20 INJECTION, SOLUTION INTRAVENOUS at 10:08

## 2020-09-29 RX ADMIN — TOBRAMYCIN 1 DROP: 3 SOLUTION/ DROPS OPHTHALMIC at 10:12

## 2020-09-29 RX ADMIN — ACETAMINOPHEN 975 MG: 325 TABLET ORAL at 10:09

## 2020-09-29 RX ADMIN — Medication 1 DROP: at 10:12

## 2020-09-29 RX ADMIN — PROPOFOL 20 MG: 10 INJECTION, EMULSION INTRAVENOUS at 11:25

## 2020-09-29 RX ADMIN — LIDOCAINE HYDROCHLORIDE 20 MG: 20 INJECTION, SOLUTION INFILTRATION; PERINEURAL at 11:09

## 2020-09-29 RX ADMIN — PROPOFOL 50 MG: 10 INJECTION, EMULSION INTRAVENOUS at 11:09

## 2020-09-29 RX ADMIN — SODIUM CHLORIDE, SODIUM LACTATE, POTASSIUM CHLORIDE, CALCIUM CHLORIDE: 600; 310; 30; 20 INJECTION, SOLUTION INTRAVENOUS at 10:07

## 2020-09-29 RX ADMIN — TOBRAMYCIN 1 DROP: 3 SOLUTION/ DROPS OPHTHALMIC at 10:09

## 2020-09-29 NOTE — ANESTHESIA CARE TRANSFER NOTE
Patient: Rose Castaneda    Procedure(s):  PHACOEMULSIFICATION, CATARACT, WITH INTRAOCULAR LENS IMPLANT  SUPERFICIAL KERATECTOMY with AMNIOTIC MEMBRANE TRANSPLANT    Diagnosis: Posterior subcapsular polar age-related cataract of left eye [H25.042]  Corneal scar, left eye [H17.9]  Diagnosis Additional Information: No value filed.    Anesthesia Type:   MAC     Note:  Airway :Room Air  Patient transferred to:Phase II  Comments: Uneventful transport phase 2 room air paper face mask   Report to RN - Aundrea  Exchanging well; color natl  Pt responds appropriately to command  IV patent  Lips/teeth/dentition as preop status  Questions answered  /77  HR 57  TAT 96.7  RR 16  Sat 97% room air  Handoff Report: Identifed the Patient, Identified the Reponsible Provider, Reviewed the pertinent medical history, Discussed the surgical course, Reviewed Intra-OP anesthesia mangement and issues during anesthesia, Set expectations for post-procedure period and Allowed opportunity for questions and acknowledgement of understanding      Vitals: (Last set prior to Anesthesia Care Transfer)    CRNA VITALS  9/29/2020 1116 - 9/29/2020 1146      9/29/2020             Resp Rate (set):  10                Electronically Signed By: JODY ARIAS CRNA  September 29, 2020  11:46 AM

## 2020-09-29 NOTE — OP NOTE
OPERATIVE REPORT    Patient Name: Rose Castaneda  Date of Operation: September 29, 2020    Pre-operative diagnosis: Visually significant cataract, left eye; Astigmatism, left eye; Temporal corneal scar, left eye  Post-operative diagnosis: Same     Procedure(s): To the LEFT eye  1. Cataract extraction via phacoemulsification and insertion of toric intraocular lens  2. Superficial keratectomy  3. Amniotic membrane transplant    Attending: Willy Thurston MD  Fellow: Anabel Gaitan MD    Anesthesia: MAC/RBB  Findings: None, CDE 1.06  Blood Loss: None  Complications: None   Implant: [id number] 17.0 D , left at 93 degrees, target -0.19 ([place])    Implant Name Type Inv. Item Serial No.  Lot No. LRB No. Used Action   EYE IMP IOL TECNIS TORIC II 1-PC 17.0D CYL3.00 [id number] Lens/Eye Implant EYE IMP IOL TECNIS TORIC II 1-PC 17.0D CYL3.00 [id number]  Left 1 Implanted   EYE IMP AMNIOTIC MEMBRANE 2.0X1.5CM AG-2015 Lens/Eye Implant EYE IMP AMNIOTIC MEMBRANE 2.0X1.5CM AG-2015 [id number] BIO-TISSUE  Left 1 Implanted         DESCRIPTION OF PROCEDURE:  After verifying the correct surgical site, the patient was placed in supine position and taken to the operating room on an ophthalmologic gurney. A time-out was performed verifying the patient's   identity, correct treatment site, and procedure to be performed. In an upright position the 0, 90, and 180   degree meridians were marked.     The operative eye was prepped and draped in the usual sterile ophthalmic fashion. A speculum was inserted to further expose the eye. The 93 degree axis was marked. A Habematolel blade and 0.12 forceps were then used to scrape the temporal epithelium corneal scar. The eye was then irrigated with BSS.      A paracentesis was made to the surgeon's left. Viscoat was injected into the anterior chamber on a cannula. A temporal wound   was made using a 2.4-mm keratome. A cystotome was used to make a rent in the anterior capsule,  which was converted into a smooth curvilinear capsulorrhexis using Utrata forceps. Balance salt solution on a cannula was used to hydrodissect the lens. The lens was removed using phacoemulsification via stop & chop technique. The cortex was removed using an irrigation and aspiration handpiece. The bag was deepened using [employer]. The intraocular lens implant was loaded into a [employer] microinjector and injected into the bag. It was centered on the previously marked axis using a kuglen hook. The viscoelastic was removed using an irrigation and aspiration handpiece. The wounds were hydrated using balanced salt solution on a cannula.     Cryopreserved amniotic membrane was then brought to the surgical field and cut to size. The amniotic membrane graft was then placed endothelium side down over the temporal cornea surface making sure that the epithelial defect was well covered.  It was secured to the cornea using Tisseel tissue fibrin sealant. The lens was found to be centered, the intraocular pressure adequate,   and the wound was watertight. A bandage contact lens was placed. Ancef and dexamethasone were administered subconjunctivally. The speculum was removed followed by the drapes. The area around the   eye was cleaned with a wet followed by dry 4 x 4's, and one drop each of prednisolone acetate and ofloxacin was instilled. The eye was patched and shielded. The patient tolerated the procedure well.    Dr. Willy Thurston was present and scrubbed for the entire procedure.    Anabel Gaitan MD  Cornea & External Disease Fellow    Willy Thurston MD   of Ophthalmology

## 2020-09-29 NOTE — ANESTHESIA PREPROCEDURE EVALUATION
Anesthesia Pre-Procedure Evaluation    Patient: Rose Castaneda   MRN:     [id number] Gender:   female   Age:    44 year old :      1975        Preoperative Diagnosis: Posterior subcapsular polar age-related cataract of left eye [H25.042]  Corneal scar, left eye [H17.9]   Procedure(s):  PHACOEMULSIFICATION, CATARACT, WITH INTRAOCULAR LENS IMPLANT  SUPERFICIAL KERATECTOMY with AMNIOTIC MEMBRANE TRANSPLANT     LABS:  CBC:   Lab Results   Component Value Date    WBC 5.6 2020    WBC 5.1 2020    HGB 10.5 (L) 2020    HGB 10.5 (L) 2020    HCT 32.3 (L) 2020    HCT 33.1 (L) 2020     2020     2020     BMP:   Lab Results   Component Value Date     2020     2020    POTASSIUM 4.3 2020    POTASSIUM 4.6 2020    CHLORIDE 107 2020    CHLORIDE 112 (H) 2020    CO2 27 2020    CO2 25 2020    BUN 18 2020    BUN 21 2020    CR 1.19 (H) 2020    CR 1.37 (H) 2020    GLC 80 2020     (H) 2020     COAGS:   Lab Results   Component Value Date    PTT 27 2016    INR 1.08 2019     POC:   Lab Results   Component Value Date     (H) 2016    HCG Negative 2020    HCGS Negative 10/08/2013     OTHER:   Lab Results   Component Value Date    LACT 1.0 2017    A1C 5.4 2020    SCOT 8.2 (L) 2020    PHOS 3.0 2017    MAG 1.9 2017    ALBUMIN 3.3 (L) 2017    PROTTOTAL 6.9 2017    ALT 19 2017    AST 21 2017    ALKPHOS 118 2017    BILITOTAL 0.4 2017    LIPASE 130 2020    AMYLASE 92 2020    TSH 1.79 2017    CRP 97.0 (H) 2017    SED 18 2017        Preop Vitals    BP Readings from Last 3 Encounters:   20 (!) 150/88   19 (!) 82/50   10/31/19 111/62    Pulse Readings from Last 3 Encounters:   20 59   19 66   10/31/19 69      Resp Readings from Last 3  "Encounters:   09/29/20 14   12/04/19 16   02/14/18 16    SpO2 Readings from Last 3 Encounters:   09/29/20 98%   12/04/19 95%   10/31/19 98%      Temp Readings from Last 1 Encounters:   09/29/20 36.6  C (97.8  F) (Oral)    Ht Readings from Last 1 Encounters:   12/04/19 1.549 m (5' 0.98\")      Wt Readings from Last 1 Encounters:   12/04/19 49.9 kg (110 lb)    Estimated body mass index is 20.8 kg/m  as calculated from the following:    Height as of 12/4/19: 1.549 m (5' 0.98\").    Weight as of 12/4/19: 49.9 kg (110 lb).     LDA:  Peripheral IV 02/14/18 Left Hand (Active)   Site Assessment WDL 02/14/18 1249   Line Status Infusing 02/14/18 1249   Phlebitis Scale 0-->no symptoms 02/14/18 1249   Dressing Intervention New dressing  02/14/18 1249   Number of days: 958       Peripheral IV 09/29/20 Right Upper arm (Active)   Site Assessment WDL 09/29/20 1006   Line Status Infusing 09/29/20 1006   Phlebitis Scale 0-->no symptoms 09/29/20 1006   Infiltration Scale 0 09/29/20 1006   Infiltration Site Treatment Method  None 09/29/20 1006   Extravasation? No 09/29/20 1006   Dressing Intervention New dressing  09/29/20 1006   Number of days: 0       Airway - Adult/Peds laryngeal mask airway (Active)   Number of days: 958        Past Medical History:   Diagnosis Date     Anemia in chronic renal disease      Charcot foot due to diabetes mellitus (H) Dec 2006    left     Diabetes mellitus type 1 (H) 1988    12yr old     Diabetic retinopathy      Dyslipidemia      ESRD on hemodialysis (H)     Feb 2006 to 2007     History of acute pyelonephritis     2003     History of blood transfusion      Hypothyroidism     on and off synthroid     Immunosuppressed status (H)      Kidney replaced by transplant 2007     Neurogenic orthostatic hypotension (H)      Nonsenile cataract      Osteoporosis      Pulmonary histoplasmosis (H)      Secondary renal hyperparathyroidism (H)       Past Surgical History:   Procedure Laterality Date     BENCH KIDNEY N/A " 2016    Procedure: BENCH KIDNEY;  Surgeon: Gilson Doe MD;  Location: [facility] OR     BENCH PANCREAS N/A 2016    Procedure: BENCH PANCREAS;  Surgeon: Gilson Doe MD;  Location: [facility] OR     C ANESTH,OPEN HEART SURGERY+PUMP      for thrombectomy only - catheter related     C CARDIAC SURG PROCEDURE UNLIST       C STOMACH SURGERY PROCEDURE UNLISTED        SECTION           CYSTOSCOPY, REMOVE STENT(S), COMBINED Left 2016    Procedure: COMBINED CYSTOSCOPY, REMOVE STENT(S);  Surgeon: Gilson Doe MD;  Location: [facility] OR     GYN SURGERY      cesarian     INT NEPHROSTOMY PERCUT RIGHT*       IR RENAL BIOPSY RIGHT  2019     native nephrectomy      simult with tx surgery.  ? Right     PANRETINAL PHOTOCOAGULATION (PRP) OD (RIGHT EYE)       PANRETINAL PHOTOCOAGULATION (PRP) OS (LEFT EYE)       TRANSPLANT KIDNEY RECIPIENT LIVING RELATED  2007    sister     TRANSPLANT PANCREAS, KIDNEY  DONOR, COMBINED N/A 2016    Procedure: COMBINED TRANSPLANT PANCREAS, KIDNEY  DONOR;  Surgeon: Gilson Doe MD;  Location: [facility] OR      Allergies   Allergen Reactions     Amoxicillin-Pot Clavulanate Diarrhea     Augmentin Diarrhea and Other (See Comments)     Diarrhea  Took augmentin 2017 with loperamide       Ciprofloxacin Nausea, GI Disturbance and Other (See Comments)     Nausea         Pollen Extract Other (See Comments)     Seasonal allergies  Runny Nose  Seasonal allergies  Seasonal allergies       Pyridostigmine Other (See Comments), Swelling and Muscle Pain (Myalgia)     Spastic muscle motion in the face and legs, weakness in the arms. Slight swelling of the tongue.  Edema  Spastic muscle motion in the face and legs, weakness in the arms. Slight swelling of the tongue.  Spastic muscle motion in the face and legs, weakness in the arms. Slight swelling of the tongue.          Anesthesia Evaluation     . Pt has had prior anesthetic. Type: General    No history of  anesthetic complications          ROS/MED HX    ENT/Pulmonary:  - neg pulmonary ROS     Neurologic:  - neg neurologic ROS     Cardiovascular:  - neg cardiovascular ROS       METS/Exercise Tolerance:  3 - Able to walk 1-2 blocks without stopping   Hematologic:  - neg hematologic  ROS       Musculoskeletal:  - neg musculoskeletal ROS       GI/Hepatic:  - neg GI/hepatic ROS       Renal/Genitourinary:         Endo:     (+) type I DM, Diabetic complications: nephropathy retinopathy, thyroid problem .      Psychiatric:  - neg psychiatric ROS       Infectious Disease:         Malignancy:         Other:                         PHYSICAL EXAM:   Mental Status/Neuro: A/A/O   Airway: Facies: Feasible  Mallampati: I  Mouth/Opening: Full  TM distance: > 6 cm  Neck ROM: Full   Respiratory: Auscultation: CTAB     Resp. Rate: Normal     Resp. Effort: Normal      CV: Rhythm: Regular  Rate: Age appropriate  Heart: Normal Sounds  Edema: None   Comments:      Dental: Normal Dentition                Assessment:   ASA SCORE: 3    H&P: History and physical reviewed and following examination; no interval change.   Smoking Status:  Non-Smoker/Unknown   NPO Status: NPO Appropriate     Plan:   Anes. Type:  MAC   Pre-Medication: None   Induction:  N/a   Airway: Native Airway   Access/Monitoring: PIV   Maintenance: N/a     Postop Plan:   Postop Pain: None  Postop Sedation/Airway: Not planned  Disposition: Outpatient     PONV Management:   Adult Risk Factors: Female, Non-Smoker   Prevention: Ondansetron, Dexamethasone     CONSENT: Direct conversation   Plan and risks discussed with: Patient                      Trino Owen MD, MD

## 2020-09-29 NOTE — ANESTHESIA POSTPROCEDURE EVALUATION
Anesthesia POST Procedure Evaluation    Patient: Rose Castaneda   MRN:     [id number] Gender:   female   Age:    44 year old :      1975        Preoperative Diagnosis: Posterior subcapsular polar age-related cataract of left eye [H25.042]  Corneal scar, left eye [H17.9]   Procedure(s):  PHACOEMULSIFICATION, CATARACT, WITH INTRAOCULAR LENS IMPLANT  SUPERFICIAL KERATECTOMY with AMNIOTIC MEMBRANE TRANSPLANT   Postop Comments: No value filed.     Anesthesia Type: MAC       Disposition: Outpatient   Postop Pain Control: Uneventful            Sign Out: Well controlled pain   PONV: No   Neuro/Psych: Uneventful            Sign Out: Acceptable/Baseline neuro status   Airway/Respiratory: Uneventful            Sign Out: AIRWAY IN SITU/Resp. Support   CV/Hemodynamics: Uneventful            Sign Out: Acceptable CV status   Other NRE: NONE   DID A NON-ROUTINE EVENT OCCUR? No         Last Anesthesia Record Vitals:  CRNA VITALS  2020 1117 - 2020 1217      2020             Resp Rate (set):  10          Last PACU Vitals:  Vitals Value Taken Time   /77 2020 11:45 AM   Temp 35.9  C (96.7  F) 2020 11:45 AM   Pulse     Resp 14 2020 11:45 AM   SpO2 97 % 2020 11:45 AM   Temp src Available 2020 11:30 AM   NIBP 143/85 2020 11:37 AM   Pulse 63 2020 11:42 AM   SpO2 99 % 2020 11:42 AM   Resp     Temp     Ht Rate 62 2020 11:41 AM   Temp 2           Electronically Signed By: Trino Owen MD, MD, 2020, 12:31 PM

## 2020-09-29 NOTE — DISCHARGE INSTRUCTIONS
"[facility]  Home Care Following Anesthesia  For 24 hours after surgery:  1. Get plenty of rest.  A responsible adult must stay with you for at least 24 hours after you leave the surgery center.  2. Do not drive or use heavy equipment.  If you have weakness or tingling, don't drive or use heavy equipment until this feeling goes away.   3. Do not drink alcohol.   4. Avoid strenuous or risky activities.  Ask for help when climbing stairs.  5. You may feel lightheaded.  IF so, sit for a few minutes before standing.  Have someone help you get up.   6. If you have nausea (feel sick to your stomach): Drink only clear liquids such as apple juice, ginger ale, broth or 7-Up.  Rest may also help.  Be sure to drink enough fluids.  Move to a regular diet as you feel able.   7. You may have a slight fever.  Call the doctor if your fever is over 100 F (37.7 C) (taken under the tongue) or lasts longer than 24 hours.  8. You may have a dry mouth, a sore throat, muscle aches or trouble sleeping. These should go away after 24 hours.  9. Do not make important or legal decisions.        Today you received a Marcaine or bupivacaine block to numb the nerves near your surgery site.  This is a block using local anesthetic or \"numbing\" medication injected around the nerves to anesthetize or \"numb\" the area supplied by those nerves.  This block is injected into the muscle layer near your surgical site.  The medication may numb the location where you had surgery for 6-18 hours, but may last up to 24 hours.  If your surgical site is an arm or leg you should be careful with your affected limb, since it is possible to injure your limb without being aware of it due to the numbing.  Until full feeling returns, you should guard against bumping or hitting your limb, and avoid extreme hot or cold temperatures on the skin.  As the block wears off, the feeling will return as a tingling or prickly sensation near your " surgical site.  You will experience more discomfort from your incision as the feeling returns.  You may want to take a pain pill (a narcotic or Tylenol if this was prescribed by your surgeon) when you start to experience mild pain before the pain beccomes more severe.  If your pain medications do not control your pain you should notifiy your surgeon.    Tips for taking pain medications  To get the best pain relief possible, remember these points:    Take pain medications as directed, before pain becomes severe.    Pain medication can upset your stomach: taking it with food may help.    Constipation is a common side effect of pain medication. Drink plenty of  fluids.    Eat foods high in fiber. Take a stool softener if recommended by your doctor or pharmacist.    Do not drink alcohol, drive or operate machinery while taking pain medications.    Ask about other ways to control pain, such as with heat, ice or relaxation.    Tylenol/Acetaminophen Consumption  To help encourage the safe use of acetaminophen, the makers of TYLENOL  have lowered the maximum daily dose for single-ingredient Extra Strength TYLENOL  (acetaminophen) products sold in the [place] from 8 pills per day (4,000 mg) to 6 pills per day (3,000 mg). The dosing interval has also changed from 2 pills every 4-6 hours to 2 pills every 6 hours.    If you feel your pain relief is insufficient, you may take Tylenol/Acetaminophen in addition to your narcotic pain medication.     Be careful not to exceed 3,000 mg of Tylenol/Acetaminophen in a 24 hour period from all sources.    If you are taking extra strength Tylenol/acetaminophen (500 mg), the maximum dose is 6 tablets in 24 hours.    If you are taking regular strength acetaminophen (325 mg), the maximum dose is 9 tablets in 24 hours.    Call a doctor for any of the followin. Signs of infection (fever, growing tenderness at the surgery site, a large amount of drainage or bleeding, severe pain, foul-smelling  drainage, redness, swelling).  2. It has been over 8 to 10 hours since surgery and you are still not able to urinate (pass water).  3. Headache for over 24 hours.  4. Numbness, tingling or weakness the day after surgery (if you had spinal anesthesia).  5. Signs of Covid-19 infection (temperature over 100 degrees, shortness of breath, cough, loss of taste/smell, generalized body aches, persistent headache, chills, sore throat, nausea/vomiting/diarrhea)  Your doctor is:  Dr. Willy Thurston, Ophthalmology: [phone]                    Or dial [phone] and ask for the resident on call for:  Ophthalmology  For emergency care, call the:  [place] [facility]:  [phone] (TTY for hearing impaired: [phone])              Instructions:    NO EYE DROPS while the patch is in place. Please bring all eye drops with you to your appointment tomorrow.    Keep the patch on all day and night. We will remove it for you at your post-op appointment tomorrow.    No heavy lifting > 20 lbs. No bending with head below waist. Avoid sleeping on operative side.    If you have worsening eye pain, redness, or decreased vision, please call the Eye Clinic right away at [phone].

## 2020-09-30 ENCOUNTER — OFFICE VISIT (OUTPATIENT)
Dept: OPHTHALMOLOGY | Facility: CLINIC | Age: 45
End: 2020-09-30
Attending: OPHTHALMOLOGY
Payer: COMMERCIAL

## 2020-09-30 DIAGNOSIS — Z98.890 POSTOPERATIVE EYE STATE: Primary | ICD-10-CM

## 2020-09-30 PROBLEM — E78.5 HYPERLIPIDEMIA: Status: ACTIVE | Noted: 2020-09-30

## 2020-09-30 PROBLEM — G56.01 RIGHT CARPAL TUNNEL SYNDROME: Status: ACTIVE | Noted: 2019-04-23

## 2020-09-30 PROBLEM — I10 HYPERTENSION: Status: ACTIVE | Noted: 2020-09-30

## 2020-09-30 PROBLEM — G56.21 CUBITAL TUNNEL SYNDROME ON RIGHT: Status: ACTIVE | Noted: 2019-04-23

## 2020-09-30 PROBLEM — Z86.718 PERSONAL HISTORY OF VENOUS THROMBOSIS AND EMBOLISM: Status: ACTIVE | Noted: 2020-09-30

## 2020-09-30 PROBLEM — M65.331 TRIGGER FINGER, RIGHT MIDDLE FINGER: Status: ACTIVE | Noted: 2019-04-23

## 2020-09-30 PROCEDURE — G0463 HOSPITAL OUTPT CLINIC VISIT: HCPCS | Mod: ZF

## 2020-09-30 ASSESSMENT — SLIT LAMP EXAM - LIDS
COMMENTS: NORMAL
COMMENTS: MILD PROTECTIVE PTOSIS

## 2020-09-30 ASSESSMENT — VISUAL ACUITY
OS_SC: 20/100
METHOD: SNELLEN - LINEAR
OS_PH_SC: 20/50
OS_PH_SC+: +2

## 2020-09-30 ASSESSMENT — TONOMETRY
IOP_METHOD: ICARE
OS_IOP_MMHG: 18
OD_IOP_MMHG: 15

## 2020-09-30 ASSESSMENT — CONF VISUAL FIELD
OS_NORMAL: 1
OD_NORMAL: 1

## 2020-09-30 ASSESSMENT — EXTERNAL EXAM - LEFT EYE: OS_EXAM: NORMAL

## 2020-09-30 ASSESSMENT — EXTERNAL EXAM - RIGHT EYE: OD_EXAM: NORMAL

## 2020-09-30 NOTE — PATIENT INSTRUCTIONS
Start Prednisolone 1-2 drops 4x/day   Start Ketorolac 1-2 drops 4x/day  Start Tobramycin 1-2 drop 4x/day  Wait 3-5 minutes between the drops    Okay to shower but keep eyes closed to avoid getting water in the eye. If you do get water in the eye, keep the eyes close and gently dab the eye with a dry towel.    Take it easy for the next week. No heavy lifting over 20 pounds. Don't bend over.      Wear the eye shield at night. During the day, wear some form of eye protection (sunglass or eye glasses).

## 2020-09-30 NOTE — PROGRESS NOTES
CC:  Here for cataract evaluation    HPI:  45 y/o F wit h/o DM1 w/ PDR and DME each eye (mild)  s/p pancreas transplant, NSC each eye, PSC left eye, BARBARA referred by Dr. Zuniga for cataract evaluation. Patient is on mycophenolate, and envartise for her pancreatic transplant.    Interval Hx:   S/p CEIOL with toric, superficial keratectomy, and AMT OS. Doing well. Slept well. No eye pain or irritation.       POHx:  DM1 w/ PDR each eye, NSC each eye, PSC left eye     Last eye exam: 8/2020 Kristin  Prior eye surgery/laser: PRP each eye, LANIE x1 right eye in 2007  CTL wearer: yes, [place] RGP x 30 years  Glasses: no    Fam hx of eye disease: denies    Gtts:  Restasis each eye Qday     All:  Augmentin, ciprofloxacin (GI disturbance)     A/P:    # s/p toric CEIOL, superficial keratectomy, and AMT   - Doing well   - Start Prednisolone acetate QID   - Start Ketorolac QID    - Start Tobramycin QID    - reviewed post-op precautions   - eye shield at night    # Nuclear Sclerotic Cataract w/ PSC left eye > right eye   - patient notes worsening vision each eye with decreased light and contrast sensitivity, difficulty navigating without bumping into things, near vision  - patient understand ultimate BCVA may be limited by atrophic retina / diabetic eye disease.  - s/p pancreas transplant x 3, now DM1 no longer an issue, glucose OK off meds.   - discussed r/b/a of CEIOL, patient amenable would like to proceed.  - also for local SK w/ possible AMT for temporal scar   Denies trauma   No glaucoma   H/o DM1, now quiescent s/p pancreas transplant   Target distance   Dilates poorly 4.5mm right eye, 5.3mm left eye    3D of WTR astigmatism, amenable to toric lens - given 30 years RGP use will repeat biometry / topography in 1-2 weeks, if stable, can proceed with toric, will repeat again if changing    Patient will call to schedule after biometry to decide btwn monofocal vs. Toric     #h/o PDR with DME  - 8/2020 OCT with atrophy, mild DME  -  s/p PRP each eye  - following with Dr. Foster     Follow up:  1 week for postop check     Mikel Danielson MD  Ophthalmology PGY-3  [facility]     Attending Physician Attestation:  Complete documentation of historical and exam elements from today's encounter can be found in the full encounter summary report (not reduplicated in this progress note).  I personally obtained the chief complaint(s) and history of present illness.  I confirmed and edited as necessary the review of systems, past medical/surgical history, family history, social history, and examination findings as documented by others; and I examined the patient myself.  I personally reviewed the relevant tests, images, and reports as documented above.  I formulated and edited as necessary the assessment and plan and discussed the findings and management plan with the patient and family. - Willy Thurston MD

## 2020-10-06 DIAGNOSIS — Z11.59 ENCOUNTER FOR SCREENING FOR OTHER VIRAL DISEASES: Primary | ICD-10-CM

## 2020-10-07 ENCOUNTER — OFFICE VISIT (OUTPATIENT)
Dept: OPHTHALMOLOGY | Facility: CLINIC | Age: 45
End: 2020-10-07
Attending: OPHTHALMOLOGY
Payer: COMMERCIAL

## 2020-10-07 DIAGNOSIS — Z98.890 POSTOPERATIVE EYE STATE: Primary | ICD-10-CM

## 2020-10-07 PROCEDURE — 99024 POSTOP FOLLOW-UP VISIT: CPT | Performed by: OPHTHALMOLOGY

## 2020-10-07 PROCEDURE — G0463 HOSPITAL OUTPT CLINIC VISIT: HCPCS

## 2020-10-07 RX ORDER — KETOROLAC TROMETHAMINE 5 MG/ML
1 SOLUTION OPHTHALMIC 3 TIMES DAILY
Qty: 5 ML | Refills: 0 | Status: SHIPPED | OUTPATIENT
Start: 2020-10-07 | End: 2020-10-28

## 2020-10-07 RX ORDER — PREDNISOLONE ACETATE 10 MG/ML
1 SUSPENSION/ DROPS OPHTHALMIC 3 TIMES DAILY
Qty: 5 ML | Refills: 0 | Status: SHIPPED | OUTPATIENT
Start: 2020-10-07 | End: 2020-10-28

## 2020-10-07 ASSESSMENT — SLIT LAMP EXAM - LIDS
COMMENTS: MILD PROTECTIVE PTOSIS
COMMENTS: NORMAL

## 2020-10-07 ASSESSMENT — VISUAL ACUITY
OD_SC: 20/150
OS_SC: 20/40
OD_PH_SC+: -1
OD_PH_SC: 20/100
METHOD: SNELLEN - LINEAR

## 2020-10-07 ASSESSMENT — CONF VISUAL FIELD
OS_SUPERIOR_NASAL_RESTRICTION: 3
OD_SUPERIOR_NASAL_RESTRICTION: 3
OS_INFERIOR_TEMPORAL_RESTRICTION: 1
OS_SUPERIOR_TEMPORAL_RESTRICTION: 1
OS_INFERIOR_NASAL_RESTRICTION: 3
OD_SUPERIOR_TEMPORAL_RESTRICTION: 1
OD_INFERIOR_NASAL_RESTRICTION: 3
OD_INFERIOR_TEMPORAL_RESTRICTION: 1

## 2020-10-07 ASSESSMENT — EXTERNAL EXAM - RIGHT EYE: OD_EXAM: NORMAL

## 2020-10-07 ASSESSMENT — TONOMETRY
OD_IOP_MMHG: 13
IOP_METHOD: ICARE
OS_IOP_MMHG: 14

## 2020-10-07 ASSESSMENT — EXTERNAL EXAM - LEFT EYE: OS_EXAM: NORMAL

## 2020-10-07 NOTE — PATIENT INSTRUCTIONS
LEFT EYE:  STOP Tobramycin  DECREASE Prednisolone Acetate 1% to three times a day for 1 week, then twice a day for 1 week, then once a day for 1 week, then STOP  DECREASE Ketorolac to three times a day for 1 week, then twice a day for 1week, then once a day for 1week, then STOP

## 2020-10-07 NOTE — NURSING NOTE
Chief Complaints and History of Present Illnesses   Patient presents with     Post Op (Ophthalmology) Left Eye     Chief Complaint(s) and History of Present Illness(es)     Post Op (Ophthalmology) Left Eye     Pain scale: 0/10              Comments     One week POP for CE/IOL and SUPERFICIAL KERATECTOMY with AMNIOTIC MEMBRANE TRANSPLANT of the left eye on 09/29/2020.  The patient notes she is doing well.  Her vision seems clearer and brighter in her left eye.  She is using Prednisolone, Ketorolac and Tobramycin four times daily in the left eye.  Brenda Bone, COA, COA 10:07 AM 10/07/2020   .

## 2020-10-07 NOTE — PROGRESS NOTES
CC:  Here for cataract evaluation    HPI:  45 y/o F wit h/o DM1 w/ PDR and DME each eye (mild)  s/p pancreas transplant, NSC each eye, PSC left eye, BARBARA referred by Dr. Zuniga for cataract evaluation. Patient is on mycophenolate, and envartise for her pancreatic transplant.    Interval Hx:   S/p CEIOL with toric, superficial keratectomy, and AMT OS. Doing well. Slept well. No eye pain or irritation. Vision is improved, has been using the drops. No new flashes, floaters, diplopia.      POHx:  DM1 w/ PDR each eye, NSC each eye, PSC left eye     Last eye exam: 8/2020 Kristin  Prior eye surgery/laser: PRP each eye, LANIE x1 right eye in 2007  CTL wearer: yes, [place] RGP x 30 years  Glasses: no    Fam hx of eye disease: denies    Gtts:     Prednisolone QID OS  Ketorolac QID OS  Tobramycin QID OS    All:  Augmentin, ciprofloxacin (GI disturbance)     A/P:    # s/p toric CEIOL, superficial keratectomy, and AMT OS  - D/C tobramycin   - D/C BCL  - taper prednisolone to TID x 1 week, then BID x 1 week, then daily x 1week, then STOP  - taper ketorolac to TID x 1 week, then BID x 1 week, then daily x 1week, then STOP    # Nuclear Sclerotic Cataract w/ PSC OD  - patient understand ultimate BCVA may be limited by atrophic retina / diabetic eye disease.  - s/p pancreas transplant x 3, now DM1 no longer an issue, glucose OK off meds.   - patient scheduled for CE/IOL OD 10/20/20    #h/o PDR with DME  - 8/2020 OCT with atrophy, mild DME  - s/p PRP each eye  - following with Dr. Foster     Follow up:  POM1 as scheduled, sooner PRN.    Attending Physician Attestation:  Complete documentation of historical and exam elements from today's encounter can be found in the full encounter summary report (not reduplicated in this progress note).  I personally obtained the chief complaint(s) and history of present illness.  I confirmed and edited as necessary the review of systems, past medical/surgical history, family history, social history, and  examination findings as documented by others; and I examined the patient myself.  I personally reviewed the relevant tests, images, and reports as documented above.  I formulated and edited as necessary the assessment and plan and discussed the findings and management plan with the patient and family. - Willy Thurston MD

## 2020-10-16 DIAGNOSIS — Z11.59 ENCOUNTER FOR SCREENING FOR OTHER VIRAL DISEASES: ICD-10-CM

## 2020-10-16 LAB
SARS-COV-2 RNA SPEC QL NAA+PROBE: NOT DETECTED
SPECIMEN SOURCE: NORMAL

## 2020-10-16 PROCEDURE — U0003 INFECTIOUS AGENT DETECTION BY NUCLEIC ACID (DNA OR RNA); SEVERE ACUTE RESPIRATORY SYNDROME CORONAVIRUS 2 (SARS-COV-2) (CORONAVIRUS DISEASE [COVID-19]), AMPLIFIED PROBE TECHNIQUE, MAKING USE OF HIGH THROUGHPUT TECHNOLOGIES AS DESCRIBED BY CMS-2020-01-R: HCPCS | Mod: 90 | Performed by: PATHOLOGY

## 2020-10-16 PROCEDURE — 99000 SPECIMEN HANDLING OFFICE-LAB: CPT | Performed by: PATHOLOGY

## 2020-10-19 ENCOUNTER — ANESTHESIA EVENT (OUTPATIENT)
Dept: SURGERY | Facility: AMBULATORY SURGERY CENTER | Age: 45
End: 2020-10-19

## 2020-10-19 DIAGNOSIS — H25.11 AGE-RELATED NUCLEAR CATARACT OF RIGHT EYE: Primary | ICD-10-CM

## 2020-10-19 RX ORDER — PREDNISOLONE ACETATE 10 MG/ML
1 SUSPENSION/ DROPS OPHTHALMIC 4 TIMES DAILY
Qty: 10 ML | Refills: 0 | Status: SHIPPED | OUTPATIENT
Start: 2020-10-19 | End: 2021-02-16

## 2020-10-19 RX ORDER — POLYMYXIN B SULFATE AND TRIMETHOPRIM 1; 10000 MG/ML; [USP'U]/ML
1 SOLUTION OPHTHALMIC 4 TIMES DAILY
Qty: 10 ML | Refills: 0 | Status: SHIPPED | OUTPATIENT
Start: 2020-10-19 | End: 2021-02-15

## 2020-10-19 RX ORDER — KETOROLAC TROMETHAMINE 5 MG/ML
1 SOLUTION OPHTHALMIC 4 TIMES DAILY
Qty: 5 ML | Refills: 0 | Status: SHIPPED | OUTPATIENT
Start: 2020-10-19 | End: 2021-02-16

## 2020-10-20 ENCOUNTER — ANESTHESIA (OUTPATIENT)
Dept: SURGERY | Facility: AMBULATORY SURGERY CENTER | Age: 45
End: 2020-10-20

## 2020-10-20 ENCOUNTER — HOSPITAL ENCOUNTER (OUTPATIENT)
Facility: AMBULATORY SURGERY CENTER | Age: 45
Discharge: HOME | End: 2020-10-20
Attending: OPHTHALMOLOGY | Admitting: OPHTHALMOLOGY
Payer: COMMERCIAL

## 2020-10-20 VITALS
HEART RATE: 51 BPM | BODY MASS INDEX: 20.77 KG/M2 | HEIGHT: 61 IN | OXYGEN SATURATION: 99 % | SYSTOLIC BLOOD PRESSURE: 106 MMHG | TEMPERATURE: 97 F | DIASTOLIC BLOOD PRESSURE: 67 MMHG | RESPIRATION RATE: 18 BRPM | WEIGHT: 110 LBS

## 2020-10-20 DIAGNOSIS — H25.11 AGE-RELATED NUCLEAR CATARACT OF RIGHT EYE: Primary | ICD-10-CM

## 2020-10-20 LAB
GLUCOSE BLDC GLUCOMTR-MCNC: 76 MG/DL (ref 70–99)
GLUCOSE BLDC GLUCOMTR-MCNC: 78 MG/DL (ref 70–99)
HCG UR QL: NEGATIVE
INTERNAL QC OK POCT: YES

## 2020-10-20 PROCEDURE — 999N001017 HC STATISTIC GLUCOSE BY METER IP: Performed by: PATHOLOGY

## 2020-10-20 PROCEDURE — V2599 CONTACT LENS/ES OTHER TYPE: HCPCS | Mod: RT

## 2020-10-20 PROCEDURE — 66984 XCAPSL CTRC RMVL W/O ECP: CPT | Mod: RT

## 2020-10-20 PROCEDURE — 81025 URINE PREGNANCY TEST: CPT | Performed by: PATHOLOGY

## 2020-10-20 DEVICE — IMPLANTABLE DEVICE: Type: IMPLANTABLE DEVICE | Site: EYE | Status: FUNCTIONAL

## 2020-10-20 RX ORDER — MOXIFLOXACIN 5 MG/ML
1 SOLUTION/ DROPS OPHTHALMIC 3 TIMES DAILY
Status: COMPLETED | OUTPATIENT
Start: 2020-10-20 | End: 2020-10-20

## 2020-10-20 RX ORDER — FENTANYL CITRATE 50 UG/ML
INJECTION, SOLUTION INTRAMUSCULAR; INTRAVENOUS PRN
Status: DISCONTINUED | OUTPATIENT
Start: 2020-10-20 | End: 2020-10-20

## 2020-10-20 RX ORDER — FENTANYL CITRATE 50 UG/ML
25-50 INJECTION, SOLUTION INTRAMUSCULAR; INTRAVENOUS EVERY 5 MIN PRN
Status: DISCONTINUED | OUTPATIENT
Start: 2020-10-20 | End: 2020-10-20 | Stop reason: HOSPADM

## 2020-10-20 RX ORDER — NALOXONE HYDROCHLORIDE 0.4 MG/ML
.1-.4 INJECTION, SOLUTION INTRAMUSCULAR; INTRAVENOUS; SUBCUTANEOUS
Status: DISCONTINUED | OUTPATIENT
Start: 2020-10-20 | End: 2020-10-21 | Stop reason: HOSPADM

## 2020-10-20 RX ORDER — SODIUM CHLORIDE, SODIUM LACTATE, POTASSIUM CHLORIDE, CALCIUM CHLORIDE 600; 310; 30; 20 MG/100ML; MG/100ML; MG/100ML; MG/100ML
INJECTION, SOLUTION INTRAVENOUS CONTINUOUS
Status: DISCONTINUED | OUTPATIENT
Start: 2020-10-20 | End: 2020-10-21 | Stop reason: HOSPADM

## 2020-10-20 RX ORDER — ONDANSETRON 2 MG/ML
INJECTION INTRAMUSCULAR; INTRAVENOUS PRN
Status: DISCONTINUED | OUTPATIENT
Start: 2020-10-20 | End: 2020-10-20

## 2020-10-20 RX ORDER — TETRACAINE HYDROCHLORIDE 5 MG/ML
SOLUTION OPHTHALMIC PRN
Status: DISCONTINUED | OUTPATIENT
Start: 2020-10-20 | End: 2020-10-20 | Stop reason: HOSPADM

## 2020-10-20 RX ORDER — CYCLOPENTOLAT/TROPIC/PHENYLEPH 1%-1%-2.5%
1 DROPS (EA) OPHTHALMIC (EYE)
Status: COMPLETED | OUTPATIENT
Start: 2020-10-20 | End: 2020-10-20

## 2020-10-20 RX ORDER — LIDOCAINE 40 MG/G
CREAM TOPICAL
Status: DISCONTINUED | OUTPATIENT
Start: 2020-10-20 | End: 2020-10-20 | Stop reason: HOSPADM

## 2020-10-20 RX ORDER — OXYCODONE HYDROCHLORIDE 5 MG/1
5 TABLET ORAL EVERY 4 HOURS PRN
Status: DISCONTINUED | OUTPATIENT
Start: 2020-10-20 | End: 2020-10-21 | Stop reason: HOSPADM

## 2020-10-20 RX ORDER — MEPERIDINE HYDROCHLORIDE 25 MG/ML
12.5 INJECTION INTRAMUSCULAR; INTRAVENOUS; SUBCUTANEOUS
Status: DISCONTINUED | OUTPATIENT
Start: 2020-10-20 | End: 2020-10-21 | Stop reason: HOSPADM

## 2020-10-20 RX ORDER — ONDANSETRON 2 MG/ML
4 INJECTION INTRAMUSCULAR; INTRAVENOUS EVERY 30 MIN PRN
Status: DISCONTINUED | OUTPATIENT
Start: 2020-10-20 | End: 2020-10-21 | Stop reason: HOSPADM

## 2020-10-20 RX ORDER — BALANCED SALT SOLUTION 6.4; .75; .48; .3; 3.9; 1.7 MG/ML; MG/ML; MG/ML; MG/ML; MG/ML; MG/ML
SOLUTION OPHTHALMIC PRN
Status: DISCONTINUED | OUTPATIENT
Start: 2020-10-20 | End: 2020-10-20 | Stop reason: HOSPADM

## 2020-10-20 RX ORDER — LIDOCAINE HYDROCHLORIDE 10 MG/ML
INJECTION, SOLUTION EPIDURAL; INFILTRATION; INTRACAUDAL; PERINEURAL PRN
Status: DISCONTINUED | OUTPATIENT
Start: 2020-10-20 | End: 2020-10-20 | Stop reason: HOSPADM

## 2020-10-20 RX ORDER — PREDNISOLONE ACETATE 1 %
SUSPENSION, DROPS(FINAL DOSAGE FORM)(ML) OPHTHALMIC (EYE) PRN
Status: DISCONTINUED | OUTPATIENT
Start: 2020-10-20 | End: 2020-10-20 | Stop reason: HOSPADM

## 2020-10-20 RX ORDER — SODIUM CHLORIDE, SODIUM LACTATE, POTASSIUM CHLORIDE, CALCIUM CHLORIDE 600; 310; 30; 20 MG/100ML; MG/100ML; MG/100ML; MG/100ML
INJECTION, SOLUTION INTRAVENOUS CONTINUOUS
Status: DISCONTINUED | OUTPATIENT
Start: 2020-10-20 | End: 2020-10-20 | Stop reason: HOSPADM

## 2020-10-20 RX ORDER — MOXIFLOXACIN IN NACL,ISO-OS/PF 0.3MG/0.3
SYRINGE (ML) INTRAOCULAR PRN
Status: DISCONTINUED | OUTPATIENT
Start: 2020-10-20 | End: 2020-10-20 | Stop reason: HOSPADM

## 2020-10-20 RX ORDER — ONDANSETRON 4 MG/1
4 TABLET, ORALLY DISINTEGRATING ORAL EVERY 30 MIN PRN
Status: DISCONTINUED | OUTPATIENT
Start: 2020-10-20 | End: 2020-10-21 | Stop reason: HOSPADM

## 2020-10-20 RX ADMIN — Medication 1 DROP: at 12:30

## 2020-10-20 RX ADMIN — FENTANYL CITRATE 50 MCG: 50 INJECTION, SOLUTION INTRAMUSCULAR; INTRAVENOUS at 14:25

## 2020-10-20 RX ADMIN — MOXIFLOXACIN 1 DROP: 5 SOLUTION/ DROPS OPHTHALMIC at 12:40

## 2020-10-20 RX ADMIN — Medication 1 DROP: at 12:23

## 2020-10-20 RX ADMIN — ONDANSETRON 4 MG: 2 INJECTION INTRAMUSCULAR; INTRAVENOUS at 14:27

## 2020-10-20 RX ADMIN — SODIUM CHLORIDE, SODIUM LACTATE, POTASSIUM CHLORIDE, CALCIUM CHLORIDE: 600; 310; 30; 20 INJECTION, SOLUTION INTRAVENOUS at 14:22

## 2020-10-20 RX ADMIN — MOXIFLOXACIN 1 DROP: 5 SOLUTION/ DROPS OPHTHALMIC at 12:23

## 2020-10-20 RX ADMIN — MOXIFLOXACIN 1 DROP: 5 SOLUTION/ DROPS OPHTHALMIC at 12:30

## 2020-10-20 RX ADMIN — Medication 1 DROP: at 12:42

## 2020-10-20 ASSESSMENT — MIFFLIN-ST. JEOR: SCORE: 1068.4

## 2020-10-20 NOTE — OP NOTE
Operative Report    Date of Operation: October 20, 2020  Pre-operative diagnosis: Visually significant cataract, right eye   Post-operative diagnosis: Same   Procedure(s):   1. Cataract extraction by phacoemulsification insertion of intraocular toric lens, right eye  Surgeon(s): Dr. Willy Thurston  Fellow: Dr. Jason Goldberg  Findings: None   Blood Loss: None  Complications: None     Implant Name Type Inv. Item Serial No.  Lot No. LRB No. Used Action   EYE IMP IOL TECNIS TORIC II 1-PC 18.0D CYL3.00 [id number] Lens/Eye Implant EYE IMP IOL TECNIS TORIC II 1-PC 18.0D CYL3.00 [id number]  Right 1 Implanted       INDICATION FOR PROCEDURE   The patient has been followed in our eye clinic with a visually-significant cataract that has been affecting their activities of daily living. The risks, including, but not limited to infection, loss of vision, loss of eye, need for more surgery, and bleeding, along with the benefits, alternatives, expectations, and the procedure itself were discussed at length with the patient who wished to proceed with surgery.   DESCRIPTION OF PROCEDURE   In the preoperative suite, the patient was identified, the surgical site marked and informed consent was obtained. Toric orientation marks were placed using a sterile marking pen to evaluate potential cyclotorsion in the supine position. The patient was the brought back to the operative suite where the appropriate anesthesia monitors were connected. A routine time-out was performed and topical lidocaine Akten gel was administered to the right eye. The patient's operative eye was then prepped and draped in the usual sterile fashion for ophthalmic surgery.  Following draping, a lid speculum was placed to the operative eye. Toric markings were placed on the limbus at 92 degrees to nathaly the position of the toric lens. A pair of 0.12 forceps and a supersharp blade were used to make a paracentesis incision. Preservative free  lidocaine was injected into the anterior chamber to provide additional anesthesia.   Viscoat was then injected into the anterior chamber to fill the anterior chamber. A 2.5 mm keratome was used to make a temporal, triplanar clear corneal incision.Capsulorrhexis was completed with a bent cystitome and Utrata forceps. Hydrodissection of the nucleus was achieved with a hydrodissection cannula.The nucleus was found to move freely within the capsular bag. The cataract was then removed using a stop chop technique. The irrigation and aspiration handpiece was used to remove the remaining cortex. The capsular bag was filled with Provisc. The intraocular lens was injected into the capsular bag and dialed into position. Care was taken to align the toric lens with the toric markings at 92 degrees. The remaining viscoelastic was removed using irrigation and aspiration.  BSS on a cannula was used to hydrate the clear corneal and paracentesis incisions. Weck-jony sponges were used to confirm there were no leaks from the incisions. Intraocular pressure was assessed and found to be appropriate. Following the end of the case, the anterior chamber was noted to be deep and the lens was found to be well-centered and in the capsular bag.    The lid speculum and drapes were carefully removed. Several drops of Ofloxacin, Ketorolac, and Prednisolone Acetate were placed into the operative eye. A shield was taped over the eye. The patient was then taken to the recovery room in stable condition having tolerated the procedure well and discharged home in good condition. Patient is to follow-up next day at eye clinic for post-operative visit.    Dr. Willy Thurston was scrubbed and performed the entire surgery.    Willy Thurston MD   of Ophthalmology

## 2020-10-20 NOTE — ANESTHESIA CARE TRANSFER NOTE
Patient: Rose Castaneda    Procedure(s):  RIGHT EYE PHACOEMULSIFICATION, CATARACT, WITH INTRAOCULAR LENS IMPLANT    Diagnosis: Age-related nuclear cataract of right eye [H25.11]  Diagnosis Additional Information: No value filed.    Anesthesia Type:   MAC     Note:  Airway :Room Air  Patient transferred to:Phase II  Comments: Sherrill Report: Identifed the Patient, Identified the Reponsible Provider, Reviewed the pertinent medical history, Discussed the surgical course, Reviewed Intra-OP anesthesia mangement and issues during anesthesia, Set expectations for post-procedure period and Allowed opportunity for questions and acknowledgement of understanding      Vitals: (Last set prior to Anesthesia Care Transfer)    CRNA VITALS  10/20/2020 1419 - 10/20/2020 1449      10/20/2020             Resp Rate (set):  10                Electronically Signed By: JODY Stone CRNA  October 20, 2020  2:49 PM

## 2020-10-20 NOTE — BRIEF OP NOTE
[facility] Brief Operative Note    Pre-operative diagnosis: Age-related nuclear cataract of right eye [H25.11]   Post-operative diagnosis same   Procedure: Procedure(s):  RIGHT EYE PHACOEMULSIFICATION, CATARACT, WITH INTRAOCULAR LENS IMPLANT   Surgeon(s): Surgeon(s) and Role:     * Willy Thurston MD - Primary     * Goldberg, Jason, MD - Fellow - Assisting   Estimated blood loss: *none   Specimens: * No specimens in log *   Findings: As expected    Jason Goldberg, MD  Cornea & External Disease Fellow  Department of Ophthalmology and Visual Neurosciences

## 2020-10-20 NOTE — DISCHARGE INSTRUCTIONS
[facility]  Home Care Following Anesthesia  For 24 hours after surgery:  1. Get plenty of rest.  A responsible adult must stay with you for at least 24 hours after you leave the surgery center.  2. Do not drive or use heavy equipment.  If you have weakness or tingling, don't drive or use heavy equipment until this feeling goes away.   3. Do not drink alcohol.   4. Avoid strenuous or risky activities.  Ask for help when climbing stairs.  5. You may feel lightheaded.  IF so, sit for a few minutes before standing.  Have someone help you get up.   6. If you have nausea (feel sick to your stomach): Drink only clear liquids such as apple juice, ginger ale, broth or 7-Up.  Rest may also help.  Be sure to drink enough fluids.  Move to a regular diet as you feel able.   7. You may have a slight fever.  Call the doctor if your fever is over 100 F (37.7 C) (taken under the tongue) or lasts longer than 24 hours.  8. You may have a dry mouth, a sore throat, muscle aches or trouble sleeping. These should go away after 24 hours.  9. Do not make important or legal decisions.               Tips for taking pain medications  To get the best pain relief possible, remember these points:    Take pain medications as directed, before pain becomes severe.    Pain medication can upset your stomach: taking it with food may help.    Constipation is a common side effect of pain medication. Drink plenty of  fluids.    Eat foods high in fiber. Take a stool softener if recommended by your doctor or pharmacist.    Do not drink alcohol, drive or operate machinery while taking pain medications.    Ask about other ways to control pain, such as with heat, ice or relaxation.    Tylenol/Acetaminophen Consumption  To help encourage the safe use of acetaminophen, the makers of TYLENOL  have lowered the maximum daily dose for single-ingredient Extra Strength TYLENOL  (acetaminophen) products sold in the [place] from 8  pills per day (4,000 mg) to 6 pills per day (3,000 mg). The dosing interval has also changed from 2 pills every 4-6 hours to 2 pills every 6 hours.    If you feel your pain relief is insufficient, you may take Tylenol/Acetaminophen in addition to your narcotic pain medication.     Be careful not to exceed 3,000 mg of Tylenol/Acetaminophen in a 24 hour period from all sources.    If you are taking extra strength Tylenol/acetaminophen (500 mg), the maximum dose is 6 tablets in 24 hours.    If you are taking regular strength acetaminophen (325 mg), the maximum dose is 9 tablets in 24 hours.    Call a doctor for any of the followin. Signs of infection (fever, growing tenderness at the surgery site, a large amount of drainage or bleeding, severe pain, foul-smelling drainage, redness, swelling).  2. It has been over 8 to 10 hours since surgery and you are still not able to urinate (pass water).  3. Headache for over 24 hours.  4. Numbness, tingling or weakness the day after surgery (if you had spinal anesthesia).  5. Signs of Covid-19 infection (temperature over 100 degrees, shortness of breath, cough, loss of taste/smell, generalized body aches, persistent headache, chills, sore throat, nausea/vomiting/diarrhea)  Your doctor is:  Dr. Willy Thurston, Ophthalmology: [phone]                    Or dial [phone] and ask for the resident on call for:  Ophthalmology  For emergency care, call the:  [place] [facility]:  [phone] (TTY for hearing impaired: [phone])

## 2020-10-20 NOTE — ANESTHESIA POSTPROCEDURE EVALUATION
Anesthesia POST Procedure Evaluation    Patient: Rose Castaneda   MRN:     [id number] Gender:   female   Age:    44 year old :      1975        Preoperative Diagnosis: Age-related nuclear cataract of right eye [H25.11]   Procedure(s):  RIGHT EYE PHACOEMULSIFICATION, CATARACT, WITH INTRAOCULAR LENS IMPLANT   Postop Comments: No value filed.     Anesthesia Type: MAC       Disposition: Outpatient   Postop Pain Control: Uneventful            Sign Out: Well controlled pain   PONV: No   Neuro/Psych: Uneventful            Sign Out: Acceptable/Baseline neuro status   Airway/Respiratory: Uneventful            Sign Out: AIRWAY IN SITU/Resp. Support   CV/Hemodynamics: Uneventful            Sign Out: Acceptable CV status   Other NRE: NONE   DID A NON-ROUTINE EVENT OCCUR? No         Last Anesthesia Record Vitals:  CRNA VITALS  10/20/2020 1419 - 10/20/2020 1503      10/20/2020             Resp Rate (set):  10          Last PACU Vitals:  Vitals Value Taken Time   /72 10/20/20 1450   Temp 36.3  C (97.3  F) 10/20/20 1450   Pulse     Resp 18 10/20/20 1450   SpO2 99 % 10/20/20 1450   Temp src     NIBP     Pulse     SpO2     Resp     Temp     Ht Rate     Temp 2           Electronically Signed By: Vanessa Bernard MD, 2020, 3:03 PM

## 2020-10-20 NOTE — ANESTHESIA PREPROCEDURE EVALUATION
Anesthesia Pre-Procedure Evaluation    Patient: Rose Castaneda   MRN:     [id number] Gender:   female   Age:    44 year old :      1975        Preoperative Diagnosis: Age-related nuclear cataract of right eye [H25.11]   Procedure(s):  RIGHT EYE PHACOEMULSIFICATION, CATARACT, WITH INTRAOCULAR LENS IMPLANT     LABS:  CBC:   Lab Results   Component Value Date    WBC 5.6 2020    WBC 5.1 2020    HGB 10.5 (L) 2020    HGB 10.5 (L) 2020    HCT 32.3 (L) 2020    HCT 33.1 (L) 2020     2020     2020     BMP:   Lab Results   Component Value Date     2020     2020    POTASSIUM 4.3 2020    POTASSIUM 4.6 2020    CHLORIDE 107 2020    CHLORIDE 112 (H) 2020    CO2 27 2020    CO2 25 2020    BUN 18 2020    BUN 21 2020    CR 1.19 (H) 2020    CR 1.37 (H) 2020    GLC 80 2020     (H) 2020     COAGS:   Lab Results   Component Value Date    PTT 27 2016    INR 1.08 2019     POC:   Lab Results   Component Value Date    BGM 76 10/20/2020    HCG Negative 2020    HCGS Negative 10/08/2013     OTHER:   Lab Results   Component Value Date    LACT 1.0 2017    A1C 5.4 2020    SCOT 8.2 (L) 2020    PHOS 3.0 2017    MAG 1.9 2017    ALBUMIN 3.3 (L) 2017    PROTTOTAL 6.9 2017    ALT 19 2017    AST 21 2017    ALKPHOS 118 2017    BILITOTAL 0.4 2017    LIPASE 130 2020    AMYLASE 92 2020    TSH 1.79 2017    CRP 97.0 (H) 2017    SED 18 2017        Preop Vitals    BP Readings from Last 3 Encounters:   10/20/20 133/85   20 132/83   19 (!) 82/50    Pulse Readings from Last 3 Encounters:   10/20/20 51   20 59   19 66      Resp Readings from Last 3 Encounters:   10/20/20 18   20 14   19 16    SpO2 Readings from Last 3 Encounters:   10/20/20 99%  "  09/29/20 96%   12/04/19 95%      Temp Readings from Last 1 Encounters:   10/20/20 35.9  C (96.6  F) (Oral)    Ht Readings from Last 1 Encounters:   10/20/20 1.537 m (5' 0.5\")      Wt Readings from Last 1 Encounters:   10/20/20 49.9 kg (110 lb)    Estimated body mass index is 21.13 kg/m  as calculated from the following:    Height as of this encounter: 1.537 m (5' 0.5\").    Weight as of this encounter: 49.9 kg (110 lb).     LDA:  Peripheral IV 10/20/20 Right Upper arm (Active)   Site Assessment WDL 10/20/20 1246   Line Status Saline locked 10/20/20 1246   Dressing Intervention New dressing  10/20/20 1246   Phlebitis Scale 0-->no symptoms 10/20/20 1246   Infiltration Scale 0 10/20/20 1246   Infiltration Site Treatment Method  None 10/20/20 1246   Number of days: 0       Peripheral IV 02/14/18 Left Hand (Active)   Site Assessment WDL 02/14/18 1249   Line Status Infusing 02/14/18 1249   Phlebitis Scale 0-->no symptoms 02/14/18 1249   Dressing Intervention New dressing  02/14/18 1249   Number of days: 979       Airway - Adult/Peds laryngeal mask airway (Active)   Number of days: 979        Past Medical History:   Diagnosis Date     Anemia in chronic renal disease      Charcot foot due to diabetes mellitus (H) Dec 2006    left     Diabetes mellitus type 1 (H) 1988    12yr old     Diabetic retinopathy      Dyslipidemia      ESRD on hemodialysis (H)     Feb 2006 to 2007     History of acute pyelonephritis     2003     History of blood transfusion      Hypothyroidism     on and off synthroid     Immunosuppressed status (H)      Kidney replaced by transplant 2007     Neurogenic orthostatic hypotension (H)      Nonsenile cataract      Osteoporosis      Pulmonary histoplasmosis (H)      Secondary renal hyperparathyroidism (H)       Past Surgical History:   Procedure Laterality Date     BENCH KIDNEY N/A 8/5/2016    Procedure: BENCH KIDNEY;  Surgeon: Gilson Doe MD;  Location:  OR     BENCH PANCREAS N/A 8/5/2016    " Procedure: BENCH PANCREAS;  Surgeon: Gilson Doe MD;  Location: [facility] OR     C ANESTH,OPEN HEART SURGERY+PUMP  2006    for thrombectomy only - catheter related     C CARDIAC SURG PROCEDURE UNLIST       C STOMACH SURGERY PROCEDURE UNLISTED       CATARACT IOL, RT/LT Left 2020      SECTION      2006     CYSTOSCOPY, REMOVE STENT(S), COMBINED Left 2016    Procedure: COMBINED CYSTOSCOPY, REMOVE STENT(S);  Surgeon: Gilson Doe MD;  Location: [facility] OR     GYN SURGERY  2006    cesarian     INT NEPHROSTOMY PERCUT RIGHT*       IR RENAL BIOPSY RIGHT  2019     native nephrectomy  2007    simult with tx surgery.  ? Right     PANRETINAL PHOTOCOAGULATION (PRP) OD (RIGHT EYE)       PANRETINAL PHOTOCOAGULATION (PRP) OS (LEFT EYE)       PHACOEMULSIFICATION CLEAR CORNEA WITH STANDARD INTRAOCULAR LENS IMPLANT Left 2020    Procedure: PHACOEMULSIFICATION, CATARACT, WITH INTRAOCULAR LENS IMPLANT;  Surgeon: Willy Thurston MD;  Location: [facility] OR     SUPERFICIAL KERATECTOMY Left 2020    Procedure: SUPERFICIAL KERATECTOMY with AMNIOTIC MEMBRANE TRANSPLANT;  Surgeon: Willy Thurston MD;  Location: [facility] OR     TRANSPLANT KIDNEY RECIPIENT LIVING RELATED  2007    sister     TRANSPLANT PANCREAS, KIDNEY  DONOR, COMBINED N/A 2016    Procedure: COMBINED TRANSPLANT PANCREAS, KIDNEY  DONOR;  Surgeon: Gilson Doe MD;  Location: [facility] OR      Allergies   Allergen Reactions     Amoxicillin-Pot Clavulanate Diarrhea     Augmentin Diarrhea and Other (See Comments)     Diarrhea  Took augmentin 2017 with loperamide       Ciprofloxacin Nausea, GI Disturbance and Other (See Comments)     Nausea         Pollen Extract Other (See Comments)     Seasonal allergies  Runny Nose  Seasonal allergies  Seasonal allergies       Pyridostigmine Other (See Comments), Swelling and Muscle Pain (Myalgia)     Spastic muscle motion in the face and legs, weakness in the arms. Slight swelling of the  tongue.  Edema  Spastic muscle motion in the face and legs, weakness in the arms. Slight swelling of the tongue.  Spastic muscle motion in the face and legs, weakness in the arms. Slight swelling of the tongue.          Anesthesia Evaluation     .             ROS/MED HX    ENT/Pulmonary: Comment: Pulmonary histoplasmosis      Neurologic:       Cardiovascular:     (+) hypertension----. : . . . :. .       METS/Exercise Tolerance:     Hematologic:         Musculoskeletal:         GI/Hepatic: Comment: Pancreas transplant    (+) GERD       Renal/Genitourinary:     (+) chronic renal disease, Pt has history of transplant,       Endo:     (+) type I DM, thyroid problem .      Psychiatric:         Infectious Disease: Comment: CMV, EBV        Malignancy:         Other:                     JZG FV AN PHYSICAL EXAM    Assessment:   ASA SCORE: 3    H&P: History and physical reviewed and following examination; no interval change.         Plan:   Anes. Type:  MAC   Pre-Medication: None   Induction:  N/a   Airway: Native Airway   Access/Monitoring: PIV   Maintenance: N/a     Postop Plan:   Postop Pain: None  Postop Sedation/Airway: Not planned  Disposition: Outpatient     PONV Management: Adult Risk Factors: Female   Prevention: Ondansetron     CONSENT: Direct conversation   Plan and risks discussed with: Patient   Blood Products: Consent Deferred (Minimal Blood Loss)                   aVnessa Bernrad MD

## 2020-10-21 ENCOUNTER — OFFICE VISIT (OUTPATIENT)
Dept: OPHTHALMOLOGY | Facility: CLINIC | Age: 45
End: 2020-10-21
Attending: OPHTHALMOLOGY
Payer: COMMERCIAL

## 2020-10-21 DIAGNOSIS — Z96.1 PSEUDOPHAKIA: Primary | ICD-10-CM

## 2020-10-21 PROCEDURE — 99024 POSTOP FOLLOW-UP VISIT: CPT | Mod: GC | Performed by: OPHTHALMOLOGY

## 2020-10-21 PROCEDURE — G0463 HOSPITAL OUTPT CLINIC VISIT: HCPCS

## 2020-10-21 ASSESSMENT — VISUAL ACUITY
METHOD: SNELLEN - LINEAR
OD_SC: 20/60
OD_SC+: -2
OS_SC: 20/30

## 2020-10-21 ASSESSMENT — EXTERNAL EXAM - RIGHT EYE: OD_EXAM: NORMAL

## 2020-10-21 ASSESSMENT — TONOMETRY
OS_IOP_MMHG: 11
IOP_METHOD: ICARE
OD_IOP_MMHG: 14

## 2020-10-21 ASSESSMENT — SLIT LAMP EXAM - LIDS: COMMENTS: NORMAL

## 2020-10-21 NOTE — NURSING NOTE
Chief Complaints and History of Present Illnesses   Patient presents with     Post Op (Ophthalmology) Right Eye     S/p Cataract extraction by phacoemulsification insertion of intraocular toric lens, right eye 10/20/2020     Chief Complaint(s) and History of Present Illness(es)     Post Op (Ophthalmology) Right Eye     Laterality: right eye    Associated symptoms: Negative for eye pain, tearing, redness and headache    Treatments tried: eye drops    Pain scale: 0/10    Comments: S/p Cataract extraction by phacoemulsification insertion of intraocular toric lens, right eye 10/20/2020              Comments     She states that her right eye is comfortable this morning.  Her distance vision seems improved with the procedure.    Muriel Shah, COT 10:33 AM  October 21, 2020

## 2020-10-21 NOTE — PROGRESS NOTES
CC:  Here for cataract evaluation    HPI:  43 y/o F wit h/o DM1 w/ PDR and DME each eye (mild)  s/p pancreas transplant, NSC each eye, PSC left eye, BARBARA referred by Dr. Zuniga for cataract evaluation. Patient is on mycophenolate, and envartise for her pancreatic transplant.    Interval Hx:   S/p CEIOL OD. Doing well. Slept well. No eye pain or irritation. No new flashes, floaters, diplopia.      POHx:  DM1 w/ PDR each eye, NSC each eye, PSC left eye     Last eye exam: 8/2020 Kristin  Prior eye surgery/laser: PRP each eye, LANIE x1 right eye in 2007  CTL wearer: yes, [place] RGP x 30 years  Glasses: no    Fam hx of eye disease: denies    Gtts:     Prednisolone QID OD, 2/1 weekly taper OS  Ketorolac QID OD, 2/1 weekly taper OS  Polytrim QID OD    All:  Augmentin, ciprofloxacin (GI disturbance)     A/P:  # s/p toric CEIOL OD 10/20/2020  - Doing well  - VA 20/60  - IOP appropriate   - Start Prednisolone QID   - Start Ketorolac QID  - Start Polytrim QID     # s/p toric CEIOL, superficial keratectomy, and AMT OS  - continue to taper prednisolone BID x 1 week, then daily x 1week, then STOP  - continue to taper ketorolac BID x 1 week, then daily x 1week, then STOP    #h/o PDR with DME  - 8/2020 OCT with atrophy, mild DME  - s/p PRP each eye  - following with Dr. Foster     Follow up: 10/28/20 as scheduled for POW#1     Mikel Danielson MD  Ophthalmology PGY-3  [facility]     Attending Physician Attestation:  Complete documentation of historical and exam elements from today's encounter can be found in the full encounter summary report (not reduplicated in this progress note).  I personally obtained the chief complaint(s) and history of present illness.  I confirmed and edited as necessary the review of systems, past medical/surgical history, family history, social history, and examination findings as documented by others; and I examined the patient myself.  I personally reviewed the relevant tests, images, and reports  as documented above.  I formulated and edited as necessary the assessment and plan and discussed the findings and management plan with the patient and family. - Willy Thurston MD

## 2020-10-21 NOTE — PATIENT INSTRUCTIONS
- Continue Prednisolone 4x/day in the RIGHT eye   - Continue Ketorolac 4x/day in the RIGHT eye   - Continue Polytrim 4x/day in the RIGHT eye     - No submerging head under water; no swimming.  - No strenuous activity x1 week.  - Wear eye shield at night.   - Avoid rubbing or touching the eyes

## 2020-10-28 ENCOUNTER — OFFICE VISIT (OUTPATIENT)
Dept: OPHTHALMOLOGY | Facility: CLINIC | Age: 45
End: 2020-10-28
Attending: OPHTHALMOLOGY
Payer: COMMERCIAL

## 2020-10-28 DIAGNOSIS — Z96.1 PSEUDOPHAKIA: ICD-10-CM

## 2020-10-28 DIAGNOSIS — Z98.890 POSTOPERATIVE EYE STATE: Primary | ICD-10-CM

## 2020-10-28 PROCEDURE — G0463 HOSPITAL OUTPT CLINIC VISIT: HCPCS

## 2020-10-28 PROCEDURE — 99024 POSTOP FOLLOW-UP VISIT: CPT | Performed by: OPHTHALMOLOGY

## 2020-10-28 RX ORDER — PREDNISOLONE ACETATE 10 MG/ML
1-2 SUSPENSION/ DROPS OPHTHALMIC 4 TIMES DAILY
Qty: 10 ML | Refills: 1 | Status: SHIPPED | OUTPATIENT
Start: 2020-10-28 | End: 2021-02-16

## 2020-10-28 RX ORDER — KETOROLAC TROMETHAMINE 5 MG/ML
1 SOLUTION OPHTHALMIC 4 TIMES DAILY
Qty: 10 ML | Refills: 1 | Status: SHIPPED | OUTPATIENT
Start: 2020-10-28 | End: 2021-02-16

## 2020-10-28 ASSESSMENT — TONOMETRY
IOP_METHOD: ICARE
OS_IOP_MMHG: 13
OD_IOP_MMHG: 10

## 2020-10-28 ASSESSMENT — CONF VISUAL FIELD
OD_INFERIOR_TEMPORAL_RESTRICTION: 3
OD_SUPERIOR_NASAL_RESTRICTION: 1
OS_SUPERIOR_TEMPORAL_RESTRICTION: 1
OS_INFERIOR_TEMPORAL_RESTRICTION: 3
OD_SUPERIOR_TEMPORAL_RESTRICTION: 1
OS_INFERIOR_NASAL_RESTRICTION: 3
OS_SUPERIOR_NASAL_RESTRICTION: 1
OD_INFERIOR_NASAL_RESTRICTION: 3

## 2020-10-28 ASSESSMENT — VISUAL ACUITY
OS_SC+: -1
OD_SC+: -2
OS_SC: 20/30
OD_SC: 20/50 ECC
METHOD: SNELLEN - LINEAR

## 2020-10-28 ASSESSMENT — EXTERNAL EXAM - RIGHT EYE: OD_EXAM: NORMAL

## 2020-10-28 ASSESSMENT — SLIT LAMP EXAM - LIDS
COMMENTS: NORMAL
COMMENTS: NORMAL

## 2020-10-28 NOTE — PROGRESS NOTES
CC:  Here for cataract evaluation    HPI:  43 y/o F wit h/o DM1 w/ PDR and DME each eye (mild)  s/p pancreas transplant, NSC each eye, PSC left eye, BARBARA referred by Dr. Zuniga for cataract evaluation. Patient is on mycophenolate, and envartise for her pancreatic transplant.    Interval Hx:   S/p CEIOL OD. Doing well. Slept well. No eye pain or irritation. No new flashes, floaters, diplopia.      POHx:  DM1 w/ PDR each eye, NSC each eye, PSC left eye   PRP each eye, LANIE x1 right eye in 2007  S/p CE/IOL right toric 10/20/20  S/p CE/IOL left eye toric 9/29/2020  [place] RGP x 30 years    Fam hx of eye disease: denies    Gtts:     Prednisolone QID OD, 2/1 weekly taper OS  Ketorolac QID OD, 2/1 weekly taper OS  Polytrim QID OD    All:  Augmentin, ciprofloxacin (GI disturbance)     A/P:  # s/p toric CEIOL OD 10/20/2020  - Doing well  - VA 20/60  - IOP appropriate   - Cont Prednisolone TID for 1wk, BID for 1wk, Day until seen next   - Cont Ketorolac TID for 1wk, BID for 1 week, daily untl seen next  - STOP Polytrim    # s/p toric CEIOL, superficial keratectomy, and AMT OS  - All gtts completed as of 10/28/2020    #h/o PDR with DME  - 8/2020 OCT with atrophy, mild DME  - s/p PRP each eye  - following with Dr. Kristin juárez within next 2 months    Follow up: as scheduled for POW#4 w/ MAC OCT BE    Attending Physician Attestation:  Complete documentation of historical and exam elements from today's encounter can be found in the full encounter summary report (not reduplicated in this progress note).  I personally obtained the chief complaint(s) and history of present illness.  I confirmed and edited as necessary the review of systems, past medical/surgical history, family history, social history, and examination findings as documented by others; and I examined the patient myself.  I personally reviewed the relevant tests, images, and reports as documented above.  I formulated and edited as necessary the assessment and plan and  discussed the findings and management plan with the patient and family. - Willy Thurston MD

## 2020-10-28 NOTE — NURSING NOTE
Chief Complaints and History of Present Illnesses   Patient presents with     Post Op (Ophthalmology) Right Eye     Chief Complaint(s) and History of Present Illness(es)     Post Op (Ophthalmology) Right Eye     Laterality: right eye    Associated symptoms: dryness (sometimes).  Negative for eye pain    Pain scale: 0/10              Comments     Renetta is here 1 week post cataract surgery with IOL implant RE. She is also 1 month Post SK, and cataract surgery with IOL implant LE.  She says RE feels better and she feels she sees a little better, but still sees the hole in the canter of her vision RE. LE seems okay    Mauricio Deleon COT 10:10 AM October 28, 2020

## 2020-10-28 NOTE — PATIENT INSTRUCTIONS
- Cont Prednisolone (PINK/WHITE CAP) Three for 1wk, Twice for 1wk, Daily until seen next     - Cont Ketorolac (GREY CAP) Three for 1wk, Twice for 1 week, Daily until seen next    - STOP Polytrim (WHITE/TAN CAP) small thin bottle    Dr Foster in 12/2020 for RTN F/u    Dr Thurston on 11/23/2020

## 2020-10-30 DIAGNOSIS — Z94.0 IMMUNOSUPPRESSIVE MANAGEMENT ENCOUNTER FOLLOWING KIDNEY TRANSPLANT: ICD-10-CM

## 2020-10-30 DIAGNOSIS — Z94.83 PANCREAS REPLACED BY TRANSPLANT (H): ICD-10-CM

## 2020-10-30 DIAGNOSIS — Z94.83 PANCREAS TRANSPLANTED (H): ICD-10-CM

## 2020-10-30 DIAGNOSIS — Z79.899 IMMUNOSUPPRESSIVE MANAGEMENT ENCOUNTER FOLLOWING KIDNEY TRANSPLANT: ICD-10-CM

## 2020-10-30 DIAGNOSIS — Z94.0 KIDNEY REPLACED BY TRANSPLANT: Primary | ICD-10-CM

## 2020-10-30 RX ORDER — SULFAMETHOXAZOLE AND TRIMETHOPRIM 400; 80 MG/1; MG/1
1 TABLET ORAL DAILY
Qty: 90 TABLET | Refills: 0 | Status: SHIPPED | OUTPATIENT
Start: 2020-10-30 | End: 2020-12-22

## 2020-11-12 ENCOUNTER — TELEPHONE (OUTPATIENT)
Dept: OPHTHALMOLOGY | Facility: CLINIC | Age: 45
End: 2020-11-12

## 2020-11-12 NOTE — TELEPHONE ENCOUNTER
Spoke to pt at 1450  S/p cataract surgery-- right last done 10-    Past week-- light blindness, when goes outside, unable to see.  Effecting both eyes  Wearing sunglasses helps on a bit    No pain from light/no photophobia    Past 2-3 days having burning-- feel dry.  systane helping with dryness and burning   Redness yesterday which has improved today with use of drops    Still having the light blindness when goes outside.    Vision good inside of e.g. house and not problems    Reviewed with pt to start preservative free artificial tears every 1-2 hours during day and may use lubricating eye ointment at night.    Reviewed if prescribed restasis, ok to restart, but not benefit as been off for 2 months and can take several weeks--sometimes 6-8 weeks to have effect    Reviewed if not improving by next week to call clinic.  Reviewed with pt if has any worsening symptoms and/or worsening vision to contact clinic    Reviewed would forward to Dr. Danielson for review and contact pt if would like to amend plan    Pt verbally demonstrated understanding    Gurinder Mojica RN 3:10 PM 11/12/20              [facility]    Phone Message    May a detailed message be left on voicemail: yes     Reason for Call: Other:    Pt had surgery on 10/20 with ALLY.     Pt's vision is getting worst. Pt is unsure if it is a 'white blindiness'. The vision seems off. Pt unsure if it's dry eyes or not? Pt has tried sustain preservative free drops. Should pt try restasis rx.     Please call pt back to advise.     Action Taken: Other:  eye     Travel Screening: Not Applicable

## 2020-11-18 DIAGNOSIS — E10.3513 TYPE 1 DIABETES MELLITUS WITH PROLIFERATIVE RETINOPATHY OF BOTH EYES AND MACULAR EDEMA (H): Primary | ICD-10-CM

## 2020-11-23 ENCOUNTER — OFFICE VISIT (OUTPATIENT)
Dept: OPHTHALMOLOGY | Facility: CLINIC | Age: 45
End: 2020-11-23
Attending: OPHTHALMOLOGY
Payer: COMMERCIAL

## 2020-11-23 DIAGNOSIS — H04.123 BILATERAL DRY EYES: ICD-10-CM

## 2020-11-23 DIAGNOSIS — H17.9 CORNEAL SCAR, LEFT EYE: ICD-10-CM

## 2020-11-23 DIAGNOSIS — Z98.890 POSTOPERATIVE EYE STATE: ICD-10-CM

## 2020-11-23 DIAGNOSIS — Z96.1 PSEUDOPHAKIA: ICD-10-CM

## 2020-11-23 DIAGNOSIS — Z98.890 POSTOPERATIVE EYE STATE: Primary | ICD-10-CM

## 2020-11-23 DIAGNOSIS — E10.3513 TYPE 1 DIABETES MELLITUS WITH PROLIFERATIVE RETINOPATHY OF BOTH EYES AND MACULAR EDEMA (H): ICD-10-CM

## 2020-11-23 PROCEDURE — G0463 HOSPITAL OUTPT CLINIC VISIT: HCPCS

## 2020-11-23 PROCEDURE — 66821 AFTER CATARACT LASER SURGERY: CPT | Mod: LT | Performed by: OPHTHALMOLOGY

## 2020-11-23 PROCEDURE — 99213 OFFICE O/P EST LOW 20 MIN: CPT | Mod: 24 | Performed by: OPHTHALMOLOGY

## 2020-11-23 PROCEDURE — 99024 POSTOP FOLLOW-UP VISIT: CPT | Performed by: OPHTHALMOLOGY

## 2020-11-23 PROCEDURE — 92134 CPTRZ OPH DX IMG PST SGM RTA: CPT | Performed by: OPHTHALMOLOGY

## 2020-11-23 PROCEDURE — 999N000103 HC STATISTIC NO CHARGE FACILITY FEE

## 2020-11-23 ASSESSMENT — TONOMETRY
OS_IOP_MMHG: 10
OD_IOP_MMHG: 13
OD_IOP_MMHG: 13
IOP_METHOD: TONOPEN
IOP_METHOD: TONOPEN
OS_IOP_MMHG: 10

## 2020-11-23 ASSESSMENT — EXTERNAL EXAM - RIGHT EYE
OD_EXAM: NORMAL
OD_EXAM: NORMAL

## 2020-11-23 ASSESSMENT — CONF VISUAL FIELD
OD_SUPERIOR_NASAL_RESTRICTION: 1
OS_INFERIOR_NASAL_RESTRICTION: 1
OD_SUPERIOR_TEMPORAL_RESTRICTION: 3
OS_SUPERIOR_NASAL_RESTRICTION: 1
OD_SUPERIOR_NASAL_RESTRICTION: 1
OD_INFERIOR_TEMPORAL_RESTRICTION: 1
OD_INFERIOR_NASAL_RESTRICTION: 1
OS_INFERIOR_NASAL_RESTRICTION: 1
OS_SUPERIOR_NASAL_RESTRICTION: 1
OS_SUPERIOR_TEMPORAL_RESTRICTION: 3
OD_INFERIOR_TEMPORAL_RESTRICTION: 1
OS_INFERIOR_TEMPORAL_RESTRICTION: 1
OD_INFERIOR_NASAL_RESTRICTION: 1
METHOD: COUNTING FINGERS
OS_INFERIOR_TEMPORAL_RESTRICTION: 1
OS_SUPERIOR_TEMPORAL_RESTRICTION: 3
OD_SUPERIOR_TEMPORAL_RESTRICTION: 3

## 2020-11-23 ASSESSMENT — VISUAL ACUITY
METHOD: SNELLEN - LINEAR
METHOD: SNELLEN - LINEAR
OS_SC+: -2
OD_SC+: -1
OS_SC: 20/30 SLOW
OS_SC+: -2
OD_SC: 20/50 SEARCHING
OD_SC: 20/50 SEARCHING
OD_SC+: -1
OS_SC: 20/30 SLOW

## 2020-11-23 ASSESSMENT — CUP TO DISC RATIO
OD_RATIO: 0.4
OS_RATIO: 0.4

## 2020-11-23 ASSESSMENT — SLIT LAMP EXAM - LIDS
COMMENTS: NORMAL

## 2020-11-23 ASSESSMENT — EXTERNAL EXAM - LEFT EYE: OS_EXAM: NORMAL

## 2020-11-23 NOTE — PROGRESS NOTES
CC:  Here for cataract evaluation    HPI:  45 y/o F wit h/o DM1 w/ PDR and DME each eye (mild)  s/p pancreas transplant, NSC each eye, PSC left eye, BARBARA referred by Dr. Zuniga for cataract evaluation. Patient is on mycophenolate, and envartise for her pancreatic transplant.    Interval Hx:   S/p CEIOL OD. Doing well. Slept well. No eye pain or irritation. No new flashes, floaters, diplopia.      POHx:  DM1 w/ PDR each eye, NSC each eye, PSC left eye   PRP each eye, LANIE x1 right eye in 2007  S/p CE/IOL right toric 10/20/20  S/p CE/IOL left eye toric 9/29/2020  [place] RGP x 30 years    Fam hx of eye disease: denies    Gtts:     Prednisolone QID OD, 2/1 weekly taper OS  Ketorolac QID OD, 2/1 weekly taper OS  Polytrim QID OD    All:  Augmentin, ciprofloxacin (GI disturbance)     A/P:  # s/p toric CEIOL OD 10/20/2020  - Doing well  - VA 20/60  - IOP appropriate   - Cont Prednisolone TID for 1wk, BID for 1wk, Day until seen next   - Cont Ketorolac TID for 1wk, BID for 1 week, daily untl seen next  - STOP Polytrim    # s/p toric CEIOL, superficial keratectomy, and AMT OS  - All gtts completed as of 10/28/2020    #h/o PDR with DME  - 8/2020 OCT with atrophy, mild DME  - s/p PRP each eye  - following with Dr. Kristin juárez within next 2 months    Follow up: as scheduled for POW#4 w/ MAC OCT BE    Attending Physician Attestation:  Complete documentation of historical and exam elements from today's encounter can be found in the full encounter summary report (not reduplicated in this progress note).  I personally obtained the chief complaint(s) and history of present illness.  I confirmed and edited as necessary the review of systems, past medical/surgical history, family history, social history, and examination findings as documented by others; and I examined the patient myself.  I personally reviewed the relevant tests, images, and reports as documented above.  I formulated and edited as necessary the assessment and plan and  discussed the findings and management plan with the patient and family. - Willy Thurston MD

## 2020-11-23 NOTE — PROGRESS NOTES
"CC -  PDR    INTERVAL HISTORY -  Had CE/IOL, vision improved afterwards initially, now feels \"white blinded\" by light, gets too much light, glare  VA was better initially      HPI -   Rose Castaneda is a  44 year old year-old patient referred BY Dr Yancey from [facility] for evlauation and treat  of PDR. Previously seen by me 4/2015 @ [facility]. subseqeuntly seen by Dr. Yancey @ [facility] and referred to me for further evaluation.  History of pancreatic transplant, kidney transplant x2, ~ 2017  VA @ [facility] ([id number]) was 20/40 - 20/50 & 20/30 - 20/40       PAST OCULAR SURGERY  PRP OU 2005 in [place]  CE/IOL 2020        RETINAL IMAGING:  OCT 11-23-20  OD - extensive outer atrophy and thinning, mild ERM  OS - extensive outer atrophy from FML scars, mild diffuse thinning, mild ERM          ASSESSMENT & PLAN    # DM I with PDR and DME   - now s/p pancreas transplant   - quiescent   - dense laser scars & outer atrophy diffuse   - likely causing loss of acuity & contrast    - has focal VRT to ONH on OCT but no traction on exam       # Mild DME, OS > OD   - had AVastin x 1 OD in 2007   - Unlikely to be visually significant today      # PCO OU   - likely causing patients symptoms and glare   - will see Karena today        F/u  1 year, OCT OU, DFE OU         ATTESTATION     Attending Attestation:     Complete documentation of historical and exam elements from today's encounter can be found in the full encounter summary report (not reduplicated in this progress note).  I personally obtained the chief complaint(s) and history of present illness.  I confirmed and edited as necessary the review of systems, past medical/surgical history, family history, social history, and examination findings as documented by others; and I examined the patient myself.  I personally reviewed the relevant tests, images, and reports as documented above.  I formulated and edited as necessary the assessment and plan and discussed the findings and management plan " with the patient and family    Jaja Zuniga MD, PhD  , Vitreoretinal Surgery  Department of Ophthalmology  [facility]

## 2020-11-23 NOTE — NURSING NOTE
Chief Complaint(s) and History of Present Illness(es)     Post Op (Ophthalmology) Both Eyes     In both eyes.  Associated symptoms include dryness.  Negative for eye pain, redness and tearing.  Pain was noted as 0/10.              Comments     Pt is here for a f/u s/p toric CEIOL OD 10/20/2020, and s/p toric CEIOL, superficial keratectomy, and AMT left eye (10/28/20). Pt is noting she is very light sensitive since her cataract surgeries of BE. Pt feels like vision is bleached out with all the bright lights. Pt is still having a hard time with some distortion when reading in the evening.     Ocular meds:  Systane PF PRN BE    ELIZABETH Oliveira 10:05 AM November 23, 2020

## 2020-11-23 NOTE — NURSING NOTE
Chief Complaint(s) and History of Present Illness(es)     Follow Up     In both eyes.  Associated symptoms include dryness.  Negative for eye pain, redness and tearing.  Treatments tried include artificial tears.  Pain was noted as 0/10.              Comments     F/u for h/o PDR with DME. Pt is noting she is very light sensitive since her cataract surgeries of BE. Pt feels like vision is bleached out with all the bright lights. Pt is still having a hard time with some distortion when reading in the evening.     Ocular meds:  Systane PF PRN BE    ELIZABETH Oliveira 10:05 AM November 23, 2020

## 2020-12-02 ENCOUNTER — OFFICE VISIT (OUTPATIENT)
Dept: OPHTHALMOLOGY | Facility: CLINIC | Age: 45
End: 2020-12-02
Attending: OPHTHALMOLOGY
Payer: COMMERCIAL

## 2020-12-02 DIAGNOSIS — H04.123 BILATERAL DRY EYES: Primary | ICD-10-CM

## 2020-12-02 DIAGNOSIS — H26.491 PCO (POSTERIOR CAPSULAR OPACIFICATION), RIGHT: ICD-10-CM

## 2020-12-02 DIAGNOSIS — Z96.1 PSEUDOPHAKIA: ICD-10-CM

## 2020-12-02 DIAGNOSIS — H53.143 PHOTOPHOBIA OF BOTH EYES: ICD-10-CM

## 2020-12-02 PROCEDURE — 99024 POSTOP FOLLOW-UP VISIT: CPT | Mod: 57 | Performed by: OPHTHALMOLOGY

## 2020-12-02 PROCEDURE — G0463 HOSPITAL OUTPT CLINIC VISIT: HCPCS

## 2020-12-02 PROCEDURE — 66821 AFTER CATARACT LASER SURGERY: CPT | Mod: RT | Performed by: OPHTHALMOLOGY

## 2020-12-02 RX ORDER — CYCLOSPORINE 0.5 MG/ML
1 EMULSION OPHTHALMIC 2 TIMES DAILY
Qty: 60 EACH | Refills: 11 | Status: SHIPPED | OUTPATIENT
Start: 2020-12-02 | End: 2021-02-16

## 2020-12-02 ASSESSMENT — TONOMETRY
OD_IOP_MMHG: 17
IOP_METHOD: TONOPEN
OS_IOP_MMHG: 18

## 2020-12-02 ASSESSMENT — CONF VISUAL FIELD
OD_INFERIOR_NASAL_RESTRICTION: 1
OS_SUPERIOR_NASAL_RESTRICTION: 1
OS_INFERIOR_NASAL_RESTRICTION: 1
OD_INFERIOR_TEMPORAL_RESTRICTION: 1
OS_SUPERIOR_TEMPORAL_RESTRICTION: 3
OD_SUPERIOR_TEMPORAL_RESTRICTION: 3
OS_INFERIOR_TEMPORAL_RESTRICTION: 1
OD_SUPERIOR_NASAL_RESTRICTION: 1

## 2020-12-02 ASSESSMENT — VISUAL ACUITY
OS_SC: 20/30
OD_SC: 20/60
METHOD: SNELLEN - LINEAR
OS_SC+: +2

## 2020-12-02 ASSESSMENT — EXTERNAL EXAM - LEFT EYE: OS_EXAM: NORMAL

## 2020-12-02 ASSESSMENT — EXTERNAL EXAM - RIGHT EYE: OD_EXAM: NORMAL

## 2020-12-02 ASSESSMENT — SLIT LAMP EXAM - LIDS
COMMENTS: NORMAL
COMMENTS: NORMAL

## 2020-12-02 ASSESSMENT — CUP TO DISC RATIO
OS_RATIO: 0.4
OD_RATIO: 0.4

## 2020-12-02 NOTE — NURSING NOTE
Chief Complaints and History of Present Illnesses   Patient presents with     Post Op (Ophthalmology) Left Eye     Chief Complaint(s) and History of Present Illness(es)     Post Op (Ophthalmology) Left Eye     Associated symptoms: Negative for floaters and flashes    Pain scale: 0/10              Comments     POP CE/IOL  Right eye on 10/20/2020 and left eye on 09/29/2020.  Follow up post YAG on left eye (11/23/2020)  Possible YAG today right eye.    The patient is finished with the surgical drops.  The patient notes increased vision in the left eye.  Brenda Bone, COA, COA 1:03 PM 12/02/2020

## 2020-12-02 NOTE — PROGRESS NOTES
CC:  Here for cataract evaluation    HPI:  45 y/o F wit h/o DM1 w/ PDR and DME each eye (mild)  s/p pancreas transplant, NSC each eye, PSC left eye, BARBARA referred by Dr. Zuniga for cataract evaluation. Patient is on mycophenolate, and envartise for her pancreatic transplant.    Interval Hx:   S/p CEIOL OD. Doing well. Having a lot of light sensitivity both eyes.  Outside brightness creating difficulty driving and walking, can function indoors better.  S/p YAG cap left eye 11/23, vision improved.      Denies any pain, new flashes, floaters, diplopia.        POHx:  DM1 w/ PDR each eye, NSC each eye, PSC left eye   PRP each eye, LANIE x1 right eye in 2007  S/p CE/IOL right toric 10/20/20  S/p CE/IOL left eye toric 9/29/2020  [place] RGP x 30 years    Fam hx of eye disease: denies    Gtts:  none    All:  Augmentin, ciprofloxacin (GI disturbance)     A/P:  # s/p toric CEIOL OD 10/20/2020  - Doing well  - VA 20/60  - still with significant light sensitivity creating difficulty with outdoor ADLs.  S/p YAG cap left eye.   - discussed r/b/a of YAG cap right eye, patient amenable and would like to proceed.  Informed consent obtained.    - will trial Peli prism next visit and tinted lenses     # s/p toric CEIOL, superficial keratectomy, and AMT OS  - All gtts completed as of 10/28/2020    # Dry Eye Syndrome each eye:  # MGD  - start preservative free tears 5-6x/day, can use Genteal gel formulation if preferred 4x/day  - Restart Restasis BID each eye   - warm compresses BID, lid hygiene.     # Light Sensitivity each eye:  - significant photophobia  - may be surface vs. Retina vs. S/p phaco lens clarity  - tr cell each eye s/p dilation, would monitor for now.  Unlikely cause.  - will trial Peli prism next visit and tinted lenses.    #h/o PDR with DME  - 8/2020 OCT with atrophy, mild DME  - s/p PRP each eye  - following with Dr. Kristin juárez within next 2 months    Follow up: 1-2 weeks,  DFE s/p YAG cap, Peli prism fitting / trial,  sooner prn Jason Goldberg, MD  Cornea & External Disease Fellow  Department of Ophthalmology and Visual Neurosciences    Attending Physician Attestation:  Complete documentation of historical and exam elements from today's encounter can be found in the full encounter summary report (not reduplicated in this progress note).  I personally obtained the chief complaint(s) and history of present illness.  I confirmed and edited as necessary the review of systems, past medical/surgical history, family history, social history, and examination findings as documented by others; and I examined the patient myself.  I personally reviewed the relevant tests, images, and reports as documented above.  I formulated and edited as necessary the assessment and plan and discussed the findings and management plan with the patient and family. I was present for the key portions of the procedure and immediately available for the remainder. - Willy Thurston MD

## 2020-12-07 ENCOUNTER — TELEPHONE (OUTPATIENT)
Dept: TRANSPLANT | Facility: CLINIC | Age: 45
End: 2020-12-07

## 2020-12-16 ENCOUNTER — ALLIED HEALTH/NURSE VISIT (OUTPATIENT)
Dept: OPHTHALMOLOGY | Facility: CLINIC | Age: 45
End: 2020-12-16
Attending: OPHTHALMOLOGY
Payer: COMMERCIAL

## 2020-12-16 DIAGNOSIS — Z96.1 PSEUDOPHAKIA: ICD-10-CM

## 2020-12-16 DIAGNOSIS — H53.143 PHOTOPHOBIA OF BOTH EYES: ICD-10-CM

## 2020-12-16 DIAGNOSIS — E10.3513 TYPE 1 DIABETES MELLITUS WITH PROLIFERATIVE RETINOPATHY OF BOTH EYES AND MACULAR EDEMA (H): ICD-10-CM

## 2020-12-16 DIAGNOSIS — H04.123 BILATERAL DRY EYES: Primary | ICD-10-CM

## 2020-12-16 DIAGNOSIS — Z86.39: Primary | ICD-10-CM

## 2020-12-16 DIAGNOSIS — H04.123 BILATERAL DRY EYES: ICD-10-CM

## 2020-12-16 PROCEDURE — G0463 HOSPITAL OUTPT CLINIC VISIT: HCPCS

## 2020-12-16 PROCEDURE — 99207 PR NO CHARGE COORDINATED CARE PS: CPT

## 2020-12-16 PROCEDURE — 68761 CLOSE TEAR DUCT OPENING: CPT | Mod: 50 | Performed by: OPHTHALMOLOGY

## 2020-12-16 PROCEDURE — 99024 POSTOP FOLLOW-UP VISIT: CPT | Mod: GC | Performed by: OPHTHALMOLOGY

## 2020-12-16 ASSESSMENT — VISUAL ACUITY
METHOD: SNELLEN - LINEAR
OS_SC: 20/40-/+
OD_SC: 20/60-

## 2020-12-16 ASSESSMENT — CONF VISUAL FIELD
OS_SUPERIOR_TEMPORAL_RESTRICTION: 3
OD_SUPERIOR_TEMPORAL_RESTRICTION: 3
OS_SUPERIOR_NASAL_RESTRICTION: 1
OS_INFERIOR_NASAL_RESTRICTION: 1
OD_SUPERIOR_NASAL_RESTRICTION: 1
OD_INFERIOR_NASAL_RESTRICTION: 1
OD_INFERIOR_TEMPORAL_RESTRICTION: 1
OS_INFERIOR_TEMPORAL_RESTRICTION: 1

## 2020-12-16 ASSESSMENT — EXTERNAL EXAM - RIGHT EYE: OD_EXAM: NORMAL

## 2020-12-16 ASSESSMENT — TONOMETRY
OS_IOP_MMHG: 12
OD_IOP_MMHG: 13
IOP_METHOD: ICARE

## 2020-12-16 ASSESSMENT — SLIT LAMP EXAM - LIDS
COMMENTS: NORMAL
COMMENTS: NORMAL

## 2020-12-16 ASSESSMENT — CUP TO DISC RATIO
OS_RATIO: 0.4
OD_RATIO: 0.4

## 2020-12-16 ASSESSMENT — EXTERNAL EXAM - LEFT EYE: OS_EXAM: NORMAL

## 2020-12-16 NOTE — PATIENT INSTRUCTIONS
Increase preservative free tears to 4-6x/day, Genteal OK  Warm compresses 2x/day  Continue restasis 2x/day

## 2020-12-16 NOTE — PROGRESS NOTES
Tinted lens assessment and peli prism trial      Tech Visit for glasses. Separate note for physician visit.    Dr. Thurston

## 2020-12-16 NOTE — PROGRESS NOTES
CC:  Here for cataract evaluation    HPI:  43 y/o F wit h/o DM1 w/ PDR and DME each eye (mild)  s/p pancreas transplant, NSC each eye, PSC left eye, BARBARA referred by Dr. Zuniga for cataract evaluation. Patient is on mycophenolate, and envartise for her pancreatic transplant.    Interval Hx:   S/p CEIOL right eye and YAG cap right eye last visit, left eye 11/23.  Doing OK, no significant changes in vision.  Does not think YAG helped vision or light sensitivity. Doing OK and stable. Having a lot of light sensitivity both eyes.  Outside brightness creating difficulty driving and walking, can function indoors better.    Also feels like eyes cannot focus with each other well.   Feels like image changes as she blinks.      Denies any pain, new flashes, floaters, diplopia.        POHx:  DM1 w/ PDR each eye, NSC each eye, PSC left eye   PRP each eye, LANIE x1 right eye in 2007  S/p CE/IOL right toric 10/20/20  S/p CE/IOL left eye toric 9/29/2020  [place] RGP x 30 years    Fam hx of eye disease: denies    Gtts:  PFATs 5-6x/day (using as needed)  Restasis BID each eye     All:  Augmentin, ciprofloxacin (GI disturbance)     A/P:  # s/p toric CEIOL OU OD 10/20/2020, superficial keratectomy, and AMT OS  - Doing well  - VA 20/60  - still with significant light sensitivity creating difficulty with outdoor ADLs.  S/p YAG cap left eye.   - discussed r/b/a of YAG cap right eye, patient amenable and would like to proceed.  Informed consent obtained.    - Peli lenses did not seem to help much.    # Dry Eye Syndrome each eye:  # MGD  - start preservative free tears 5-6x/day, can use Genteal gel formulation if preferred 4x/day  - Restart Restasis BID each eye   - warm compresses BID, lid hygiene.   - punctal plugs BLL today 12/16/20 for winter.   - If symptoms improved, recommend punctal cautery.    # Light Sensitivity each eye:  - significant photophobia  - may be surface vs. Retina vs. S/p phaco lens clarity  - will trial tinted  lenses.  - likely retina related    #h/o PDR with DME  - 8/2020 OCT with atrophy, mild DME  - s/p PRP each eye  - following with Dr. Foster see within next 2 months    Follow up: 3-4 months, sooner prn Jason Goldberg, MD  Cornea & External Disease Fellow  Department of Ophthalmology and Visual Neurosciences    Attending Physician Attestation:  Complete documentation of historical and exam elements from today's encounter can be found in the full encounter summary report (not reduplicated in this progress note).  I personally obtained the chief complaint(s) and history of present illness.  I confirmed and edited as necessary the review of systems, past medical/surgical history, family history, social history, and examination findings as documented by others; and I examined the patient myself.  I personally reviewed the relevant tests, images, and reports as documented above.  I formulated and edited as necessary the assessment and plan and discussed the findings and management plan with the patient and family. I was present for the key portions of the procedure and immediately available for the remainder. - Willy Thurston MD

## 2020-12-22 DIAGNOSIS — Z94.0 KIDNEY REPLACED BY TRANSPLANT: Primary | ICD-10-CM

## 2020-12-22 DIAGNOSIS — Z94.83 PANCREAS TRANSPLANTED (H): ICD-10-CM

## 2020-12-22 RX ORDER — SULFAMETHOXAZOLE AND TRIMETHOPRIM 400; 80 MG/1; MG/1
1 TABLET ORAL DAILY
Qty: 90 TABLET | Refills: 0 | Status: SHIPPED | OUTPATIENT
Start: 2020-12-22 | End: 2021-04-23

## 2021-01-27 ENCOUNTER — TELEPHONE (OUTPATIENT)
Dept: OPHTHALMOLOGY | Facility: CLINIC | Age: 46
End: 2021-01-27

## 2021-01-27 NOTE — TELEPHONE ENCOUNTER
"Spoke to pt at 1150    Pt has request in to medical records    Pt would like to review peripheral visual field loss in degrees    Per my review-- no visual field testing performed here.  Pt feels like has had done    Reviewed would ask Dr. Zuniga's facilitator to check also next week    If not performed and pt needs for documentation-- may schedule visual field testing    Note to facilitator    Gurinder Mojica RN 11:54 AM 01/29/21          Reviewed by cornea team    [facility]    Phone Message    May a detailed message be left on voicemail: yes     Reason for Call: Other: Pt is filing for state assistance and needs confirmation of her \"percent of visual impairment\" or a formal document stating her levels of vision. Requests call back to discuss at [phone]. Thank you    Action Taken: Message routed to:  [facility] EYE    Travel Screening: Not Applicable   "

## 2021-02-02 ENCOUNTER — DOCUMENTATION ONLY (OUTPATIENT)
Dept: CARE COORDINATION | Facility: CLINIC | Age: 46
End: 2021-02-02

## 2021-02-04 ENCOUNTER — ALLIED HEALTH/NURSE VISIT (OUTPATIENT)
Dept: NURSING | Facility: CLINIC | Age: 46
End: 2021-02-04
Payer: COMMERCIAL

## 2021-02-04 ENCOUNTER — TELEPHONE (OUTPATIENT)
Dept: TRANSPLANT | Facility: CLINIC | Age: 46
End: 2021-02-04

## 2021-02-04 ENCOUNTER — RESULTS ONLY (OUTPATIENT)
Dept: OTHER | Facility: CLINIC | Age: 46
End: 2021-02-04

## 2021-02-04 DIAGNOSIS — Z94.0 KIDNEY REPLACED BY TRANSPLANT: ICD-10-CM

## 2021-02-04 DIAGNOSIS — Z94.83 PANCREAS REPLACED BY TRANSPLANT (H): ICD-10-CM

## 2021-02-04 DIAGNOSIS — Z23 NEED FOR PROPHYLACTIC VACCINATION AND INOCULATION AGAINST INFLUENZA: Primary | ICD-10-CM

## 2021-02-04 DIAGNOSIS — Z79.899 IMMUNOSUPPRESSIVE MANAGEMENT ENCOUNTER FOLLOWING KIDNEY TRANSPLANT: ICD-10-CM

## 2021-02-04 DIAGNOSIS — Z94.83 PANCREAS TRANSPLANTED (H): ICD-10-CM

## 2021-02-04 DIAGNOSIS — Z94.0 IMMUNOSUPPRESSIVE MANAGEMENT ENCOUNTER FOLLOWING KIDNEY TRANSPLANT: ICD-10-CM

## 2021-02-04 LAB
ERYTHROCYTE [DISTWIDTH] IN BLOOD BY AUTOMATED COUNT: 13.5 % (ref 10–15)
HBA1C MFR BLD: 5.2 % (ref 0–5.6)
HCT VFR BLD AUTO: 34.4 % (ref 35–47)
HGB BLD-MCNC: 11.2 G/DL (ref 11.7–15.7)
LIPASE SERPL-CCNC: 197 U/L (ref 73–393)
MCH RBC QN AUTO: 29.9 PG (ref 26.5–33)
MCHC RBC AUTO-ENTMCNC: 32.6 G/DL (ref 31.5–36.5)
MCV RBC AUTO: 92 FL (ref 78–100)
PLATELET # BLD AUTO: 281 10E9/L (ref 150–450)
RBC # BLD AUTO: 3.75 10E12/L (ref 3.8–5.2)
WBC # BLD AUTO: 5.1 10E9/L (ref 4–11)

## 2021-02-04 PROCEDURE — 87799 DETECT AGENT NOS DNA QUANT: CPT | Performed by: INTERNAL MEDICINE

## 2021-02-04 PROCEDURE — 86832 HLA CLASS I HIGH DEFIN QUAL: CPT | Performed by: INTERNAL MEDICINE

## 2021-02-04 PROCEDURE — 82150 ASSAY OF AMYLASE: CPT | Performed by: INTERNAL MEDICINE

## 2021-02-04 PROCEDURE — 80048 BASIC METABOLIC PNL TOTAL CA: CPT | Performed by: INTERNAL MEDICINE

## 2021-02-04 PROCEDURE — 90471 IMMUNIZATION ADMIN: CPT

## 2021-02-04 PROCEDURE — 86833 HLA CLASS II HIGH DEFIN QUAL: CPT | Performed by: INTERNAL MEDICINE

## 2021-02-04 PROCEDURE — 80197 ASSAY OF TACROLIMUS: CPT | Performed by: INTERNAL MEDICINE

## 2021-02-04 PROCEDURE — 36415 COLL VENOUS BLD VENIPUNCTURE: CPT | Performed by: INTERNAL MEDICINE

## 2021-02-04 PROCEDURE — 85027 COMPLETE CBC AUTOMATED: CPT | Performed by: INTERNAL MEDICINE

## 2021-02-04 PROCEDURE — 83036 HEMOGLOBIN GLYCOSYLATED A1C: CPT | Performed by: INTERNAL MEDICINE

## 2021-02-04 PROCEDURE — 90686 IIV4 VACC NO PRSV 0.5 ML IM: CPT

## 2021-02-04 PROCEDURE — 80061 LIPID PANEL: CPT | Performed by: INTERNAL MEDICINE

## 2021-02-04 PROCEDURE — 83690 ASSAY OF LIPASE: CPT | Performed by: INTERNAL MEDICINE

## 2021-02-04 NOTE — TELEPHONE ENCOUNTER
Patient Call: Voicemail  Date/Time: [id number] 11:03 am  Reason for call: Patient received letter regarding COVID vaccine, she has tried [facility] and [facility] in [place], was told they can only give to patients that are 75+.  She needs help trying to find out where she can go. Please call her at [phone].

## 2021-02-04 NOTE — TELEPHONE ENCOUNTER
Returned call to Renetta regarding COVID letter. Apologized for any confusion related to letter. She is correct that only 75+ are able to get vaccinated at this time thru [facility]. She understands and will follow [facility] and [facility] COVID Hub for updates.

## 2021-02-05 ENCOUNTER — TELEPHONE (OUTPATIENT)
Dept: TRANSPLANT | Facility: CLINIC | Age: 46
End: 2021-02-05

## 2021-02-05 LAB
AMYLASE SERPL-CCNC: 105 U/L (ref 30–110)
ANION GAP SERPL CALCULATED.3IONS-SCNC: 5 MMOL/L (ref 3–14)
BKV DNA # SPEC NAA+PROBE: NORMAL COPIES/ML
BKV DNA SPEC NAA+PROBE-LOG#: NORMAL LOG COPIES/ML
BUN SERPL-MCNC: 18 MG/DL (ref 7–30)
CALCIUM SERPL-MCNC: 9.3 MG/DL (ref 8.5–10.1)
CHLORIDE SERPL-SCNC: 110 MMOL/L (ref 94–109)
CHOLEST SERPL-MCNC: 177 MG/DL
CO2 SERPL-SCNC: 25 MMOL/L (ref 20–32)
CREAT SERPL-MCNC: 1.2 MG/DL (ref 0.52–1.04)
GFR SERPL CREATININE-BSD FRML MDRD: 54 ML/MIN/{1.73_M2}
GLUCOSE SERPL-MCNC: 89 MG/DL (ref 70–99)
HDLC SERPL-MCNC: 92 MG/DL
LDLC SERPL CALC-MCNC: 72 MG/DL
NONHDLC SERPL-MCNC: 85 MG/DL
POTASSIUM SERPL-SCNC: 4.4 MMOL/L (ref 3.4–5.3)
SODIUM SERPL-SCNC: 140 MMOL/L (ref 133–144)
SPECIMEN SOURCE: NORMAL
TACROLIMUS BLD-MCNC: 4.9 UG/L (ref 5–15)
TME LAST DOSE: ABNORMAL H
TRIGL SERPL-MCNC: 64 MG/DL

## 2021-02-05 NOTE — TELEPHONE ENCOUNTER
ISSUE:   Tacrolimus XR level 4.9 on 2/4/21, goal 5-8, dose 3 mg (4 tabs) Envarsus PO every morning before breakfast. Last dose date and time recorded as 2/3/21 1030.    PLAN:   Please call patient and confirm this was an accurate 24-hour trough. Verify Tacrolimus XR dose 3 mg (4 tabs) daily. Confirm no new medications or illness. Confirm no missed doses. If accurate trough and accurate dose, increase Tacrolimus XR dose to 3.75 mg (5 tabs) daily and repeat labs in 2 weeks    OUTCOME:   Spoke with patient, they confirm accurate trough level (although she went without taking her medication in order to get 24 hour trough level.) Current dose 3 mg daily but takes in the evening routinely because that is when she remembers to take it. States she discussed this with the transplant nephrologist at her last appt. Patient refused dose change to 3.75 mg daily because when she takes more than current dose her body hurts. Reminded to repeat labs monthly. She will review at upcoming appt in 10 days. Patient voiced understanding of plan.

## 2021-02-10 ENCOUNTER — MYC MEDICAL ADVICE (OUTPATIENT)
Dept: OPHTHALMOLOGY | Facility: CLINIC | Age: 46
End: 2021-02-10

## 2021-02-12 NOTE — TELEPHONE ENCOUNTER
Reviewed form request from [facility] services for the blind    Recommended exam with Dr. Zuniga for vision, possible refraction, visual field and be able to fill out forms.    Scheduled exam next Tuesday afternoon    Note to facilitator for review    Gurinder Mojica RN 12:44 PM 02/12/21

## 2021-02-15 ENCOUNTER — VIRTUAL VISIT (OUTPATIENT)
Dept: NEPHROLOGY | Facility: CLINIC | Age: 46
End: 2021-02-15
Attending: INTERNAL MEDICINE
Payer: COMMERCIAL

## 2021-02-15 DIAGNOSIS — H25.11 AGE-RELATED NUCLEAR CATARACT OF RIGHT EYE: ICD-10-CM

## 2021-02-15 DIAGNOSIS — Z94.0 KIDNEY TRANSPLANTED: ICD-10-CM

## 2021-02-15 DIAGNOSIS — E10.3513 TYPE 1 DIABETES MELLITUS WITH PROLIFERATIVE RETINOPATHY OF BOTH EYES AND MACULAR EDEMA (H): Primary | ICD-10-CM

## 2021-02-15 DIAGNOSIS — Z94.83 PANCREAS REPLACED BY TRANSPLANT (H): ICD-10-CM

## 2021-02-15 DIAGNOSIS — Z94.0 KIDNEY REPLACED BY TRANSPLANT: ICD-10-CM

## 2021-02-15 PROCEDURE — 99215 OFFICE O/P EST HI 40 MIN: CPT | Mod: 95

## 2021-02-15 RX ORDER — POLYMYXIN B SULFATE AND TRIMETHOPRIM 1; 10000 MG/ML; [USP'U]/ML
1 SOLUTION OPHTHALMIC 4 TIMES DAILY
Qty: 10 ML | Refills: 0 | Status: SHIPPED | OUTPATIENT
Start: 2021-02-15 | End: 2021-02-16

## 2021-02-15 RX ORDER — MYCOPHENOLIC ACID 180 MG/1
TABLET, DELAYED RELEASE ORAL
Qty: 120 TABLET | Refills: 11 | Status: SHIPPED | OUTPATIENT
Start: 2021-02-15 | End: 2021-07-05

## 2021-02-15 RX ORDER — TACROLIMUS 0.75 MG/1
3 TABLET, EXTENDED RELEASE ORAL
Qty: 120 TABLET | Refills: 11 | Status: SHIPPED | OUTPATIENT
Start: 2021-02-15 | End: 2022-03-07

## 2021-02-15 ASSESSMENT — PAIN SCALES - GENERAL: PAINLEVEL: NO PAIN (0)

## 2021-02-15 NOTE — LETTER
2/15/2021       RE: Rose Castaneda  [address] [place] [address]     Dear Colleague,    Thank you for referring your patient, Rose Castaneda, to the [facility] [place] at [facility]. Please see a copy of my visit note below.    Renetta is a 45 year old who is being evaluated via a billable video visit.      How would you like to obtain your AVS? Mail a copy  If the video visit is dropped, the invitation should be resent by: Send to e-mail at: [email or web address]  Will anyone else be joining your video visit? No      Video Start Time:2:13  Video-Visit Details    Type of service:  Video Visit    Video End Time:2:31    Originating Location (pt. Location): Home    Distant Location (provider location):  [facility] NE[facility]     Platform used for Video Visit: Sandi Jackson is a 45 year old who is being evaluated via a billable video visit.      How would you like to obtain your AVS? Mail a copy  If the video visit is dropped, the invitation should be resent by: Send to e-mail at: [email or web address]  Will anyone else be joining your video visit? No      Video Start Time:2:13  Video-Visit Details    Type of service:  Video Visit    Video End Time:2:31    Originating Location (pt. Location): Home    Distant Location (provider location):  [facility]     Platform used for Video Visit: [facility]     Annual visit SPK 4 yr    CHRONIC TRANSPLANT NEPHROLOGY VISIT    Assessment & Plan   # DDKT (SPK):   - Baseline Cr ~ 1.1-1.3   - Proteinuria: Normal (<0.2 grams)   - Date DSA Last Checked: Oct/2019      Latest DSA: No   - BK Viremia: No   - Kidney Tx Biopsy: No    # Pancreas Tx (SPK):   - Pancreatic Exocrine Drainage: Enteric drained     - Blood glucose: Euglycemia On insulin: No   - HbA1c: Stable      Latest HbA1c: 5.0%   - Pancreatic enzymes:  slight uptrend in lipase  ~50%, monitor again with next labs   - Date DSA Last Checked: Oct/2019  Latest DSA: No   - Pancreas Tx Biopsy: No    # Immunosuppression: Tacrolimus extended release (goal 5-8) and Mycophenolic acid (goal not followed)   - Changes: yes-recent up-titration of Envarsus to 3.75 mg po every day as FK  Trough-4.9 but patient reports it was not an exact 12 hr trough so she is still taking 3 mg po every day , advised to go for repeat again next month  monitoring for long term toxicity and complications including infections and malignancies while on lifelong immunosuppression     # Infection Prophylaxis:   - PJP: Sulfa/TMP (Bactrim)    # Neurogenic Orthostatic Hypotension: Controlled;  Goal BP: > 100, but < 130 systolic   - Changes: No    - Off of Florinef    # Anemia in Chronic Renal Disease: Hgb: Stable      DONNY: No   - Iron studies: Not checked recently    # Mineral Bone Disorder:   - Vitamin D; level: Not checked recently        On Supplement: Yes  - Calcium; level: Low        On Supplement: No    # Chronic Diarrhea: Resolved.     # CMV Viremia: Detectable, but less than quantifiable at last check. No need to follow unless symptomatic.     # EBV Viremia: Detectable, but less than quantifiable at last check. No need to follow unless symptomatic.     # h/o Pulmonary Histoplasmosis: No off itraconazole.    # CAD: Asymptomatic.     # Carpal Tunnel Syndrome:  S/p release on right hand.     # Skin Cancer Risk:    - Discussed sun protection and recommend regular follow up with Dermatology.    # Medical Compliance: No.  Evidence of labs less than recommended.  - Discussed importance of checking labs regularly as recommended, taking medications as prescribed and attending scheduled medical appointments.  - Patient was informed to get her labs done once per month.       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    Return visit: No follow-ups on file.    Chief Complaint   Ms. Castaneda is a 45 year old here for routine follow up.    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   She feels well overall and reports no new complaints. She reports increased sensitivity to bright light after her cataract surgery that she can no longer drive and this limits her daily activity. She got Flu shot a few weeks ago and got sick afterwards but she feels ok now. Energy level is good. Denies any fevers, chills, weight loss, night sweats. No nausea, vomiting, abdominal pain, diarrhea. No chest pain, sob, leg swelling. No recent illness or hospitalization.       Home BP: 100- 110's/60's    Review of Systems   A comprehensive review of systems was obtained and negative, except as noted in the HPI or PMH.    Problem List   Patient Active Problem List   Diagnosis     Diabetes mellitus type 1 (H)     Immunosuppression (H)     Kidney replaced by transplant     Neurogenic orthostatic hypotension (H)     Osteoporosis     Dyslipidemia     Secondary renal hyperparathyroidism (H)     Hypothyroidism     Anemia in chronic renal disease     Pulmonary histoplasmosis (H)     Pancreas replaced by transplant (H)     Hypomagnesemia     Vitamin D deficiency     Aftercare following organ transplant     Cytomegalovirus (CMV) viremia (H)     EBV (Isabelle-Barr virus) viremia     Posterior subcapsular polar age-related cataract of left eye     Corneal scar, left eye     Age-related nuclear cataract of right eye     Ankle joint pain     Anxiety      Right carpal tunnel syndrome     Cubital tunnel syndrome on right     Diabetic polyneuropathy (H)     Encephalopathy acute     GERD (gastroesophageal reflux disease)     Hyperlipidemia     Hypertension     Leukocytosis     Nausea & vomiting     Personal history of venous thrombosis and embolism     Pyuria     Retention of urine     Somnolence     Trigger finger, right middle finger     UTI (urinary tract infection)     Metabolic acidosis, normal anion gap (NAG)     CKD (chronic kidney disease) stage 4, GFR 15-29 ml/min (H)       Social History   Social History     Tobacco Use     Smoking status: Never Smoker     Smokeless tobacco: Never Used   Substance Use Topics     Alcohol use: Yes     Comment: rare     Drug use: No       Allergies   Allergies   Allergen Reactions     Amoxicillin-Pot Clavulanate Diarrhea     Augmentin Diarrhea and Other (See Comments)     Diarrhea  Took augmentin 1/2017 with loperamide       Ciprofloxacin Nausea, GI Disturbance and Other (See Comments)     Nausea         Pollen Extract Other (See Comments)     Seasonal allergies  Runny Nose  Seasonal allergies  Seasonal allergies       Pyridostigmine Other (See Comments), Swelling and Muscle Pain (Myalgia)     Spastic muscle motion in the face and legs, weakness in the arms. Slight swelling of the tongue.  Edema  Spastic muscle motion in the face and legs, weakness in the arms. Slight swelling of the tongue.  Spastic muscle motion in the face and legs, weakness in the arms. Slight swelling of the tongue.         Medications   Current Outpatient Medications   Medication Sig     aspirin  MG EC tablet Take 1 tablet (325 mg) by mouth daily (Patient taking differently: Take 81 mg by mouth daily )     citalopram (CELEXA) 10 MG tablet Take 20 mg by mouth daily      Levothyroxine Sodium (SYNTHROID PO) Take 50 mcg by mouth daily      mycophenolic acid (GENERIC EQUIVALENT) 180 MG EC tablet TAKE 2 TABLETS TWICE A DAY     pravastatin (PRAVACHOL) 20  MG tablet Take 1 tablet (20 mg) by mouth daily     sulfamethoxazole-trimethoprim (BACTRIM) 400-80 MG tablet Take 1 tablet by mouth daily     tacrolimus (ENVARSUS XR) 0.75 MG 24 hr tablet Take 4 tablets (3 mg) by mouth every morning (before breakfast)     cycloSPORINE (RESTASIS) 0.05 % ophthalmic emulsion Place 1 drop into both eyes 2 times daily (Patient not taking: Reported on 2/15/2021)     ketorolac (ACULAR) 0.5 % ophthalmic solution Place 1 drop into the right eye 4 times daily (Patient not taking: Reported on 2/15/2021)     ketorolac (ACULAR) 0.5 % ophthalmic solution Place 1 drop into the right eye 4 times daily (Patient not taking: Reported on 2/15/2021)     prednisoLONE acetate (PRED FORTE) 1 % ophthalmic suspension Place 1-2 drops into the right eye 4 times daily (Patient not taking: Reported on 2/15/2021)     prednisoLONE acetate (PRED FORTE) 1 % ophthalmic suspension Place 1 drop into the right eye 4 times daily (Patient not taking: Reported on 2/15/2021)     trimethoprim-polymyxin b (POLYTRIM) 84278-7.1 UNIT/ML-% ophthalmic solution Place 1 drop into the right eye 4 times daily (Patient not taking: Reported on 2/15/2021)     No current facility-administered medications for this visit.      There are no discontinued medications.    Physical Exam   Vital Signs: There were no vitals taken for this visit.    GENERAL APPEARANCE: alert and no distress  HENT: mouth without ulcers or lesions  LYMPHATICS: no cervical or supraclavicular nodes  RESP: lungs clear to auscultation - no rales, rhonchi or wheezes  CV: regular rhythm, normal rate, no rub, no murmur  EDEMA: no LE edema bilaterally  ABDOMEN: soft, nondistended, nontender, bowel sounds normal  MS: extremities normal - no gross deformities noted, no evidence of inflammation in joints, no muscle tenderness  SKIN: no rash  TX KIDNEY: normal  DIALYSIS ACCESS:  None      Data     Renal Latest Ref Rng & Units 2/4/2021 7/8/2020 4/14/2020   Na 133 - 144 mmol/L 140  138 141   Na (external) 136 - 145 - - -   K 3.4 - 5.3 mmol/L 4.4 4.3 4.6   K (external) 3.5 - 5.1 - - -   Cl 94 - 109 mmol/L 110(H) 107 112(H)   Cl (external) 98 - 112 - - -   CO2 20 - 32 mmol/L 25 27 25   CO2 (external) 21 - 32 - - -   BUN 7 - 30 mg/dL 18 18 21   BUN (external) 7 - 24 - - -   Cr 0.52 - 1.04 mg/dL 1.20(H) 1.19(H) 1.37(H)   Cr (external) 0.55 - 1.02 mg/dL - - -   Glucose 70 - 99 mg/dL 89 80 102(H)   Glucose (external) 74 - 106 - - -   Ca  8.5 - 10.1 mg/dL 9.3 8.2(L) 8.2(L)   Ca (external) 8.5 - 10.1 - - -   Mg 1.6 - 2.3 mg/dL - - -   Mg (external) 1.8 - 2.4 - - -     Bone Health Latest Ref Rng & Units 2/13/2018 6/14/2017 5/19/2017   Phos 2.5 - 4.5 mg/dL - - 3.0   Phos (external) 2.5 - 4.7 mg/dL - - -   PTHi 12 - 72 pg/mL - 34 -   Vit D Def 20 - 75 ug/L 21 31 -     Heme Latest Ref Rng & Units 2/4/2021 7/8/2020 4/14/2020   WBC 4.0 - 11.0 10e9/L 5.1 5.6 5.1   WBC (external) 4.3 - 10.8 - - -   Hgb 11.7 - 15.7 g/dL 11.2(L) 10.5(L) 10.5(L)   Hgb (external) 12.0 - 16.0 - - -   Plt 150 - 450 10e9/L 281 253 265   Plt (external) 150 - 400 - - -   ABSOLUTE NEUTROPHIL 1.6 - 8.3 10e9/L - - -   ABSOLUTE LYMPHOCYTES 0.8 - 5.3 10e9/L - - -   ABSOLUTE MONOCYTES 0.0 - 1.3 10e9/L - - -   ABSOLUTE EOSINOPHILS 0.0 - 0.7 10e9/L - - -   ABSOLUTE BASOPHILS 0.0 - 0.2 10e9/L - - -   ABS IMMATURE GRANULOCYTES 0 - 0.4 10e9/L - - -   ABSOLUTE NUCLEATED RBC - - - -     Liver Latest Ref Rng & Units 9/8/2017 5/19/2017 5/11/2017   AP 40 - 150 U/L 118 169(H) 157(H)   AP (external) 38 - 126 IU/L - - -   TBili 0.2 - 1.3 mg/dL 0.4 0.6 0.4   TBili (external) 0.2 - 1.5 mg/dL - - -   DBili 0.0 - 0.2 mg/dL 0.2 - 0.1   DBili (external) <0.4 mg/dL - - -   ALT 0 - 50 U/L 19 29 40   ALT (external) 10.0 - 50 IU/L - - -   AST 0 - 45 U/L 21 24 35   AST (external) 12.0 - 45 IU/L - - -   Tot Protein 6.8 - 8.8 g/dL 6.9 7.1 6.7(L)   Tot Protein (external) 6.2 - 8.1 - - -   Albumin 3.4 - 5.0 g/dL 3.3(L) 3.4 3.5   Albumin (external) 3.4 - 4.9 - - -      Pancreas Latest Ref Rng & Units 2/4/2021 7/8/2020 4/14/2020   A1C 0 - 5.6 % 5.2 - 5.4   A1C (external) <5.7 % - - -   Amylase 30 - 110 U/L 105 92 109   Amylase (external) 25 - 125 U/L - - -   Lipase 73 - 393 U/L 197 130 125   Lipase (external) 25 - 125 U/L - - -     Iron studies Latest Ref Rng & Units 2/12/2018 8/8/2016 6/3/2015   Iron 35 - 180 ug/dL - 55 -   Iron (external) 8 - 78 U/L 25 - -   Iron sat 15 - 46 % - 24 -   Ferritin 12 - 150 ng/mL - 73 -   Ferritin (external) 8 - 388 - - 121     [facility] Txp Virology Latest Ref Rng & Units 2/4/2021 10/25/2019 3/5/2019   CVM DNA Quant - - - Plasma   CMV QUANT IU/ML CMVND:CMV DNA Not Detected [IU]/mL - - <137(A)   LOG IU/ML OF CMVQNT <2.1 [Log:IU]/mL - - <2.1   BK Spec - Plasma Plasma -   BK Res BKNEG:BK Virus DNA Not Detected copies/mL BK Virus DNA Not Detected BK Virus DNA Not Detected -   BK Log <2.7 Log copies/mL Not Calculated Not Calculated -   EBV VCA IGG ANTIBODY U/mL - - -   EBV VCA IGM ANTIBODY U/mL - - -   EBV CAPSID ANTIBODY IGG 0.0 - 0.8 AI - - -   EBV DNA COPIES/ML EBVNEG:EBV DNA Not Detected [Copies]/mL - - -   EBV DNA LOG OF COPIES <2.7 [Log:copies]/mL - - -   Hep B Core NR - - -   Hep B Surf - - - -   HIV 1&2 NEG - - -        Recent Labs   Lab Test 07/08/20  1044 07/21/20  1043 02/04/21  1031   DOSTAC 07/07/2020 AT 1030 AM 2330 7/20/20 LAST DOSE LAST DOSE 1030 AM02/03   TACROL 4.0* 4.9* 4.9*     Recent Labs   Lab Test 06/14/17  0958 02/01/18  0857 03/08/18  1029   DOSMPA 2300 ON 6/13/2017 800 03/07/2018 at 0930 pm   MPACID 0.92* 2.17 2.30   MPAG 84.2 46.4 40.9           Again, thank you for allowing me to participate in the care of your patient.      Sincerely,    Kidney/Pancreas Recipient

## 2021-02-15 NOTE — PROGRESS NOTES
Renetta is a 45 year old who is being evaluated via a billable video visit.      How would you like to obtain your AVS? Mail a copy  If the video visit is dropped, the invitation should be resent by: Send to e-mail at: [email or web address]  Will anyone else be joining your video visit? No      Video Start Time:2:13  Video-Visit Details    Type of service:  Video Visit    Video End Time:2:31    Originating Location (pt. Location): Home    Distant Location (provider location):  [facility]PHROLOG[facility] [place]     Platform used for Video Visit: [employer]

## 2021-02-15 NOTE — PROGRESS NOTES
Renetta is a 45 year old who is being evaluated via a billable video visit.      How would you like to obtain your AVS? Mail a copy  If the video visit is dropped, the invitation should be resent by: Send to e-mail at: [email or web address]  Will anyone else be joining your video visit? No      Video Start Time:2:13  Video-Visit Details    Type of service:  Video Visit    Video End Time:2:31    Originating Location (pt. Location): Home    Distant Location (provider location):  [facility] [place]     Platform used for Video Visit: [employer]     Annual visit SPK 4 yr    CHRONIC TRANSPLANT NEPHROLOGY VISIT    Assessment & Plan   # DDKT (SPK):   - Baseline Cr ~ 1.1-1.3   - Proteinuria: Normal (<0.2 grams)   - Date DSA Last Checked: Oct/2019      Latest DSA: No   - BK Viremia: No   - Kidney Tx Biopsy: No    # Pancreas Tx (SPK):   - Pancreatic Exocrine Drainage: Enteric drained     - Blood glucose: Euglycemia On insulin: No   - HbA1c: Stable      Latest HbA1c: 5.0%   - Pancreatic enzymes:  slight uptrend in lipase ~50%, monitor again with next labs   - Date DSA Last Checked: Oct/2019  Latest DSA: No   - Pancreas Tx Biopsy: No    # Immunosuppression: Tacrolimus extended release (goal 5-8) and Mycophenolic acid (goal not followed)   - Changes: yes-recent up-titration of Envarsus to 3.75 mg po every day as FK  Trough-4.9 but patient reports it was not an exact 12 hr trough so she is still taking 3 mg po every day , advised to go for repeat again next month  monitoring for long term toxicity and complications including infections and malignancies while on lifelong immunosuppression     # Infection Prophylaxis:   - PJP: Sulfa/TMP (Bactrim)    # Neurogenic Orthostatic Hypotension: Controlled;  Goal BP: > 100, but < 130 systolic   - Changes: No    - Off of Florinef    # Anemia in Chronic Renal Disease: Hgb: Stable      DONNY: No   - Iron studies: Not checked recently    # Mineral Bone Disorder:   -  Vitamin D; level: Not checked recently        On Supplement: Yes  - Calcium; level: Low        On Supplement: No    # Chronic Diarrhea: Resolved.     # CMV Viremia: Detectable, but less than quantifiable at last check. No need to follow unless symptomatic.     # EBV Viremia: Detectable, but less than quantifiable at last check. No need to follow unless symptomatic.     # h/o Pulmonary Histoplasmosis: No off itraconazole.    # CAD: Asymptomatic.     # Carpal Tunnel Syndrome:  S/p release on right hand.     # Skin Cancer Risk:    - Discussed sun protection and recommend regular follow up with Dermatology.    # Medical Compliance: No.  Evidence of labs less than recommended.  - Discussed importance of checking labs regularly as recommended, taking medications as prescribed and attending scheduled medical appointments.  - Patient was informed to get her labs done once per month.       # Transplant History:  Etiology of Kidney Failure: Diabetes mellitus type 1  Tx: DDKT (K)  Transplant: 8/5/2016 (Kidney / Pancreas), 1/19/2007 (Kidney)  Donor Type: Donation after Brain Death Donor Class:   Significant changes in immunosuppression: None  Significant transplant-related complications: CMV Viremia and EBV Viremia    Transplant Office Phone Number: [phone]    Assessment and plan was discussed with the patient and she voiced her understanding and agreement.    Return visit: No follow-ups on file.    Chief Complaint   Ms. Castaneda is a 45 year old here for routine follow up.    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   She feels well overall and reports no new complaints. She reports increased sensitivity to bright light after her cataract surgery that she can no longer drive and this limits her daily activity. She got Flu shot a few weeks ago and got sick afterwards but she feels ok now. Energy level is good.  Denies any fevers, chills, weight loss, night sweats. No nausea, vomiting, abdominal pain, diarrhea. No chest pain, sob, leg swelling. No recent illness or hospitalization.       Home BP: 100- 110's/60's    Review of Systems   A comprehensive review of systems was obtained and negative, except as noted in the HPI or PMH.    Problem List   Patient Active Problem List   Diagnosis     Diabetes mellitus type 1 (H)     Immunosuppression (H)     Kidney replaced by transplant     Neurogenic orthostatic hypotension (H)     Osteoporosis     Dyslipidemia     Secondary renal hyperparathyroidism (H)     Hypothyroidism     Anemia in chronic renal disease     Pulmonary histoplasmosis (H)     Pancreas replaced by transplant (H)     Hypomagnesemia     Vitamin D deficiency     Aftercare following organ transplant     Cytomegalovirus (CMV) viremia (H)     EBV (Isabelle-Barr virus) viremia     Posterior subcapsular polar age-related cataract of left eye     Corneal scar, left eye     Age-related nuclear cataract of right eye     Ankle joint pain     Anxiety     Right carpal tunnel syndrome     Cubital tunnel syndrome on right     Diabetic polyneuropathy (H)     Encephalopathy acute     GERD (gastroesophageal reflux disease)     Hyperlipidemia     Hypertension     Leukocytosis     Nausea & vomiting     Personal history of venous thrombosis and embolism     Pyuria     Retention of urine     Somnolence     Trigger finger, right middle finger     UTI (urinary tract infection)     Metabolic acidosis, normal anion gap (NAG)     CKD (chronic kidney disease) stage 4, GFR 15-29 ml/min (H)       Social History   Social History     Tobacco Use     Smoking status: Never Smoker     Smokeless tobacco: Never Used   Substance Use Topics     Alcohol use: Yes     Comment: rare     Drug use: No       Allergies   Allergies   Allergen Reactions     Amoxicillin-Pot Clavulanate Diarrhea     Augmentin Diarrhea and Other (See Comments)     Diarrhea  Took  augmentin 1/2017 with loperamide       Ciprofloxacin Nausea, GI Disturbance and Other (See Comments)     Nausea         Pollen Extract Other (See Comments)     Seasonal allergies  Runny Nose  Seasonal allergies  Seasonal allergies       Pyridostigmine Other (See Comments), Swelling and Muscle Pain (Myalgia)     Spastic muscle motion in the face and legs, weakness in the arms. Slight swelling of the tongue.  Edema  Spastic muscle motion in the face and legs, weakness in the arms. Slight swelling of the tongue.  Spastic muscle motion in the face and legs, weakness in the arms. Slight swelling of the tongue.         Medications   Current Outpatient Medications   Medication Sig     aspirin  MG EC tablet Take 1 tablet (325 mg) by mouth daily (Patient taking differently: Take 81 mg by mouth daily )     citalopram (CELEXA) 10 MG tablet Take 20 mg by mouth daily      Levothyroxine Sodium (SYNTHROID PO) Take 50 mcg by mouth daily      mycophenolic acid (GENERIC EQUIVALENT) 180 MG EC tablet TAKE 2 TABLETS TWICE A DAY     pravastatin (PRAVACHOL) 20 MG tablet Take 1 tablet (20 mg) by mouth daily     sulfamethoxazole-trimethoprim (BACTRIM) 400-80 MG tablet Take 1 tablet by mouth daily     tacrolimus (ENVARSUS XR) 0.75 MG 24 hr tablet Take 4 tablets (3 mg) by mouth every morning (before breakfast)     cycloSPORINE (RESTASIS) 0.05 % ophthalmic emulsion Place 1 drop into both eyes 2 times daily (Patient not taking: Reported on 2/15/2021)     ketorolac (ACULAR) 0.5 % ophthalmic solution Place 1 drop into the right eye 4 times daily (Patient not taking: Reported on 2/15/2021)     ketorolac (ACULAR) 0.5 % ophthalmic solution Place 1 drop into the right eye 4 times daily (Patient not taking: Reported on 2/15/2021)     prednisoLONE acetate (PRED FORTE) 1 % ophthalmic suspension Place 1-2 drops into the right eye 4 times daily (Patient not taking: Reported on 2/15/2021)     prednisoLONE acetate (PRED FORTE) 1 % ophthalmic  suspension Place 1 drop into the right eye 4 times daily (Patient not taking: Reported on 2/15/2021)     trimethoprim-polymyxin b (POLYTRIM) 51702-5.1 UNIT/ML-% ophthalmic solution Place 1 drop into the right eye 4 times daily (Patient not taking: Reported on 2/15/2021)     No current facility-administered medications for this visit.      There are no discontinued medications.    Physical Exam   Vital Signs: There were no vitals taken for this visit.    GENERAL APPEARANCE: alert and no distress  HENT: mouth without ulcers or lesions  LYMPHATICS: no cervical or supraclavicular nodes  RESP: lungs clear to auscultation - no rales, rhonchi or wheezes  CV: regular rhythm, normal rate, no rub, no murmur  EDEMA: no LE edema bilaterally  ABDOMEN: soft, nondistended, nontender, bowel sounds normal  MS: extremities normal - no gross deformities noted, no evidence of inflammation in joints, no muscle tenderness  SKIN: no rash  TX KIDNEY: normal  DIALYSIS ACCESS:  None      Data     Renal Latest Ref Rng & Units 2/4/2021 7/8/2020 4/14/2020   Na 133 - 144 mmol/L 140 138 141   Na (external) 136 - 145 - - -   K 3.4 - 5.3 mmol/L 4.4 4.3 4.6   K (external) 3.5 - 5.1 - - -   Cl 94 - 109 mmol/L 110(H) 107 112(H)   Cl (external) 98 - 112 - - -   CO2 20 - 32 mmol/L 25 27 25   CO2 (external) 21 - 32 - - -   BUN 7 - 30 mg/dL 18 18 21   BUN (external) 7 - 24 - - -   Cr 0.52 - 1.04 mg/dL 1.20(H) 1.19(H) 1.37(H)   Cr (external) 0.55 - 1.02 mg/dL - - -   Glucose 70 - 99 mg/dL 89 80 102(H)   Glucose (external) 74 - 106 - - -   Ca  8.5 - 10.1 mg/dL 9.3 8.2(L) 8.2(L)   Ca (external) 8.5 - 10.1 - - -   Mg 1.6 - 2.3 mg/dL - - -   Mg (external) 1.8 - 2.4 - - -     Bone Health Latest Ref Rng & Units 2/13/2018 6/14/2017 5/19/2017   Phos 2.5 - 4.5 mg/dL - - 3.0   Phos (external) 2.5 - 4.7 mg/dL - - -   PTHi 12 - 72 pg/mL - 34 -   Vit D Def 20 - 75 ug/L 21 31 -     Heme Latest Ref Rng & Units 2/4/2021 7/8/2020 4/14/2020   WBC 4.0 - 11.0 10e9/L 5.1 5.6  5.1   WBC (external) 4.3 - 10.8 - - -   Hgb 11.7 - 15.7 g/dL 11.2(L) 10.5(L) 10.5(L)   Hgb (external) 12.0 - 16.0 - - -   Plt 150 - 450 10e9/L 281 253 265   Plt (external) 150 - 400 - - -   ABSOLUTE NEUTROPHIL 1.6 - 8.3 10e9/L - - -   ABSOLUTE LYMPHOCYTES 0.8 - 5.3 10e9/L - - -   ABSOLUTE MONOCYTES 0.0 - 1.3 10e9/L - - -   ABSOLUTE EOSINOPHILS 0.0 - 0.7 10e9/L - - -   ABSOLUTE BASOPHILS 0.0 - 0.2 10e9/L - - -   ABS IMMATURE GRANULOCYTES 0 - 0.4 10e9/L - - -   ABSOLUTE NUCLEATED RBC - - - -     Liver Latest Ref Rng & Units 9/8/2017 5/19/2017 5/11/2017   AP 40 - 150 U/L 118 169(H) 157(H)   AP (external) 38 - 126 IU/L - - -   TBili 0.2 - 1.3 mg/dL 0.4 0.6 0.4   TBili (external) 0.2 - 1.5 mg/dL - - -   DBili 0.0 - 0.2 mg/dL 0.2 - 0.1   DBili (external) <0.4 mg/dL - - -   ALT 0 - 50 U/L 19 29 40   ALT (external) 10.0 - 50 IU/L - - -   AST 0 - 45 U/L 21 24 35   AST (external) 12.0 - 45 IU/L - - -   Tot Protein 6.8 - 8.8 g/dL 6.9 7.1 6.7(L)   Tot Protein (external) 6.2 - 8.1 - - -   Albumin 3.4 - 5.0 g/dL 3.3(L) 3.4 3.5   Albumin (external) 3.4 - 4.9 - - -     Pancreas Latest Ref Rng & Units 2/4/2021 7/8/2020 4/14/2020   A1C 0 - 5.6 % 5.2 - 5.4   A1C (external) <5.7 % - - -   Amylase 30 - 110 U/L 105 92 109   Amylase (external) 25 - 125 U/L - - -   Lipase 73 - 393 U/L 197 130 125   Lipase (external) 25 - 125 U/L - - -     Iron studies Latest Ref Rng & Units 2/12/2018 8/8/2016 6/3/2015   Iron 35 - 180 ug/dL - 55 -   Iron (external) 8 - 78 U/L 25 - -   Iron sat 15 - 46 % - 24 -   Ferritin 12 - 150 ng/mL - 73 -   Ferritin (external) 8 - 388 - - 121     [facility] Txp Virology Latest Ref Rng & Units 2/4/2021 10/25/2019 3/5/2019   CVM DNA Quant - - - Plasma   CMV QUANT IU/ML CMVND:CMV DNA Not Detected [IU]/mL - - <137(A)   LOG IU/ML OF CMVQNT <2.1 [Log:IU]/mL - - <2.1   BK Spec - Plasma Plasma -   BK Res BKNEG:BK Virus DNA Not Detected copies/mL BK Virus DNA Not Detected BK Virus DNA Not Detected -   BK Log <2.7 Log copies/mL Not  Calculated Not Calculated -   EBV VCA IGG ANTIBODY U/mL - - -   EBV VCA IGM ANTIBODY U/mL - - -   EBV CAPSID ANTIBODY IGG 0.0 - 0.8 AI - - -   EBV DNA COPIES/ML EBVNEG:EBV DNA Not Detected [Copies]/mL - - -   EBV DNA LOG OF COPIES <2.7 [Log:copies]/mL - - -   Hep B Core NR - - -   Hep B Surf - - - -   HIV 1&2 NEG - - -        Recent Labs   Lab Test 07/08/20  1044 07/21/20  1043 02/04/21  1031   DOSTAC 07/07/2020 AT 1030 AM 2330 7/20/20 LAST DOSE LAST DOSE 1030 AM02/03   TACROL 4.0* 4.9* 4.9*     Recent Labs   Lab Test 06/14/17  0958 02/01/18  0857 03/08/18  1029   DOSMPA 2300 ON 6/13/2017 800 03/07/2018 at 0930 pm   MPACID 0.92* 2.17 2.30   MPAG 84.2 46.4 40.9

## 2021-02-16 ENCOUNTER — OFFICE VISIT (OUTPATIENT)
Dept: OPHTHALMOLOGY | Facility: CLINIC | Age: 46
End: 2021-02-16
Attending: OPHTHALMOLOGY
Payer: COMMERCIAL

## 2021-02-16 DIAGNOSIS — E10.3513 TYPE 1 DIABETES MELLITUS WITH PROLIFERATIVE RETINOPATHY OF BOTH EYES AND MACULAR EDEMA (H): ICD-10-CM

## 2021-02-16 PROBLEM — Z94.83 HISTORY OF PANCREAS TRANSPLANT (H): Status: ACTIVE | Noted: 2018-02-12

## 2021-02-16 PROBLEM — Z94.0 HISTORY OF KIDNEY TRANSPLANT: Status: ACTIVE | Noted: 2018-02-12

## 2021-02-16 PROBLEM — F51.04 CHRONIC INSOMNIA: Status: ACTIVE | Noted: 2021-02-03

## 2021-02-16 PROCEDURE — 92134 CPTRZ OPH DX IMG PST SGM RTA: CPT | Performed by: OPHTHALMOLOGY

## 2021-02-16 PROCEDURE — 92083 EXTENDED VISUAL FIELD XM: CPT | Performed by: OPHTHALMOLOGY

## 2021-02-16 PROCEDURE — G0463 HOSPITAL OUTPT CLINIC VISIT: HCPCS

## 2021-02-16 PROCEDURE — 99213 OFFICE O/P EST LOW 20 MIN: CPT | Mod: GC | Performed by: OPHTHALMOLOGY

## 2021-02-16 PROCEDURE — 92015 DETERMINE REFRACTIVE STATE: CPT

## 2021-02-16 ASSESSMENT — TONOMETRY
OS_IOP_MMHG: 13
IOP_METHOD: TONOPEN
OD_IOP_MMHG: 13

## 2021-02-16 ASSESSMENT — VISUAL ACUITY
OD_SC: 20/70
OS_SC: 20/40
METHOD: SNELLEN - LINEAR

## 2021-02-16 ASSESSMENT — CONF VISUAL FIELD
OS_INFERIOR_NASAL_RESTRICTION: 1
OS_SUPERIOR_TEMPORAL_RESTRICTION: 3
OD_SUPERIOR_NASAL_RESTRICTION: 1
OS_SUPERIOR_NASAL_RESTRICTION: 1
OD_SUPERIOR_TEMPORAL_RESTRICTION: 3
OS_INFERIOR_TEMPORAL_RESTRICTION: 1
OD_INFERIOR_TEMPORAL_RESTRICTION: 1
OD_INFERIOR_NASAL_RESTRICTION: 1
METHOD: COUNTING FINGERS

## 2021-02-16 ASSESSMENT — REFRACTION_MANIFEST
OD_AXIS: 085
OD_CYLINDER: +1.25
OD_SPHERE: -0.75
OS_AXIS: 083
OS_ADD: +2.75
OD_ADD: +2.75
OS_SPHERE: -0.25
OS_CYLINDER: +0.50

## 2021-02-16 ASSESSMENT — SLIT LAMP EXAM - LIDS
COMMENTS: NORMAL
COMMENTS: NORMAL

## 2021-02-16 ASSESSMENT — EXTERNAL EXAM - LEFT EYE: OS_EXAM: NORMAL

## 2021-02-16 ASSESSMENT — CUP TO DISC RATIO
OD_RATIO: 0.4
OS_RATIO: 0.4

## 2021-02-16 ASSESSMENT — EXTERNAL EXAM - RIGHT EYE: OD_EXAM: NORMAL

## 2021-02-16 NOTE — LETTER
February 16, 2021      Re: Rose Castaneda   1975    To Whom It May Concern:    This is to confirm that the above patient was seen on 2/16/2021.  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 Her visual acuity is 20/70 in right eye and 20/40 in left eye. Her visual field shows dense constriction with 15 degrees of central vision remaining in right eye and 10 degrees remaining in left eye. Ms. Castaneda is considered legally blind and has difficulty with activities of daily living. Her vision may be stable or could possibly get worse overtime.     Thank you for your cooperation in this matter.  Please do not hesitate to contact me if you have any further questions.    Sincerely,    electronically signed  ANTWAN RATLIFF

## 2021-02-16 NOTE — LETTER
February 16, 2021      Re: Rose Castaneda   1975    To Whom It May Concern:    This is to confirm that the above patient was seen on 2/16/2021.  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 Her visual acuity is 20/70 in right eye and 20/40 in left eye. Her visual field shows severe constriction with less than 10 degrees of visual field as measured with the III4e isopter on a Bradford visual field. Ms. Castaneda is considered legally blind based on her limited visual field and has difficulty with activities of daily living. Her vision may be stable or could possibly get worse overtime.     Thank you for your cooperation in this matter.  Please do not hesitate to contact me if you have any further questions.    Sincerely,    electronically signed  ANTWAN RATLIFF

## 2021-02-16 NOTE — PROGRESS NOTES
CC -  PDR    INTERVAL HISTORY - VA worsened subjectively, here for legal blindness evaluation      PMH -   Rose Castaneda is a  45 year old year-old patient referred BY Dr Yancey from [facility] for evlauation and treat  of PDR. Previously seen by me 4/2015 @ [facility]. subseqeuntly seen by Dr. Yancey @ [facility] and referred to me for further evaluation.  History of pancreatic transplant, kidney transplant x2, ~ 2017  VA @ [facility] ([id number]) was 20/40 - 20/50 & 20/30 - 20/40       PAST OCULAR SURGERY  PRP OU 2005 in [place]  CE/IOL 9/2020  YAG OU      RETINAL IMAGING:  OCT 2-16-21  OD - extensive outer atrophy and thinning, mild ERM, trace IRF  OS - extensive outer atrophy from FML scars, mild diffuse thinning, mild ERM, mild DME non-central  -> 209    GVF 2-16-21  OU severe constriction, III4e ~ 5 degrees OU, V ~ 15 degrees        ASSESSMENT & PLAN    # DM I with PDR and DME   - now s/p pancreas transplant   - quiescent   - dense laser scars & outer atrophy diffuse   - likely causing loss of acuity & contrast    - has focal VRT to ONH on OCT but no traction on exam       # Mild DME, OS > OD   - had AVastin x 1 OD in 2007   - doubt significant   - recheck 6-12 months          F/U 6-12 months, OCT + DFE OU          ATTESTATION     Attending Attestation:     Complete documentation of historical and exam elements from today's encounter can be found in the full encounter summary report (not reduplicated in this progress note).  I personally obtained the chief complaint(s) and history of present illness.  I confirmed and edited as necessary the review of systems, past medical/surgical history, family history, social history, and examination findings as documented by others; and I examined the patient myself.  I personally reviewed the relevant tests, images, and reports as documented above.  I personally reviewed the ophthalmic test(s) associated with this encounter, agree with the interpretation(s) as documented by the  resident/fellow, and have edited the corresponding report(s) as necessary.   I formulated and edited as necessary the assessment and plan and discussed the findings and management plan with the patient and family    Jaja Zuniga MD, PhD  , Vitreoretinal Surgery  Department of Ophthalmology  [facility]

## 2021-02-27 ENCOUNTER — HEALTH MAINTENANCE LETTER (OUTPATIENT)
Age: 46
End: 2021-02-27

## 2021-03-25 ENCOUNTER — TELEPHONE (OUTPATIENT)
Dept: TRANSPLANT | Facility: CLINIC | Age: 46
End: 2021-03-25

## 2021-03-25 NOTE — TELEPHONE ENCOUNTER
Spoke with Renetta. She called today to report she was exposed to someone with COVID positive results 4 days ago; this person tested positive today and whom is asymptomatic. She confirms she was within 6 feet of the person who has COVID-19 for greater than 15 minutes.     Was given the following CDC guidelines:   Stay home for 14 days after your last contact with a person who has COVID-19.   Watch for fever (100.4?F), cough, shortness of breath, or other symptoms of COVID-19 such as sore throat, runny nose or nasal congestion, N/V/D. Proceed to ED for chest pain or shortness of breath.  If possible, stay away from others, especially people who are at higher risk for getting very sick from COVID-19      She has not been able to get vaccinated for COVID and asked if she can get vaccinated. CDC states:    Can I get vaccinated against COVID-19 while I am currently sick with COVID-19?     No. People with COVID-19 who have symptoms should wait to be vaccinated until they have recovered from their illness and have met the criteria for discontinuing isolation; those without symptoms should also wait until they meet the criteria before getting vaccinated. This guidance also applies to people who get COVID-19 before getting their second dose of vaccine.    Renetta verbalized understanding to quarantine. Instructed her to notify her primary care provider if symptoms develop or she can be see thru [email or web address] virtually. Informed she may qualify for monoclonal antibodies if symptoms develop in which a monoclonal antibody referral could be placed if criteria met. Also, if symptoms develop may consider reducing immunosuppression depending on the severity.

## 2021-03-30 ENCOUNTER — TELEPHONE (OUTPATIENT)
Dept: TRANSPLANT | Facility: CLINIC | Age: 46
End: 2021-03-30

## 2021-03-30 NOTE — TELEPHONE ENCOUNTER
ISSUE: MyChart message received:   Renetta Castaneda, Mauricio Bangura MD 8 hours ago (7:34 AM)        Hi! Just trying to get some help from the transplant center to help schedule a COVID shot. I am legally blind now and living alone and am having trouble scheduling online. If there is someone available to help me I would very much appreciate it. I am currently under quarentine until April 4th as I was exposed to an asymptonatic  relative whom tested positive. Please let me know asap if this is someone can help me even if at a later date as I will need to continue searching for help if the center is unable. Thank you so very much. I greatly appreciate it. Best, Elaine   PS: I would appreciate a call, rather than a message return as I have trouble seeing the screen. Thanks again.     PLAN: Provide Renetta with COVID-19 vaccine scheduling phone number: [phone].    OUTCOME:   Rashid states she has the COVID-19 number but there are no appointments available/all booked. Suggested she try local pharmacies and continue to check back with scheduling.

## 2021-04-23 DIAGNOSIS — Z94.83 PANCREAS TRANSPLANTED (H): ICD-10-CM

## 2021-04-23 DIAGNOSIS — Z94.0 KIDNEY REPLACED BY TRANSPLANT: ICD-10-CM

## 2021-04-23 RX ORDER — SULFAMETHOXAZOLE AND TRIMETHOPRIM 400; 80 MG/1; MG/1
TABLET ORAL
Qty: 90 TABLET | Refills: 3 | Status: SHIPPED | OUTPATIENT
Start: 2021-04-23 | End: 2022-04-29

## 2021-05-06 ENCOUNTER — TELEPHONE (OUTPATIENT)
Dept: OPHTHALMOLOGY | Facility: CLINIC | Age: 46
End: 2021-05-06

## 2021-05-06 NOTE — TELEPHONE ENCOUNTER
I spoke to the patient who notes some newer flashes and possible change of vision in both eyes.  She cannot come tomorrow so I scheduled her for next week.

## 2021-05-06 NOTE — TELEPHONE ENCOUNTER
[facility] Call Center    Phone Message    May a detailed message be left on voicemail: yes     Reason for Call: Symptoms or Concerns     If patient has red-flag symptoms, warm transfer to triage line    Current symptom or concern: flashers/loss of vision    Symptoms have been present for:  1-2 month(s)    Has patient previously been seen for this? No    By : N/A    Date: N/A    Are there any new or worsening symptoms? Yes: Pt says over the past 1-2 months her vision has been decreasing and that she is now seeing flashing in her vision      Action Taken: Message routed to:  [facility] E[facility]    Travel Screening: Not Applicable

## 2021-05-12 ENCOUNTER — OFFICE VISIT (OUTPATIENT)
Dept: OPHTHALMOLOGY | Facility: CLINIC | Age: 46
End: 2021-05-12
Attending: OPHTHALMOLOGY
Payer: COMMERCIAL

## 2021-05-12 DIAGNOSIS — H04.123 BILATERAL DRY EYES: ICD-10-CM

## 2021-05-12 DIAGNOSIS — E10.3513 TYPE 1 DIABETES MELLITUS WITH PROLIFERATIVE RETINOPATHY OF BOTH EYES AND MACULAR EDEMA (H): Primary | ICD-10-CM

## 2021-05-12 DIAGNOSIS — E10.3513 TYPE 1 DIABETES MELLITUS WITH PROLIFERATIVE RETINOPATHY OF BOTH EYES AND MACULAR EDEMA (H): ICD-10-CM

## 2021-05-12 DIAGNOSIS — H53.143 PHOTOPHOBIA OF BOTH EYES: ICD-10-CM

## 2021-05-12 DIAGNOSIS — Z96.1 PSEUDOPHAKIA: ICD-10-CM

## 2021-05-12 DIAGNOSIS — Z86.39: ICD-10-CM

## 2021-05-12 PROCEDURE — G0463 HOSPITAL OUTPT CLINIC VISIT: HCPCS

## 2021-05-12 PROCEDURE — 92134 CPTRZ OPH DX IMG PST SGM RTA: CPT | Performed by: OPHTHALMOLOGY

## 2021-05-12 PROCEDURE — 92012 INTRM OPH EXAM EST PATIENT: CPT | Performed by: OPHTHALMOLOGY

## 2021-05-12 ASSESSMENT — TONOMETRY
IOP_METHOD: TONOPEN
OD_IOP_MMHG: 13
OS_IOP_MMHG: 11

## 2021-05-12 ASSESSMENT — VISUAL ACUITY
OD_SC+: -1
OD_SC: 20/70
METHOD: SNELLEN - LINEAR
OS_SC+: -1
OS_SC: 20/40

## 2021-05-12 ASSESSMENT — SLIT LAMP EXAM - LIDS
COMMENTS: NORMAL
COMMENTS: NORMAL

## 2021-05-12 ASSESSMENT — CONF VISUAL FIELD
OS_SUPERIOR_TEMPORAL_RESTRICTION: 3
OS_INFERIOR_TEMPORAL_RESTRICTION: 1
OD_INFERIOR_NASAL_RESTRICTION: 1
OD_SUPERIOR_NASAL_RESTRICTION: 1
OS_SUPERIOR_NASAL_RESTRICTION: 1
OD_SUPERIOR_TEMPORAL_RESTRICTION: 3
OD_INFERIOR_TEMPORAL_RESTRICTION: 1
OS_INFERIOR_NASAL_RESTRICTION: 1

## 2021-05-12 ASSESSMENT — CUP TO DISC RATIO
OS_RATIO: 0.4
OD_RATIO: 0.4

## 2021-05-12 ASSESSMENT — EXTERNAL EXAM - LEFT EYE: OS_EXAM: NORMAL

## 2021-05-12 ASSESSMENT — EXTERNAL EXAM - RIGHT EYE: OD_EXAM: NORMAL

## 2021-05-12 NOTE — NURSING NOTE
Chief Complaints and History of Present Illnesses   Patient presents with     Vitreous Floaters Evaluation     Chief Complaint(s) and History of Present Illness(es)     Vitreous Floaters Evaluation     Laterality: both eyes    Quality: ring    Course: gradually worsening    Associated symptoms: flashes, blurred vision and headache.  Negative for nausea and vomiting    Pain scale: 0/10              Comments     C/o Headaches and [place] of flashes BE X2-3 weeks  Feels like eyes aren't working together   Seems to be missing area of RE    Katheryn Mcgregor COT 8:19 AM May 12, 2021

## 2021-05-12 NOTE — PROGRESS NOTES
"CC -  PDR- flashing lights right eye     INTERVAL HISTORY - VA right eye worsened subjectively, here for headaches and flashing lights. Complains of \"eyes crossing\". Occasional double vision  Her vision has good days and bad days and bad days are more frequent lately.     PMH -   Rose Castaneda is a  45 year old year-old patient referred BY Dr Yancey from [facility] for evlauation and treat  of PDR. Previously seen by me 4/2015 @ [facility]. subseqeuntly seen by Dr. Yancey @ [facility] and referred to me for further evaluation.  History of pancreatic transplant, kidney transplant x2, ~ 2017  VA @ [facility] ([id number]) was 20/40 - 20/50 & 20/30 - 20/40     PAST OCULAR SURGERY  PRP OU 2005 in [place]  CE/IOL 9/2020  YAG OU    RETINAL IMAGING:  OCT 2-16-21 5-12-21  OD - extensive outer atrophy and thinning, mild ERM, trace IRF; stable   OS - extensive outer atrophy from FML scars, mild diffuse thinning, mild ERM, mild DME non-central  -> 209 -> 205    GVF 2-16-21  OU severe constriction, III4e ~ 5 degrees OU, V ~ 15 degrees    ASSESSMENT & PLAN    # DM I with PDR and DME   - now s/p pancreas transplant   - quiescent   - dense laser scars & outer atrophy diffuse   - likely causing loss of acuity & contrast    - has focal VRT to ONH on OCT but no traction on exam    # Mild DME, OS > OD   - had AVastin x 1 OD in 2007   - stable and doubt significant   - recheck next visit    # Dry eye each eye   - may explain visual fluctuation   - is not using ATs; recommended to use ATs frequently; sample giving     F/U keep appt with Dr. Zuniga in August 2021, OCT + DFE OU     Complete documentation of historical and exam elements from today's encounter can be found in the full encounter summary report (not reduplicated in this progress note). I personally obtained the chief complaint(s) and history of present illness.  I confirmed and edited as necessary the review of systems, past medical/surgical history, family history, social history, " and examination findings as documented by others; and I examined the patient myself. I personally reviewed the relevant tests, images, and reports as documented above. I formulated and edited as necessary the assessment and plan and discussed the findings and management plan with the patient and family.    Christophe Sylvester MD, PhD

## 2021-07-05 DIAGNOSIS — Z94.83 PANCREAS REPLACED BY TRANSPLANT (H): ICD-10-CM

## 2021-07-05 DIAGNOSIS — Z94.0 KIDNEY REPLACED BY TRANSPLANT: Primary | ICD-10-CM

## 2021-07-05 RX ORDER — MYCOPHENOLIC ACID 180 MG/1
360 TABLET, DELAYED RELEASE ORAL 2 TIMES DAILY
Qty: 120 TABLET | Refills: 11 | Status: SHIPPED | OUTPATIENT
Start: 2021-07-05 | End: 2022-06-07

## 2021-07-24 NOTE — LETTER
7/31/2017       RE: Rose Castaneda  [address] [place] [address]     Dear Colleague,    Thank you for referring your patient, Rose Castaneda, to the [facility] at [facility]. Please see a copy of my visit note below.    Chief Complaint:   Chief Complaint   Patient presents with     Wound Check     Wound, Left plantar foot. Unna boot. Diabetic. Pt stated that the odor from her left foot is horrible and is wondering what she can do to help with the odor.          Allergies   Allergen Reactions     Amoxicillin-Pot Clavulanate Diarrhea     Ciprofloxacin Nausea     Pollen Extract Other (See Comments)     Seasonal allergies     Pyridostigmine Other (See Comments)     Spastic muscle motion in the face and legs, weakness in the arms. Slight swelling of the tongue.         Subjective: Rose is a 41 year old female who presents to the clinic today for a follow up of left foot wound. She relates that she has kept the boot C/D/I over the last week. She relates that she does notice some odor coming from the area. She relates that the swelling has significantly decreased over the last few days.       Objective      A wound is noted at left  plantar foot measuring 0.6cm x 0.7cm x 0.1cm.    Block Classification: II    Wound base: Red/Granulation    Edges: intact    Drainage: light/serous    Odor: no    Undermining: no    Bone Exposure: No    Clinical Signs of Infection: No    No debridement indicated on this wound today.   Deroofed bullae noted on the dorsal aspect of the left foot. No s/s of infection noted to this area.     Assessment: Left plantar foot wound - has healed some since the last visit.     Plan:   - Pt seen and evaluated  - Silvercel was placed directly on the wound. The leg was then wrapped in a multi-layered, valved unna's boot. She should keep this C/D/I for the next week.   - Pt to return to clinic in 1 week or sooner if problems  arise.      Darius Parkinson DPM       Awake/Alert/Cooperative

## 2021-08-02 DIAGNOSIS — E10.3513 TYPE 1 DIABETES MELLITUS WITH PROLIFERATIVE RETINOPATHY OF BOTH EYES AND MACULAR EDEMA (H): Primary | ICD-10-CM

## 2021-08-07 ENCOUNTER — HEALTH MAINTENANCE LETTER (OUTPATIENT)
Age: 46
End: 2021-08-07

## 2021-09-13 NOTE — TELEPHONE ENCOUNTER
Call returned to patient: No answer. Voice message left with instructions to return call to discuss tacrolimus level, dose change and recheck level on Monday. Will try back   color consistent with ethnicity/race

## 2021-09-15 ENCOUNTER — TELEPHONE (OUTPATIENT)
Dept: TRANSPLANT | Facility: CLINIC | Age: 46
End: 2021-09-15

## 2021-09-15 NOTE — TELEPHONE ENCOUNTER
September 15, 2021 9:21 AM - Migel Clinton CNA: pt called to [facility] debby neph visit w Spong 12/20 next avail /video

## 2021-10-02 ENCOUNTER — HEALTH MAINTENANCE LETTER (OUTPATIENT)
Age: 46
End: 2021-10-02

## 2021-10-04 ENCOUNTER — OFFICE VISIT (OUTPATIENT)
Dept: OPHTHALMOLOGY | Facility: CLINIC | Age: 46
End: 2021-10-04
Attending: OPHTHALMOLOGY
Payer: COMMERCIAL

## 2021-10-04 DIAGNOSIS — E10.3513 TYPE 1 DIABETES MELLITUS WITH PROLIFERATIVE RETINOPATHY OF BOTH EYES AND MACULAR EDEMA (H): Primary | ICD-10-CM

## 2021-10-04 PROCEDURE — 92014 COMPRE OPH EXAM EST PT 1/>: CPT | Performed by: OPHTHALMOLOGY

## 2021-10-04 PROCEDURE — G0463 HOSPITAL OUTPT CLINIC VISIT: HCPCS

## 2021-10-04 PROCEDURE — 92134 CPTRZ OPH DX IMG PST SGM RTA: CPT | Performed by: OPHTHALMOLOGY

## 2021-10-04 ASSESSMENT — CONF VISUAL FIELD
OD_SUPERIOR_NASAL_RESTRICTION: 3
OD_SUPERIOR_TEMPORAL_RESTRICTION: 1
OD_INFERIOR_NASAL_RESTRICTION: 1
OD_INFERIOR_TEMPORAL_RESTRICTION: 1
OS_INFERIOR_TEMPORAL_RESTRICTION: 1
OS_SUPERIOR_NASAL_RESTRICTION: 3
OS_SUPERIOR_TEMPORAL_RESTRICTION: 1
OS_INFERIOR_NASAL_RESTRICTION: 1

## 2021-10-04 ASSESSMENT — VISUAL ACUITY
OS_SC: 20/40
METHOD: SNELLEN - LINEAR
OD_SC: 20/70

## 2021-10-04 ASSESSMENT — CUP TO DISC RATIO
OD_RATIO: 0.4
OS_RATIO: 0.4

## 2021-10-04 ASSESSMENT — TONOMETRY
OD_IOP_MMHG: 12
IOP_METHOD: TONOPEN
OS_IOP_MMHG: 13

## 2021-10-04 ASSESSMENT — EXTERNAL EXAM - LEFT EYE: OS_EXAM: NORMAL

## 2021-10-04 ASSESSMENT — SLIT LAMP EXAM - LIDS
COMMENTS: NORMAL
COMMENTS: NORMAL

## 2021-10-04 ASSESSMENT — EXTERNAL EXAM - RIGHT EYE: OD_EXAM: NORMAL

## 2021-10-04 NOTE — NURSING NOTE
"Chief Complaints and History of Present Illnesses   Patient presents with     Diabetic Retinopathy Follow Up     1 year follow up     Chief Complaint(s) and History of Present Illness(es)     Diabetic Retinopathy Follow Up     Comments: 1 year follow up              Comments     Pt states that vision is worse in both eyes. Pt feels like her \"world is shrinking\" able to see things within 15 ft, pt notes that she was able to see farther before. More difficulty with contrast. Pt unable to distinguish objects when walking, \" everything appears white washed\".  No eye pain today.    DM1 Pt s/p Pancreas transplant.  Lab Results       Component                Value               Date                       A1C                      5.2                 02/04/2021                 A1C                      5.4                 04/14/2020                 A1C                      5.0                 10/25/2019                 A1C                      5.2                 02/05/2019                 A1C                      5.1                 05/02/2018              ELIZABETH Flaherty October 4, 2021 7:28 AM                    "

## 2021-10-04 NOTE — PROGRESS NOTES
CC -  PDR    INTERVAL HISTORY - VA continuing to worsen, losing contrast sensitivity      PMH -   Rose Castaneda is a  45 year old year-old patient referred BY Dr Yancey from [facility] for evlauation and treat  of PDR. Previously seen by me 4/2015 @ [facility]. subseqeuntly seen by Dr. Yancey @ [facility] and referred to me for further evaluation.  History of pancreatic transplant, kidney transplant x2, ~ 2017  VA @ [facility] ([id number]) was 20/40 - 20/50 & 20/30 - 20/40       PAST OCULAR SURGERY  PRP OU 2005 in [place]  CE/IOL 9/2020  YAG OU      RETINAL IMAGING:  OCT 10-4-21  OD - extensive outer atrophy and thinning, mild ERM, trace IRF  OS - extensive outer atrophy from FML scars, mild diffuse thinning, mild ERM, mild DME non-central stable    GVF 2-16-21  OU severe constriction, III4e ~ 5 degrees OU, V ~ 15 degrees        ASSESSMENT & PLAN    # DM I with PDR and DME   - now s/p pancreas transplant   - quiescent   - dense laser scars & outer atrophy diffuse   - likely causing loss of acuity & contrast    - has focal VRT to ONH on OCT but no traction on exam       # Mild DME, OS > OD   - had AVastin x 1 OD in 2007   - doubt significant   - recheck 6-12 months      # Legal blindness OU   - presumed d/t PDR and extensive laser scaring   - suspect progression d/t scar creep    - cannot r/o concomitant sandra/cone dystrophy but less likely   - patient advised to have her son get DFE as precaution   - could consider genetic testing if continues to progress   - doubt ffERG would be useful given dense laser   - low vision eval     - check FAF next visit      F/U 6-12 months, OCT OU, DFE OU, FAF OU 55         ATTESTATION     Attending Attestation:     Complete documentation of historical and exam elements from today's encounter can be found in the full encounter summary report (not reduplicated in this progress note).  I personally obtained the chief complaint(s) and history of present illness.  I confirmed and edited as necessary the  review of systems, past medical/surgical history, family history, social history, and examination findings as documented by others; and I examined the patient myself.  I personally reviewed the relevant tests, images, and reports as documented above.  I formulated and edited as necessary the assessment and plan and discussed the findings and management plan with the patient and family    Jaja Zuniga MD, PhD  , Vitreoretinal Surgery  Department of Ophthalmology  [facility]

## 2021-10-21 ENCOUNTER — OFFICE VISIT (OUTPATIENT)
Dept: OPTOMETRY | Facility: CLINIC | Age: 46
End: 2021-10-21
Payer: MEDICARE

## 2021-10-21 DIAGNOSIS — Z96.1 PSEUDOPHAKIA OF BOTH EYES: ICD-10-CM

## 2021-10-21 DIAGNOSIS — E10.3513 TYPE 1 DIABETES MELLITUS WITH PROLIFERATIVE RETINOPATHY OF BOTH EYES AND MACULAR EDEMA (H): Primary | ICD-10-CM

## 2021-10-21 DIAGNOSIS — E11.39 LEGAL BLINDNESS DUE TO DIABETES MELLITUS (H): ICD-10-CM

## 2021-10-21 DIAGNOSIS — H54.8 LEGAL BLINDNESS DUE TO DIABETES MELLITUS (H): ICD-10-CM

## 2021-10-21 DIAGNOSIS — H52.4 PRESBYOPIA: ICD-10-CM

## 2021-10-21 ASSESSMENT — CONF VISUAL FIELD
OD_SUPERIOR_TEMPORAL_RESTRICTION: 1
OS_INFERIOR_TEMPORAL_RESTRICTION: 1
OS_SUPERIOR_NASAL_RESTRICTION: 3
OD_INFERIOR_TEMPORAL_RESTRICTION: 1
OD_INFERIOR_NASAL_RESTRICTION: 1
OS_INFERIOR_NASAL_RESTRICTION: 1
OS_SUPERIOR_TEMPORAL_RESTRICTION: 1
OD_SUPERIOR_NASAL_RESTRICTION: 3

## 2021-10-21 ASSESSMENT — REFRACTION_MANIFEST
OD_CYLINDER: SPHERE
OS_SPHERE: -0.25
OD_ADD: +2.75
OS_CYLINDER: +0.50
OD_SPHERE: -0.25
OS_ADD: +2.75
OS_AXIS: 090

## 2021-10-21 ASSESSMENT — SLIT LAMP EXAM - LIDS
COMMENTS: NORMAL
COMMENTS: NORMAL

## 2021-10-21 ASSESSMENT — VISUAL ACUITY
METHOD: SNELLEN - LINEAR
OD_SC+: -2
OD_SC: 20/60
OS_SC+: -2
OS_SC: 20/30

## 2021-10-21 ASSESSMENT — EXTERNAL EXAM - LEFT EYE: OS_EXAM: NORMAL

## 2021-10-21 ASSESSMENT — CUP TO DISC RATIO
OS_RATIO: 0.4
OD_RATIO: 0.4

## 2021-10-21 ASSESSMENT — EXTERNAL EXAM - RIGHT EYE: OD_EXAM: NORMAL

## 2021-10-21 NOTE — PROGRESS NOTES
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 Emphasized importance of adequate lighting and contrast. Patient may consider trying smart lighting in her apartment to better control glare. Continued use of tinted lenses as needed was recommended. Patient has an upcoming appt with low vision OT to discuss additional aids and strategies to help with her vision, although she seems to be well-adjusted to her vision loss and is currently using ZoomText, [id number] software and a white-tipped cane effectively for mobility.     (Z96.1) Pseudophakia of both eyes  Plan: Monitor. Minimal Rx for distance vision.     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 Low Vision OT appt is upcoming.     (H52.4) Presbyopia  Plan: REFRACTION [[id number]]        SRx updated. Minimal Rx. Ok to continue with OTC reading glasses and tinted glasses for outdoor wear, as patient did not appreciate significant improvement with distance Rx today. Monitor annually.           60 minutes were spent on the date of the encounter doing chart review, history and exam, documentation, and further activities as noted above.    Complete documentation of historical and exam elements from today's encounter can  be found in the full encounter summary report (not reduplicated in this progress  note). I personally obtained the chief complaint(s) and history of present illness. I  confirmed and edited as necessary the review of systems, past medical/surgical  history, family history, social history, and examination findings as documented by  others; and I examined the patient myself. I personally reviewed the relevant tests,  images, and reports as documented above. I formulated and edited as necessary  the  assessment and plan and discussed the findings and management plan with the  patient and family.    Enoch Vo, GABRIELLA

## 2021-11-08 ENCOUNTER — HOSPITAL ENCOUNTER (OUTPATIENT)
Dept: OCCUPATIONAL THERAPY | Facility: CLINIC | Age: 46
Setting detail: THERAPIES SERIES
End: 2021-11-08
Attending: OPHTHALMOLOGY
Payer: COMMERCIAL

## 2021-11-08 DIAGNOSIS — E10.3513 TYPE 1 DIABETES MELLITUS WITH PROLIFERATIVE RETINOPATHY OF BOTH EYES AND MACULAR EDEMA (H): ICD-10-CM

## 2021-11-08 PROCEDURE — 97535 SELF CARE MNGMENT TRAINING: CPT | Mod: GO | Performed by: OCCUPATIONAL THERAPIST

## 2021-11-08 PROCEDURE — 97165 OT EVAL LOW COMPLEX 30 MIN: CPT | Mod: GO | Performed by: OCCUPATIONAL THERAPIST

## 2021-11-08 ASSESSMENT — ACTIVITIES OF DAILY LIVING (ADL): PRIOR_ADL/IADL_STATUS: INDEPENDENT PRIOR TO ONSET

## 2021-11-08 NOTE — PROGRESS NOTES
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 (H) E10.3513    Onset Of Illness/injury Or Date Of Surgery 10/4/2021 - date of order   Surgical/Medical history reviewed Yes   Precautions/Limitations falls   Additional Occupational Profile Info/Pertinent History of Current Problem History of pancreatic transplant, kidney transplant x2, charcot foot L, EBV, R carpal tunnel syndrome, CMV, see chart for other   Prior ADL/IADL status Independent prior to onset   Patient/family Goals Statement move to an apartment in [place] to be close to her son (14yo), get a job (reports she is very social) - \"I need some self worth, I'm willing to learn new things;\" would like to \"get out more\"   General information comments per recent eye MD visits/records: Legal blindness due to diabetes mellitus, Ok to continue with OTC reading glasses and tinted glasses for outdoor wear, as patient did not appreciate significant improvement with distance Rx.  Patient went to [facility] for a couple months in the past year - did cooking classes (though cooks very little), didn't learn braille - neuropathy in finger tips, did white cane training - primary focus; \"tested out of keyboard.\" She is connected with [employer] (currently in tech program - learning zoom text, etc. - does 1x/week for 2 hours, he comes to her apartment).   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 No   Have you fallen and had an injury in the past year? No   Is patient a fall risk? Yes  (vision and neuropathy)   Abuse Screen (yes response referral indicated)   Feels Unsafe at Home or Work/School no   Feels Threatened by Someone no   Cognitive/Behavioral   Communication Intact   Cognitive Status Intact for evaluation process   Behavior Appropriate   Physical Status/Equipment   Mobility equipment used White cane   Physical Status/Equipment comments neuropathy from knees distally and in fingers   Visual Report   Functional Complaints Reading;Work related tasks;Homemaking;Safety in mobility  (writing appears as could be deficit based on screening)   Visual Complaints Visual fatigue;Difficulty maintaining focus;Double vision;Light sensitivity   Complaints comments double vision when gets tired; flashers circular \"lightning kind of\"   Pavan Baum Symptoms? No   Magnifier (strength and type) dome magnifier; readers - see below   Reading glasses   (+2.75 or +3.0 readers help some)   Technology Computer;Zoom text  (lap top - plugs in to big monitor; smart phone)   Equipment comments Uses Lisa a lot, Has [employer] and Seeing  - doesn't use either; working with tech through [employer] for computer/zoom text, etc.   Lighting and Glare   Is your lighting adequate? No/ at home  (too dim - has 4 lamps with bright lights)   Is glare a problem? Yes/ indoors;Yes/ outdoors   Are you satisfied with your sunglasses? No   Sunglass filter color Lala   Visual Acuity   Acuity right eye distance sc: 20/60 -2   Acuity left eye distance sc: " 20/30 -2   Acuity both eyes together add is +2.75   Contrast Sensitivity   Contrast sensitivity (score/25) 10/25   Preferred Retinal Locus   Right eye   (clock face: missing all numbers)   Left eye   (clock face: missing all numbers)   MN Read   Smallest print size read all testing with +3.25 readers from ~1.6M and smaller); ambient light: .5M; task light: .5M   Critical print size ambient light: 2M; task light: 1.3M   Words per minute at critical print size ambient light: 133wpm; task light: 133M; preferred print size: 2M   SRAFVP SCORING   # relevant measures 33   Composite score 53   Percentage of disability (%) 19.7   Clinical Impression, OT Eval   Criteria for Skilled Therapeutic Interventions Met yes;treatment indicated   Therapy  Diagnosis: Impaired ADL/IADL with deficits in Reading based ADL;Home management;Written communication;Financial management;Dressing;Grooming;Work performance  (mobility)   Impairment comments decreased vision, neuropathy in UE/LE's   Assessment of Occupational Performance 5 or more Performance Deficits   Identified Performance Deficits Reading based ADL;Home management;Written communication;Financial management;Dressing;Grooming;Work performance; mobility   Clinical Decision Making (Complexity) Low complexity   OT Visual Rehabilitation Evaluation Plan   Therapy Plan Occupational therapy intervention   Planned Interventions Visual field loss awareness;Low vision compensatory training for reading;Low vision compensatory training for written communication;Low vision compensatory trainging for financial management;Low vision compensatory training for object identification;Instruction in environmental adaptations for glare;Instruction in environmental adaptations for contrast;Instruction in environmental adaptations for lighting;Optical device/ADL device instruction and training;Computer modifications;Instruction in community resources   Frequency / Duration 1x/week, decreased frequency  prn   Risks and Benefits of Treatment have been explained. Yes   Patient, Family in agreement with plan of care Yes   GOALS   Goals Near Vision;Environmental Modification;Resource Education;Distance Viewing   Goal 1 - Near Vision   Near Vision Goal Comment Patient will demonstrate 3 pieces of adaptive equipment and/or adaptive techniques in regards to magnification, lighting, contrast and glare for increased ADL/IADL independence with near vision tasks (reading, meal prep, medication management, writing).   Target Date 01/31/22   Goal 3 - Environmental modification   Environmental Modification Goal Comment Patient will demonstrate and/or verbalize 3 environmentally-based ADL modifications to improve visual-based ADL/IADL activities.   Target Date 01/31/22   Goal 4 - Resource education   Goal Description: Resource education Patient will verbalize awareness of community resources for the following:;Audio access to print materials;Access to large print materials;Access to low vision devices   Target Date 01/31/22   Goal 5 - Distance viewing   Distance Viewing Goal Comment Pt will demonstrate increased independence and safety in ADL/IADL activities at intermediate distance or in the distance through proficient use of AE.   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  (Physician co-signature of this document indicates review and certification of the therapy plan).

## 2021-11-08 NOTE — PROGRESS NOTES
[facility]          OUTPATIENT OCCUPATIONALTHERAPY  EVALUATION  PLAN OF TREATMENT FOR OUTPATIENT REHABILITATION  (COMPLETE FOR INITIAL CLAIMS ONLY)  Patient's Last Name, First Name, M.I.  YOB: 1975  Rose Castaneda      Provider's Name  [facility] Medical Record No.  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 (H) E10.3513    Therapy Diagnosis: Impaired ADL/IADL with deficits in Reading based ADL,Home management,Written communication,Financial management,Dressing,Grooming,Work performance (mobility)  decreased vision, neuropathy in UE/LE's Visits from SOC: 1     _________________________________________________________________________________  Plan of Treatment/Functional Goals:  Planned Interventions: Visual field loss awareness,Low vision compensatory training for reading,Low vision compensatory training for written communication,Low vision compensatory trainging for financial management,Low vision compensatory training for object identification,Instruction in environmental adaptations for glare,Instruction in environmental adaptations for contrast,Instruction in environmental adaptations for lighting,Optical device/ADL device instruction and training,Computer modifications,Instruction in community resources        Goals  1.      Patient will demonstrate 3 pieces of adaptive equipment and/or adaptive techniques in regards to magnification, lighting, contrast and glare for increased ADL/IADL independence with near vision tasks (reading, meal prep, medication management, writing).    Target Date: 01/31/22      2.                  3.       Patient will demonstrate and/or verbalize 3  environmentally-based ADL modifications to improve visual-based ADL/IADL activities.    Target Date: 01/31/22      4. Patient will verbalize awareness of community resources for the following:,Audio access to print materials,Access to large print materials,Access to low vision devices         Target Date: 01/31/22      5.       Pt will demonstrate increased independence and safety in ADL/IADL activities at intermediate distance or in the distance through proficient use of AE.    Target Date: 01/31/22      6.                    7.                 8.                            Therapy Frequency/Duration:  1x/week, decreased frequency prn    Aundrea Mina OT       I CERTIFY THE NEED FOR THESE SERVICES FURNISHED UNDER        THIS PLAN OF TREATMENT AND WHILE UNDER MY CARE     (Physician co-signature of this document indicates review and certification of the therapy plan).                  Certification date from: 11/08/21 Certification date to: 01/31/21          Referring Physician: Jaja Zuniga MD     Initial Assessment        See Epic Evaluation Start Of Care Date: 11/08/21     Aundrea Mina OT

## 2021-11-29 ENCOUNTER — HOSPITAL ENCOUNTER (OUTPATIENT)
Dept: OCCUPATIONAL THERAPY | Facility: CLINIC | Age: 46
Setting detail: THERAPIES SERIES
End: 2021-11-29
Attending: OPHTHALMOLOGY
Payer: COMMERCIAL

## 2021-11-29 PROCEDURE — 97535 SELF CARE MNGMENT TRAINING: CPT | Mod: GO | Performed by: OCCUPATIONAL THERAPIST

## 2021-12-21 ENCOUNTER — TELEPHONE (OUTPATIENT)
Dept: OPHTHALMOLOGY | Facility: CLINIC | Age: 46
End: 2021-12-21
Payer: MEDICARE

## 2021-12-21 NOTE — TELEPHONE ENCOUNTER
LVM for pt to call back directly in order to complete section 4 (physician's certification) on the discharge application that was sent via [employer] - per pt's note, this is to have her student loan discharged due to her disability status.    Marcia Traylor COA 5:03 PM December 21, 2021

## 2021-12-24 DIAGNOSIS — N18.4 CKD (CHRONIC KIDNEY DISEASE) STAGE 4, GFR 15-29 ML/MIN (H): ICD-10-CM

## 2021-12-24 DIAGNOSIS — Z94.0 KIDNEY REPLACED BY TRANSPLANT: Primary | ICD-10-CM

## 2021-12-24 DIAGNOSIS — N18.9 ANEMIA IN CHRONIC RENAL DISEASE: ICD-10-CM

## 2021-12-24 DIAGNOSIS — Z94.83 PANCREAS REPLACED BY TRANSPLANT (H): ICD-10-CM

## 2021-12-24 DIAGNOSIS — E55.9 VITAMIN D DEFICIENCY: ICD-10-CM

## 2021-12-24 DIAGNOSIS — D63.1 ANEMIA IN CHRONIC RENAL DISEASE: ICD-10-CM

## 2021-12-24 DIAGNOSIS — N25.81 SECONDARY RENAL HYPERPARATHYROIDISM (H): ICD-10-CM

## 2021-12-29 ENCOUNTER — HOSPITAL ENCOUNTER (OUTPATIENT)
Dept: OCCUPATIONAL THERAPY | Facility: CLINIC | Age: 46
Setting detail: THERAPIES SERIES
End: 2021-12-29
Attending: OPHTHALMOLOGY
Payer: COMMERCIAL

## 2021-12-29 PROCEDURE — 97535 SELF CARE MNGMENT TRAINING: CPT | Mod: GO | Performed by: OCCUPATIONAL THERAPIST

## 2022-01-03 ENCOUNTER — TELEPHONE (OUTPATIENT)
Dept: TRANSPLANT | Facility: CLINIC | Age: 47
End: 2022-01-03
Payer: MEDICARE

## 2022-01-03 DIAGNOSIS — R73.9 HYPERGLYCEMIA: ICD-10-CM

## 2022-01-03 DIAGNOSIS — Z94.0 KIDNEY REPLACED BY TRANSPLANT: ICD-10-CM

## 2022-01-03 DIAGNOSIS — Z94.83 PANCREAS REPLACED BY TRANSPLANT (H): Primary | ICD-10-CM

## 2022-01-03 NOTE — TELEPHONE ENCOUNTER
RE:  Received: Today  Lowell Paez MD Blaisdell, Heather Cuevas, RN  Caller: Unspecified (Today,  8:57 AM)  Let s check DSA, A1c, c peptide please along with routine labs. If something is up we can get prospera vs biopsy             Previous Messages       ----- Message -----   From: Heather Aparicio, KYA   Sent: 1/3/2022   3:47 PM CST   To: Lowell Paez MD     ----- Message from Heather Aparicio RN sent at 1/3/2022  3:47 PM CST -----   Should we add anything to routine pancreas labs? DSA?     OUTCOME: Lab orders placed per above recommendation.

## 2022-01-03 NOTE — TELEPHONE ENCOUNTER
Spoke with Renetta. She started to sleep in afternoon. Can normally sleep well at night, but naps concerned her. So she decided to check her glucose and found her BG was 160 yesterday, down to 120. Today 130 but not eating much on purpose. She denies fever, pain at graft sites, or concern for infection. She has not missed any doses of her anti-rejection medications. No DSA in February.  She is very concerned however that her pancreas is not functioning to it's full capacity as her BG typically is under 100.     She has Medicare so I believe we could check Prospera for pancreas rejection if needed but told her we should first see what hemoglobin A1C and Pancreas enzymes look like. Made Lab appointment for tomorrow at 10:45 AM Carol Irion.    Do we need to check DSA? Message to reviewing provider.     Heather Aparicio, RN, BSN  Solid Organ Transplant, Post Kidney and Pancreas  Transplant Care Coordinator  [phone]

## 2022-01-04 ENCOUNTER — LAB (OUTPATIENT)
Dept: LAB | Facility: CLINIC | Age: 47
End: 2022-01-04
Payer: COMMERCIAL

## 2022-01-04 DIAGNOSIS — N18.4 CKD (CHRONIC KIDNEY DISEASE) STAGE 4, GFR 15-29 ML/MIN (H): ICD-10-CM

## 2022-01-04 DIAGNOSIS — R73.9 HYPERGLYCEMIA: ICD-10-CM

## 2022-01-04 DIAGNOSIS — E55.9 VITAMIN D DEFICIENCY: ICD-10-CM

## 2022-01-04 DIAGNOSIS — N25.81 SECONDARY RENAL HYPERPARATHYROIDISM (H): ICD-10-CM

## 2022-01-04 DIAGNOSIS — N18.9 ANEMIA IN CHRONIC RENAL DISEASE: ICD-10-CM

## 2022-01-04 DIAGNOSIS — D63.1 ANEMIA IN CHRONIC RENAL DISEASE: ICD-10-CM

## 2022-01-04 DIAGNOSIS — Z79.899 IMMUNOSUPPRESSIVE MANAGEMENT ENCOUNTER FOLLOWING KIDNEY TRANSPLANT: ICD-10-CM

## 2022-01-04 DIAGNOSIS — Z94.0 IMMUNOSUPPRESSIVE MANAGEMENT ENCOUNTER FOLLOWING KIDNEY TRANSPLANT: ICD-10-CM

## 2022-01-04 DIAGNOSIS — Z94.0 KIDNEY REPLACED BY TRANSPLANT: ICD-10-CM

## 2022-01-04 DIAGNOSIS — Z94.83 PANCREAS TRANSPLANTED (H): ICD-10-CM

## 2022-01-04 DIAGNOSIS — Z94.83 PANCREAS REPLACED BY TRANSPLANT (H): ICD-10-CM

## 2022-01-04 LAB
CREAT UR-MCNC: 190 MG/DL
ERYTHROCYTE [DISTWIDTH] IN BLOOD BY AUTOMATED COUNT: 13.4 % (ref 10–15)
HBA1C MFR BLD: 5.1 % (ref 0–5.6)
HCT VFR BLD AUTO: 35.4 % (ref 35–47)
HGB BLD-MCNC: 11.4 G/DL (ref 11.7–15.7)
LIPASE SERPL-CCNC: 158 U/L (ref 73–393)
MCH RBC QN AUTO: 29 PG (ref 26.5–33)
MCHC RBC AUTO-ENTMCNC: 32.2 G/DL (ref 31.5–36.5)
MCV RBC AUTO: 90 FL (ref 78–100)
PLATELET # BLD AUTO: 210 10E3/UL (ref 150–450)
PROT UR-MCNC: 0.35 G/L
PROT/CREAT 24H UR: 0.18 G/G CR (ref 0–0.2)
RBC # BLD AUTO: 3.93 10E6/UL (ref 3.8–5.2)
WBC # BLD AUTO: 4.2 10E3/UL (ref 4–11)

## 2022-01-04 PROCEDURE — 85027 COMPLETE CBC AUTOMATED: CPT

## 2022-01-04 PROCEDURE — 86832 HLA CLASS I HIGH DEFIN QUAL: CPT

## 2022-01-04 PROCEDURE — 82150 ASSAY OF AMYLASE: CPT

## 2022-01-04 PROCEDURE — 80061 LIPID PANEL: CPT

## 2022-01-04 PROCEDURE — 80048 BASIC METABOLIC PNL TOTAL CA: CPT

## 2022-01-04 PROCEDURE — 84156 ASSAY OF PROTEIN URINE: CPT

## 2022-01-04 PROCEDURE — 86833 HLA CLASS II HIGH DEFIN QUAL: CPT

## 2022-01-04 PROCEDURE — 84681 ASSAY OF C-PEPTIDE: CPT

## 2022-01-04 PROCEDURE — 83036 HEMOGLOBIN GLYCOSYLATED A1C: CPT

## 2022-01-04 PROCEDURE — 36415 COLL VENOUS BLD VENIPUNCTURE: CPT

## 2022-01-04 PROCEDURE — 83690 ASSAY OF LIPASE: CPT

## 2022-01-04 PROCEDURE — 80197 ASSAY OF TACROLIMUS: CPT

## 2022-01-04 PROCEDURE — 83550 IRON BINDING TEST: CPT

## 2022-01-04 PROCEDURE — 82728 ASSAY OF FERRITIN: CPT

## 2022-01-04 PROCEDURE — 87799 DETECT AGENT NOS DNA QUANT: CPT

## 2022-01-04 PROCEDURE — 82306 VITAMIN D 25 HYDROXY: CPT

## 2022-01-04 NOTE — TELEPHONE ENCOUNTER
Called Rose Castaneda end of the day to see if rest of the labs are resulted.  Made aware only CBC is back.  On-call nurses are only for emergent issues, they won't be logged in looking at labs.  Appreciates call back.

## 2022-01-04 NOTE — TELEPHONE ENCOUNTER
Patient Call: General  Route to LPN    Reason for call: Patient states she was told she would be told today about lab results due to potential rejection. Patient wants to know if the lab still runs results after 5:00pm and if so will an on call nurse reach out.     Call back needed? Yes    Return Call Needed  Same as documented in contacts section  When to return call?: Same day: Route High Priority

## 2022-01-05 DIAGNOSIS — R79.89 LOW VITAMIN D LEVEL: Primary | ICD-10-CM

## 2022-01-05 LAB
AMYLASE SERPL-CCNC: 99 U/L (ref 30–110)
ANION GAP SERPL CALCULATED.3IONS-SCNC: 5 MMOL/L (ref 3–14)
BKV DNA # SPEC NAA+PROBE: NOT DETECTED COPIES/ML
BUN SERPL-MCNC: 17 MG/DL (ref 7–30)
C PEPTIDE SERPL-MCNC: 1.8 NG/ML (ref 0.9–6.9)
CALCIUM SERPL-MCNC: 8.3 MG/DL (ref 8.5–10.1)
CHLORIDE BLD-SCNC: 110 MMOL/L (ref 94–109)
CHOLEST SERPL-MCNC: 182 MG/DL
CO2 SERPL-SCNC: 24 MMOL/L (ref 20–32)
CREAT SERPL-MCNC: 1.15 MG/DL (ref 0.52–1.04)
DEPRECATED CALCIDIOL+CALCIFEROL SERPL-MC: 12 UG/L (ref 20–75)
FASTING STATUS PATIENT QL REPORTED: YES
FERRITIN SERPL-MCNC: 41 NG/ML (ref 8–252)
GFR SERPL CREATININE-BSD FRML MDRD: 58 ML/MIN/1.73M2
GLUCOSE BLD-MCNC: 85 MG/DL (ref 70–99)
HDLC SERPL-MCNC: 97 MG/DL
IRON SATN MFR SERPL: 19 % (ref 15–46)
IRON SERPL-MCNC: 58 UG/DL (ref 35–180)
LDLC SERPL CALC-MCNC: 72 MG/DL
NONHDLC SERPL-MCNC: 85 MG/DL
POTASSIUM BLD-SCNC: 3.9 MMOL/L (ref 3.4–5.3)
SODIUM SERPL-SCNC: 139 MMOL/L (ref 133–144)
TACROLIMUS BLD-MCNC: 6.1 UG/L (ref 5–15)
TIBC SERPL-MCNC: 302 UG/DL (ref 240–430)
TME LAST DOSE: NORMAL H
TME LAST DOSE: NORMAL H
TRIGL SERPL-MCNC: 63 MG/DL

## 2022-01-05 RX ORDER — CHOLECALCIFEROL (VITAMIN D3) 50 MCG
2 TABLET ORAL DAILY
Qty: 60 TABLET | Refills: 11 | Status: SHIPPED | OUTPATIENT
Start: 2022-01-05 | End: 2024-01-31

## 2022-01-05 NOTE — TELEPHONE ENCOUNTER
Pt called for her lab results  Read them off to her  She said Heather was concerned re rejection  Needs a Coordinator to call her back

## 2022-01-05 NOTE — TELEPHONE ENCOUNTER
ISSUE:  Vit D level =12    PLAN:    ----- Message from Lowell Paez MD sent at 1/5/2022 11:10 AM CST -----  Please start cholecalciferol 4000 units daily    TASK   Please notify patient to start cholecalciferol 4000units daily.     Carmita Gonzáles RN BSN  Transplant Care Coordinator

## 2022-01-05 NOTE — TELEPHONE ENCOUNTER
Explained to patient that lipase, hgA1C are both WNL for patient. Glucose on BMP was 85.     BG for the past 2 days has been normal. Patient feels well. Note sent to Dr Paez for review, but no outward signs of rejection.     Lowell Paez MD Rockers, Emily S, RN; Heather Aparicio, KYA  Let's just continue to have her monitor. Ok to hold off on prospera

## 2022-01-06 LAB
DONOR IDENTIFICATION: NORMAL
DSA COMMENTS: NORMAL
DSA PRESENT: NO
DSA TEST METHOD: NORMAL
ORGAN: NORMAL
SA 1 CELL: NORMAL
SA 1 TEST METHOD: NORMAL
SA 2 CELL: NORMAL
SA 2 TEST METHOD: NORMAL
SA1 HI RISK ABY: NORMAL
SA1 MOD RISK ABY: NORMAL
SA2 HI RISK ABY: NORMAL
SA2 MOD RISK ABY: NORMAL
UNACCEPTABLE ANTIGENS: NORMAL
UNOS CPRA: 54
ZZZSA 1  COMMENTS: NORMAL
ZZZSA 2 COMMENTS: NORMAL

## 2022-01-07 NOTE — TELEPHONE ENCOUNTER
ISSUE: VITAMIN D DEF LEVEL 12. CALCIUM LEVEL 8.3    PLAN: Looks like on 1/5/22 Vitamin D3 supplement 2000 units added.    OUTCOME: Prescription sent to [facility].

## 2022-01-10 ENCOUNTER — TELEPHONE (OUTPATIENT)
Dept: PULMONOLOGY | Facility: CLINIC | Age: 47
End: 2022-01-10
Payer: MEDICARE

## 2022-01-10 ENCOUNTER — HOSPITAL ENCOUNTER (OUTPATIENT)
Dept: OCCUPATIONAL THERAPY | Facility: CLINIC | Age: 47
Setting detail: THERAPIES SERIES
End: 2022-01-10
Attending: OPHTHALMOLOGY
Payer: COMMERCIAL

## 2022-01-10 PROCEDURE — 97535 SELF CARE MNGMENT TRAINING: CPT | Mod: GO | Performed by: OCCUPATIONAL THERAPIST

## 2022-01-10 NOTE — TELEPHONE ENCOUNTER
Patient Call: Voicemail  Date/Time: 1/10/22 @ 3:11pm  Reason for call[facility] She has questions about it and the potential impact to transplant. Please call her to discuss

## 2022-01-11 ENCOUNTER — TELEPHONE (OUTPATIENT)
Dept: TRANSPLANT | Facility: CLINIC | Age: 47
End: 2022-01-11
Payer: MEDICARE

## 2022-01-11 NOTE — TELEPHONE ENCOUNTER
Pt has the chance to get the Monoclonal antibodies shot tomorrow it Rodrigo  Wonders if itiok with us   Does if affect future tx?  Has questions to review

## 2022-01-11 NOTE — TELEPHONE ENCOUNTER
RNCC returned call to Renetta and she was given the okay to receive per transplant nephrologist.  _______________________________________________________________________    Message  Received: Today  Lowell Paez MD [place]aisdell, Christin Rebecca, RN  Caller: Unspecified (Yesterday,  3:11 PM)  Yes she should receive it but I don't know how she is moving ahead of everyone on the line to get it. I know of lung txp patients getting it.             Previous Messages       ----- Message -----   From: Heather Aparicio RN   Sent: 1/11/2022  12:03 PM CST   To: Lowell Paez MD     ----- Message from Heather Aparicio RN sent at 1/11/2022 12:03 PM CST -----   Hi Lowell, You don't know this patient but for review from 2-3 pm...I told her if she has the opportunity to get Evusheld, that we are moving in that direction, but to be on the same page, I wanted to make sure you were okay with her going ahead and receiving tomorrow. Told her I would let her know by end of the day. Thanks-Heather

## 2022-01-11 NOTE — TELEPHONE ENCOUNTER
RNCC returned call to Renetta. Explained that Evusheld is an emergency use authorized COVID-19 prevention option that has just recently been approved for distribution to our lung transplant/BMT patients, not yet being distributed to other SOT patients. This has become an option as COVID-19 vaccine research has proven immunocompromised transplant patients do not build great immunity to vaccines/boosters. She has opportunity to receive Evusheld. She wants to know if transplant nephrology backs this decision and that it will not prevent her from re-transplant in future.

## 2022-01-18 ENCOUNTER — DOCUMENTATION ONLY (OUTPATIENT)
Dept: OPHTHALMOLOGY | Facility: CLINIC | Age: 47
End: 2022-01-18
Payer: MEDICARE

## 2022-01-25 ENCOUNTER — VIRTUAL VISIT (OUTPATIENT)
Dept: NEPHROLOGY | Facility: CLINIC | Age: 47
End: 2022-01-25
Attending: INTERNAL MEDICINE
Payer: COMMERCIAL

## 2022-01-25 DIAGNOSIS — N25.81 SECONDARY RENAL HYPERPARATHYROIDISM (H): ICD-10-CM

## 2022-01-25 DIAGNOSIS — Z94.0 KIDNEY REPLACED BY TRANSPLANT: ICD-10-CM

## 2022-01-25 DIAGNOSIS — N18.4 CKD (CHRONIC KIDNEY DISEASE) STAGE 4, GFR 15-29 ML/MIN (H): ICD-10-CM

## 2022-01-25 DIAGNOSIS — E55.9 VITAMIN D DEFICIENCY: ICD-10-CM

## 2022-01-25 DIAGNOSIS — Z94.83 PANCREAS REPLACED BY TRANSPLANT (H): ICD-10-CM

## 2022-01-25 PROCEDURE — 99215 OFFICE O/P EST HI 40 MIN: CPT | Mod: 95

## 2022-01-25 ASSESSMENT — PAIN SCALES - GENERAL: PAINLEVEL: NO PAIN (0)

## 2022-01-25 NOTE — PROGRESS NOTES
CHRONIC TRANSPLANT NEPHROLOGY VISIT    Assessment & Plan   # DDKT (SPK):   - Baseline Cr ~ 1.1-1.3   - Proteinuria: Normal (<0.2 grams)   - Date DSA Last Checked: Oct/2019      Latest DSA: No   - BK Viremia: No   - Kidney Tx Biopsy: No    # Pancreas Tx (SPK):   - Pancreatic Exocrine Drainage: Enteric drained     - Blood glucose: Euglycemia On insulin: No   - HbA1c: Stable      Latest HbA1c: 5.0%   - Pancreatic enzymes:  slight uptrend in lipase ~50%, monitor again with next labs   - Date DSA Last Checked: Oct/2019  Latest DSA: No   - Pancreas Tx Biopsy: No    # Immunosuppression: Tacrolimus extended release (goal 5-8) and Mycophenolic acid (goal not followed)   - Changes: No, monitoring for long term toxicity and complications including infections and malignancies while on lifelong immunosuppression     # Infection Prophylaxis:   - PJP: Sulfa/TMP (Bactrim)    # Neurogenic Orthostatic Hypotension: Controlled;  Goal BP: > 100, but < 130 systolic   - Changes: No    - Off of Florinef    # Anemia in Chronic Renal Disease: Hgb: Stable      DONNY: No   - Iron studies: Not checked recently    # Mineral Bone Disorder:   - Vitamin D; level: Not checked recently        On Supplement: Yes  - Calcium; level: Low        On Supplement: No    # Chronic Diarrhea: Resolved.     # CMV Viremia: Detectable, but less than quantifiable at last check. No need to follow unless symptomatic.     # EBV Viremia: Detectable, but less than quantifiable at last check. No need to follow unless symptomatic.     # h/o Pulmonary Histoplasmosis: No off itraconazole.    # CAD: Asymptomatic.     # Carpal Tunnel Syndrome:  S/p release on right hand.     # Skin Cancer Risk:    - Discussed sun protection and recommend regular follow up with Dermatology.    # Medical Compliance: No.  Evidence of labs less than recommended.  - Discussed importance of checking labs regularly as recommended, taking medications as prescribed and attending scheduled medical  appointments.  - Patient was informed to get her labs done once per month.     # COVID: counseling was offered. Patient received JJ vaccine and evosheld, will check with IS on the best timing of further vaccination.     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    Return visit: No follow-ups on file.    Chief Complaint   Ms. Castaneda is a 46 year old here for routine follow up.    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   She feels well overall and reports no new complaints. Energy level is good. Denies any fevers, chills, weight loss, night sweats. No nausea, vomiting, abdominal pain, diarrhea. No chest pain, sob, leg swelling. No recent illness or hospitalization.   She received her montrell and montrell vaccine in April and 2 weeks ago received the Evusheld. She no new complaints and has not been following with dermatology or getting her labs regularly due to covid. I stressed the importance of both of these and the routine health maintenance including colonoscopy, mammogram and pap smear.     Home BP: controlled     Review of Systems   A comprehensive review of systems was obtained and negative, except as noted in the HPI or PMH.    Problem List   Patient Active Problem List   Diagnosis     Type 1 diabetes mellitus (H)     Immunosuppression (H)     Kidney replaced by transplant     Neurogenic orthostatic hypotension (H)     Osteoporosis     Dyslipidemia     Secondary  renal hyperparathyroidism (H)     Acquired hypothyroidism     Anemia in chronic renal disease     Pulmonary histoplasmosis (H)     Pancreas replaced by transplant (H)     Hypomagnesemia     Vitamin D deficiency     Aftercare following organ transplant     Cytomegalovirus (CMV) viremia (H)     EBV (Isabelle-Barr virus) viremia     Posterior subcapsular polar age-related cataract of left eye     Corneal scar, left eye     Age-related nuclear cataract of right eye     Ankle joint pain     Anxiety     Right carpal tunnel syndrome     Cubital tunnel syndrome on right     Diabetic polyneuropathy (H)     Encephalopathy acute     GERD (gastroesophageal reflux disease)     Hyperlipidemia     Hypertension     Leukocytosis     Nausea & vomiting     Personal history of venous thrombosis and embolism     Pyuria     Retention of urine     Somnolence     Trigger finger, right middle finger     UTI (urinary tract infection)     Metabolic acidosis, normal anion gap (NAG)     CKD (chronic kidney disease) stage 4, GFR 15-29 ml/min (H)     Chronic insomnia     History of pancreas transplant (H)     History of kidney transplant     Histoplasmosis       Social History   Social History     Tobacco Use     Smoking status: Never Smoker     Smokeless tobacco: Never Used   Substance Use Topics     Alcohol use: Yes     Comment: rare     Drug use: No       Allergies   Allergies   Allergen Reactions     Amoxicillin-Pot Clavulanate Diarrhea     Augmentin Diarrhea and Other (See Comments)     Diarrhea  Took augmentin 1/2017 with loperamide       Ciprofloxacin Nausea, GI Disturbance and Other (See Comments)     Nausea         Pollen Extract Other (See Comments)     Seasonal allergies  Runny Nose  Seasonal allergies  Seasonal allergies       Pyridostigmine Other (See Comments), Swelling and Muscle Pain (Myalgia)     Spastic muscle motion in the face and legs, weakness in the arms. Slight swelling of the tongue.  Edema  Spastic muscle motion in the  face and legs, weakness in the arms. Slight swelling of the tongue.  Spastic muscle motion in the face and legs, weakness in the arms. Slight swelling of the tongue.         Medications   Current Outpatient Medications   Medication Sig     aspirin (ASA) 81 MG EC tablet Take 81 mg by mouth daily     citalopram (CELEXA) 10 MG tablet Take 20 mg by mouth daily      Levothyroxine Sodium (SYNTHROID PO) Take 50 mcg by mouth daily      mycophenolic acid (GENERIC EQUIVALENT) 180 MG EC tablet Take 2 tablets (360 mg) by mouth 2 times daily TAKE 2 TABLETS TWICE A DAY     pravastatin (PRAVACHOL) 20 MG tablet Take 1 tablet (20 mg) by mouth daily     sulfamethoxazole-trimethoprim (BACTRIM) 400-80 MG tablet TAKE ONE TABLET BY MOUTH ONCE DAILY     tacrolimus (ENVARSUS XR) 0.75 MG 24 hr tablet Take 4 tablets (3 mg) by mouth every morning (before breakfast)     vitamin D3 (CHOLECALCIFEROL) 50 mcg (2000 units) tablet Take 2 tablets (100 mcg) by mouth daily     No current facility-administered medications for this visit.     There are no discontinued medications.    Physical Exam   WNL for video     Data     Renal Latest Ref Rng & Units 1/4/2022 2/4/2021 7/8/2020   Na 133 - 144 mmol/L 139 140 138   Na (external) 136 - 145 - - -   K 3.4 - 5.3 mmol/L 3.9 4.4 4.3   K (external) 3.5 - 5.1 - - -   Cl 94 - 109 mmol/L 110(H) 110(H) 107   Cl (external) 98 - 112 - - -   CO2 20 - 32 mmol/L 24 25 27   CO2 (external) 21 - 32 - - -   BUN 7 - 30 mg/dL 17 18 18   BUN (external) 7 - 24 - - -   Cr 0.52 - 1.04 mg/dL 1.15(H) 1.20(H) 1.19(H)   Cr (external) 0.55 - 1.02 mg/dL - - -   Glucose 70 - 99 mg/dL 85 89 80   Glucose (external) 74 - 106 - - -   Ca  8.5 - 10.1 mg/dL 8.3(L) 9.3 8.2(L)   Ca (external) 8.5 - 10.1 - - -   Mg 1.6 - 2.3 mg/dL - - -   Mg (external) 1.8 - 2.4 - - -     Bone Health Latest Ref Rng & Units 1/4/2022 2/13/2018 6/14/2017   Phos 2.5 - 4.5 mg/dL - - -   Phos (external) 2.5 - 4.7 mg/dL - - -   PTHi 12 - 72 pg/mL - - 34   Vit D Def 20  - 75 ug/L 12(L) 21 31     Heme Latest Ref Rng & Units 1/4/2022 2/4/2021 7/8/2020   WBC 4.0 - 11.0 10e3/uL 4.2 5.1 5.6   WBC (external) 4.3 - 10.8 - - -   Hgb 11.7 - 15.7 g/dL 11.4(L) 11.2(L) 10.5(L)   Hgb (external) 12.0 - 16.0 - - -   Plt 150 - 450 10e3/uL 210 281 253   Plt (external) 150 - 400 - - -   ABSOLUTE NEUTROPHIL 1.6 - 8.3 10e9/L - - -   ABSOLUTE LYMPHOCYTES 0.8 - 5.3 10e9/L - - -   ABSOLUTE MONOCYTES 0.0 - 1.3 10e9/L - - -   ABSOLUTE EOSINOPHILS 0.0 - 0.7 10e9/L - - -   ABSOLUTE BASOPHILS 0.0 - 0.2 10e9/L - - -   ABS IMMATURE GRANULOCYTES 0 - 0.4 10e9/L - - -   ABSOLUTE NUCLEATED RBC - - - -     Liver Latest Ref Rng & Units 9/8/2017 5/19/2017 5/11/2017   AP 40 - 150 U/L 118 169(H) 157(H)   AP (external) 38 - 126 IU/L - - -   TBili 0.2 - 1.3 mg/dL 0.4 0.6 0.4   TBili (external) 0.2 - 1.5 mg/dL - - -   DBili 0.0 - 0.2 mg/dL 0.2 - 0.1   DBili (external) <0.4 mg/dL - - -   ALT 0 - 50 U/L 19 29 40   ALT (external) 10.0 - 50 IU/L - - -   AST 0 - 45 U/L 21 24 35   AST (external) 12.0 - 45 IU/L - - -   Tot Protein 6.8 - 8.8 g/dL 6.9 7.1 6.7(L)   Tot Protein (external) 6.2 - 8.1 - - -   Albumin 3.4 - 5.0 g/dL 3.3(L) 3.4 3.5   Albumin (external) 3.4 - 4.9 - - -     Pancreas Latest Ref Rng & Units 1/4/2022 2/4/2021 7/8/2020   A1C 0.0 - 5.6 % 5.1 5.2 -   A1C (external) <5.7 % - - -   Amylase 30 - 110 U/L 99 105 92   Amylase (external) 25 - 125 U/L - - -   Lipase 73 - 393 U/L 158 197 130   Lipase (external) 25 - 125 U/L - - -     Iron studies Latest Ref Rng & Units 1/4/2022 2/12/2018 8/8/2016   Iron 35 - 180 ug/dL 58 - 55   Iron (external) 8 - 78 U/L - 25 -   Iron sat 15 - 46 % 19 - 24   Ferritin 8 - 252 ng/mL 41 - 73   Ferritin (external) 8 - 388 - - -     [facility] Txp Virology Latest Ref Rng & Units 2/4/2021 10/25/2019 3/5/2019   CVM DNA Quant - - - Plasma   CMV QUANT IU/ML CMVND:CMV DNA Not Detected [IU]/mL - - <137(A)   LOG IU/ML OF CMVQNT <2.1 [Log:IU]/mL - - <2.1   BK Spec - Plasma Plasma -   BK Res BKNEG:BK Virus  DNA Not Detected copies/mL BK Virus DNA Not Detected BK Virus DNA Not Detected -   BK Log <2.7 Log copies/mL Not Calculated Not Calculated -   EBV VCA IGG ANTIBODY U/mL - - -   EBV VCA IGM ANTIBODY U/mL - - -   EBV CAPSID ANTIBODY IGG 0.0 - 0.8 AI - - -   EBV DNA COPIES/ML EBVNEG:EBV DNA Not Detected [Copies]/mL - - -   EBV DNA LOG OF COPIES <2.7 [Log:copies]/mL - - -   Hep B Core NR - - -   Hep B Surf - - - -   HIV 1&2 NEG - - -        Recent Labs   Lab Test 07/21/20  1043 02/04/21  1031 01/04/22  1112   DOSTAC 2330 7/20/20 LAST DOSE LAST DOSE 1030 AM02/03 1/3/2022   TACROL 4.9* 4.9* 6.1     Recent Labs   Lab Test 06/14/17  0958 02/01/18  0857 03/08/18  1029   DOSMPA 2300 ON 6/13/2017 800 03/07/2018 at 0930 pm   MPACID 0.92* 2.17 2.30   MPAG 84.2 46.4 40.9

## 2022-01-25 NOTE — PROGRESS NOTES
Renetta is a 46 year old who is being evaluated via a billable video visit.      How would you like to obtain your AVS? MyChart  If the video visit is dropped, the invitation should be resent by: Text to cell phone: [phone]  Will anyone else be joining your video visit? No      Video Start Time: 130  Video-Visit Details    Type of service:  Video Visit    Video End Time:2:14 PM    Originating Location (pt. Location): Home    Distant Location (provider location):  [facility] [place]     Platform used for Video Visit: [employer]

## 2022-01-25 NOTE — LETTER
1/25/2022     RE: Rose Castaneda  [address]  [address]  [place] [address]     Dear Colleague,    Thank you for referring your patient, Rose Castaneda, to the [facility] [place] at [facility]. Please see a copy of my visit note below.    Renetta is a 46 year old who is being evaluated via a billable video visit.      How would you like to obtain your AVS? MyChart  If the video visit is dropped, the invitation should be resent by: Text to cell phone: [phone]  Will anyone else be joining your video visit? No      Video Start Time: 130  Video-Visit Details    Type of service:  Video Visit    Video End Time:2:14 PM    Originating Location (pt. Location): Home    Distant Location (provider location):  [facility] [place]     Platform used for Video Visit: [employer]      CHRONIC TRANSPLANT NEPHROLOGY VISIT    Assessment & Plan   # DDKT (SPK):   - Baseline Cr ~ 1.1-1.3   - Proteinuria: Normal (<0.2 grams)   - Date DSA Last Checked: Oct/2019      Latest DSA: No   - BK Viremia: No   - Kidney Tx Biopsy: No    # Pancreas Tx (SPK):   - Pancreatic Exocrine Drainage: Enteric drained     - Blood glucose: Euglycemia On insulin: No   - HbA1c: Stable      Latest HbA1c: 5.0%   - Pancreatic enzymes:  slight uptrend in lipase ~50%, monitor again with next labs   - Date DSA Last Checked: Oct/2019  Latest DSA: No   - Pancreas Tx Biopsy: No    # Immunosuppression: Tacrolimus extended release (goal 5-8) and Mycophenolic acid (goal not followed)   - Changes: No, monitoring for long term toxicity and complications including infections and malignancies while on lifelong immunosuppression     # Infection Prophylaxis:   - PJP: Sulfa/TMP (Bactrim)    # Neurogenic Orthostatic Hypotension: Controlled;  Goal BP: > 100, but < 130 systolic   - Changes: No    - Off of Florinef    # Anemia in Chronic Renal Disease: Hgb: Stable      DONNY: No   -  Iron studies: Not checked recently    # Mineral Bone Disorder:   - Vitamin D; level: Not checked recently        On Supplement: Yes  - Calcium; level: Low        On Supplement: No    # Chronic Diarrhea: Resolved.     # CMV Viremia: Detectable, but less than quantifiable at last check. No need to follow unless symptomatic.     # EBV Viremia: Detectable, but less than quantifiable at last check. No need to follow unless symptomatic.     # h/o Pulmonary Histoplasmosis: No off itraconazole.    # CAD: Asymptomatic.     # Carpal Tunnel Syndrome:  S/p release on right hand.     # Skin Cancer Risk:    - Discussed sun protection and recommend regular follow up with Dermatology.    # Medical Compliance: No.  Evidence of labs less than recommended.  - Discussed importance of checking labs regularly as recommended, taking medications as prescribed and attending scheduled medical appointments.  - Patient was informed to get her labs done once per month.     # COVID: counseling was offered. Patient received JJ vaccine and abi, will check with IS on the best timing of further vaccination.     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    Return visit: No follow-ups on file.    Chief Complaint   Ms. Castaneda is a 46 year old here for routine follow up.    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   She feels well overall and reports no new complaints. Energy level is good. Denies any  fevers, chills, weight loss, night sweats. No nausea, vomiting, abdominal pain, diarrhea. No chest pain, sob, leg swelling. No recent illness or hospitalization.   She received her montrell and montrell vaccine in April and 2 weeks ago received the Evusheld. She no new complaints and has not been following with dermatology or getting her labs regularly due to covid. I stressed the importance of both of these and the routine health maintenance including colonoscopy, mammogram and pap smear.     Home BP: controlled     Review of Systems   A comprehensive review of systems was obtained and negative, except as noted in the HPI or PMH.    Problem List   Patient Active Problem List   Diagnosis     Type 1 diabetes mellitus (H)     Immunosuppression (H)     Kidney replaced by transplant     Neurogenic orthostatic hypotension (H)     Osteoporosis     Dyslipidemia     Secondary renal hyperparathyroidism (H)     Acquired hypothyroidism     Anemia in chronic renal disease     Pulmonary histoplasmosis (H)     Pancreas replaced by transplant (H)     Hypomagnesemia     Vitamin D deficiency     Aftercare following organ transplant     Cytomegalovirus (CMV) viremia (H)     EBV (Isabelle-Barr virus) viremia     Posterior subcapsular polar age-related cataract of left eye     Corneal scar, left eye     Age-related nuclear cataract of right eye     Ankle joint pain     Anxiety     Right carpal tunnel syndrome     Cubital tunnel syndrome on right     Diabetic polyneuropathy (H)     Encephalopathy acute     GERD (gastroesophageal reflux disease)     Hyperlipidemia     Hypertension     Leukocytosis     Nausea & vomiting     Personal history of venous thrombosis and embolism     Pyuria     Retention of urine     Somnolence     Trigger finger, right middle finger     UTI (urinary tract infection)     Metabolic acidosis, normal anion gap (NAG)     CKD (chronic kidney disease) stage 4, GFR 15-29 ml/min (H)     Chronic insomnia     History of  pancreas transplant (H)     History of kidney transplant     Histoplasmosis       Social History   Social History     Tobacco Use     Smoking status: Never Smoker     Smokeless tobacco: Never Used   Substance Use Topics     Alcohol use: Yes     Comment: rare     Drug use: No       Allergies   Allergies   Allergen Reactions     Amoxicillin-Pot Clavulanate Diarrhea     Augmentin Diarrhea and Other (See Comments)     Diarrhea  Took augmentin 1/2017 with loperamide       Ciprofloxacin Nausea, GI Disturbance and Other (See Comments)     Nausea         Pollen Extract Other (See Comments)     Seasonal allergies  Runny Nose  Seasonal allergies  Seasonal allergies       Pyridostigmine Other (See Comments), Swelling and Muscle Pain (Myalgia)     Spastic muscle motion in the face and legs, weakness in the arms. Slight swelling of the tongue.  Edema  Spastic muscle motion in the face and legs, weakness in the arms. Slight swelling of the tongue.  Spastic muscle motion in the face and legs, weakness in the arms. Slight swelling of the tongue.         Medications   Current Outpatient Medications   Medication Sig     aspirin (ASA) 81 MG EC tablet Take 81 mg by mouth daily     citalopram (CELEXA) 10 MG tablet Take 20 mg by mouth daily      Levothyroxine Sodium (SYNTHROID PO) Take 50 mcg by mouth daily      mycophenolic acid (GENERIC EQUIVALENT) 180 MG EC tablet Take 2 tablets (360 mg) by mouth 2 times daily TAKE 2 TABLETS TWICE A DAY     pravastatin (PRAVACHOL) 20 MG tablet Take 1 tablet (20 mg) by mouth daily     sulfamethoxazole-trimethoprim (BACTRIM) 400-80 MG tablet TAKE ONE TABLET BY MOUTH ONCE DAILY     tacrolimus (ENVARSUS XR) 0.75 MG 24 hr tablet Take 4 tablets (3 mg) by mouth every morning (before breakfast)     vitamin D3 (CHOLECALCIFEROL) 50 mcg (2000 units) tablet Take 2 tablets (100 mcg) by mouth daily     No current facility-administered medications for this visit.     There are no discontinued  medications.    Physical Exam   WNL for video     Data     Renal Latest Ref Rng & Units 1/4/2022 2/4/2021 7/8/2020   Na 133 - 144 mmol/L 139 140 138   Na (external) 136 - 145 - - -   K 3.4 - 5.3 mmol/L 3.9 4.4 4.3   K (external) 3.5 - 5.1 - - -   Cl 94 - 109 mmol/L 110(H) 110(H) 107   Cl (external) 98 - 112 - - -   CO2 20 - 32 mmol/L 24 25 27   CO2 (external) 21 - 32 - - -   BUN 7 - 30 mg/dL 17 18 18   BUN (external) 7 - 24 - - -   Cr 0.52 - 1.04 mg/dL 1.15(H) 1.20(H) 1.19(H)   Cr (external) 0.55 - 1.02 mg/dL - - -   Glucose 70 - 99 mg/dL 85 89 80   Glucose (external) 74 - 106 - - -   Ca  8.5 - 10.1 mg/dL 8.3(L) 9.3 8.2(L)   Ca (external) 8.5 - 10.1 - - -   Mg 1.6 - 2.3 mg/dL - - -   Mg (external) 1.8 - 2.4 - - -     Bone Health Latest Ref Rng & Units 1/4/2022 2/13/2018 6/14/2017   Phos 2.5 - 4.5 mg/dL - - -   Phos (external) 2.5 - 4.7 mg/dL - - -   PTHi 12 - 72 pg/mL - - 34   Vit D Def 20 - 75 ug/L 12(L) 21 31     Heme Latest Ref Rng & Units 1/4/2022 2/4/2021 7/8/2020   WBC 4.0 - 11.0 10e3/uL 4.2 5.1 5.6   WBC (external) 4.3 - 10.8 - - -   Hgb 11.7 - 15.7 g/dL 11.4(L) 11.2(L) 10.5(L)   Hgb (external) 12.0 - 16.0 - - -   Plt 150 - 450 10e3/uL 210 281 253   Plt (external) 150 - 400 - - -   ABSOLUTE NEUTROPHIL 1.6 - 8.3 10e9/L - - -   ABSOLUTE LYMPHOCYTES 0.8 - 5.3 10e9/L - - -   ABSOLUTE MONOCYTES 0.0 - 1.3 10e9/L - - -   ABSOLUTE EOSINOPHILS 0.0 - 0.7 10e9/L - - -   ABSOLUTE BASOPHILS 0.0 - 0.2 10e9/L - - -   ABS IMMATURE GRANULOCYTES 0 - 0.4 10e9/L - - -   ABSOLUTE NUCLEATED RBC - - - -     Liver Latest Ref Rng & Units 9/8/2017 5/19/2017 5/11/2017   AP 40 - 150 U/L 118 169(H) 157(H)   AP (external) 38 - 126 IU/L - - -   TBili 0.2 - 1.3 mg/dL 0.4 0.6 0.4   TBili (external) 0.2 - 1.5 mg/dL - - -   DBili 0.0 - 0.2 mg/dL 0.2 - 0.1   DBili (external) <0.4 mg/dL - - -   ALT 0 - 50 U/L 19 29 40   ALT (external) 10.0 - 50 IU/L - - -   AST 0 - 45 U/L 21 24 35   AST (external) 12.0 - 45 IU/L - - -   Tot Protein 6.8 - 8.8  g/dL 6.9 7.1 6.7(L)   Tot Protein (external) 6.2 - 8.1 - - -   Albumin 3.4 - 5.0 g/dL 3.3(L) 3.4 3.5   Albumin (external) 3.4 - 4.9 - - -     Pancreas Latest Ref Rng & Units 1/4/2022 2/4/2021 7/8/2020   A1C 0.0 - 5.6 % 5.1 5.2 -   A1C (external) <5.7 % - - -   Amylase 30 - 110 U/L 99 105 92   Amylase (external) 25 - 125 U/L - - -   Lipase 73 - 393 U/L 158 197 130   Lipase (external) 25 - 125 U/L - - -     Iron studies Latest Ref Rng & Units 1/4/2022 2/12/2018 8/8/2016   Iron 35 - 180 ug/dL 58 - 55   Iron (external) 8 - 78 U/L - 25 -   Iron sat 15 - 46 % 19 - 24   Ferritin 8 - 252 ng/mL 41 - 73   Ferritin (external) 8 - 388 - - -     [facility] Txp Virology Latest Ref Rng & Units 2/4/2021 10/25/2019 3/5/2019   CVM DNA Quant - - - Plasma   CMV QUANT IU/ML CMVND:CMV DNA Not Detected [IU]/mL - - <137(A)   LOG IU/ML OF CMVQNT <2.1 [Log:IU]/mL - - <2.1   BK Spec - Plasma Plasma -   BK Res BKNEG:BK Virus DNA Not Detected copies/mL BK Virus DNA Not Detected BK Virus DNA Not Detected -   BK Log <2.7 Log copies/mL Not Calculated Not Calculated -   EBV VCA IGG ANTIBODY U/mL - - -   EBV VCA IGM ANTIBODY U/mL - - -   EBV CAPSID ANTIBODY IGG 0.0 - 0.8 AI - - -   EBV DNA COPIES/ML EBVNEG:EBV DNA Not Detected [Copies]/mL - - -   EBV DNA LOG OF COPIES <2.7 [Log:copies]/mL - - -   Hep B Core NR - - -   Hep B Surf - - - -   HIV 1&2 NEG - - -        Recent Labs   Lab Test 07/21/20  1043 02/04/21  1031 01/04/22  1112   DOSTAC 2330 7/20/20 LAST DOSE LAST DOSE 1030 AM02/03 1/3/2022   TACROL 4.9* 4.9* 6.1     Recent Labs   Lab Test 06/14/17  0958 02/01/18  0857 03/08/18  1029   DOSMPA 2300 ON 6/13/2017 800 03/07/2018 at 0930 pm   MPACID 0.92* 2.17 2.30   MPAG 84.2 46.4 40.9     Again, thank you for allowing me to participate in the care of your patient.      Sincerely,     [facility]

## 2022-02-03 ENCOUNTER — TELEPHONE (OUTPATIENT)
Dept: OPHTHALMOLOGY | Facility: CLINIC | Age: 47
End: 2022-02-03
Payer: MEDICARE

## 2022-02-03 NOTE — TELEPHONE ENCOUNTER
[facility] Call Center    Phone Message    May a detailed message be left on voicemail: yes     Reason for Call: Other: Patient calling requesting a return call to discuss TPD forms regarding her disability and a student loan. Patient states there may be miscommunication but the forms are not being recieved. Please call to discuss thank you.      Action Taken: Message routed to:  [facility] ([facility] eye    Travel Screening: Not Applicable

## 2022-02-03 NOTE — TELEPHONE ENCOUNTER
Called and spoke to Renetta     She has been at her new address since 1/15. She did not change her address until last week.     She has not receive the forms. Can you send the forms out again.?     Chantelle Whitt Communication Facilitator on 2/3/2022 at 3:09 PM

## 2022-02-04 NOTE — TELEPHONE ENCOUNTER
Spoke with Renetta     She understands she will have to wait for the forms. She will check her ex husbands house to see if her mail is laying around somewhere.     Thank You    Chantelle

## 2022-03-07 DIAGNOSIS — Z94.83 PANCREAS REPLACED BY TRANSPLANT (H): Primary | ICD-10-CM

## 2022-03-07 DIAGNOSIS — Z94.0 KIDNEY TRANSPLANTED: ICD-10-CM

## 2022-03-07 DIAGNOSIS — Z94.0 KIDNEY REPLACED BY TRANSPLANT: ICD-10-CM

## 2022-03-07 RX ORDER — TACROLIMUS 0.75 MG/1
3 TABLET, EXTENDED RELEASE ORAL
Qty: 120 TABLET | Refills: 0 | Status: SHIPPED | OUTPATIENT
Start: 2022-03-07 | End: 2022-05-02

## 2022-03-19 ENCOUNTER — HEALTH MAINTENANCE LETTER (OUTPATIENT)
Age: 47
End: 2022-03-19

## 2022-04-29 DIAGNOSIS — Z94.83 PANCREAS TRANSPLANTED (H): Primary | ICD-10-CM

## 2022-04-29 DIAGNOSIS — Z94.0 KIDNEY REPLACED BY TRANSPLANT: ICD-10-CM

## 2022-04-29 RX ORDER — SULFAMETHOXAZOLE AND TRIMETHOPRIM 400; 80 MG/1; MG/1
1 TABLET ORAL DAILY
Qty: 90 TABLET | Refills: 3 | Status: SHIPPED | OUTPATIENT
Start: 2022-04-29 | End: 2023-06-04

## 2022-05-02 DIAGNOSIS — Z94.0 KIDNEY TRANSPLANTED: ICD-10-CM

## 2022-05-02 DIAGNOSIS — Z94.83 PANCREAS REPLACED BY TRANSPLANT (H): ICD-10-CM

## 2022-05-02 DIAGNOSIS — Z94.0 KIDNEY REPLACED BY TRANSPLANT: ICD-10-CM

## 2022-05-02 RX ORDER — TACROLIMUS 0.75 MG/1
3 TABLET, EXTENDED RELEASE ORAL
Qty: 120 TABLET | Refills: 0 | Status: SHIPPED | OUTPATIENT
Start: 2022-05-02 | End: 2022-06-23

## 2022-05-23 ENCOUNTER — NURSE TRIAGE (OUTPATIENT)
Dept: NURSING | Facility: CLINIC | Age: 47
End: 2022-05-23
Payer: MEDICARE

## 2022-05-23 ENCOUNTER — TELEPHONE (OUTPATIENT)
Dept: OPHTHALMOLOGY | Facility: CLINIC | Age: 47
End: 2022-05-23

## 2022-05-23 NOTE — TELEPHONE ENCOUNTER
"Call from Renetta, see triage note.  Spoke with Brenda in eye clinic.  Appointment scheduled with Dr Sylvester for Wednesday 2:30.  Triage note will be reviewed by ophthalmology team and if needed to be seen sooner team will reach out to Renetta.  Spoke with Renetta, advised of appointment date, time, arrival time, provider, and triage note being reviewed by ophthalmology team, if sooner appointment needed, she will receive call.  Able to teach back.    Reason for Disposition    Patient wants to be seen    Additional Information    Negative: Weakness of the face, arm or leg on one side of the body    Negative: Followed getting substance in the eye    Negative: Foreign body or object is or was lodged in the eye    Negative: Followed an eye injury    Negative: Followed sun lamp or sun exposure (UV keratitis)    Negative: Yellow or green discharge (pus) in the eye    Negative: Pregnant    Negative: Complete loss of vision in 1 or both eyes    Negative: Severe eye pain    Negative: Severe headache    Negative: Double vision    Negative: Blurred vision or visual changes and present now and sudden onset or new (e.g., minutes, hours, days) (Exception: seeing floaters / black specks OR previously diagnosed migraine headaches with same symptoms)    Negative: Patient sounds very sick or weak to the triager    Negative: Flashes of light  (Exception: brief from pressing on the eyeball)    Negative: Eye pain and brief (now gone) blurred vision or visual changes    Negative: Taking digoxin (e.g., Lanoxin, Digitek, Cardoxin, Lanoxicaps; Toloxin in [place]) and blurred vision, yellow vision, or yellow-green halos    Answer Assessment - Initial Assessment Questions  1. DESCRIPTION: \"What is the vision loss like? Describe it for me.\" (e.g., complete vision loss, blurred vision, double vision, floaters, etc.)      Feels is looking through vaseline, right eye worse than left.  Has flashes of light, not uncommon, feels occurring more often.  " "Denies floaters.  Has been walking into things due to vision change, feels balance is off.  Also feels peripheral vision has decreased.  These changes have been occurring this past week, feels getting worse as the week goes on  2. LOCATION: \"One or both eyes?\" If one, ask: \"Which eye?\"      Right eye worse than left  3. SEVERITY: \"Can you see anything?\" If so, ask: \"What can you see?\" (e.g., fine print)      Walks into things.  Doesn't feel right eye can coordinate with left eye  4. ONSET: \"When did this begin?\" \"Did it start suddenly or has this been gradual?\"      This past week, change occurred suddenly  5. PATTERN: \"Does this come and go, or has it been constant since it started?\"      constant  6. PAIN: \"Is there any pain in your eye(s)?\"  (Scale 1-10; or mild, moderate, severe)      Denies pain  7. CONTACTS-GLASSES: \"Do you wear contacts or glasses?\"      yes  8. CAUSE: \"What do you think is causing this visual problem?\"      She is unsure  9. OTHER SYMPTOMS: \"Do you have any other symptoms?\" (e.g., confusion, headache, arm or leg weakness, speech problems)      Denies headache, weakness, speech fluent during call.  Denies redness or injury to either eye, no drainage/discharge, excessive tearing. Denies fever  10. PREGNANCY: \"Is there any chance you are pregnant?\" \"When was your last menstrual period?\"        N/A    Protocols used: VISION LOSS OR CHANGE-A-OH      "

## 2022-05-23 NOTE — TELEPHONE ENCOUNTER
See red flag triage nurse note.  Patient notes more frequent intermittent right eye flashes in past week.  Vision seems worse in the right eye.  Appointment made in two days.  To be reviewed by Retina team.

## 2022-05-24 ENCOUNTER — TELEPHONE (OUTPATIENT)
Dept: TRANSPLANT | Facility: CLINIC | Age: 47
End: 2022-05-24
Payer: MEDICARE

## 2022-05-24 NOTE — TELEPHONE ENCOUNTER
General  Route to LPN    Reason for call: Patient has 2nd Evusheld shot/infusion last month and is wondering should she be getting another booster sometime soon.     Call back needed? Yes  Return Call Needed  Same as documented in contacts section  When to return call?: Greater than one day: Route standard priority

## 2022-05-25 ENCOUNTER — OFFICE VISIT (OUTPATIENT)
Dept: OPHTHALMOLOGY | Facility: CLINIC | Age: 47
End: 2022-05-25
Attending: OPHTHALMOLOGY
Payer: COMMERCIAL

## 2022-05-25 DIAGNOSIS — E10.3513 TYPE 1 DIABETES MELLITUS WITH PROLIFERATIVE RETINOPATHY OF BOTH EYES AND MACULAR EDEMA (H): Primary | ICD-10-CM

## 2022-05-25 PROCEDURE — 99207 FUNDUS AUTOFLUORESCENCE IMAGE (FAF) OU (BOTH EYES): CPT | Mod: GC | Performed by: OPHTHALMOLOGY

## 2022-05-25 PROCEDURE — 92250 FUNDUS PHOTOGRAPHY W/I&R: CPT | Performed by: OPHTHALMOLOGY

## 2022-05-25 PROCEDURE — G0463 HOSPITAL OUTPT CLINIC VISIT: HCPCS | Mod: 25

## 2022-05-25 PROCEDURE — 92134 CPTRZ OPH DX IMG PST SGM RTA: CPT | Performed by: OPHTHALMOLOGY

## 2022-05-25 PROCEDURE — 92014 COMPRE OPH EXAM EST PT 1/>: CPT | Mod: GC | Performed by: OPHTHALMOLOGY

## 2022-05-25 ASSESSMENT — VISUAL ACUITY
OS_SC+: -2
OD_SC: 20/60
OS_SC: 20/30
METHOD: SNELLEN - LINEAR

## 2022-05-25 ASSESSMENT — SLIT LAMP EXAM - LIDS
COMMENTS: NORMAL
COMMENTS: NORMAL

## 2022-05-25 ASSESSMENT — CUP TO DISC RATIO
OD_RATIO: 0.4
OS_RATIO: 0.4

## 2022-05-25 ASSESSMENT — CONF VISUAL FIELD
OS_INFERIOR_NASAL_RESTRICTION: 1
OD_SUPERIOR_NASAL_RESTRICTION: 1
OD_SUPERIOR_TEMPORAL_RESTRICTION: 1
OS_SUPERIOR_NASAL_RESTRICTION: 1
OS_INFERIOR_TEMPORAL_RESTRICTION: 1
METHOD: COUNTING FINGERS
OD_INFERIOR_NASAL_RESTRICTION: 1
OD_INFERIOR_TEMPORAL_RESTRICTION: 1
OS_SUPERIOR_TEMPORAL_RESTRICTION: 1

## 2022-05-25 ASSESSMENT — TONOMETRY
OD_IOP_MMHG: 11
IOP_METHOD: TONOPEN
OS_IOP_MMHG: 10

## 2022-05-25 ASSESSMENT — EXTERNAL EXAM - RIGHT EYE: OD_EXAM: NORMAL

## 2022-05-25 ASSESSMENT — EXTERNAL EXAM - LEFT EYE: OS_EXAM: NORMAL

## 2022-05-25 NOTE — NURSING NOTE
Chief Complaints and History of Present Illnesses   Patient presents with     Decreased Vision Evaluation     Chief Complaint(s) and History of Present Illness(es)     Decreased Vision Evaluation     Laterality: both eyes    Course: gradually worsening    Associated symptoms: dryness and flashes (left eye).  Negative for eye pain and headache    Treatments tried: artificial tears    Pain scale: 0/10              Comments     Last Thursday or Friday her right vision was like looking through vasoline.  Her vision later seemed hazy.  She started using eye drops and working to hydrate herself.  It felt like her eyes were not working together.   Her vision seems tunneled through her right eye and dimmer.    She felt like she was seeing stationary dark spots in her left eye over the weekend. There is now a line running centrally through her left vision.  She can see above and below the line.  She sees a flutter of light in the left eye.     Lab Results       Component                Value               Date                       A1C                      5.1                 01/04/2022                 A1C                      5.2                 02/04/2021                 A1C                      5.4                 04/14/2020                 A1C                      5.0                 10/25/2019                 A1C                      5.2                 02/05/2019                 A1C                      5.1                 05/02/2018              Muriel Shah, COT 2:50 PM  May 25, 2022

## 2022-05-25 NOTE — TELEPHONE ENCOUNTER
Patient calling to inquire about Covid 19 booster shots.  Patient informed that we are recommending being fully vaccinated along with the Evusheld.  Patient V/U and will make apt to get caught up on boosters.    Fely Blake RN   Transplant Coordinator  [phone]

## 2022-05-25 NOTE — PROGRESS NOTES
CC -  PDR    INTERVAL HISTORY -Concerned that her vision is dimming in both eyes and visual field constriction that she describes as tunnel vision over the last few day.      Denies family hx of vision problems in parents, grandparents,     PMH -   Rose Castaneda is a 46 year old year-old patient being followed by Dr. Zuniga.   Hx of extensive laser tx for diabetes and gestational diabetes with vit heme in 2000s  History of pancreatic transplant, kidney transplant x2, ~ 2017  VA @ [facility] ([id number]) was 20/40 - 20/50 & 20/30 - 20/40       PAST OCULAR SURGERY  PRP OU 2005 in [place]  CE/IOL 9/2020  YAG OU      RETINAL IMAGING:  OCT 10-4-21  OD - extensive outer atrophy and thinning, mild ERM, trace IRF  OS - extensive outer atrophy from FML scars, mild diffuse thinning, mild ERM, mild DME non-central stable    GVF 2-16-21  OU severe constriction, III4e ~ 5 degrees OU, V ~ 15 degrees    ASSESSMENT & PLAN    # subjective loss of vision   - VA and macular anatomy stable compared to previous exams   - ATs drops helped mildly this morning   - discussed the findings and possible explanations for perception of  Vision loss   - will use ATs and will come back for repeat exam in 2-3 w; doesn't want to repeat [place] VF    # DM I with PDR and DME   - s/p pancreas transplant   - quiescent   - dense laser scars & outer atrophy diffuse   - likely causing loss of acuity & contrast    - has focal VRT to ONH on OCT but no traction on exam     # Mild DME, OS > OD   - had AVastin x 1 OD in 2007   - doubt significant   - recheck 6-12 months    # Legal blindness OU   - presumed d/t PDR and extensive laser scaring   - suspect progression d/t scar creep    - cannot r/o concomitant sandra/cone dystrophy but less likely   - patient advised to have her son get DFE as precaution   - could consider genetic testing if continues to progress   - doubt ffERG would be useful given dense laser   - low vision eval in past. Doesn't want to do it  now    F/U follow up 3-4 w OCT and DFE with me or Dr. Nadia Maurer MD  Vitreoretinal Surgery Fellow  [facility]       Complete documentation of historical and exam elements from today's encounter can be found in the full encounter summary report (not reduplicated in this progress note). I personally obtained the chief complaint(s) and history of present illness.  I confirmed and edited as necessary the review of systems, past medical/surgical history, family history, social history, and examination findings as documented by others; and I examined the patient myself. I personally reviewed the relevant tests, images, and reports as documented above. I formulated and edited as necessary the assessment and plan and discussed the findings and management plan with the patient and family.    Christophe Sylvester MD, PhD

## 2022-06-04 DIAGNOSIS — Z94.83 PANCREAS REPLACED BY TRANSPLANT (H): Primary | ICD-10-CM

## 2022-06-04 DIAGNOSIS — Z94.0 KIDNEY REPLACED BY TRANSPLANT: ICD-10-CM

## 2022-06-07 RX ORDER — MYCOPHENOLIC ACID 180 MG/1
360 TABLET, DELAYED RELEASE ORAL 2 TIMES DAILY
Qty: 360 TABLET | Refills: 0 | Status: SHIPPED | OUTPATIENT
Start: 2022-06-07 | End: 2022-06-08

## 2022-06-08 DIAGNOSIS — Z94.0 KIDNEY REPLACED BY TRANSPLANT: ICD-10-CM

## 2022-06-08 DIAGNOSIS — Z94.83 PANCREAS REPLACED BY TRANSPLANT (H): Primary | ICD-10-CM

## 2022-06-08 RX ORDER — MYCOPHENOLIC ACID 180 MG/1
360 TABLET, DELAYED RELEASE ORAL 2 TIMES DAILY
Qty: 360 TABLET | Refills: 0 | Status: SHIPPED | OUTPATIENT
Start: 2022-06-08 | End: 2022-06-13

## 2022-06-12 DIAGNOSIS — Z94.83 PANCREAS REPLACED BY TRANSPLANT (H): Primary | ICD-10-CM

## 2022-06-12 DIAGNOSIS — Z94.0 KIDNEY REPLACED BY TRANSPLANT: ICD-10-CM

## 2022-06-13 ENCOUNTER — MYC MEDICAL ADVICE (OUTPATIENT)
Dept: TRANSPLANT | Facility: CLINIC | Age: 47
End: 2022-06-13
Payer: MEDICARE

## 2022-06-13 DIAGNOSIS — Z94.83 PANCREAS REPLACED BY TRANSPLANT (H): ICD-10-CM

## 2022-06-13 DIAGNOSIS — Z94.0 KIDNEY REPLACED BY TRANSPLANT: Primary | ICD-10-CM

## 2022-06-13 RX ORDER — MYCOPHENOLIC ACID 180 MG/1
360 TABLET, DELAYED RELEASE ORAL 2 TIMES DAILY
Qty: 360 TABLET | Refills: 0 | Status: SHIPPED | OUTPATIENT
Start: 2022-06-13 | End: 2022-08-24

## 2022-06-14 DIAGNOSIS — E10.3513 TYPE 1 DIABETES MELLITUS WITH PROLIFERATIVE RETINOPATHY OF BOTH EYES AND MACULAR EDEMA (H): Primary | ICD-10-CM

## 2022-06-23 DIAGNOSIS — Z94.83 PANCREAS REPLACED BY TRANSPLANT (H): Primary | ICD-10-CM

## 2022-06-23 DIAGNOSIS — Z94.0 KIDNEY TRANSPLANTED: ICD-10-CM

## 2022-06-23 DIAGNOSIS — Z94.0 KIDNEY REPLACED BY TRANSPLANT: ICD-10-CM

## 2022-06-23 RX ORDER — TACROLIMUS 0.75 MG/1
3 TABLET, EXTENDED RELEASE ORAL
Qty: 120 TABLET | Refills: 0 | Status: SHIPPED | OUTPATIENT
Start: 2022-06-23 | End: 2022-07-15

## 2022-07-08 ENCOUNTER — TELEPHONE (OUTPATIENT)
Dept: TRANSPLANT | Facility: CLINIC | Age: 47
End: 2022-07-08

## 2022-07-08 DIAGNOSIS — Z94.0 KIDNEY REPLACED BY TRANSPLANT: Primary | ICD-10-CM

## 2022-07-09 ENCOUNTER — HEALTH MAINTENANCE LETTER (OUTPATIENT)
Age: 47
End: 2022-07-09

## 2022-07-11 NOTE — TELEPHONE ENCOUNTER
ISSUE: patient calling with question about covid 19 vaccine.  Berhane X1.  Patient instructed that she is due for booster X2 to be considered fully vaccinated.  Patient has apt this week for vaccine. Will bring vaccine card and ensure she is scheduled for all doses to be fully vaccinated.    Fely Blake RN   Transplant Coordinator  [phone]

## 2022-07-14 ENCOUNTER — TELEPHONE (OUTPATIENT)
Dept: TRANSPLANT | Facility: CLINIC | Age: 47
End: 2022-07-14

## 2022-07-14 ENCOUNTER — LAB (OUTPATIENT)
Dept: LAB | Facility: CLINIC | Age: 47
End: 2022-07-14
Payer: COMMERCIAL

## 2022-07-14 DIAGNOSIS — Z94.0 KIDNEY REPLACED BY TRANSPLANT: ICD-10-CM

## 2022-07-14 DIAGNOSIS — N18.9 ANEMIA IN CHRONIC KIDNEY DISEASE, UNSPECIFIED CKD STAGE: ICD-10-CM

## 2022-07-14 DIAGNOSIS — Z94.0 KIDNEY REPLACED BY TRANSPLANT: Primary | ICD-10-CM

## 2022-07-14 DIAGNOSIS — D63.1 ANEMIA IN CHRONIC KIDNEY DISEASE, UNSPECIFIED CKD STAGE: ICD-10-CM

## 2022-07-14 DIAGNOSIS — Z94.83 PANCREAS REPLACED BY TRANSPLANT (H): ICD-10-CM

## 2022-07-14 DIAGNOSIS — Z94.83 PANCREAS TRANSPLANTED (H): ICD-10-CM

## 2022-07-14 LAB
AMYLASE SERPL-CCNC: 103 U/L (ref 30–110)
ANION GAP SERPL CALCULATED.3IONS-SCNC: 4 MMOL/L (ref 3–14)
BUN SERPL-MCNC: 24 MG/DL (ref 7–30)
CALCIUM SERPL-MCNC: 8.8 MG/DL (ref 8.5–10.1)
CHLORIDE BLD-SCNC: 111 MMOL/L (ref 94–109)
CO2 SERPL-SCNC: 27 MMOL/L (ref 20–32)
CREAT SERPL-MCNC: 1.26 MG/DL (ref 0.52–1.04)
ERYTHROCYTE [DISTWIDTH] IN BLOOD BY AUTOMATED COUNT: 13.8 % (ref 10–15)
FERRITIN SERPL-MCNC: 23 NG/ML (ref 8–252)
GFR SERPL CREATININE-BSD FRML MDRD: 52 ML/MIN/1.73M2
GLUCOSE BLD-MCNC: 87 MG/DL (ref 70–99)
HCT VFR BLD AUTO: 29 % (ref 35–47)
HGB BLD-MCNC: 9.1 G/DL (ref 11.7–15.7)
IRON SATN MFR SERPL: 36 % (ref 15–46)
IRON SERPL-MCNC: 83 UG/DL (ref 35–180)
LIPASE SERPL-CCNC: 38 U/L (ref 13–60)
MCH RBC QN AUTO: 29.7 PG (ref 26.5–33)
MCHC RBC AUTO-ENTMCNC: 31.4 G/DL (ref 31.5–36.5)
MCV RBC AUTO: 95 FL (ref 78–100)
PLATELET # BLD AUTO: 218 10E3/UL (ref 150–450)
POTASSIUM BLD-SCNC: 4.6 MMOL/L (ref 3.4–5.3)
RBC # BLD AUTO: 3.06 10E6/UL (ref 3.8–5.2)
SODIUM SERPL-SCNC: 142 MMOL/L (ref 133–144)
TACROLIMUS BLD-MCNC: 4.1 UG/L (ref 5–15)
TIBC SERPL-MCNC: 232 UG/DL (ref 240–430)
TME LAST DOSE: ABNORMAL H
TME LAST DOSE: ABNORMAL H
WBC # BLD AUTO: 4.7 10E3/UL (ref 4–11)

## 2022-07-14 PROCEDURE — 80048 BASIC METABOLIC PNL TOTAL CA: CPT

## 2022-07-14 PROCEDURE — 82728 ASSAY OF FERRITIN: CPT

## 2022-07-14 PROCEDURE — 80197 ASSAY OF TACROLIMUS: CPT

## 2022-07-14 PROCEDURE — 82150 ASSAY OF AMYLASE: CPT

## 2022-07-14 PROCEDURE — 36415 COLL VENOUS BLD VENIPUNCTURE: CPT

## 2022-07-14 PROCEDURE — 83690 ASSAY OF LIPASE: CPT

## 2022-07-14 PROCEDURE — 83550 IRON BINDING TEST: CPT

## 2022-07-14 PROCEDURE — 85027 COMPLETE CBC AUTOMATED: CPT

## 2022-07-14 NOTE — TELEPHONE ENCOUNTER
ISSUE: drop in HGB:9.1 on 7/14/2022    PLAN  ----- Message from Mauricio Marquez MD sent at 7/14/2022 11:01 AM CDT -----  Decreased hemoglobin and recommend checking iron panel and evaluating for any signs or symptoms or bleeding.      OUTCOME:  Patient denies s/s of bleeding.  Denies SOB or chest pain.  States she was feeling dizzy last week, has since resolved.  Patient is feeling well.    Iron panel added to lab draw from 7/14    Fely Blake RN   Transplant Coordinator  [phone]

## 2022-07-15 ENCOUNTER — TELEPHONE (OUTPATIENT)
Dept: TRANSPLANT | Facility: CLINIC | Age: 47
End: 2022-07-15

## 2022-07-15 DIAGNOSIS — Z94.0 KIDNEY REPLACED BY TRANSPLANT: ICD-10-CM

## 2022-07-15 DIAGNOSIS — Z94.0 KIDNEY TRANSPLANTED: ICD-10-CM

## 2022-07-15 DIAGNOSIS — Z94.83 PANCREAS REPLACED BY TRANSPLANT (H): ICD-10-CM

## 2022-07-15 RX ORDER — TACROLIMUS 0.75 MG/1
3.75 TABLET, EXTENDED RELEASE ORAL
Qty: 120 TABLET | Refills: 0 | Status: SHIPPED | OUTPATIENT
Start: 2022-07-15 | End: 2022-07-19

## 2022-07-15 NOTE — TELEPHONE ENCOUNTER
ISSUE:   Tacrolimus XR level 4.1 on 7/14/2022, goal 5-8, dose 3 mg daily.    PLAN:   Please call patient and confirm this was an accurate 24-hour trough. Verify Tacrolimus XR dose 3 mg daily. Confirm no new medications or illness. Confirm no missed doses. If accurate trough and accurate dose, increase Tacrolimus XR dose to 3.75 mg daily and repeat labs in 1 weeks    OUTCOME:   Spoke with patient, they confirm accurate trough level and current dose 3 mg daily. Patient confirmed dose change to 3.75 mg daily and to repeat labs in 1 weeks. Orders sent to preferred pharmacy for dose change and lab for repeat labs. Patient voiced understanding of plan.       Fely Blake RN   Transplant Coordinator  [phone]

## 2022-07-19 ENCOUNTER — TELEPHONE (OUTPATIENT)
Dept: TRANSPLANT | Facility: CLINIC | Age: 47
End: 2022-07-19

## 2022-07-19 DIAGNOSIS — Z94.0 KIDNEY REPLACED BY TRANSPLANT: ICD-10-CM

## 2022-07-19 DIAGNOSIS — Z94.83 PANCREAS REPLACED BY TRANSPLANT (H): ICD-10-CM

## 2022-07-19 DIAGNOSIS — Z94.0 KIDNEY TRANSPLANTED: ICD-10-CM

## 2022-07-19 RX ORDER — TACROLIMUS 0.75 MG/1
3.75 TABLET, EXTENDED RELEASE ORAL
Qty: 150 TABLET | Refills: 0 | Status: SHIPPED | OUTPATIENT
Start: 2022-07-19 | End: 2022-09-07

## 2022-07-19 NOTE — TELEPHONE ENCOUNTER
Medication/Refill approved per CPA:    [employer]LL[facility] AGREEMENT FOR  IMMUNOSUPPRESSENT PRESCRIPTION MODIFICATION.      Routing encounter to Transplant as an FYI.    Thanks,  Otoniel Borja [facility]  Specialty Pharmacist [phone]

## 2022-08-10 NOTE — TELEPHONE ENCOUNTER
Patient Call: Transplant Lab  Reason for call: patient needs new lab orders sent over to the [facility].     room air

## 2022-08-24 DIAGNOSIS — Z94.83 PANCREAS REPLACED BY TRANSPLANT (H): ICD-10-CM

## 2022-08-24 DIAGNOSIS — Z94.0 KIDNEY REPLACED BY TRANSPLANT: ICD-10-CM

## 2022-08-24 RX ORDER — MYCOPHENOLIC ACID 180 MG/1
TABLET, DELAYED RELEASE ORAL
Qty: 360 TABLET | Refills: 3 | Status: SHIPPED | OUTPATIENT
Start: 2022-08-24 | End: 2023-03-02

## 2022-09-03 ENCOUNTER — HEALTH MAINTENANCE LETTER (OUTPATIENT)
Age: 47
End: 2022-09-03

## 2022-09-07 DIAGNOSIS — Z94.0 KIDNEY REPLACED BY TRANSPLANT: ICD-10-CM

## 2022-09-07 DIAGNOSIS — Z94.83 PANCREAS REPLACED BY TRANSPLANT (H): Primary | ICD-10-CM

## 2022-09-07 DIAGNOSIS — Z94.0 KIDNEY TRANSPLANTED: ICD-10-CM

## 2022-09-07 NOTE — TELEPHONE ENCOUNTER
Sorry for the inconvenience pt delivery is set for tomorrow, if we can get this prescription send over asap.     Thank you.

## 2022-09-08 ENCOUNTER — TELEPHONE (OUTPATIENT)
Dept: INFUSION THERAPY | Facility: CLINIC | Age: 47
End: 2022-09-08

## 2022-09-08 RX ORDER — TACROLIMUS 0.75 MG/1
3.75 TABLET, EXTENDED RELEASE ORAL
Qty: 150 TABLET | Refills: 0 | Status: SHIPPED | OUTPATIENT
Start: 2022-09-08 | End: 2022-10-10

## 2022-09-08 NOTE — TELEPHONE ENCOUNTER
Prior Authorization Approval    Authorization Effective Date: 8/9/2022  Authorization Expiration Date: 9/8/2023  Medication: Envarsus - Approved  Approved Dose/Quantity: 150  Reference #: [id number]   Insurance Company: [employer] - Phone [phone] Fax [phone]  Expected CoPay:       CoPay Card Available:      Foundation Assistance Needed:    Which Pharmacy is filling the prescription (Not needed for infusion/clinic administered): [facility] - [place], [place] - [address]  Pharmacy Notified: Yes  Patient Notified: Yes

## 2022-10-10 DIAGNOSIS — Z94.83 PANCREAS REPLACED BY TRANSPLANT (H): ICD-10-CM

## 2022-10-10 DIAGNOSIS — Z94.0 KIDNEY TRANSPLANTED: ICD-10-CM

## 2022-10-10 DIAGNOSIS — Z94.0 KIDNEY REPLACED BY TRANSPLANT: ICD-10-CM

## 2022-10-10 RX ORDER — TACROLIMUS 0.75 MG/1
3.75 TABLET, EXTENDED RELEASE ORAL
Qty: 150 TABLET | Refills: 0 | Status: SHIPPED | OUTPATIENT
Start: 2022-10-10 | End: 2022-11-15

## 2022-10-31 ENCOUNTER — TELEPHONE (OUTPATIENT)
Dept: TRANSPLANT | Facility: CLINIC | Age: 47
End: 2022-10-31

## 2022-10-31 NOTE — TELEPHONE ENCOUNTER
Patient calling to request a FSH be drawn to check to see if she is going through meopause.    RNCC encouraged patient to reach out to PCP or OBGYN for orders and apt to follow up.  Patient v/u    Fely Blake RN   Transplant Coordinator  [phone]

## 2022-10-31 NOTE — TELEPHONE ENCOUNTER
Renetta will be going to have labs drawn and would like to have a FSH test ordered, to check for menopause

## 2022-11-14 DIAGNOSIS — Z94.0 KIDNEY TRANSPLANTED: ICD-10-CM

## 2022-11-14 DIAGNOSIS — Z94.83 PANCREAS REPLACED BY TRANSPLANT (H): ICD-10-CM

## 2022-11-14 DIAGNOSIS — Z94.0 KIDNEY REPLACED BY TRANSPLANT: ICD-10-CM

## 2022-11-15 ENCOUNTER — TELEPHONE (OUTPATIENT)
Dept: TRANSPLANT | Facility: CLINIC | Age: 47
End: 2022-11-15

## 2022-11-15 RX ORDER — TACROLIMUS 0.75 MG/1
3.75 TABLET, EXTENDED RELEASE ORAL
Qty: 150 TABLET | Refills: 0 | Status: SHIPPED | OUTPATIENT
Start: 2022-11-15 | End: 2022-12-07

## 2022-11-15 NOTE — TELEPHONE ENCOUNTER
ISSUE: Patient overdue for transplant labs,  Last labs on file 07/2022    LPN TASK: Please call patient to review the following:     The most recent laboratory results we have on file for you are from 07/2022. Your labs should be completed this month, 11/2022.  You have orders to be obtaining labs monthly.     For the best outcome for your transplant and in order for us to refill your prescriptions, we encourage you to have a yearly clinic appointment with a physician and to have current labs. If you are unable to return to our transplant center there are several alternatives. Please call your coordinator to discuss them.  To make an appointment with a physician here at [facility], please call:  The Transplant Office at [phone] or [phone].     The Transplant Staff at [facility]

## 2022-11-15 NOTE — LETTER
Rose Castaneda  [address]  [address]  [facility] [address]                November 15, 2022    This letter is regarding your medication refills.    For the best outcome for your transplant and in order for us to refill your prescriptions, we encourage you to have a yearly clinic appointment with a physician and to have current labs. If you are unable to return to our transplant center there are several alternatives. Please call your coordinator to discuss them. .  To make an appointment with a physician here at [facility], please call:  The Transplant Office at [phone] or [phone].  .      The Transplant Staff at [facility]

## 2022-11-29 ENCOUNTER — LAB (OUTPATIENT)
Dept: LAB | Facility: CLINIC | Age: 47
End: 2022-11-29
Payer: COMMERCIAL

## 2022-11-29 DIAGNOSIS — Z94.83 PANCREAS REPLACED BY TRANSPLANT (H): ICD-10-CM

## 2022-11-29 DIAGNOSIS — Z94.0 KIDNEY REPLACED BY TRANSPLANT: ICD-10-CM

## 2022-11-29 DIAGNOSIS — Z94.83 PANCREAS TRANSPLANTED (H): ICD-10-CM

## 2022-11-29 LAB
AMYLASE SERPL-CCNC: 107 U/L (ref 28–100)
ANION GAP SERPL CALCULATED.3IONS-SCNC: 12 MMOL/L (ref 7–15)
BUN SERPL-MCNC: 23.2 MG/DL (ref 6–20)
CALCIUM SERPL-MCNC: 9.4 MG/DL (ref 8.6–10)
CHLORIDE SERPL-SCNC: 103 MMOL/L (ref 98–107)
CREAT SERPL-MCNC: 1.31 MG/DL (ref 0.51–0.95)
DEPRECATED HCO3 PLAS-SCNC: 24 MMOL/L (ref 22–29)
ERYTHROCYTE [DISTWIDTH] IN BLOOD BY AUTOMATED COUNT: 13.6 % (ref 10–15)
GFR SERPL CREATININE-BSD FRML MDRD: 50 ML/MIN/1.73M2
GLUCOSE SERPL-MCNC: 84 MG/DL (ref 70–99)
HCT VFR BLD AUTO: 33.3 % (ref 35–47)
HGB BLD-MCNC: 10.6 G/DL (ref 11.7–15.7)
LIPASE SERPL-CCNC: 35 U/L (ref 13–60)
MCH RBC QN AUTO: 29.2 PG (ref 26.5–33)
MCHC RBC AUTO-ENTMCNC: 31.8 G/DL (ref 31.5–36.5)
MCV RBC AUTO: 92 FL (ref 78–100)
PLATELET # BLD AUTO: 226 10E3/UL (ref 150–450)
POTASSIUM SERPL-SCNC: 3.7 MMOL/L (ref 3.4–5.3)
RBC # BLD AUTO: 3.63 10E6/UL (ref 3.8–5.2)
SODIUM SERPL-SCNC: 139 MMOL/L (ref 136–145)
TACROLIMUS BLD-MCNC: 5.3 UG/L (ref 5–15)
TME LAST DOSE: NORMAL H
TME LAST DOSE: NORMAL H
WBC # BLD AUTO: 7.5 10E3/UL (ref 4–11)

## 2022-11-29 PROCEDURE — 82150 ASSAY OF AMYLASE: CPT

## 2022-11-29 PROCEDURE — 80197 ASSAY OF TACROLIMUS: CPT

## 2022-11-29 PROCEDURE — 80048 BASIC METABOLIC PNL TOTAL CA: CPT

## 2022-11-29 PROCEDURE — 85027 COMPLETE CBC AUTOMATED: CPT

## 2022-11-29 PROCEDURE — 36415 COLL VENOUS BLD VENIPUNCTURE: CPT

## 2022-11-29 PROCEDURE — 83690 ASSAY OF LIPASE: CPT

## 2022-12-07 DIAGNOSIS — Z94.0 KIDNEY TRANSPLANTED: ICD-10-CM

## 2022-12-07 DIAGNOSIS — Z94.0 KIDNEY REPLACED BY TRANSPLANT: Primary | ICD-10-CM

## 2022-12-07 DIAGNOSIS — Z94.83 PANCREAS REPLACED BY TRANSPLANT (H): ICD-10-CM

## 2022-12-07 RX ORDER — TACROLIMUS 0.75 MG/1
3.75 TABLET, EXTENDED RELEASE ORAL
Qty: 450 TABLET | Refills: 3 | Status: SHIPPED | OUTPATIENT
Start: 2022-12-07 | End: 2023-06-22

## 2022-12-19 ENCOUNTER — TELEPHONE (OUTPATIENT)
Dept: TRANSPLANT | Facility: CLINIC | Age: 47
End: 2022-12-19

## 2022-12-19 NOTE — TELEPHONE ENCOUNTER
Provider Call: General  Route to LPN    Reason for call: [facility] faxed 2 forms on Fri- Clarification  Form re Mycophenolate 180 mg and Immunosupression Checklist form  Needs signed and back to them today  Forms were sent to [facility] on Fri     Call back needed? Yes opt 1 then opt 6     Return Call Needed  Same as documented in contacts section  When to return call?: Same day: Route High Priority

## 2022-12-21 NOTE — PROGRESS NOTES
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  Patient reports she is doing eye drops more often and drinking more water.  Blood sugars were high  recently and she had \"a rejection scare\" - but everything checked out okay.  She is still getting tech help through \"[employer]\" (resource through [employer]). Patient is moving on Friday to a new apartment - has family helping her move.  She will be 6 blocks from her son.  Looking into getting a guide dog. Filling out the application for this - needs more low vision pens and going to low vision store after therapy.  Seems to like nightshift on phone for nighttime. Getting a 75 inch TV from her dad.    GOALS     Goal 1 - Near vision        Near Vision Goal Comment: Patient will demonstrate 3 pieces of adaptive equipment and/or adaptive techniques in regards to magnification, lighting, contrast and glare for increased ADL/IADL independence with near vision tasks (reading, meal prep, medication management, writing).   Target Date: 22    GOAL PROGRESS: Goal partially but not fully met due to limited number of treatment sessions.  Please see adaptive equipment/optical devices " recommended below for details of education completed and recommendations made prior to unexpected discharge      Goal 2 - Visual field                     Goal 3 - Environmental modification        Environmental Modification Goal Comment: Patient will demonstrate and/or verbalize 3 environmentally-based ADL modifications to improve visual-based ADL/IADL activities.   Target Date: 01/31/22    GOAL PROGRESS: Goal partially but not fully met due to limited number of treatment sessions.  Please see adaptive equipment/optical devices recommended below for details of education completed and recommendations made prior to unexpected discharge    Goal 4 - Resource education    Goal Description: Resource education: Patient will verbalize awareness of community resources for the following:, Audio access to print materials, Access to large print materials, Access to low vision devices       Target Date: 01/31/22    GOAL PROGRESS: Goal partially but not fully met due to limited number of treatment sessions.  Please see adaptive equipment/optical devices recommended below for details of education completed and recommendations made prior to unexpected discharge    Goal 5 - Distance viewing        Distance Viewing Goal Comment: Pt will demonstrate increased independence and safety in ADL/IADL activities at intermediate distance or in the distance through proficient use of AE.   Target Date: 01/31/22    GOAL PROGRESS: Goal partially but not fully met due to limited number of treatment sessions.  Please see adaptive equipment/optical devices recommended below for details of education completed and recommendations made prior to unexpected discharge    Progress Toward Goals  Goal(s) all not met due to unexpected discharge/early discharge  Progress: Patient seen for 4 visits.    Reason for Discharge  Patient has failed to schedule further appointments.    Adaptive Equipment/Optical Devices Recommended:  For TV: Recommendations for  distance to sit, adjust settings on the TV (picture settings/mode, brightness, contrast, etc.) - set ideally in the evening or with the curtains closed and lights dim in the room (same as for watching TV); kitchen/meal prep AE and adapted strategies: ove gloves, cut resistant glove (wear on your left hand while cutting), Rubberized  for under your cutting board, Black trays with sides for setting up pills, etc; high contrast for plates, bowls and soup mugs, Line kitchen and bathroom drawers with rubberized , Liquid measuring cup strategies; general contrast: high contrast outlet plate, Rugs - flat and contrast in color, marking strategies for ADL/IADLs (rubber bands, electric tape - different colors, buttons, tags, etc. - tactile substitution); placement of items in new apartment; strategies to determine correct colors of items; general contrast strategies/tips for increased ADL/IADL independence; Lighting: likes ~600 lumens and 5000K - also smart bulbs should be great option (issued [employer] resource); ambient lighting - recommend solar shades ([employer]), under cabinet rope lighting (warm to cool and brightness levels), bright motion lights for closet; cell phone adaptations: nightshift, zoom (controller); tips for dry eyes (humidifier in home, rewetting drops and ask MD, drink more water and check with renal MD re: amount to drink); continuous reading AE: Snow 12 CCTV with OCR technology - worked fairly well - likes white on black best; recommendation for smart speaker - Google home and syncing calendars (uses whiteboard right now) and retrieving/obtaining information; readers (good brands); verbally educated in benefits of Be My Eyes brooke and Seeing  brooke    Discharge Plan  Patient to continue home program/techniques  Other services: Will need new order to resume OT

## 2022-12-23 NOTE — TELEPHONE ENCOUNTER
Returned call to pharmacy,  Pharmacist reports no outstanding issues.  RX is in process.  RX confirmed.    Fely Blake RN   Transplant Coordinator  [phone]

## 2023-01-15 ENCOUNTER — HEALTH MAINTENANCE LETTER (OUTPATIENT)
Age: 48
End: 2023-01-15

## 2023-02-14 ENCOUNTER — TELEPHONE (OUTPATIENT)
Dept: TRANSPLANT | Facility: CLINIC | Age: 48
End: 2023-02-14
Payer: COMMERCIAL

## 2023-02-14 DIAGNOSIS — Z94.83 PANCREAS REPLACED BY TRANSPLANT (H): ICD-10-CM

## 2023-02-14 DIAGNOSIS — Z20.828 CONTACT WITH AND (SUSPECTED) EXPOSURE TO OTHER VIRAL COMMUNICABLE DISEASES: ICD-10-CM

## 2023-02-14 DIAGNOSIS — Z94.0 KIDNEY REPLACED BY TRANSPLANT: Primary | ICD-10-CM

## 2023-02-14 NOTE — TELEPHONE ENCOUNTER
Per Renetta,  Oneida Jesup;  'She needs updated lab orders in her chart ' has lab appt tomorrow 02/15/2023

## 2023-02-20 ENCOUNTER — LAB (OUTPATIENT)
Dept: LAB | Facility: CLINIC | Age: 48
End: 2023-02-20
Payer: MEDICARE

## 2023-02-20 DIAGNOSIS — Z94.0 KIDNEY REPLACED BY TRANSPLANT: ICD-10-CM

## 2023-02-20 DIAGNOSIS — Z20.828 CONTACT WITH AND (SUSPECTED) EXPOSURE TO OTHER VIRAL COMMUNICABLE DISEASES: ICD-10-CM

## 2023-02-20 DIAGNOSIS — Z94.83 PANCREAS REPLACED BY TRANSPLANT (H): ICD-10-CM

## 2023-02-20 LAB
ALBUMIN MFR UR ELPH: 31.9 MG/DL (ref 1–14)
AMYLASE SERPL-CCNC: 144 U/L (ref 28–100)
ANION GAP SERPL CALCULATED.3IONS-SCNC: 13 MMOL/L (ref 7–15)
BUN SERPL-MCNC: 26 MG/DL (ref 6–20)
CALCIUM SERPL-MCNC: 9.1 MG/DL (ref 8.6–10)
CHLORIDE SERPL-SCNC: 106 MMOL/L (ref 98–107)
CREAT SERPL-MCNC: 1.44 MG/DL (ref 0.51–0.95)
CREAT UR-MCNC: 227 MG/DL
DEPRECATED HCO3 PLAS-SCNC: 22 MMOL/L (ref 22–29)
ERYTHROCYTE [DISTWIDTH] IN BLOOD BY AUTOMATED COUNT: 13.4 % (ref 10–15)
GFR SERPL CREATININE-BSD FRML MDRD: 44 ML/MIN/1.73M2
GLUCOSE SERPL-MCNC: 77 MG/DL (ref 70–99)
HCT VFR BLD AUTO: 33.7 % (ref 35–47)
HGB BLD-MCNC: 10.8 G/DL (ref 11.7–15.7)
LIPASE SERPL-CCNC: 57 U/L (ref 13–60)
MCH RBC QN AUTO: 29.2 PG (ref 26.5–33)
MCHC RBC AUTO-ENTMCNC: 32 G/DL (ref 31.5–36.5)
MCV RBC AUTO: 91 FL (ref 78–100)
PLATELET # BLD AUTO: 180 10E3/UL (ref 150–450)
POTASSIUM SERPL-SCNC: 4.2 MMOL/L (ref 3.4–5.3)
PROT/CREAT 24H UR: 0.14 MG/MG CR (ref 0–0.2)
RBC # BLD AUTO: 3.7 10E6/UL (ref 3.8–5.2)
SODIUM SERPL-SCNC: 141 MMOL/L (ref 136–145)
WBC # BLD AUTO: 3.5 10E3/UL (ref 4–11)

## 2023-02-20 PROCEDURE — 85027 COMPLETE CBC AUTOMATED: CPT

## 2023-02-20 PROCEDURE — 36415 COLL VENOUS BLD VENIPUNCTURE: CPT

## 2023-02-20 PROCEDURE — 83690 ASSAY OF LIPASE: CPT

## 2023-02-20 PROCEDURE — 82150 ASSAY OF AMYLASE: CPT

## 2023-02-20 PROCEDURE — 80048 BASIC METABOLIC PNL TOTAL CA: CPT

## 2023-02-20 PROCEDURE — 87799 DETECT AGENT NOS DNA QUANT: CPT

## 2023-02-20 PROCEDURE — 84156 ASSAY OF PROTEIN URINE: CPT

## 2023-02-21 ENCOUNTER — TELEPHONE (OUTPATIENT)
Dept: TRANSPLANT | Facility: CLINIC | Age: 48
End: 2023-02-21
Payer: COMMERCIAL

## 2023-02-21 DIAGNOSIS — Z94.0 KIDNEY REPLACED BY TRANSPLANT: Primary | ICD-10-CM

## 2023-02-21 DIAGNOSIS — Z94.83 PANCREAS REPLACED BY TRANSPLANT (H): ICD-10-CM

## 2023-02-21 LAB — BKV DNA # SPEC NAA+PROBE: NOT DETECTED COPIES/ML

## 2023-02-21 NOTE — TELEPHONE ENCOUNTER
ISSUE: elevated lipase and amylase    PLAN/OUTCOME:  Call and assess if patient is constipated, how many BM's per day? Denies BM daily  What is she doing for bowel regimen? denies  Any recent abdominal pain, fevers, n/v? denies  Increased intake of high fat or spicy food? denies  Any alcohol intake? denies  Recommend using Miralax as needed to have at least 2-3 soft BM's per day.   Plan to repeat labs in 1 week.    States she was constipated ~2 weeks ago.  Will repeat labs. Orders placed.    Fely Blake RN   Transplant Coordinator  [phone]

## 2023-02-27 ENCOUNTER — TELEPHONE (OUTPATIENT)
Dept: TRANSPLANT | Facility: CLINIC | Age: 48
End: 2023-02-27
Payer: COMMERCIAL

## 2023-02-27 DIAGNOSIS — Z94.0 KIDNEY REPLACED BY TRANSPLANT: Primary | ICD-10-CM

## 2023-02-27 DIAGNOSIS — Z94.83 PANCREAS REPLACED BY TRANSPLANT (H): ICD-10-CM

## 2023-02-27 DIAGNOSIS — R73.09 ABNORMAL BLOOD SUGAR: ICD-10-CM

## 2023-02-27 NOTE — TELEPHONE ENCOUNTER
Renetta wanted to discuss her S/Aundrea and S/Lip and she stated she had BS low's   shaking fell asleep for hours after

## 2023-02-27 NOTE — TELEPHONE ENCOUNTER
"ISSUE: patient called to reports she is not feeling well and has low BG.  Patient reports \" a couple episodes\" of likely low BG over this past weekend.  Patient did not have a glucometer at home to take BG but reports shaking, sweating, and fatigue.  Reports increase in fatigue the past ~ couple weeks.  Denies night sweats.  Denies weight loss, slight decrease in appetite. Denies N/V/D.    OUTCOME:   Lysine 2000 mg X3 days- taken about 3 weeks prior to labs  womens probiotic- started around time of labs    Lysine okay per transplant pharmacy,  Encouraged to stop probiotic due to timing of elevated enzymes per Rodolfo Mott- patient aware and agreeable    Message sent to Dr Douglas Pappas confirming okay to check C peptide and EBV with repeat labs.    Fely Blake RN   Transplant Coordinator  [phone]      Emerson Wolf MD Sveiven, Sara, RN  Ok to start with those labs for now     "

## 2023-02-28 ENCOUNTER — LAB (OUTPATIENT)
Dept: LAB | Facility: CLINIC | Age: 48
End: 2023-02-28
Payer: MEDICARE

## 2023-02-28 DIAGNOSIS — R73.09 ABNORMAL BLOOD SUGAR: ICD-10-CM

## 2023-02-28 DIAGNOSIS — Z94.83 PANCREAS REPLACED BY TRANSPLANT (H): ICD-10-CM

## 2023-02-28 DIAGNOSIS — Z94.0 KIDNEY REPLACED BY TRANSPLANT: ICD-10-CM

## 2023-02-28 DIAGNOSIS — Z20.828 CONTACT WITH AND (SUSPECTED) EXPOSURE TO OTHER VIRAL COMMUNICABLE DISEASES: ICD-10-CM

## 2023-02-28 LAB
ALBUMIN MFR UR ELPH: 73.7 MG/DL (ref 1–14)
AMYLASE SERPL-CCNC: 116 U/L (ref 28–100)
ANION GAP SERPL CALCULATED.3IONS-SCNC: 9 MMOL/L (ref 7–15)
BUN SERPL-MCNC: 25.2 MG/DL (ref 6–20)
CALCIUM SERPL-MCNC: 9.6 MG/DL (ref 8.6–10)
CHLORIDE SERPL-SCNC: 107 MMOL/L (ref 98–107)
CREAT SERPL-MCNC: 1.58 MG/DL (ref 0.51–0.95)
CREAT UR-MCNC: 373 MG/DL
DEPRECATED HCO3 PLAS-SCNC: 24 MMOL/L (ref 22–29)
ERYTHROCYTE [DISTWIDTH] IN BLOOD BY AUTOMATED COUNT: 13.5 % (ref 10–15)
GFR SERPL CREATININE-BSD FRML MDRD: 40 ML/MIN/1.73M2
GLUCOSE SERPL-MCNC: 89 MG/DL (ref 70–99)
HBA1C MFR BLD: 5.4 % (ref 0–5.6)
HCT VFR BLD AUTO: 32.3 % (ref 35–47)
HGB BLD-MCNC: 10.2 G/DL (ref 11.7–15.7)
LIPASE SERPL-CCNC: 37 U/L (ref 13–60)
MCH RBC QN AUTO: 29.2 PG (ref 26.5–33)
MCHC RBC AUTO-ENTMCNC: 31.6 G/DL (ref 31.5–36.5)
MCV RBC AUTO: 93 FL (ref 78–100)
PLATELET # BLD AUTO: 194 10E3/UL (ref 150–450)
POTASSIUM SERPL-SCNC: 5 MMOL/L (ref 3.4–5.3)
PROT/CREAT 24H UR: 0.2 MG/MG CR (ref 0–0.2)
RBC # BLD AUTO: 3.49 10E6/UL (ref 3.8–5.2)
SODIUM SERPL-SCNC: 140 MMOL/L (ref 136–145)
WBC # BLD AUTO: 3.9 10E3/UL (ref 4–11)

## 2023-02-28 PROCEDURE — 83690 ASSAY OF LIPASE: CPT

## 2023-02-28 PROCEDURE — 83036 HEMOGLOBIN GLYCOSYLATED A1C: CPT

## 2023-02-28 PROCEDURE — 82150 ASSAY OF AMYLASE: CPT

## 2023-02-28 PROCEDURE — 36415 COLL VENOUS BLD VENIPUNCTURE: CPT

## 2023-02-28 PROCEDURE — 84156 ASSAY OF PROTEIN URINE: CPT

## 2023-02-28 PROCEDURE — 85027 COMPLETE CBC AUTOMATED: CPT

## 2023-02-28 PROCEDURE — 87799 DETECT AGENT NOS DNA QUANT: CPT

## 2023-02-28 PROCEDURE — 84681 ASSAY OF C-PEPTIDE: CPT

## 2023-02-28 PROCEDURE — 80048 BASIC METABOLIC PNL TOTAL CA: CPT

## 2023-03-01 LAB
BKV DNA # SPEC NAA+PROBE: NOT DETECTED COPIES/ML
C PEPTIDE SERPL-MCNC: 1.8 NG/ML (ref 0.9–6.9)
EBV DNA COPIES/ML, INSTRUMENT: 3864 COPIES/ML
EBV DNA SPEC NAA+PROBE-LOG#: 3.6 {LOG_COPIES}/ML

## 2023-03-02 ENCOUNTER — TELEPHONE (OUTPATIENT)
Dept: TRANSPLANT | Facility: CLINIC | Age: 48
End: 2023-03-02
Payer: COMMERCIAL

## 2023-03-02 DIAGNOSIS — Z94.83 PANCREAS REPLACED BY TRANSPLANT (H): ICD-10-CM

## 2023-03-02 DIAGNOSIS — Z94.0 KIDNEY REPLACED BY TRANSPLANT: ICD-10-CM

## 2023-03-02 RX ORDER — MYCOPHENOLIC ACID 180 MG/1
TABLET, DELAYED RELEASE ORAL
Qty: 360 TABLET | Refills: 0 | Status: SHIPPED | OUTPATIENT
Start: 2023-03-02 | End: 2023-04-12

## 2023-03-03 ENCOUNTER — TELEPHONE (OUTPATIENT)
Dept: TRANSPLANT | Facility: CLINIC | Age: 48
End: 2023-03-03
Payer: COMMERCIAL

## 2023-03-03 DIAGNOSIS — Z94.83 PANCREAS REPLACED BY TRANSPLANT (H): ICD-10-CM

## 2023-03-03 DIAGNOSIS — B27.90 EBV INFECTION: ICD-10-CM

## 2023-03-03 DIAGNOSIS — Z94.0 KIDNEY REPLACED BY TRANSPLANT: Primary | ICD-10-CM

## 2023-03-03 NOTE — TELEPHONE ENCOUNTER
ldh  Emerson Wolf MD Sveiven, Sara, RN  Repeat EBV in 2 weeks with LDH,  if stable then monthly   Add cmv to next labs   Labs next week, was Fk checked?, UA Ucx, kidney US, DSA     Does she need fluids? Any diarrhea or  new meds?           Previous Messages       ----- Message -----   From: Fely Blake, RN   Sent: 3/2/2023   8:52 AM CST   To: Emerson Pappas MD     Reports extreme fatigue.  EBV 3.6 with elevated creatinine     Denies weight loss, night sweats, swollen lymph nodes.     When would you like repeat EBV and labs?     Fely Blake RN   Transplant Coordinator   [phone]      OUTCOME:  Orders placed.  Patient v/u.  Patient to call to scheduled US.  Denies need to IVF.      Fely Blake RN   Transplant Coordinator  [phone]

## 2023-03-07 ENCOUNTER — ANCILLARY PROCEDURE (OUTPATIENT)
Dept: ULTRASOUND IMAGING | Facility: CLINIC | Age: 48
End: 2023-03-07
Attending: INTERNAL MEDICINE
Payer: MEDICARE

## 2023-03-07 ENCOUNTER — LAB (OUTPATIENT)
Dept: LAB | Facility: CLINIC | Age: 48
End: 2023-03-07
Payer: MEDICARE

## 2023-03-07 DIAGNOSIS — Z94.0 KIDNEY REPLACED BY TRANSPLANT: ICD-10-CM

## 2023-03-07 DIAGNOSIS — Z94.83 PANCREAS REPLACED BY TRANSPLANT (H): ICD-10-CM

## 2023-03-07 DIAGNOSIS — B27.90 EBV INFECTION: ICD-10-CM

## 2023-03-07 LAB
ANION GAP SERPL CALCULATED.3IONS-SCNC: 10 MMOL/L (ref 7–15)
BUN SERPL-MCNC: 23.7 MG/DL (ref 6–20)
CALCIUM SERPL-MCNC: 9.1 MG/DL (ref 8.6–10)
CHLORIDE SERPL-SCNC: 102 MMOL/L (ref 98–107)
CREAT SERPL-MCNC: 1.49 MG/DL (ref 0.51–0.95)
DEPRECATED HCO3 PLAS-SCNC: 25 MMOL/L (ref 22–29)
ERYTHROCYTE [DISTWIDTH] IN BLOOD BY AUTOMATED COUNT: 13.3 % (ref 10–15)
GFR SERPL CREATININE-BSD FRML MDRD: 43 ML/MIN/1.73M2
GLUCOSE SERPL-MCNC: 82 MG/DL (ref 70–99)
HCT VFR BLD AUTO: 30.1 % (ref 35–47)
HGB BLD-MCNC: 9.4 G/DL (ref 11.7–15.7)
MCH RBC QN AUTO: 28.7 PG (ref 26.5–33)
MCHC RBC AUTO-ENTMCNC: 31.2 G/DL (ref 31.5–36.5)
MCV RBC AUTO: 92 FL (ref 78–100)
PLATELET # BLD AUTO: 173 10E3/UL (ref 150–450)
POTASSIUM SERPL-SCNC: 3.7 MMOL/L (ref 3.4–5.3)
RBC # BLD AUTO: 3.27 10E6/UL (ref 3.8–5.2)
SODIUM SERPL-SCNC: 137 MMOL/L (ref 136–145)
TACROLIMUS BLD-MCNC: 6.1 UG/L (ref 5–15)
TME LAST DOSE: NORMAL H
TME LAST DOSE: NORMAL H
WBC # BLD AUTO: 4.5 10E3/UL (ref 4–11)

## 2023-03-07 PROCEDURE — 85027 COMPLETE CBC AUTOMATED: CPT | Performed by: PATHOLOGY

## 2023-03-07 PROCEDURE — 36415 COLL VENOUS BLD VENIPUNCTURE: CPT | Performed by: PATHOLOGY

## 2023-03-07 PROCEDURE — 87799 DETECT AGENT NOS DNA QUANT: CPT | Performed by: INTERNAL MEDICINE

## 2023-03-07 PROCEDURE — 86833 HLA CLASS II HIGH DEFIN QUAL: CPT | Performed by: INTERNAL MEDICINE

## 2023-03-07 PROCEDURE — 80197 ASSAY OF TACROLIMUS: CPT | Performed by: INTERNAL MEDICINE

## 2023-03-07 PROCEDURE — 76776 US EXAM K TRANSPL W/DOPPLER: CPT | Performed by: RADIOLOGY

## 2023-03-07 PROCEDURE — 86832 HLA CLASS I HIGH DEFIN QUAL: CPT | Performed by: INTERNAL MEDICINE

## 2023-03-07 PROCEDURE — 83615 LACTATE (LD) (LDH) ENZYME: CPT | Performed by: INTERNAL MEDICINE

## 2023-03-07 PROCEDURE — 99000 SPECIMEN HANDLING OFFICE-LAB: CPT | Performed by: PATHOLOGY

## 2023-03-07 PROCEDURE — 80048 BASIC METABOLIC PNL TOTAL CA: CPT | Performed by: PATHOLOGY

## 2023-03-08 ENCOUNTER — TELEPHONE (OUTPATIENT)
Dept: TRANSPLANT | Facility: CLINIC | Age: 48
End: 2023-03-08
Payer: COMMERCIAL

## 2023-03-08 DIAGNOSIS — B27.90 EBV INFECTION: Primary | ICD-10-CM

## 2023-03-08 DIAGNOSIS — Z94.0 KIDNEY REPLACED BY TRANSPLANT: ICD-10-CM

## 2023-03-08 DIAGNOSIS — Z94.83 PANCREAS REPLACED BY TRANSPLANT (H): ICD-10-CM

## 2023-03-08 LAB
CMV DNA SPEC NAA+PROBE-ACNC: NOT DETECTED IU/ML
EBV DNA COPIES/ML, INSTRUMENT: 6369 COPIES/ML
EBV DNA SPEC NAA+PROBE-LOG#: 3.8 {LOG_COPIES}/ML
LDH SERPL L TO P-CCNC: 159 U/L (ref 0–250)

## 2023-03-08 NOTE — TELEPHONE ENCOUNTER
Patient Call: General  Route to LPN    Reason for call: called in regards of patient was looking to go over lab results and ultra sound images. Call back number is [phone].    Call back needed? Yes    Return Call Needed  Same as documented in contacts section  When to return call?: Same day: Route High Priority

## 2023-03-09 LAB
DONOR IDENTIFICATION: NORMAL
DSA COMMENTS: NORMAL
DSA PRESENT: NO
DSA TEST METHOD: NORMAL
ORGAN: NORMAL
SA 1 CELL: NORMAL
SA 1 TEST METHOD: NORMAL
SA 2 CELL: NORMAL
SA 2 TEST METHOD: NORMAL
SA1 HI RISK ABY: NORMAL
SA1 MOD RISK ABY: NORMAL
SA2 HI RISK ABY: NORMAL
SA2 MOD RISK ABY: NORMAL
UNACCEPTABLE ANTIGENS: NORMAL
UNOS CPRA: 52
ZZZSA 1  COMMENTS: NORMAL
ZZZSA 2 COMMENTS: NORMAL

## 2023-03-09 NOTE — TELEPHONE ENCOUNTER
Patient calling to review labs.    Creatinine: 1.49, above baseline 1.1-1.3    Currently extreme fatigue.  Denies any decrease in appetite or night sweats.  States she is hydrating, denies need for IVF.    Reviewed EBV and its Signs and symptoms,  EBV information provided via [employer].    Reviewed US results with patient:  Emerson Wolf MD Sveiven, Fely, RN  No hydronephrosis, non obstructive stones     Patient to repeat labs including EBV level next Tuesday     Orders placed.  Patient v/u    Fely Blake RN   Transplant Coordinator  [phone]

## 2023-03-14 ENCOUNTER — LAB (OUTPATIENT)
Dept: LAB | Facility: CLINIC | Age: 48
End: 2023-03-14
Payer: MEDICARE

## 2023-03-14 DIAGNOSIS — B27.90 EBV INFECTION: ICD-10-CM

## 2023-03-14 DIAGNOSIS — Z94.0 KIDNEY REPLACED BY TRANSPLANT: ICD-10-CM

## 2023-03-14 DIAGNOSIS — Z94.83 PANCREAS REPLACED BY TRANSPLANT (H): ICD-10-CM

## 2023-03-14 LAB
AMYLASE SERPL-CCNC: 112 U/L (ref 28–100)
ANION GAP SERPL CALCULATED.3IONS-SCNC: 8 MMOL/L (ref 7–15)
BUN SERPL-MCNC: 19.7 MG/DL (ref 6–20)
CALCIUM SERPL-MCNC: 9.2 MG/DL (ref 8.6–10)
CHLORIDE SERPL-SCNC: 104 MMOL/L (ref 98–107)
CREAT SERPL-MCNC: 1.32 MG/DL (ref 0.51–0.95)
DEPRECATED HCO3 PLAS-SCNC: 24 MMOL/L (ref 22–29)
ERYTHROCYTE [DISTWIDTH] IN BLOOD BY AUTOMATED COUNT: 13.8 % (ref 10–15)
GFR SERPL CREATININE-BSD FRML MDRD: 49 ML/MIN/1.73M2
GLUCOSE SERPL-MCNC: 84 MG/DL (ref 70–99)
HCT VFR BLD AUTO: 30.8 % (ref 35–47)
HGB BLD-MCNC: 9.7 G/DL (ref 11.7–15.7)
LIPASE SERPL-CCNC: 34 U/L (ref 13–60)
MCH RBC QN AUTO: 29 PG (ref 26.5–33)
MCHC RBC AUTO-ENTMCNC: 31.5 G/DL (ref 31.5–36.5)
MCV RBC AUTO: 92 FL (ref 78–100)
PLATELET # BLD AUTO: 209 10E3/UL (ref 150–450)
POTASSIUM SERPL-SCNC: 4.5 MMOL/L (ref 3.4–5.3)
RBC # BLD AUTO: 3.34 10E6/UL (ref 3.8–5.2)
SODIUM SERPL-SCNC: 136 MMOL/L (ref 136–145)
TACROLIMUS BLD-MCNC: 5.4 UG/L (ref 5–15)
TME LAST DOSE: NORMAL H
TME LAST DOSE: NORMAL H
WBC # BLD AUTO: 6.7 10E3/UL (ref 4–11)

## 2023-03-14 PROCEDURE — 36415 COLL VENOUS BLD VENIPUNCTURE: CPT

## 2023-03-14 PROCEDURE — 85027 COMPLETE CBC AUTOMATED: CPT

## 2023-03-14 PROCEDURE — 83690 ASSAY OF LIPASE: CPT

## 2023-03-14 PROCEDURE — 87799 DETECT AGENT NOS DNA QUANT: CPT

## 2023-03-14 PROCEDURE — 80197 ASSAY OF TACROLIMUS: CPT

## 2023-03-14 PROCEDURE — 82150 ASSAY OF AMYLASE: CPT

## 2023-03-14 PROCEDURE — 80048 BASIC METABOLIC PNL TOTAL CA: CPT

## 2023-03-15 LAB
EBV DNA COPIES/ML, INSTRUMENT: 4538 COPIES/ML
EBV DNA SPEC NAA+PROBE-LOG#: 3.7 {LOG_COPIES}/ML

## 2023-03-16 ENCOUNTER — TELEPHONE (OUTPATIENT)
Dept: TRANSPLANT | Facility: CLINIC | Age: 48
End: 2023-03-16
Payer: COMMERCIAL

## 2023-03-16 DIAGNOSIS — Z94.0 KIDNEY REPLACED BY TRANSPLANT: ICD-10-CM

## 2023-03-16 DIAGNOSIS — Z94.83 PANCREAS REPLACED BY TRANSPLANT (H): ICD-10-CM

## 2023-03-16 DIAGNOSIS — B27.90 EBV INFECTION: Primary | ICD-10-CM

## 2023-03-16 NOTE — TELEPHONE ENCOUNTER
ISSUE: CMV remains stable, per Dr Douglas Pappas,  Can proceed to monthly transplant labs with EBV    PLAN: update labs and assess patients symptoms    OUTCOME: patient reports ongoing fatigue, denies decrease in appetite, weight loss, night sweats. V/u of months labs    Labs updated. SOT follow up 5/2023    Fely Blake RN   Transplant Coordinator  [phone]

## 2023-03-22 ENCOUNTER — TELEPHONE (OUTPATIENT)
Dept: TRANSPLANT | Facility: CLINIC | Age: 48
End: 2023-03-22
Payer: COMMERCIAL

## 2023-03-22 NOTE — TELEPHONE ENCOUNTER
RNCC spoke with Renetta reynoso for colonoscopy with sedation (sedation similar to that used for biopsy for transplant when indicated). Ensure good oral hydration after completion of colonoscopy.    Renetta voiced understanding.

## 2023-03-22 NOTE — TELEPHONE ENCOUNTER
Patient Call: General  Route to LPN    Reason for call: Pt having a colonoscopy tomorrow under sedation  Wonders if that is OK after her kidney transplant     Call back needed? Yes    Return Call Needed  Same as documented in contacts section  When to return call?: Same day: Route High Priority

## 2023-04-12 DIAGNOSIS — Z94.0 KIDNEY REPLACED BY TRANSPLANT: ICD-10-CM

## 2023-04-12 DIAGNOSIS — Z94.83 PANCREAS REPLACED BY TRANSPLANT (H): ICD-10-CM

## 2023-04-12 RX ORDER — MYCOPHENOLIC ACID 180 MG/1
360 TABLET, DELAYED RELEASE ORAL 2 TIMES DAILY
Qty: 360 TABLET | Refills: 3 | Status: SHIPPED | OUTPATIENT
Start: 2023-04-12 | End: 2023-07-24

## 2023-04-29 ENCOUNTER — HEALTH MAINTENANCE LETTER (OUTPATIENT)
Age: 48
End: 2023-04-29

## 2023-05-03 ENCOUNTER — TELEPHONE (OUTPATIENT)
Dept: NEPHROLOGY | Facility: CLINIC | Age: 48
End: 2023-05-03
Payer: COMMERCIAL

## 2023-05-03 NOTE — TELEPHONE ENCOUNTER
ISSUE:  Returning call to patient.  Patient cancelled virtual appointment with Dr Paez for today 5/3/2023    Left message asking for CB to discuss rescheduling and preference on in person or virtual appintment    Fely Blake RN   Transplant Coordinator  [phone]

## 2023-05-03 NOTE — TELEPHONE ENCOUNTER
[facility] Call Center    Phone Message    May a detailed message be left on voicemail: yes     Reason for Call: Patient is calling because she needs to reschedule, she is in [place]. Writer is not seeing any available appts until Decemeber 2023. Wondering if there is something sooner than that. Please reach out to patient. Thank you    Action Taken: Message routed to:  [facility] NEPH    Travel Screening: Not Applicable

## 2023-05-09 ENCOUNTER — TELEPHONE (OUTPATIENT)
Dept: TRANSPLANT | Facility: CLINIC | Age: 48
End: 2023-05-09
Payer: COMMERCIAL

## 2023-05-09 DIAGNOSIS — Z94.0 KIDNEY REPLACED BY TRANSPLANT: Primary | ICD-10-CM

## 2023-05-09 DIAGNOSIS — Z94.83 PANCREAS REPLACED BY TRANSPLANT (H): ICD-10-CM

## 2023-05-09 NOTE — TELEPHONE ENCOUNTER
Renetta returning Coordinator call regarding appointment. Patient said she would prefer a Virtual appointment

## 2023-05-09 NOTE — TELEPHONE ENCOUNTER
Patient cancelled last apt with Dr Paez, would like to schedule a virtual apt.  Order placed. Patient aware.    Fely Blake RN   Transplant Coordinator  [phone]

## 2023-06-02 DIAGNOSIS — Z94.83 PANCREAS TRANSPLANTED (H): ICD-10-CM

## 2023-06-02 DIAGNOSIS — Z94.0 KIDNEY REPLACED BY TRANSPLANT: ICD-10-CM

## 2023-06-04 RX ORDER — SULFAMETHOXAZOLE AND TRIMETHOPRIM 400; 80 MG/1; MG/1
1 TABLET ORAL DAILY
Qty: 90 TABLET | Refills: 3 | Status: SHIPPED | OUTPATIENT
Start: 2023-06-04 | End: 2024-07-18

## 2023-06-22 DIAGNOSIS — Z94.0 KIDNEY TRANSPLANTED: ICD-10-CM

## 2023-06-22 DIAGNOSIS — Z94.0 KIDNEY REPLACED BY TRANSPLANT: ICD-10-CM

## 2023-06-22 DIAGNOSIS — Z94.83 PANCREAS REPLACED BY TRANSPLANT (H): ICD-10-CM

## 2023-06-22 RX ORDER — TACROLIMUS 0.75 MG/1
3.75 TABLET, EXTENDED RELEASE ORAL
Qty: 450 TABLET | Refills: 3 | Status: SHIPPED | OUTPATIENT
Start: 2023-06-22 | End: 2023-12-08

## 2023-07-22 ENCOUNTER — HEALTH MAINTENANCE LETTER (OUTPATIENT)
Age: 48
End: 2023-07-22

## 2023-07-24 ENCOUNTER — TELEPHONE (OUTPATIENT)
Dept: TRANSPLANT | Facility: CLINIC | Age: 48
End: 2023-07-24
Payer: COMMERCIAL

## 2023-07-24 DIAGNOSIS — Z94.83 PANCREAS REPLACED BY TRANSPLANT (H): ICD-10-CM

## 2023-07-24 DIAGNOSIS — Z94.0 KIDNEY REPLACED BY TRANSPLANT: ICD-10-CM

## 2023-07-24 DIAGNOSIS — Z94.0 KIDNEY REPLACED BY TRANSPLANT: Primary | ICD-10-CM

## 2023-07-24 DIAGNOSIS — Z94.83 PANCREAS REPLACED BY TRANSPLANT (H): Primary | ICD-10-CM

## 2023-07-24 RX ORDER — MYCOPHENOLIC ACID 180 MG/1
360 TABLET, DELAYED RELEASE ORAL 2 TIMES DAILY
Qty: 360 TABLET | Refills: 0 | Status: SHIPPED | OUTPATIENT
Start: 2023-07-24 | End: 2023-07-28

## 2023-07-24 NOTE — TELEPHONE ENCOUNTER
ISSUE:  received message from scheduling that patient has not been reached after multiple attempts to schedule tx neph apt.    OUTCOME:  orders now .  New order placed.  Patient aware scheduling will reach back out to schedule.    Fely Blake RN   Transplant Coordinator  [phone]

## 2023-07-28 DIAGNOSIS — Z94.83 PANCREAS REPLACED BY TRANSPLANT (H): ICD-10-CM

## 2023-07-28 DIAGNOSIS — Z94.0 KIDNEY REPLACED BY TRANSPLANT: ICD-10-CM

## 2023-07-28 RX ORDER — MYCOPHENOLIC ACID 180 MG/1
TABLET, DELAYED RELEASE ORAL
Qty: 360 TABLET | Refills: 3 | Status: SHIPPED | OUTPATIENT
Start: 2023-07-28 | End: 2023-08-01

## 2023-08-01 DIAGNOSIS — Z94.83 PANCREAS REPLACED BY TRANSPLANT (H): ICD-10-CM

## 2023-08-01 DIAGNOSIS — Z94.0 KIDNEY REPLACED BY TRANSPLANT: Primary | ICD-10-CM

## 2023-08-01 RX ORDER — MYCOPHENOLIC ACID 180 MG/1
360 TABLET, DELAYED RELEASE ORAL 2 TIMES DAILY
Qty: 360 TABLET | Refills: 0 | Status: SHIPPED | OUTPATIENT
Start: 2023-08-01 | End: 2024-02-01

## 2023-09-30 ENCOUNTER — HEALTH MAINTENANCE LETTER (OUTPATIENT)
Age: 48
End: 2023-09-30

## 2023-11-13 ENCOUNTER — NURSE TRIAGE (OUTPATIENT)
Dept: NURSING | Facility: CLINIC | Age: 48
End: 2023-11-13
Payer: COMMERCIAL

## 2023-11-13 ENCOUNTER — TELEPHONE (OUTPATIENT)
Dept: OPHTHALMOLOGY | Facility: CLINIC | Age: 48
End: 2023-11-13
Payer: COMMERCIAL

## 2023-11-13 NOTE — TELEPHONE ENCOUNTER
"Nurse Triage SBAR    Is this a 2nd Level Triage? YES, LICENSED PRACTITIONER REVIEW IS REQUIRED    Situation: New changes in vision , right>left    Background: Patient is legally blind, usually sees dark shapes and must have lights to see better. Today she woke up and it is opposite. She said that it is too bright, and even when she closes her eyes, she sees light or things are \"lit up\".   No pain, no discharge, no other symptoms. She thinks she may be dehydrated so she drank a bottle of water and thinks it may have lessened the brightness a bit.    Assessment: Sudden change in vision    Protocol Recommended Disposition:   See in Office Today    Recommendation: Please call patient at [phone] today with recommendations.     Routed to provider/team    Mamta Villanueva RN        Reason for Disposition   Patient wants to be seen    Additional Information   Negative: Weakness of the face, arm or leg on one side of the body   Negative: Followed getting substance in the eye   Negative: Foreign body stuck in the eye   Negative: Followed an eye injury   Negative: Followed sun lamp or sun exposure (UV keratitis)   Negative: Yellow or green discharge (pus) in the eye   Negative: Pregnant   Negative: Complete loss of vision in one or both eyes   Negative: SEVERE eye pain   Negative: SEVERE headache   Negative: Double vision   Negative: Blurred vision or visual changes and present now and sudden onset or new (e.g., minutes, hours, days)  (Exception: Seeing floaters / black specks OR previously diagnosed migraine headaches with same symptoms.)   Negative: Patient sounds very sick or weak to the triager   Negative: Flashes of light  (Exception: Brief from pressing on the eyeball.)   Negative: Many floaters in the eye (Exception: Floater(s) are a chronic symptom and this is unchanged from patient's baseline pattern.)   Negative: Eye pain and brief (now gone) blurred vision or visual changes   Negative: Taking digoxin (e.g., " "Lanoxin, Digitek, Cardoxin, Lanoxicaps; Toloxin in [place]) and blurred vision, yellow vision, or yellow-green halos   Negative: Blurred vision or visual changes and gradual onset (e.g., weeks, months)   Negative: Floaters are a chronic symptom (recurrent or ongoing AND present > 4 weeks)   Negative: Holds book very close to face or sits very close to TV   Negative: Closes or covers 1 eye to read   Negative: Diabetes mellitus and no eye exam in > 12 months   Negative: Age > 65 years and no eye exam in > 12 months    Answer Assessment - Initial Assessment Questions  1. DESCRIPTION: \"How has your vision changed?\" (e.g., complete vision loss, blurred vision, double vision, floaters, etc.)      Usually sees darker vision and use bright light. Today it is too bright.   2. LOCATION: \"One or both eyes?\" If one, ask: \"Which eye?\"      Right eye more left at this time  3. SEVERITY: \"Can you see anything?\" If Yes, ask: \"What can you see?\" (e.g., fine print)      Legally blind, usually can read , not today  4. ONSET: \"When did this begin?\" \"Did it start suddenly or has this been gradual?\"      Today, sudden , woke up with it  5. PATTERN: \"Does this come and go, or has it been constant since it started?\"      Constant, got better with a little water  6. PAIN: \"Is there any pain in your eye(s)?\"  (Scale 1-10; or mild, moderate, severe)    - NONE (0): No pain.    - MILD (1-3): Doesn't interfere with normal activities.    - MODERATE (4-7): Interferes with normal activities or awakens from sleep.     - SEVERE (8-10): Excruciating pain, unable to do any normal activities.      no  7. CONTACTS-GLASSES: \"Do you wear contacts or glasses?\"      no  8. CAUSE: \"What do you think is causing this visual problem?\"      Dehydrated , didn't drink as much yesterday  9. OTHER SYMPTOMS: \"Do you have any other symptoms?\" (e.g., confusion, headache, arm or leg weakness, speech problems)      no  10. PREGNANCY: \"Is there any chance you are pregnant?\" " "\"When was your last menstrual period?\"        na    Protocols used: Vision Loss or Change-A-OH    "

## 2023-11-13 NOTE — TELEPHONE ENCOUNTER
Patient reports that the brightness is bothering her and that has never happened before.  She has no eye pain.  She attributes it to being dehydrated and the bright sun today.  Patient is scheduled.  She will call us if symptoms worsen or change.

## 2023-11-13 NOTE — TELEPHONE ENCOUNTER
Caller reporting the following red-flag symptom(s): pt has a change in vision and is legally blind and pt is used to black or dark vision and today everything is white or lit up  even with eyes closed it is light and white very sudden change in vision  Pt of Dr Vo,Dr Zuniga & Dr Sylvester    Per the system red-flag symptom policy, patient was instructed to:  speak with a Registered Nurse    Action:  Patient warm transferred to a Registered Nurse Mamta

## 2023-11-17 ENCOUNTER — OFFICE VISIT (OUTPATIENT)
Dept: OPHTHALMOLOGY | Facility: CLINIC | Age: 48
End: 2023-11-17
Attending: OPHTHALMOLOGY
Payer: MEDICARE

## 2023-11-17 DIAGNOSIS — E10.3513 TYPE 1 DIABETES MELLITUS WITH PROLIFERATIVE RETINOPATHY OF BOTH EYES AND MACULAR EDEMA (H): ICD-10-CM

## 2023-11-17 PROCEDURE — G0463 HOSPITAL OUTPT CLINIC VISIT: HCPCS | Performed by: OPHTHALMOLOGY

## 2023-11-17 PROCEDURE — 92134 CPTRZ OPH DX IMG PST SGM RTA: CPT | Performed by: OPHTHALMOLOGY

## 2023-11-17 PROCEDURE — 92134 CPTRZ OPH DX IMG PST SGM RTA: CPT | Mod: 26 | Performed by: OPHTHALMOLOGY

## 2023-11-17 PROCEDURE — 99214 OFFICE O/P EST MOD 30 MIN: CPT | Performed by: OPHTHALMOLOGY

## 2023-11-17 ASSESSMENT — SLIT LAMP EXAM - LIDS
COMMENTS: NORMAL
COMMENTS: NORMAL

## 2023-11-17 ASSESSMENT — VISUAL ACUITY
OS_SC: 20/30
METHOD: SNELLEN - LINEAR
OD_SC: 20/60

## 2023-11-17 ASSESSMENT — TONOMETRY
OS_IOP_MMHG: 13
IOP_METHOD: TONOPEN
OD_IOP_MMHG: 10

## 2023-11-17 ASSESSMENT — EXTERNAL EXAM - LEFT EYE: OS_EXAM: NORMAL

## 2023-11-17 ASSESSMENT — CONF VISUAL FIELD
OS_INFERIOR_NASAL_RESTRICTION: 1
METHOD: COUNTING FINGERS
OD_SUPERIOR_TEMPORAL_RESTRICTION: 1
OS_SUPERIOR_NASAL_RESTRICTION: 1
OS_INFERIOR_TEMPORAL_RESTRICTION: 1
OD_INFERIOR_TEMPORAL_RESTRICTION: 1
OS_SUPERIOR_TEMPORAL_RESTRICTION: 1
OD_SUPERIOR_NASAL_RESTRICTION: 1
OD_INFERIOR_NASAL_RESTRICTION: 1

## 2023-11-17 ASSESSMENT — CUP TO DISC RATIO
OD_RATIO: 0.4
OS_RATIO: 0.4

## 2023-11-17 ASSESSMENT — EXTERNAL EXAM - RIGHT EYE: OD_EXAM: NORMAL

## 2023-11-17 NOTE — PROGRESS NOTES
CC -  PDR    INTERVAL HISTORY - VA worsening, occasional diplopia especially when tired, not bothered much currently      PMH -   Rose Castaneda is a 47 year old patient referred BY Dr Yancey from [facility] for evlauation and treat  of PDR. Previously seen by me 4/2015 @ [facility]. subseqeuntly seen by Dr. Yancey @ [facility] and referred to me for further evaluation.  History of pancreatic transplant, kidney transplant x2, ~ 2017  VA @ [facility] ([id number]) was 20/40 - 20/50 & 20/30 - 20/40     Prior esotropia as child no surgery      PAST OCULAR SURGERY  PRP OU 2005 in [place]  CE/IOL 9/2020  YAG OU      RETINAL IMAGING:  OCT 11/17/2023  OD - extensive outer atrophy and thinning, mild ERM, trace IRF  OS - extensive outer atrophy from FML scars, mild diffuse thinning, mild ERM, mild DME non-central stable    GVF 2-16-21  OU severe constriction, III4e ~ 5 degrees OU, V ~ 15 degrees        ASSESSMENT & PLAN    # DM I with PDR and DME   - now s/p pancreas transplant   - quiescent   - dense laser scars & outer atrophy diffuse   - likely causing loss of acuity & contrast    - has focal VRT to ONH on OCT but no traction on exam       # Mild DME, 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   - had AVastin x 1 OD in 2007   - doubt significant   - recheck 6-12 months      # Legal blindness OU   - presumed d/t PDR and extensive laser scaring   - suspect progression d/t scar creep    - cannot r/o concomitant sandra/cone dystrophy but less likely   - patient advised to have her son get DFE as precaution   - could consider genetic testing if continues to progress   - doubt ffERG would be useful given dense laser   - d/w patient IRD referral as precaution but wishes to proceed (11/2023)      # Diplopia    - suspect decompensating strabismus    - per patient had ET as child no surgery   - good motility on exam 11/2023   - offered referral to adult strabismus, declined for now (11/2023)     - monitor for now         Return in about 1 year (around 11/17/2024) for DFE OU, OCT OU, [employer]  Photo.     ATTESTATION     Attending Attestation:     Complete documentation of historical and exam elements from today's encounter can be found in the full encounter summary report (not reduplicated in this progress note).  I personally obtained the chief complaint(s) and history of present illness.  I confirmed and edited as necessary the review of systems, past medical/surgical history, family history, social history, and examination findings as documented by others; and I examined the patient myself.  I personally reviewed the relevant tests, images, and reports as documented above.  I formulated and edited as necessary the assessment and plan and discussed the findings and management plan with the patient and family    Jaja Zuniga MD, PhD  , Vitreoretinal Surgery  Department of Ophthalmology  [facility]

## 2023-11-17 NOTE — NURSING NOTE
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              Comments    Pt states no change in VA since last visit  Pt states no flashes, floaters eye pain or redness  LBS: transplant, does not check    Last A1C:  Lab Results       Component                Value               Date                       A1C                      5.4                 02/28/2023                 A1C                      5.1                 01/04/2022                 A1C                      5.2                 02/04/2021                 A1C                      5.4                 04/14/2020                 A1C                      5.0                 10/25/2019                 A1C                      5.2                 02/05/2019                 A1C                      5.1                 05/02/2018        Katheryn Mcgregor COT 7:37 AM November 17, 2023

## 2023-12-08 DIAGNOSIS — Z94.0 KIDNEY TRANSPLANTED: ICD-10-CM

## 2023-12-08 DIAGNOSIS — Z94.0 KIDNEY REPLACED BY TRANSPLANT: ICD-10-CM

## 2023-12-08 DIAGNOSIS — Z94.83 PANCREAS REPLACED BY TRANSPLANT (H): Primary | ICD-10-CM

## 2023-12-08 RX ORDER — TACROLIMUS 0.75 MG/1
3.75 TABLET, EXTENDED RELEASE ORAL
Qty: 450 TABLET | Refills: 0 | Status: SHIPPED | OUTPATIENT
Start: 2023-12-08 | End: 2024-02-02

## 2024-01-23 ENCOUNTER — TELEPHONE (OUTPATIENT)
Dept: TRANSPLANT | Facility: CLINIC | Age: 49
End: 2024-01-23

## 2024-01-23 DIAGNOSIS — Z94.0 KIDNEY REPLACED BY TRANSPLANT: ICD-10-CM

## 2024-01-23 DIAGNOSIS — Z98.890 OTHER SPECIFIED POSTPROCEDURAL STATES: ICD-10-CM

## 2024-01-23 DIAGNOSIS — Z20.828 CONTACT WITH AND (SUSPECTED) EXPOSURE TO OTHER VIRAL COMMUNICABLE DISEASES: Primary | ICD-10-CM

## 2024-01-23 DIAGNOSIS — Z48.298 AFTERCARE FOLLOWING ORGAN TRANSPLANT: ICD-10-CM

## 2024-01-23 DIAGNOSIS — Z79.899 ENCOUNTER FOR LONG-TERM CURRENT USE OF MEDICATION: ICD-10-CM

## 2024-01-23 NOTE — TELEPHONE ENCOUNTER
General  Route to LPN    Reason for call: Pt called looking for what here lab orders are for 1/30/2024 and she also wnts EDV Count     Call back needed? Yes    Return Call Needed  Same as documented in contacts section  When to return call?: Same day: Route High Priority

## 2024-01-24 NOTE — TELEPHONE ENCOUNTER
Call placed to patient. No answer. Voice message left instructing patient to complete labs monthly with EBV levels. Orders placed.

## 2024-01-30 ENCOUNTER — OFFICE VISIT (OUTPATIENT)
Dept: TRANSPLANT | Facility: CLINIC | Age: 49
End: 2024-01-30
Attending: INTERNAL MEDICINE
Payer: COMMERCIAL

## 2024-01-30 ENCOUNTER — LAB (OUTPATIENT)
Dept: LAB | Facility: CLINIC | Age: 49
End: 2024-01-30
Attending: INTERNAL MEDICINE
Payer: COMMERCIAL

## 2024-01-30 VITALS
BODY MASS INDEX: 21.9 KG/M2 | HEART RATE: 97 BPM | DIASTOLIC BLOOD PRESSURE: 72 MMHG | SYSTOLIC BLOOD PRESSURE: 119 MMHG | WEIGHT: 114 LBS | TEMPERATURE: 98.6 F | OXYGEN SATURATION: 97 %

## 2024-01-30 DIAGNOSIS — Z48.298 AFTERCARE FOLLOWING ORGAN TRANSPLANT: ICD-10-CM

## 2024-01-30 DIAGNOSIS — Z20.828 CONTACT WITH AND (SUSPECTED) EXPOSURE TO OTHER VIRAL COMMUNICABLE DISEASES: ICD-10-CM

## 2024-01-30 DIAGNOSIS — N18.31 ANEMIA IN STAGE 3A CHRONIC KIDNEY DISEASE (H): ICD-10-CM

## 2024-01-30 DIAGNOSIS — E10.3593 TYPE 1 DIABETES MELLITUS WITH PROLIFERATIVE RETINOPATHY OF BOTH EYES, MACULAR EDEMA PRESENCE UNSPECIFIED, UNSPECIFIED PROLIFERATIVE RETINOPATHY TYPE (H): Primary | ICD-10-CM

## 2024-01-30 DIAGNOSIS — E10.3593 TYPE 1 DIABETES MELLITUS WITH PROLIFERATIVE RETINOPATHY OF BOTH EYES, MACULAR EDEMA PRESENCE UNSPECIFIED, UNSPECIFIED PROLIFERATIVE RETINOPATHY TYPE (H): ICD-10-CM

## 2024-01-30 DIAGNOSIS — N18.31 STAGE 3A CHRONIC KIDNEY DISEASE (H): ICD-10-CM

## 2024-01-30 DIAGNOSIS — Z94.0 KIDNEY REPLACED BY TRANSPLANT: ICD-10-CM

## 2024-01-30 DIAGNOSIS — B27.00 EBV (EPSTEIN-BARR VIRUS) VIREMIA: ICD-10-CM

## 2024-01-30 DIAGNOSIS — Z79.899 ENCOUNTER FOR LONG-TERM CURRENT USE OF MEDICATION: ICD-10-CM

## 2024-01-30 DIAGNOSIS — Z29.89 NEED FOR PNEUMOCYSTIS PROPHYLAXIS: ICD-10-CM

## 2024-01-30 DIAGNOSIS — Z98.890 OTHER SPECIFIED POSTPROCEDURAL STATES: ICD-10-CM

## 2024-01-30 DIAGNOSIS — Z94.83 PANCREAS REPLACED BY TRANSPLANT (H): ICD-10-CM

## 2024-01-30 DIAGNOSIS — D63.1 ANEMIA IN STAGE 3A CHRONIC KIDNEY DISEASE (H): ICD-10-CM

## 2024-01-30 DIAGNOSIS — D84.9 IMMUNOSUPPRESSED STATUS (H): ICD-10-CM

## 2024-01-30 LAB
ALBUMIN MFR UR ELPH: 16.6 MG/DL
AMYLASE SERPL-CCNC: 116 U/L (ref 28–100)
ANION GAP SERPL CALCULATED.3IONS-SCNC: 10 MMOL/L (ref 7–15)
BUN SERPL-MCNC: 26.9 MG/DL (ref 6–20)
CALCIUM SERPL-MCNC: 9.5 MG/DL (ref 8.6–10)
CHLORIDE SERPL-SCNC: 105 MMOL/L (ref 98–107)
CREAT SERPL-MCNC: 2.1 MG/DL (ref 0.51–0.95)
CREAT UR-MCNC: 200 MG/DL
DEPRECATED HCO3 PLAS-SCNC: 26 MMOL/L (ref 22–29)
EGFRCR SERPLBLD CKD-EPI 2021: 28 ML/MIN/1.73M2
ERYTHROCYTE [DISTWIDTH] IN BLOOD BY AUTOMATED COUNT: 13.2 % (ref 10–15)
GLUCOSE SERPL-MCNC: 58 MG/DL (ref 70–99)
HBA1C MFR BLD: 5.5 %
HCT VFR BLD AUTO: 31.6 % (ref 35–47)
HGB BLD-MCNC: 10 G/DL (ref 11.7–15.7)
LIPASE SERPL-CCNC: 42 U/L (ref 13–60)
MCH RBC QN AUTO: 28.9 PG (ref 26.5–33)
MCHC RBC AUTO-ENTMCNC: 31.6 G/DL (ref 31.5–36.5)
MCV RBC AUTO: 91 FL (ref 78–100)
PLATELET # BLD AUTO: 196 10E3/UL (ref 150–450)
POTASSIUM SERPL-SCNC: 4.1 MMOL/L (ref 3.4–5.3)
PROT/CREAT 24H UR: 0.08 MG/MG CR (ref 0–0.2)
RBC # BLD AUTO: 3.46 10E6/UL (ref 3.8–5.2)
SODIUM SERPL-SCNC: 141 MMOL/L (ref 135–145)
TACROLIMUS BLD-MCNC: 7.3 UG/L (ref 5–15)
TME LAST DOSE: NORMAL H
TME LAST DOSE: NORMAL H
WBC # BLD AUTO: 3.9 10E3/UL (ref 4–11)

## 2024-01-30 PROCEDURE — 99214 OFFICE O/P EST MOD 30 MIN: CPT | Performed by: INTERNAL MEDICINE

## 2024-01-30 PROCEDURE — 83036 HEMOGLOBIN GLYCOSYLATED A1C: CPT | Performed by: INTERNAL MEDICINE

## 2024-01-30 PROCEDURE — 36415 COLL VENOUS BLD VENIPUNCTURE: CPT | Performed by: PATHOLOGY

## 2024-01-30 PROCEDURE — 87799 DETECT AGENT NOS DNA QUANT: CPT | Performed by: INTERNAL MEDICINE

## 2024-01-30 PROCEDURE — 82150 ASSAY OF AMYLASE: CPT | Performed by: PATHOLOGY

## 2024-01-30 PROCEDURE — 99000 SPECIMEN HANDLING OFFICE-LAB: CPT | Performed by: PATHOLOGY

## 2024-01-30 PROCEDURE — 84156 ASSAY OF PROTEIN URINE: CPT | Performed by: PATHOLOGY

## 2024-01-30 PROCEDURE — G2211 COMPLEX E/M VISIT ADD ON: HCPCS | Performed by: INTERNAL MEDICINE

## 2024-01-30 PROCEDURE — 85027 COMPLETE CBC AUTOMATED: CPT | Performed by: PATHOLOGY

## 2024-01-30 PROCEDURE — G0463 HOSPITAL OUTPT CLINIC VISIT: HCPCS | Performed by: INTERNAL MEDICINE

## 2024-01-30 PROCEDURE — 80197 ASSAY OF TACROLIMUS: CPT | Performed by: INTERNAL MEDICINE

## 2024-01-30 PROCEDURE — 83690 ASSAY OF LIPASE: CPT | Performed by: PATHOLOGY

## 2024-01-30 PROCEDURE — 80048 BASIC METABOLIC PNL TOTAL CA: CPT | Performed by: PATHOLOGY

## 2024-01-30 ASSESSMENT — PAIN SCALES - GENERAL: PAINLEVEL: NO PAIN (0)

## 2024-01-30 NOTE — NURSING NOTE
Chief Complaint   Patient presents with    RECHECK      K/P POST RETURN TXP NEPH     /72 (BP Location: Right arm, Patient Position: Sitting, Cuff Size: Adult Regular)   Pulse 97   Temp 98.6  F (37  C) (Oral)   Wt 51.7 kg (114 lb)   SpO2 97%   BMI 21.90 kg/m      Bandar Alvarado CMA on 1/30/2024 at 2:44 PM

## 2024-01-30 NOTE — PATIENT INSTRUCTIONS
Patient Recommendations:  - Recommend pushing fluids.  - Would repeat labs in a week.    Transplant Patient Information  Your Post Transplant Coordinator is: Fely Blake  For non urgent items, we encourage you to contact your coordinator/care team online via [employer]  You and your care team can also contact your transplant coordinator Monday - Friday, 8am - 5pm at [phone] (Option 2 to reach the coordinator or Option 4 to schedule an appointment).  After hours for urgent matters, please call [facility] at [phone].

## 2024-01-30 NOTE — LETTER
1/30/2024      RE: Rose JOHNSON Leonel  [address]  [facility] [address]       TRANSPLANT NEPHROLOGY CHRONIC POST TRANSPLANT VISIT    Assessment & Plan  # DDKT (SPK): Increased creatinine on today's labs, now at ~ 2.1, for unclear etiology.  Possibly prerenal with patient noted some lightheadedness, although BP today was good and no reports of poor oral intake.  Unclear on how long creatinine has been elevated due to lack of labs since last March (~ 10 months ago).  Patient denies medication noncompliance and pancreas appears to be doing well.  Recommend pushing fluids and will repeat labs with an accurate tacrolimus level.  If creatinine remains elevated, would consider obtaining a kidney transplant biopsy.   - Baseline Creatinine:  ~ 1.2-1.5   - Proteinuria: Normal (<0.2 grams)   - Date DSA Last Checked: Mar/2023      Latest DSA: No cPRA: 52%   - BK Viremia: No   - Kidney Tx Biopsy: No     # Pancreas Tx (SPK):    - Pancreatic Exocrine Drainage: Enteric drained     - Blood glucose: Euglycemia      On insulin: No   - HbA1c: Stable      Latest HbA1c: 5.5%   - Pancreatic enzymes: Stable   - Date DSA Last Checked: Mar/2023  Latest DSA: No   - Pancreas Tx Biopsy: No    # Immunosuppression: Tacrolimus extended release (goal 5-8) and Mycophenolic acid (dose 360 mg every 12 hours)   - Continue with intensive monitoring of immunosuppression for efficacy and toxicity.   - Changes: Not at this time    # Infection Prophylaxis:   - PJP: Sulfa/TMP (Bactrim)    # Blood Pressure: Controlled;  Goal BP: > 100, but < 130 systolic   - Changes: No    # Anemia in Chronic Renal Disease: Hgb: Stable      DONNY: No   - Iron studies: Not checked recently    # Mineral Bone Disorder:    - Vitamin D; level: Not checked recently        On supplement: No  - Calcium; level: Normal        On supplement: No    # Electrolytes:   - Potassium; level: Normal        On supplement: No  - Bicarbonate; level: Normal        On supplement: No    # EBV  Viremia: Stable, mildly elevated EBV PCR at ~ 4500 with last check Mar/2023.  IgG level has not been checked.   - Will continue to follow EBV PCR every 3 months.    - Will check IgG level with next labs.    # H/o CMV Viremia: Stable, negative CMV PCR with last check Mar/2023.  Presently not on any medications.  IgG level has not been checked.   - No need to check CMV PCR unless clinically indicated.    # CAD: Focal, non obstructive coronary artery disease on coronary angiogram Oct/2013.  Normal dobutamine stress test Jun/2016.  Asymptomatic.    # H/o Pulmonary Histoplasmosis: No concerning symptoms at present and is off antifungal medication.    # Skin Cancer Risk:    - Discussed sun protection and recommend regular follow up with Dermatology.    # Medical Compliance: No.  Evidence of labs less than recommended and infrequent transplant follow-up.  - Discussed importance of checking labs regularly as recommended, taking medications as prescribed and attending scheduled medical appointments.    # Health Maintenance and Vaccination Review: Not Reviewed    # Transplant History:  Etiology of Kidney Failure: Diabetes mellitus type 1  Tx: SPK  Transplant: 8/5/2016 (Kidney / Pancreas), 1/19/2007 (Kidney)  Significant changes in immunosuppression: None  Significant transplant-related complications: CMV Viremia and EBV Viremia    Transplant Office Phone Number: [phone]    Assessment and plan was discussed with the patient and she voiced her understanding and agreement.    Return visit: Return in about 1 year (around 1/30/2025).    Mauricio Marquez MD    The longitudinal plan of care for kidney and pancreas transplant was addressed during this visit. Due to the added complexity in care, I will continue to support Rose Castaneda in the subsequent management of this condition(s) and with the ongoing continuity of care of this condition(s).     Chief Complaint  Ms. Castaneda is a 48 year old here for kidney transplant,  pancreas transplant, and immunosuppression management.    History of Present Illness   Ms. Castaneda reports feeling good overall with some medical complaints.  Since last clinic visit, patient reports no hospitalizations or new medical complaints and has been doing well overall.  Her energy level is good and near normal.  She is active, although really gets minimal exercise.  Patient is limited by her poor eyesight as she is now legally blind.  She reports being able to see best at daisy and dusk, but has considerable difficulty with bright lights and glare.  Patient is trying to get a service dog to help her.  Denies any chest pain or shortness of breath with exertion.  NO leg swelling.    Appetite is good and weight is stable.  She reports staying well hydrated, although notes some increase in lightheadedness lately.  No nausea, vomiting or diarrhea.  No heartburn symptoms.  No fever, sweats or chills.  No night sweats.  No pain or burning with urination.  Patient reports being compliant with medications.    Home BP: Not checked    Problem List  Patient Active Problem List   Diagnosis     Type 1 diabetes mellitus (H)     Immunosuppressed status (H24)     Kidney replaced by transplant     Neurogenic orthostatic hypotension (H)     Osteoporosis     Dyslipidemia     Secondary renal hyperparathyroidism (H24)     Acquired hypothyroidism     Anemia in chronic renal disease     Pulmonary histoplasmosis (H24)     Pancreas replaced by transplant (H)     Vitamin D deficiency     Aftercare following organ transplant     Cytomegalovirus (CMV) viremia (H)     EBV (Isabelle-Barr virus) viremia     Posterior subcapsular polar age-related cataract of left eye     Corneal scar, left eye     Age-related nuclear cataract of right eye     Ankle joint pain     Anxiety     Right carpal tunnel syndrome     Cubital tunnel syndrome on right     Diabetic polyneuropathy (H)     Encephalopathy acute     Personal history of venous thrombosis and  embolism     Retention of urine     Trigger finger, right middle finger     UTI (urinary tract infection)     Stage 3a chronic kidney disease (H)     Histoplasmosis     Need for pneumocystis prophylaxis     CAD (coronary artery disease)       Allergies  Allergies   Allergen Reactions     Amoxicillin-Pot Clavulanate Diarrhea and Other (See Comments)     Diarrhea  Took augmentin 1/2017 with loperamide       Amoxicillin-Pot Clavulanate Diarrhea     Ciprofloxacin Nausea, GI Disturbance and Other (See Comments)     Nausea         Pollen Extract Other (See Comments)     Seasonal allergies  Runny Nose  Seasonal allergies  Seasonal allergies       Pyridostigmine Other (See Comments), Swelling and Muscle Pain (Myalgia)     Spastic muscle motion in the face and legs, weakness in the arms. Slight swelling of the tongue.  Edema  Spastic muscle motion in the face and legs, weakness in the arms. Slight swelling of the tongue.  Spastic muscle motion in the face and legs, weakness in the arms. Slight swelling of the tongue.         Medications  Current Outpatient Medications   Medication Sig     aspirin (ASA) 81 MG EC tablet Take 81 mg by mouth daily     citalopram (CELEXA) 10 MG tablet Take 20 mg by mouth daily      Levothyroxine Sodium (SYNTHROID PO) Take 50 mcg by mouth daily      mycophenolic acid (GENERIC EQUIVALENT) 180 MG EC tablet Take 2 tablets (360 mg) by mouth 2 times daily     pravastatin (PRAVACHOL) 20 MG tablet Take 1 tablet (20 mg) by mouth daily     sulfamethoxazole-trimethoprim (BACTRIM) 400-80 MG tablet TAKE 1 TABLET BY MOUTH DAILY     tacrolimus (ENVARSUS XR) 0.75 MG 24 hr tablet Take 5 tablets (3.75 mg) by mouth every morning (before breakfast)     vitamin D3 (CHOLECALCIFEROL) 50 mcg (2000 units) tablet Take 2 tablets (100 mcg) by mouth daily     No current facility-administered medications for this visit.     There are no discontinued medications.    Physical Exam  Vital Signs: /72 (BP Location: Right  arm, Patient Position: Sitting, Cuff Size: Adult Regular)   Pulse 97   Temp 98.6  F (37  C) (Oral)   Wt 51.7 kg (114 lb)   SpO2 97%   BMI 21.90 kg/m      GENERAL APPEARANCE: alert and no distress  HENT: mouth without ulcers or lesions  RESP: lungs clear to auscultation - no rales, rhonchi or wheezes  CV: regular rhythm, normal rate, no rub, no murmur  EDEMA: no LE edema bilaterally  ABDOMEN: soft, nondistended, nontender, bowel sounds normal  MS: extremities normal - no gross deformities noted, no evidence of inflammation in joints, no muscle tenderness  SKIN: no rash  TX KIDNEY: normal  DIALYSIS ACCESS:  None      Data        Latest Ref Rng & Units 1/30/2024     2:18 PM 3/14/2023     2:16 PM 3/7/2023    12:16 PM   Renal   Sodium 135 - 145 mmol/L 141  136  137    K 3.4 - 5.3 mmol/L 4.1  4.5  3.7    Cl 98 - 107 mmol/L 105  104  102    Cl (external) 98 - 107 mmol/L 105  104  102    CO2 22 - 29 mmol/L 26  24  25    Urea Nitrogen 6.0 - 20.0 mg/dL 26.9  19.7  23.7    Creatinine 0.51 - 0.95 mg/dL 2.10  1.32  1.49    Glucose 70 - 99 mg/dL 58  84  82    Calcium 8.6 - 10.0 mg/dL 9.5  9.2  9.1          Latest Ref Rng & Units 1/4/2022    11:13 AM 2/13/2018     5:31 PM 6/14/2017     9:57 AM   Bone Health   Parathyroid Hormone Intact 12 - 72 pg/mL   34    Vit D Def 20 - 75 ug/L 12  21  31          Latest Ref Rng & Units 1/30/2024     2:18 PM 3/14/2023     2:16 PM 3/7/2023    12:16 PM   Heme   WBC 4.0 - 11.0 10e3/uL 3.9  6.7  4.5    Hgb 11.7 - 15.7 g/dL 10.0  9.7  9.4    Plt 150 - 450 10e3/uL 196  209  173          Latest Ref Rng & Units 9/8/2017     9:02 AM 5/19/2017     8:51 PM 5/11/2017     9:11 AM   Liver   AP 40 - 150 U/L 118  169  157    TBili 0.2 - 1.3 mg/dL 0.4  0.6  0.4    Bilirubin Direct 0.0 - 0.2 mg/dL 0.2   0.1    ALT 0 - 50 U/L 19  29  40    AST 0 - 45 U/L 21  24  35    Tot Protein 6.8 - 8.8 g/dL 6.9  7.1  6.7    Albumin 3.4 - 5.0 g/dL 3.3  3.4  3.5          Latest Ref Rng & Units 1/30/2024     2:18 PM  3/14/2023     2:16 PM 2/28/2023    10:42 AM   Pancreas   A1C <5.7 % 5.5   5.4    Amylase 28 - 100 U/L 116  112  116    Lipase (Roche) 13 - 60 U/L 42  34  37          Latest Ref Rng & Units 7/14/2022     9:07 AM 1/4/2022    11:13 AM 2/12/2018     2:34 PM   Iron studies   Iron 35 - 180 ug/dL 83  58     Iron (external) 8 - 78 U/L   25       Iron Saturation Index 15 - 46 % 36  19     Ferritin 8 - 252 ng/mL 23  41         This result is from an external source.         Latest Ref Rng & Units 3/14/2023     2:16 PM 3/7/2023    12:16 PM 2/28/2023    10:42 AM   [facility] Virology   CMV QUANT IU/ML Not Detected IU/mL  Not Detected     EBV DNA LOG OF COPIES  3.7  3.8  3.6        Recent Labs   Lab Test 03/07/23  1216 03/14/23  1416 01/30/24  1418   DOSTAC 3/6/2023 3/13/2023 1/29/2024   TACROL 6.1 5.4 7.3     Recent Labs   Lab Test 06/14/17  0958 02/01/18  0857 03/08/18  1029   DOSMPA 2300 ON 6/13/2017 800 03/07/2018 at 0930 pm   MPACID 0.92* 2.17 2.30   MPAG 84.2 46.4 40.9        Mauricio Marquez MD

## 2024-01-30 NOTE — LETTER
1/30/2024         RE: Rose Castaneda  [address]  [place] [address]        Dear Colleague,    Thank you for referring your patient, Rose Castaneda, to the [facility]. Please see a copy of my visit note below.    TRANSPLANT NEPHROLOGY CHRONIC POST TRANSPLANT VISIT    Assessment & Plan  # DDKT (SPK): Increased creatinine on today's labs, now at ~ 2.1, for unclear etiology.  Possibly prerenal with patient noted some lightheadedness, although BP today was good and no reports of poor oral intake.  Unclear on how long creatinine has been elevated due to lack of labs since last March (~ 10 months ago).  Patient denies medication noncompliance and pancreas appears to be doing well.  Recommend pushing fluids and will repeat labs with an accurate tacrolimus level.  If creatinine remains elevated, would consider obtaining a kidney transplant biopsy.   - Baseline Creatinine:  ~ 1.2-1.5   - Proteinuria: Normal (<0.2 grams)   - Date DSA Last Checked: Mar/2023      Latest DSA: No cPRA: 52%   - BK Viremia: No   - Kidney Tx Biopsy: No     # Pancreas Tx (SPK):    - Pancreatic Exocrine Drainage: Enteric drained     - Blood glucose: Euglycemia      On insulin: No   - HbA1c: Stable      Latest HbA1c: 5.5%   - Pancreatic enzymes: Stable   - Date DSA Last Checked: Mar/2023  Latest DSA: No   - Pancreas Tx Biopsy: No    # Immunosuppression: Tacrolimus extended release (goal 5-8) and Mycophenolic acid (dose 360 mg every 12 hours)   - Continue with intensive monitoring of immunosuppression for efficacy and toxicity.   - Changes: Not at this time    # Infection Prophylaxis:   - PJP: Sulfa/TMP (Bactrim)    # Blood Pressure: Controlled;  Goal BP: > 100, but < 130 systolic   - Changes: No    # Anemia in Chronic Renal Disease: Hgb: Stable      DONNY: No   - Iron studies: Not checked recently    # Mineral Bone Disorder:    - Vitamin D; level: Not checked recently        On supplement: No  - Calcium; level:  Normal        On supplement: No    # Electrolytes:   - Potassium; level: Normal        On supplement: No  - Bicarbonate; level: Normal        On supplement: No    # EBV Viremia: Stable, mildly elevated EBV PCR at ~ 4500 with last check Mar/2023.  IgG level has not been checked.   - Will continue to follow EBV PCR every 3 months.    - Will check IgG level with next labs.    # H/o CMV Viremia: Stable, negative CMV PCR with last check Mar/2023.  Presently not on any medications.  IgG level has not been checked.   - No need to check CMV PCR unless clinically indicated.    # CAD: Focal, non obstructive coronary artery disease on coronary angiogram Oct/2013.  Normal dobutamine stress test Jun/2016.  Asymptomatic.    # H/o Pulmonary Histoplasmosis: No concerning symptoms at present and is off antifungal medication.    # Skin Cancer Risk:    - Discussed sun protection and recommend regular follow up with Dermatology.    # Medical Compliance: No.  Evidence of labs less than recommended and infrequent transplant follow-up.  - Discussed importance of checking labs regularly as recommended, taking medications as prescribed and attending scheduled medical appointments.    # Health Maintenance and Vaccination Review: Not Reviewed    # Transplant History:  Etiology of Kidney Failure: Diabetes mellitus type 1  Tx: SPK  Transplant: 8/5/2016 (Kidney / Pancreas), 1/19/2007 (Kidney)  Significant changes in immunosuppression: None  Significant transplant-related complications: CMV Viremia and EBV Viremia    Transplant Office Phone Number: [phone]    Assessment and plan was discussed with the patient and she voiced her understanding and agreement.    Return visit: Return in about 1 year (around 1/30/2025).    Mauricio Marquez MD    The longitudinal plan of care for kidney and pancreas transplant was addressed during this visit. Due to the added complexity in care, I will continue to support Rose Castaneda in the subsequent  management of this condition(s) and with the ongoing continuity of care of this condition(s).     Chief Complaint  Ms. Castaneda is a 48 year old here for kidney transplant, pancreas transplant, and immunosuppression management.    History of Present Illness   Ms. Castaneda reports feeling good overall with some medical complaints.  Since last clinic visit, patient reports no hospitalizations or new medical complaints and has been doing well overall.  Her energy level is good and near normal.  She is active, although really gets minimal exercise.  Patient is limited by her poor eyesight as she is now legally blind.  She reports being able to see best at daisy and dusk, but has considerable difficulty with bright lights and glare.  Patient is trying to get a service dog to help her.  Denies any chest pain or shortness of breath with exertion.  NO leg swelling.    Appetite is good and weight is stable.  She reports staying well hydrated, although notes some increase in lightheadedness lately.  No nausea, vomiting or diarrhea.  No heartburn symptoms.  No fever, sweats or chills.  No night sweats.  No pain or burning with urination.  Patient reports being compliant with medications.    Home BP: Not checked    Problem List  Patient Active Problem List   Diagnosis     Type 1 diabetes mellitus (H)     Immunosuppressed status (H24)     Kidney replaced by transplant     Neurogenic orthostatic hypotension (H)     Osteoporosis     Dyslipidemia     Secondary renal hyperparathyroidism (H24)     Acquired hypothyroidism     Anemia in chronic renal disease     Pulmonary histoplasmosis (H24)     Pancreas replaced by transplant (H)     Vitamin D deficiency     Aftercare following organ transplant     Cytomegalovirus (CMV) viremia (H)     EBV (Isabelle-Barr virus) viremia     Posterior subcapsular polar age-related cataract of left eye     Corneal scar, left eye     Age-related nuclear cataract of right eye     Ankle joint pain     Anxiety      Right carpal tunnel syndrome     Cubital tunnel syndrome on right     Diabetic polyneuropathy (H)     Encephalopathy acute     Personal history of venous thrombosis and embolism     Retention of urine     Trigger finger, right middle finger     UTI (urinary tract infection)     Stage 3a chronic kidney disease (H)     Histoplasmosis     Need for pneumocystis prophylaxis     CAD (coronary artery disease)       Allergies  Allergies   Allergen Reactions     Amoxicillin-Pot Clavulanate Diarrhea and Other (See Comments)     Diarrhea  Took augmentin 1/2017 with loperamide       Amoxicillin-Pot Clavulanate Diarrhea     Ciprofloxacin Nausea, GI Disturbance and Other (See Comments)     Nausea         Pollen Extract Other (See Comments)     Seasonal allergies  Runny Nose  Seasonal allergies  Seasonal allergies       Pyridostigmine Other (See Comments), Swelling and Muscle Pain (Myalgia)     Spastic muscle motion in the face and legs, weakness in the arms. Slight swelling of the tongue.  Edema  Spastic muscle motion in the face and legs, weakness in the arms. Slight swelling of the tongue.  Spastic muscle motion in the face and legs, weakness in the arms. Slight swelling of the tongue.         Medications  Current Outpatient Medications   Medication Sig     aspirin (ASA) 81 MG EC tablet Take 81 mg by mouth daily     citalopram (CELEXA) 10 MG tablet Take 20 mg by mouth daily      Levothyroxine Sodium (SYNTHROID PO) Take 50 mcg by mouth daily      mycophenolic acid (GENERIC EQUIVALENT) 180 MG EC tablet Take 2 tablets (360 mg) by mouth 2 times daily     pravastatin (PRAVACHOL) 20 MG tablet Take 1 tablet (20 mg) by mouth daily     sulfamethoxazole-trimethoprim (BACTRIM) 400-80 MG tablet TAKE 1 TABLET BY MOUTH DAILY     tacrolimus (ENVARSUS XR) 0.75 MG 24 hr tablet Take 5 tablets (3.75 mg) by mouth every morning (before breakfast)     vitamin D3 (CHOLECALCIFEROL) 50 mcg (2000 units) tablet Take 2 tablets (100 mcg) by mouth daily      No current facility-administered medications for this visit.     There are no discontinued medications.    Physical Exam  Vital Signs: /72 (BP Location: Right arm, Patient Position: Sitting, Cuff Size: Adult Regular)   Pulse 97   Temp 98.6  F (37  C) (Oral)   Wt 51.7 kg (114 lb)   SpO2 97%   BMI 21.90 kg/m      GENERAL APPEARANCE: alert and no distress  HENT: mouth without ulcers or lesions  RESP: lungs clear to auscultation - no rales, rhonchi or wheezes  CV: regular rhythm, normal rate, no rub, no murmur  EDEMA: no LE edema bilaterally  ABDOMEN: soft, nondistended, nontender, bowel sounds normal  MS: extremities normal - no gross deformities noted, no evidence of inflammation in joints, no muscle tenderness  SKIN: no rash  TX KIDNEY: normal  DIALYSIS ACCESS:  None      Data        Latest Ref Rng & Units 1/30/2024     2:18 PM 3/14/2023     2:16 PM 3/7/2023    12:16 PM   Renal   Sodium 135 - 145 mmol/L 141  136  137    K 3.4 - 5.3 mmol/L 4.1  4.5  3.7    Cl 98 - 107 mmol/L 105  104  102    Cl (external) 98 - 107 mmol/L 105  104  102    CO2 22 - 29 mmol/L 26  24  25    Urea Nitrogen 6.0 - 20.0 mg/dL 26.9  19.7  23.7    Creatinine 0.51 - 0.95 mg/dL 2.10  1.32  1.49    Glucose 70 - 99 mg/dL 58  84  82    Calcium 8.6 - 10.0 mg/dL 9.5  9.2  9.1          Latest Ref Rng & Units 1/4/2022    11:13 AM 2/13/2018     5:31 PM 6/14/2017     9:57 AM   Bone Health   Parathyroid Hormone Intact 12 - 72 pg/mL   34    Vit D Def 20 - 75 ug/L 12  21  31          Latest Ref Rng & Units 1/30/2024     2:18 PM 3/14/2023     2:16 PM 3/7/2023    12:16 PM   Heme   WBC 4.0 - 11.0 10e3/uL 3.9  6.7  4.5    Hgb 11.7 - 15.7 g/dL 10.0  9.7  9.4    Plt 150 - 450 10e3/uL 196  209  173          Latest Ref Rng & Units 9/8/2017     9:02 AM 5/19/2017     8:51 PM 5/11/2017     9:11 AM   Liver   AP 40 - 150 U/L 118  169  157    TBili 0.2 - 1.3 mg/dL 0.4  0.6  0.4    Bilirubin Direct 0.0 - 0.2 mg/dL 0.2   0.1    ALT 0 - 50 U/L 19  29  40    AST  0 - 45 U/L 21  24  35    Tot Protein 6.8 - 8.8 g/dL 6.9  7.1  6.7    Albumin 3.4 - 5.0 g/dL 3.3  3.4  3.5          Latest Ref Rng & Units 1/30/2024     2:18 PM 3/14/2023     2:16 PM 2/28/2023    10:42 AM   Pancreas   A1C <5.7 % 5.5   5.4    Amylase 28 - 100 U/L 116  112  116    Lipase (Roche) 13 - 60 U/L 42  34  37          Latest Ref Rng & Units 7/14/2022     9:07 AM 1/4/2022    11:13 AM 2/12/2018     2:34 PM   Iron studies   Iron 35 - 180 ug/dL 83  58     Iron (external) 8 - 78 U/L   25       Iron Saturation Index 15 - 46 % 36  19     Ferritin 8 - 252 ng/mL 23  41         This result is from an external source.         Latest Ref Rng & Units 3/14/2023     2:16 PM 3/7/2023    12:16 PM 2/28/2023    10:42 AM   [facility] Txp Virology   CMV QUANT IU/ML Not Detected IU/mL  Not Detected     EBV DNA LOG OF COPIES  3.7  3.8  3.6        Recent Labs   Lab Test 03/07/23  1216 03/14/23  1416 01/30/24  1418   DOSTAC 3/6/2023 3/13/2023 1/29/2024   TACROL 6.1 5.4 7.3     Recent Labs   Lab Test 06/14/17  0958 02/01/18  0857 03/08/18  1029   DOSMPA 2300 ON 6/13/2017 800 03/07/2018 at 0930 pm   MPACID 0.92* 2.17 2.30   MPAG 84.2 46.4 40.9          Again, thank you for allowing me to participate in the care of your patient.        Sincerely,        Mauricio Marquez MD

## 2024-01-31 ENCOUNTER — TELEPHONE (OUTPATIENT)
Dept: TRANSPLANT | Facility: CLINIC | Age: 49
End: 2024-01-31
Payer: COMMERCIAL

## 2024-01-31 DIAGNOSIS — Z94.0 KIDNEY REPLACED BY TRANSPLANT: Primary | ICD-10-CM

## 2024-01-31 PROBLEM — I10 HYPERTENSION: Status: RESOLVED | Noted: 2020-09-30 | Resolved: 2024-01-31

## 2024-01-31 PROBLEM — Z29.89 NEED FOR PNEUMOCYSTIS PROPHYLAXIS: Status: ACTIVE | Noted: 2024-01-31

## 2024-01-31 PROBLEM — Z94.83 HISTORY OF PANCREAS TRANSPLANT (H): Status: RESOLVED | Noted: 2018-02-12 | Resolved: 2024-01-31

## 2024-01-31 PROBLEM — I25.10 CAD (CORONARY ARTERY DISEASE): Status: ACTIVE | Noted: 2024-01-31

## 2024-01-31 PROBLEM — E78.5 HYPERLIPIDEMIA: Status: RESOLVED | Noted: 2020-09-30 | Resolved: 2024-01-31

## 2024-01-31 PROBLEM — F51.04 CHRONIC INSOMNIA: Status: RESOLVED | Noted: 2021-02-03 | Resolved: 2024-01-31

## 2024-01-31 LAB
EBV DNA COPIES/ML, INSTRUMENT: 3858 COPIES/ML
EBV DNA SPEC NAA+PROBE-LOG#: 3.6 {LOG_COPIES}/ML

## 2024-01-31 NOTE — TELEPHONE ENCOUNTER
Mauricio Marquez MD Sveiven, Sara, RN  Patient's creatinine was elevated on yesterday's labs.  She looked fine, although reports some lightheadedness recently.  Last previous labs were almost 10 months ago.    I recommended she push fluids and repeat labs soon.  If still elevated, would do a biopsy.    Please check an IgG level with next labs.    Of note, yesterday's tacrolimus level was not a true trough.    Eduard    Outcome:  repeat lab orders placed along with IGG.  Patient states she is not feeling well today.  Was exposed to covid this week.  See additional telephone encounter dated 2/1/2024    Fely Blake RN   Transplant Coordinator  [phone]

## 2024-01-31 NOTE — PROGRESS NOTES
TRANSPLANT NEPHROLOGY CHRONIC POST TRANSPLANT VISIT    Assessment & Plan   # DDKT (SPK): Increased creatinine on today's labs, now at ~ 2.1, for unclear etiology.  Possibly prerenal with patient noted some lightheadedness, although BP today was good and no reports of poor oral intake.  Unclear on how long creatinine has been elevated due to lack of labs since last March (~ 10 months ago).  Patient denies medication noncompliance and pancreas appears to be doing well.  Recommend pushing fluids and will repeat labs with an accurate tacrolimus level.  If creatinine remains elevated, would consider obtaining a kidney transplant biopsy.   - Baseline Creatinine:  ~ 1.2-1.5   - Proteinuria: Normal (<0.2 grams)   - Date DSA Last Checked: Mar/2023      Latest DSA: No cPRA: 52%   - BK Viremia: No   - Kidney Tx Biopsy: No     # Pancreas Tx (SPK):    - Pancreatic Exocrine Drainage: Enteric drained     - Blood glucose: Euglycemia      On insulin: No   - HbA1c: Stable      Latest HbA1c: 5.5%   - Pancreatic enzymes: Stable   - Date DSA Last Checked: Mar/2023  Latest DSA: No   - Pancreas Tx Biopsy: No    # Immunosuppression: Tacrolimus extended release (goal 5-8) and Mycophenolic acid (dose 360 mg every 12 hours)   - Continue with intensive monitoring of immunosuppression for efficacy and toxicity.   - Changes: Not at this time    # Infection Prophylaxis:   - PJP: Sulfa/TMP (Bactrim)    # Blood Pressure: Controlled;  Goal BP: > 100, but < 130 systolic   - Changes: No    # Anemia in Chronic Renal Disease: Hgb: Stable      DONNY: No   - Iron studies: Not checked recently    # Mineral Bone Disorder:    - Vitamin D; level: Not checked recently        On supplement: No  - Calcium; level: Normal        On supplement: No    # Electrolytes:   - Potassium; level: Normal        On supplement: No  - Bicarbonate; level: Normal        On supplement: No    # EBV Viremia: Stable, mildly elevated EBV PCR at ~ 4500 with last check Mar/2023.  IgG  level has not been checked.   - Will continue to follow EBV PCR every 3 months.    - Will check IgG level with next labs.    # H/o CMV Viremia: Stable, negative CMV PCR with last check Mar/2023.  Presently not on any medications.  IgG level has not been checked.   - No need to check CMV PCR unless clinically indicated.    # CAD: Focal, non obstructive coronary artery disease on coronary angiogram Oct/2013.  Normal dobutamine stress test Jun/2016.  Asymptomatic.    # H/o Pulmonary Histoplasmosis: No concerning symptoms at present and is off antifungal medication.    # Skin Cancer Risk:    - Discussed sun protection and recommend regular follow up with Dermatology.    # Medical Compliance: No.  Evidence of labs less than recommended and infrequent transplant follow-up.  - Discussed importance of checking labs regularly as recommended, taking medications as prescribed and attending scheduled medical appointments.    # Health Maintenance and Vaccination Review: Not Reviewed    # Transplant History:  Etiology of Kidney Failure: Diabetes mellitus type 1  Tx: SPK  Transplant: 8/5/2016 (Kidney / Pancreas), 1/19/2007 (Kidney)  Significant changes in immunosuppression: None  Significant transplant-related complications: CMV Viremia and EBV Viremia    Transplant Office Phone Number: [phone]    Assessment and plan was discussed with the patient and she voiced her understanding and agreement.    Return visit: Return in about 1 year (around 1/30/2025).    Mauricio Marquez MD    The longitudinal plan of care for kidney and pancreas transplant was addressed during this visit. Due to the added complexity in care, I will continue to support Rose Castaneda in the subsequent management of this condition(s) and with the ongoing continuity of care of this condition(s).     Chief Complaint   Ms. Castaneda is a 48 year old here for kidney transplant, pancreas transplant, and immunosuppression management.    History of Present  Illness    Ms. Castaneda reports feeling good overall with some medical complaints.  Since last clinic visit, patient reports no hospitalizations or new medical complaints and has been doing well overall.  Her energy level is good and near normal.  She is active, although really gets minimal exercise.  Patient is limited by her poor eyesight as she is now legally blind.  She reports being able to see best at daisy and dusk, but has considerable difficulty with bright lights and glare.  Patient is trying to get a service dog to help her.  Denies any chest pain or shortness of breath with exertion.  NO leg swelling.    Appetite is good and weight is stable.  She reports staying well hydrated, although notes some increase in lightheadedness lately.  No nausea, vomiting or diarrhea.  No heartburn symptoms.  No fever, sweats or chills.  No night sweats.  No pain or burning with urination.  Patient reports being compliant with medications.    Home BP: Not checked    Problem List   Patient Active Problem List   Diagnosis    Type 1 diabetes mellitus (H)    Immunosuppressed status (H24)    Kidney replaced by transplant    Neurogenic orthostatic hypotension (H)    Osteoporosis    Dyslipidemia    Secondary renal hyperparathyroidism (H24)    Acquired hypothyroidism    Anemia in chronic renal disease    Pulmonary histoplasmosis (H24)    Pancreas replaced by transplant (H)    Vitamin D deficiency    Aftercare following organ transplant    Cytomegalovirus (CMV) viremia (H)    EBV (Isabelle-Barr virus) viremia    Posterior subcapsular polar age-related cataract of left eye    Corneal scar, left eye    Age-related nuclear cataract of right eye    Ankle joint pain    Anxiety    Right carpal tunnel syndrome    Cubital tunnel syndrome on right    Diabetic polyneuropathy (H)    Encephalopathy acute    Personal history of venous thrombosis and embolism    Retention of urine    Trigger finger, right middle finger    UTI (urinary tract  infection)    Stage 3a chronic kidney disease (H)    Histoplasmosis    Need for pneumocystis prophylaxis    CAD (coronary artery disease)       Allergies   Allergies   Allergen Reactions    Amoxicillin-Pot Clavulanate Diarrhea and Other (See Comments)     Diarrhea  Took augmentin 1/2017 with loperamide      Amoxicillin-Pot Clavulanate Diarrhea    Ciprofloxacin Nausea, GI Disturbance and Other (See Comments)     Nausea        Pollen Extract Other (See Comments)     Seasonal allergies  Runny Nose  Seasonal allergies  Seasonal allergies      Pyridostigmine Other (See Comments), Swelling and Muscle Pain (Myalgia)     Spastic muscle motion in the face and legs, weakness in the arms. Slight swelling of the tongue.  Edema  Spastic muscle motion in the face and legs, weakness in the arms. Slight swelling of the tongue.  Spastic muscle motion in the face and legs, weakness in the arms. Slight swelling of the tongue.         Medications   Current Outpatient Medications   Medication Sig    aspirin (ASA) 81 MG EC tablet Take 81 mg by mouth daily    citalopram (CELEXA) 10 MG tablet Take 20 mg by mouth daily     Levothyroxine Sodium (SYNTHROID PO) Take 50 mcg by mouth daily     mycophenolic acid (GENERIC EQUIVALENT) 180 MG EC tablet Take 2 tablets (360 mg) by mouth 2 times daily    pravastatin (PRAVACHOL) 20 MG tablet Take 1 tablet (20 mg) by mouth daily    sulfamethoxazole-trimethoprim (BACTRIM) 400-80 MG tablet TAKE 1 TABLET BY MOUTH DAILY    tacrolimus (ENVARSUS XR) 0.75 MG 24 hr tablet Take 5 tablets (3.75 mg) by mouth every morning (before breakfast)    vitamin D3 (CHOLECALCIFEROL) 50 mcg (2000 units) tablet Take 2 tablets (100 mcg) by mouth daily     No current facility-administered medications for this visit.     There are no discontinued medications.    Physical Exam   Vital Signs: /72 (BP Location: Right arm, Patient Position: Sitting, Cuff Size: Adult Regular)   Pulse 97   Temp 98.6  F (37  C) (Oral)   Wt 51.7  kg (114 lb)   SpO2 97%   BMI 21.90 kg/m      GENERAL APPEARANCE: alert and no distress  HENT: mouth without ulcers or lesions  RESP: lungs clear to auscultation - no rales, rhonchi or wheezes  CV: regular rhythm, normal rate, no rub, no murmur  EDEMA: no LE edema bilaterally  ABDOMEN: soft, nondistended, nontender, bowel sounds normal  MS: extremities normal - no gross deformities noted, no evidence of inflammation in joints, no muscle tenderness  SKIN: no rash  TX KIDNEY: normal  DIALYSIS ACCESS:  None      Data         Latest Ref Rng & Units 1/30/2024     2:18 PM 3/14/2023     2:16 PM 3/7/2023    12:16 PM   Renal   Sodium 135 - 145 mmol/L 141  136  137    K 3.4 - 5.3 mmol/L 4.1  4.5  3.7    Cl 98 - 107 mmol/L 105  104  102    Cl (external) 98 - 107 mmol/L 105  104  102    CO2 22 - 29 mmol/L 26  24  25    Urea Nitrogen 6.0 - 20.0 mg/dL 26.9  19.7  23.7    Creatinine 0.51 - 0.95 mg/dL 2.10  1.32  1.49    Glucose 70 - 99 mg/dL 58  84  82    Calcium 8.6 - 10.0 mg/dL 9.5  9.2  9.1          Latest Ref Rng & Units 1/4/2022    11:13 AM 2/13/2018     5:31 PM 6/14/2017     9:57 AM   Bone Health   Parathyroid Hormone Intact 12 - 72 pg/mL   34    Vit D Def 20 - 75 ug/L 12  21  31          Latest Ref Rng & Units 1/30/2024     2:18 PM 3/14/2023     2:16 PM 3/7/2023    12:16 PM   Heme   WBC 4.0 - 11.0 10e3/uL 3.9  6.7  4.5    Hgb 11.7 - 15.7 g/dL 10.0  9.7  9.4    Plt 150 - 450 10e3/uL 196  209  173          Latest Ref Rng & Units 9/8/2017     9:02 AM 5/19/2017     8:51 PM 5/11/2017     9:11 AM   Liver   AP 40 - 150 U/L 118  169  157    TBili 0.2 - 1.3 mg/dL 0.4  0.6  0.4    Bilirubin Direct 0.0 - 0.2 mg/dL 0.2   0.1    ALT 0 - 50 U/L 19  29  40    AST 0 - 45 U/L 21  24  35    Tot Protein 6.8 - 8.8 g/dL 6.9  7.1  6.7    Albumin 3.4 - 5.0 g/dL 3.3  3.4  3.5          Latest Ref Rng & Units 1/30/2024     2:18 PM 3/14/2023     2:16 PM 2/28/2023    10:42 AM   Pancreas   A1C <5.7 % 5.5   5.4    Amylase 28 - 100 U/L 116  112  116     Lipase (Roche) 13 - 60 U/L 42  34  37          Latest Ref Rng & Units 7/14/2022     9:07 AM 1/4/2022    11:13 AM 2/12/2018     2:34 PM   Iron studies   Iron 35 - 180 ug/dL 83  58     Iron (external) 8 - 78 U/L   25       Iron Saturation Index 15 - 46 % 36  19     Ferritin 8 - 252 ng/mL 23  41         This result is from an external source.         Latest Ref Rng & Units 3/14/2023     2:16 PM 3/7/2023    12:16 PM 2/28/2023    10:42 AM   [facility] Txp Virology   CMV QUANT IU/ML Not Detected IU/mL  Not Detected     EBV DNA LOG OF COPIES  3.7  3.8  3.6        Recent Labs   Lab Test 03/07/23  1216 03/14/23  1416 01/30/24  1418   DOSTAC 3/6/2023 3/13/2023 1/29/2024   TACROL 6.1 5.4 7.3     Recent Labs   Lab Test 06/14/17  0958 02/01/18  0857 03/08/18  1029   DOSMPA 2300 ON 6/13/2017 800 03/07/2018 at 0930 pm   MPACID 0.92* 2.17 2.30   MPAG 84.2 46.4 40.9

## 2024-02-01 ENCOUNTER — TELEPHONE (OUTPATIENT)
Dept: NEPHROLOGY | Facility: CLINIC | Age: 49
End: 2024-02-01
Payer: COMMERCIAL

## 2024-02-01 ENCOUNTER — TELEPHONE (OUTPATIENT)
Dept: TRANSPLANT | Facility: CLINIC | Age: 49
End: 2024-02-01
Payer: COMMERCIAL

## 2024-02-01 DIAGNOSIS — Z94.0 KIDNEY REPLACED BY TRANSPLANT: ICD-10-CM

## 2024-02-01 DIAGNOSIS — U07.1 CLINICAL DIAGNOSIS OF COVID-19: Primary | ICD-10-CM

## 2024-02-01 DIAGNOSIS — Z94.83 PANCREAS REPLACED BY TRANSPLANT (H): Primary | ICD-10-CM

## 2024-02-01 RX ORDER — MYCOPHENOLIC ACID 180 MG/1
360 TABLET, DELAYED RELEASE ORAL 2 TIMES DAILY
Qty: 120 TABLET | Refills: 11 | Status: SHIPPED | OUTPATIENT
Start: 2024-02-01

## 2024-02-01 RX ORDER — ALBUTEROL SULFATE 0.83 MG/ML
2.5 SOLUTION RESPIRATORY (INHALATION)
Status: CANCELLED | OUTPATIENT
Start: 2024-02-01

## 2024-02-01 RX ORDER — HEPARIN SODIUM (PORCINE) LOCK FLUSH IV SOLN 100 UNIT/ML 100 UNIT/ML
5 SOLUTION INTRAVENOUS
Status: CANCELLED | OUTPATIENT
Start: 2024-02-01

## 2024-02-01 RX ORDER — ALBUTEROL SULFATE 90 UG/1
1-2 AEROSOL, METERED RESPIRATORY (INHALATION)
Status: CANCELLED
Start: 2024-02-01

## 2024-02-01 RX ORDER — DIPHENHYDRAMINE HYDROCHLORIDE 50 MG/ML
50 INJECTION INTRAMUSCULAR; INTRAVENOUS
Status: CANCELLED
Start: 2024-02-01

## 2024-02-01 RX ORDER — METHYLPREDNISOLONE SODIUM SUCCINATE 125 MG/2ML
125 INJECTION, POWDER, LYOPHILIZED, FOR SOLUTION INTRAMUSCULAR; INTRAVENOUS
Status: CANCELLED
Start: 2024-02-01

## 2024-02-01 RX ORDER — EPINEPHRINE 1 MG/ML
0.3 INJECTION, SOLUTION, CONCENTRATE INTRAVENOUS EVERY 5 MIN PRN
Status: CANCELLED | OUTPATIENT
Start: 2024-02-01

## 2024-02-01 RX ORDER — MEPERIDINE HYDROCHLORIDE 25 MG/ML
25 INJECTION INTRAMUSCULAR; INTRAVENOUS; SUBCUTANEOUS EVERY 30 MIN PRN
Status: CANCELLED | OUTPATIENT
Start: 2024-02-01

## 2024-02-01 RX ORDER — HEPARIN SODIUM,PORCINE 10 UNIT/ML
5-20 VIAL (ML) INTRAVENOUS DAILY PRN
Status: CANCELLED | OUTPATIENT
Start: 2024-02-01

## 2024-02-01 NOTE — TELEPHONE ENCOUNTER
Call placed to patient. Patient confirms that she has spoken with [facility] and v\u of instructions listed below.

## 2024-02-01 NOTE — TELEPHONE ENCOUNTER
Patient Call: General  Route to LPN    Reason for call: Pt was exposed to COVID yesterday  today having some resp symptoms this am,  does not have any COVID at home tests      Call back needed? Yes    Return Call Needed  Same as documented in contacts section  When to return call?: Same day: Route High Priority

## 2024-02-01 NOTE — TELEPHONE ENCOUNTER
PA Initiation    Medication: MYCOPHENOLIC ACID (GENERIC EQUIV) 180 MG PO TBEC  Insurance Company: [employer] - Phone [phone] Fax [phone]  Pharmacy Filling the Rx: [facility] - [place], [place] - [facility] [address]  Filling Pharmacy Phone: [phone]  Filling Pharmacy Fax: [phone]  Start Date: 2/1/2024

## 2024-02-01 NOTE — TELEPHONE ENCOUNTER
ISSUE: patient was exposed to covid this week.  Today she reports cough, sore throat, headache, runny nose.    OUTCOME: patient will order an at home covid test to be delivered to her house.  Patient will video call with her sister who will read the results of the test as patient is blind.      Patient aware she is to return call to [facility] with results.        Mauricio Marquez MD Sveiven, Sara, RN  Yes, okay to decrease mycophenolic acid for 5 days if COVID positive.          Previous Messages    ----- Message -----  From: Fely Blake RN  Sent: 2/1/2024  11:17 AM CST  To: Mauricio Marquez MD  Subject: possible covid                                  Renetta is not feeling well today.  Headache, sore throat, cough.  She was exposed to covid earlier this week which she found out last night.    She is going to take an at home covid test.      KP sp 7 years ago,  no HX of DSA or rejection.      Tacrolimus extended release (goal 5-8) and Mycophenolic acid (dose 360 mg every 12 hours)    If she is positive would you like me to decrease MPA to 180 mg bid for 5-7 days?  She is blind and likely will not have a ride to [facility] days for remdesivir.  Molnupiravir?    Fely Blake RN  Transplant Coordinator  [phone]     Patient vu of decreasing MPA from 360 mg bid to 180 mg bid.  Referred for remdesivir    Fely Blake RN   Transplant Coordinator  [phone]

## 2024-02-02 ENCOUNTER — TELEPHONE (OUTPATIENT)
Dept: INFUSION THERAPY | Facility: CLINIC | Age: 49
End: 2024-02-02
Payer: COMMERCIAL

## 2024-02-02 DIAGNOSIS — Z94.83 PANCREAS REPLACED BY TRANSPLANT (H): ICD-10-CM

## 2024-02-02 DIAGNOSIS — Z94.0 KIDNEY TRANSPLANTED: ICD-10-CM

## 2024-02-02 DIAGNOSIS — Z94.0 KIDNEY REPLACED BY TRANSPLANT: ICD-10-CM

## 2024-02-02 RX ORDER — TACROLIMUS 0.75 MG/1
3.75 TABLET, EXTENDED RELEASE ORAL
Qty: 450 TABLET | Refills: 0 | Status: SHIPPED | OUTPATIENT
Start: 2024-02-02

## 2024-02-02 NOTE — TELEPHONE ENCOUNTER
PA Initiation    Medication: ENVARSUS XR 4 MG PO TB24  Insurance Company: [employer] - Phone [phone] Fax [phone]  Pharmacy Filling the Rx: [facility] - [place], [place] - [facility] [address]  Filling Pharmacy Phone: [phone]  Filling Pharmacy Fax: [phone]  Start Date: 2/2/2024

## 2024-02-05 ENCOUNTER — TELEPHONE (OUTPATIENT)
Dept: TRANSPLANT | Facility: CLINIC | Age: 49
End: 2024-02-05
Payer: COMMERCIAL

## 2024-02-05 NOTE — TELEPHONE ENCOUNTER
Patient Call: General  Route to LPN    Reason for call: patient recieved a call for scheduling infusion appointment for tommorrow. Patient wanted to know if they still need it. Patient is still having symptoms. More details with call back.     Call back needed? Yes    Return Call Needed  Same as documented in contacts section  When to return call?: Same day: Route High Priority

## 2024-02-05 NOTE — TELEPHONE ENCOUNTER
PRIOR AUTHORIZATION DENIED    Medication: ENVARSUS XR 4 MG PO TB24  Insurance Company: [employer] - Phone [phone] Fax [phone]  Denial Date: 2/5/2024  Denial Reason(s): n/a  Appeal Information: see attached  Patient Notified: yes

## 2024-02-05 NOTE — TELEPHONE ENCOUNTER
Patient calling  back after she missed a call from [facility] scheduling.  She states her Covid symptoms are improving and she wishes to pass on the remdesivir infusion.  Message sent to [facility]    Fely Blake RN   Transplant Coordinator  [phone]

## 2024-02-07 ENCOUNTER — TELEPHONE (OUTPATIENT)
Dept: TRANSPLANT | Facility: CLINIC | Age: 49
End: 2024-02-07
Payer: COMMERCIAL

## 2024-02-07 NOTE — TELEPHONE ENCOUNTER
Covid+ 2/1 MPA decreased from 360 mg bid to 180 mg bid for 5 days.    Patient states she is feeling much better and has increased her MPA dose back to 360 mg bid    Fely Blake RN   Transplant Coordinator  [phone]

## 2024-02-13 NOTE — TELEPHONE ENCOUNTER
FREE DRUG APPLICATION INITIATED    Medication: ENVARSUS XR 4 MG PO TB24  Free Drug Program Name:  Other (see comments)  Date Submitted: 2/13/2024  9:51 AM  Phone #: [phone]  Fax #: [phone]  Additional Information: n/a

## 2024-02-19 ENCOUNTER — TELEPHONE (OUTPATIENT)
Dept: TRANSPLANT | Facility: CLINIC | Age: 49
End: 2024-02-19
Payer: COMMERCIAL

## 2024-02-19 NOTE — TELEPHONE ENCOUNTER
Prior Authorization Approval    Medication: MYCOPHENOLIC ACID (GENERIC EQUIV) 180 MG PO TBEC  Authorization Effective Date: 2/2/2024  Authorization Expiration Date: 2/2/2025  Approved Dose/Quantity: 120/30ds  Reference #: [id number]   Insurance Company: [employer] - Phone [phone] Fax [phone]  Expected CoPay: $ 16.01  CoPay Card Available: No    Financial Assistance Needed: n/a  Which Pharmacy is filling the prescription: [facility] - [place], [place] - [address]  Pharmacy Notified: yes  Patient Notified: yes

## 2024-02-20 NOTE — TELEPHONE ENCOUNTER
Left message X2 asking for CB as Lili Call is in need of documentation to finalize free drug program for Envlee Blake RN   Transplant Coordinator  [phone]

## 2024-02-28 NOTE — TELEPHONE ENCOUNTER
FREE DRUG APPLICATION APPROVED    Medication: ENVARSUS XR 4 MG PO TB24  Program Name:  Other (see comments)  Effective Date: 2/28/2024  Expiration Date: 2/26/2025  [facility] - [place], [place] - [address]  Patient Notified: yes- called patient and left message  Additional Information: n/a

## 2024-02-29 ENCOUNTER — TELEPHONE (OUTPATIENT)
Dept: TRANSPLANT | Facility: CLINIC | Age: 49
End: 2024-02-29
Payer: COMMERCIAL

## 2024-03-05 ENCOUNTER — LAB (OUTPATIENT)
Dept: LAB | Facility: CLINIC | Age: 49
End: 2024-03-05
Payer: COMMERCIAL

## 2024-03-05 DIAGNOSIS — Z94.0 KIDNEY REPLACED BY TRANSPLANT: ICD-10-CM

## 2024-03-05 DIAGNOSIS — D64.9 ANEMIA, UNSPECIFIED TYPE: ICD-10-CM

## 2024-03-05 LAB
ERYTHROCYTE [DISTWIDTH] IN BLOOD BY AUTOMATED COUNT: 14.1 % (ref 10–15)
HCT VFR BLD AUTO: 27.6 % (ref 35–47)
HGB BLD-MCNC: 8.8 G/DL (ref 11.7–15.7)
MCH RBC QN AUTO: 29.3 PG (ref 26.5–33)
MCHC RBC AUTO-ENTMCNC: 31.9 G/DL (ref 31.5–36.5)
MCV RBC AUTO: 92 FL (ref 78–100)
PLATELET # BLD AUTO: 207 10E3/UL (ref 150–450)
RBC # BLD AUTO: 3 10E6/UL (ref 3.8–5.2)
WBC # BLD AUTO: 5.5 10E3/UL (ref 4–11)

## 2024-03-05 PROCEDURE — 83550 IRON BINDING TEST: CPT

## 2024-03-05 PROCEDURE — 82784 ASSAY IGA/IGD/IGG/IGM EACH: CPT

## 2024-03-05 PROCEDURE — 83690 ASSAY OF LIPASE: CPT

## 2024-03-05 PROCEDURE — 82150 ASSAY OF AMYLASE: CPT

## 2024-03-05 PROCEDURE — 83540 ASSAY OF IRON: CPT

## 2024-03-05 PROCEDURE — 82728 ASSAY OF FERRITIN: CPT

## 2024-03-05 PROCEDURE — 36415 COLL VENOUS BLD VENIPUNCTURE: CPT

## 2024-03-05 PROCEDURE — 85027 COMPLETE CBC AUTOMATED: CPT

## 2024-03-05 PROCEDURE — 80048 BASIC METABOLIC PNL TOTAL CA: CPT

## 2024-03-06 ENCOUNTER — TELEPHONE (OUTPATIENT)
Dept: TRANSPLANT | Facility: CLINIC | Age: 49
End: 2024-03-06
Payer: COMMERCIAL

## 2024-03-06 DIAGNOSIS — D64.9 ANEMIA, UNSPECIFIED TYPE: Primary | ICD-10-CM

## 2024-03-06 DIAGNOSIS — Z94.0 KIDNEY REPLACED BY TRANSPLANT: ICD-10-CM

## 2024-03-06 LAB
AMYLASE SERPL-CCNC: 111 U/L (ref 28–100)
ANION GAP SERPL CALCULATED.3IONS-SCNC: 9 MMOL/L (ref 7–15)
BUN SERPL-MCNC: 18.2 MG/DL (ref 6–20)
CALCIUM SERPL-MCNC: 8.7 MG/DL (ref 8.6–10)
CHLORIDE SERPL-SCNC: 105 MMOL/L (ref 98–107)
CREAT SERPL-MCNC: 1.35 MG/DL (ref 0.51–0.95)
DEPRECATED HCO3 PLAS-SCNC: 21 MMOL/L (ref 22–29)
EGFRCR SERPLBLD CKD-EPI 2021: 48 ML/MIN/1.73M2
GLUCOSE SERPL-MCNC: 86 MG/DL (ref 70–99)
IGG SERPL-MCNC: 988 MG/DL (ref 610–1616)
LIPASE SERPL-CCNC: 32 U/L (ref 13–60)
POTASSIUM SERPL-SCNC: 5.4 MMOL/L (ref 3.4–5.3)
SODIUM SERPL-SCNC: 135 MMOL/L (ref 135–145)

## 2024-03-06 NOTE — TELEPHONE ENCOUNTER
Patient Call: General  Route to LPN    Reason for call: Renetta would like to go over Lab results from yesterday with Coordinator. Patient stated her hemoglobin was a little low, 8.8      Call back needed? Yes    Return Call Needed  Same as documented in contacts section  When to return call?: Same day: Route High Priority

## 2024-03-06 NOTE — TELEPHONE ENCOUNTER
ISSUE: HGB: 8.8 on 3/5 down from 10 on 1/30     PLAN: call to assess for S/S bleed     OUTCOME:  Patient denies dark stools, though states she is light headed and dizzy most of the time as she has orthostatic BP.  Patient to take Orthostatic BP and report back to RNCC.  Ferritin and Iron panel added to specimen drawn yesterday.  Patient aware she is to present to ED with any worsening symptoms, CP, SOB, signs of bleeding.  Will review results of iron panel and forward to tx neph for recommendations.    Fely Blake RN   Transplant Coordinator  [phone]

## 2024-03-07 LAB
FERRITIN SERPL-MCNC: 52 NG/ML (ref 6–175)
IRON BINDING CAPACITY (ROCHE): NORMAL
IRON SATN MFR SERPL: NORMAL %
IRON SERPL-MCNC: 78 UG/DL (ref 37–145)

## 2024-03-07 NOTE — TELEPHONE ENCOUNTER
Mauricio Marquez MD Sveiven, Sara, RN  Can refer to Anemia Services.          Previous Messages       ----- Message -----  From: Fely Blake RN  Sent: 3/7/2024   8:31 AM CST  To: Mauricio Marquez MD    FYJAVI    Ferritin and iron panel added as HGB had dropped.   Denies S/S of bleed.  K+ also up,  will review watching dietary K+ intake and will have her repeat labs next week unless you have different recs    Fely Blake RN  Transplant Coordinator  [phone]    OUTCOME: anemia referral placed.  Instructed patient to follow low K+ diet ( Dietary K+ information sent to patient via my chart ) lab orders placed for repeat next week.  Patient V/U    Fely Blake RN   Transplant Coordinator  [phone]

## 2024-03-08 ENCOUNTER — PATIENT OUTREACH (OUTPATIENT)
Dept: CARE COORDINATION | Facility: CLINIC | Age: 49
End: 2024-03-08
Payer: COMMERCIAL

## 2024-03-08 DIAGNOSIS — N18.31 ANEMIA IN STAGE 3A CHRONIC KIDNEY DISEASE (H): Primary | ICD-10-CM

## 2024-03-08 DIAGNOSIS — D63.1 ANEMIA IN STAGE 3A CHRONIC KIDNEY DISEASE (H): Primary | ICD-10-CM

## 2024-03-08 DIAGNOSIS — Z94.0 KIDNEY REPLACED BY TRANSPLANT: ICD-10-CM

## 2024-03-08 RX ORDER — METHYLPREDNISOLONE SODIUM SUCCINATE 125 MG/2ML
125 INJECTION, POWDER, LYOPHILIZED, FOR SOLUTION INTRAMUSCULAR; INTRAVENOUS
Status: CANCELLED
Start: 2024-03-08

## 2024-03-08 RX ORDER — HEPARIN SODIUM (PORCINE) LOCK FLUSH IV SOLN 100 UNIT/ML 100 UNIT/ML
5 SOLUTION INTRAVENOUS
Status: CANCELLED | OUTPATIENT
Start: 2024-03-08

## 2024-03-08 RX ORDER — ALBUTEROL SULFATE 0.83 MG/ML
2.5 SOLUTION RESPIRATORY (INHALATION)
Status: CANCELLED | OUTPATIENT
Start: 2024-03-08

## 2024-03-08 RX ORDER — ALBUTEROL SULFATE 90 UG/1
1-2 AEROSOL, METERED RESPIRATORY (INHALATION)
Status: CANCELLED
Start: 2024-03-08

## 2024-03-08 RX ORDER — MEPERIDINE HYDROCHLORIDE 25 MG/ML
25 INJECTION INTRAMUSCULAR; INTRAVENOUS; SUBCUTANEOUS EVERY 30 MIN PRN
Status: CANCELLED | OUTPATIENT
Start: 2024-03-08

## 2024-03-08 RX ORDER — EPINEPHRINE 1 MG/ML
0.3 INJECTION, SOLUTION, CONCENTRATE INTRAVENOUS EVERY 5 MIN PRN
Status: CANCELLED | OUTPATIENT
Start: 2024-03-08

## 2024-03-08 RX ORDER — HEPARIN SODIUM,PORCINE 10 UNIT/ML
5-20 VIAL (ML) INTRAVENOUS DAILY PRN
Status: CANCELLED | OUTPATIENT
Start: 2024-03-08

## 2024-03-08 RX ORDER — DIPHENHYDRAMINE HYDROCHLORIDE 50 MG/ML
50 INJECTION INTRAMUSCULAR; INTRAVENOUS
Status: CANCELLED
Start: 2024-03-08

## 2024-03-08 NOTE — PROGRESS NOTES
Anemia Management Note - Enrollment    SUBJECTIVE/OBJECTIVE:    Referred by Dr. Mauricio Marquez on 2024  Primary Diagnosis: Anemia in Chronic Kidney Disease (N18.3, D63.1)   3a  Secondary Diagnosis: Chronic Kidney Disease, Stage 3 (N18.3)  3a  Date of Kidney Transplant: 2016  Date of Pancreas Transplant: 2016  Hgb goal range: 9-10  Epo/Darbo: TBD: Treat with IV Iron first.   Iron regimen: Treat with IV Iron  Labs : 3/7/2025  RX/TX plans : TBD    *[facility]: [phone]  option 4    Recent DONNY use, transfusion, IV iron: Iron Infusion and PRBCs.   No history of stroke, MI, and blood clots or cancers. Hx of CAD.    Contact: No Consent to Communicate on File.         Latest Ref Rng & Units 2022 2023 2023 3/7/2023 3/14/2023 2024 3/5/2024   Anemia   Hemoglobin 11.7 - 15.7 g/dL 10.6  10.8  10.2  9.4  9.7  10.0  8.8    Ferritin 6 - 175 ng/mL       52      BP Readings from Last 3 Encounters:   24 119/72   10/20/20 106/67   20 132/83     Wt Readings from Last 2 Encounters:   24 51.7 kg (114 lb)   10/20/20 49.9 kg (110 lb)     Current Outpatient Medications   Medication Sig Dispense Refill    aspirin (ASA) 81 MG EC tablet Take 81 mg by mouth daily      citalopram (CELEXA) 10 MG tablet Take 20 mg by mouth daily       Levothyroxine Sodium (SYNTHROID PO) Take 50 mcg by mouth daily       mycophenolic acid (GENERIC EQUIVALENT) 180 MG EC tablet Take 2 tablets (360 mg) by mouth 2 times daily 120 tablet 11    pravastatin (PRAVACHOL) 20 MG tablet Take 1 tablet (20 mg) by mouth daily 90 tablet 3    sulfamethoxazole-trimethoprim (BACTRIM) 400-80 MG tablet TAKE 1 TABLET BY MOUTH DAILY 90 tablet 3    tacrolimus (ENVARSUS XR) 0.75 MG 24 hr tablet Take 5 tablets (3.75 mg) by mouth every morning (before breakfast) 450 tablet 0       ASSESSMENT:  Hgb Not at goal/Initiation of therapy   Ferritin: Not at goal of (>100ng/mL)  TSat: not at goal of >30%  Iron regimen  recommended: treat with IV Iron.   Recommended DONNY regimen: TBD  Blood Pressure: Stable        PLAN:  1.  Spoke with Renetta today for enrollment in Anemia Management Service.  2. Discussed: anemia overview, monitoring service, and goal hemoglobin range and rationale and risks of DONNY blood clots, stroke, and increase in blood pressure  3. Dose location: in clinic [facility]  4. Labs: [place]  5. Pharmacy: TBD      Orders placed for Venofer 300mg x 3 doses.  Closest Infusion Center is [facility]. Spoke with Renetta.  She will call [facility] to schedule the Venofer.     Next call date:  3/14/24    Mirtha Mohr RN   Anemia Services  [facility]  [email or web address]   Office : [phone]  Fax: [phone]

## 2024-03-14 NOTE — PROGRESS NOTES
Venofer infusions are scheduled on 3/18, 3/20, and 3/22/24    Carrie Leopold, RN BSN  Anemia Services  [facility]  [email or web address]  Office: [phone]  Fax [phone]

## 2024-03-18 ENCOUNTER — INFUSION THERAPY VISIT (OUTPATIENT)
Dept: INFUSION THERAPY | Facility: CLINIC | Age: 49
End: 2024-03-18
Attending: INTERNAL MEDICINE
Payer: COMMERCIAL

## 2024-03-18 VITALS
TEMPERATURE: 98 F | DIASTOLIC BLOOD PRESSURE: 63 MMHG | OXYGEN SATURATION: 98 % | RESPIRATION RATE: 16 BRPM | HEART RATE: 61 BPM | SYSTOLIC BLOOD PRESSURE: 128 MMHG

## 2024-03-18 DIAGNOSIS — D63.1 ANEMIA IN STAGE 3A CHRONIC KIDNEY DISEASE (H): Primary | ICD-10-CM

## 2024-03-18 DIAGNOSIS — N18.31 ANEMIA IN STAGE 3A CHRONIC KIDNEY DISEASE (H): Primary | ICD-10-CM

## 2024-03-18 DIAGNOSIS — Z94.0 KIDNEY REPLACED BY TRANSPLANT: ICD-10-CM

## 2024-03-18 PROCEDURE — 96365 THER/PROPH/DIAG IV INF INIT: CPT

## 2024-03-18 PROCEDURE — 250N000011 HC RX IP 250 OP 636: Performed by: INTERNAL MEDICINE

## 2024-03-18 PROCEDURE — 258N000003 HC RX IP 258 OP 636: Performed by: INTERNAL MEDICINE

## 2024-03-18 RX ORDER — ALBUTEROL SULFATE 0.83 MG/ML
2.5 SOLUTION RESPIRATORY (INHALATION)
Status: CANCELLED | OUTPATIENT
Start: 2024-03-20

## 2024-03-18 RX ORDER — HEPARIN SODIUM,PORCINE 10 UNIT/ML
5-20 VIAL (ML) INTRAVENOUS DAILY PRN
Status: CANCELLED | OUTPATIENT
Start: 2024-03-20

## 2024-03-18 RX ORDER — HEPARIN SODIUM (PORCINE) LOCK FLUSH IV SOLN 100 UNIT/ML 100 UNIT/ML
5 SOLUTION INTRAVENOUS
Status: CANCELLED | OUTPATIENT
Start: 2024-03-20

## 2024-03-18 RX ORDER — EPINEPHRINE 1 MG/ML
0.3 INJECTION, SOLUTION INTRAMUSCULAR; SUBCUTANEOUS EVERY 5 MIN PRN
Status: CANCELLED | OUTPATIENT
Start: 2024-03-20

## 2024-03-18 RX ORDER — ALBUTEROL SULFATE 90 UG/1
1-2 AEROSOL, METERED RESPIRATORY (INHALATION)
Status: CANCELLED
Start: 2024-03-20

## 2024-03-18 RX ORDER — METHYLPREDNISOLONE SODIUM SUCCINATE 125 MG/2ML
125 INJECTION, POWDER, LYOPHILIZED, FOR SOLUTION INTRAMUSCULAR; INTRAVENOUS
Status: CANCELLED
Start: 2024-03-20

## 2024-03-18 RX ORDER — MEPERIDINE HYDROCHLORIDE 25 MG/ML
25 INJECTION INTRAMUSCULAR; INTRAVENOUS; SUBCUTANEOUS EVERY 30 MIN PRN
Status: CANCELLED | OUTPATIENT
Start: 2024-03-20

## 2024-03-18 RX ORDER — DIPHENHYDRAMINE HYDROCHLORIDE 50 MG/ML
50 INJECTION INTRAMUSCULAR; INTRAVENOUS
Status: CANCELLED
Start: 2024-03-20

## 2024-03-18 RX ADMIN — IRON SUCROSE 300 MG: 20 INJECTION, SOLUTION INTRAVENOUS at 11:18

## 2024-03-18 NOTE — PATIENT INSTRUCTIONS
Dear Rose Castaneda    Thank you for choosing [facility] ([facility]) for your infusion.  The following information is a summary of our appointment as well as important reminders.      If you are a transplant patient and require transplant education, please click on this link: [email or web address].    We look forward in seeing you on your next appointment here at [facility] ([facility]).  Please don t hesitate to call us at [phone] to reschedule any of your appointments or to speak with one of the [facility] registered nurses.  It was a pleasure taking care of you today.    Sincerely,    [facility]  [facility]  [address] [place] [address]  [address]  Phone:  [phone]

## 2024-03-18 NOTE — PROGRESS NOTES
Infusion Nursing Note:  Rose Castaneda presents today for Venofer- first dose.    Patient seen by provider today: No   present during visit today: Not Applicable.    Note:   Infusion over 90 minutes.    Intravenous Access:  Peripheral IV placed.    Treatment Conditions:  Not Applicable.    Post Infusion Assessment:  Patient tolerated infusion without incident.  Blood return noted pre and post infusion.  Site patent and intact, free from redness, edema or discomfort.  No evidence of extravasations.  Access discontinued per protocol.     Discharge Plan:   Patient and/or family verbalized understanding of discharge instructions and all questions answered.  AVS to patient via [employer].  Patient will return 3/20 for next appointment.   Patient discharged in stable condition accompanied by: self.  Departure Mode: Ambulatory.    Administrations This Visit       iron sucrose (VENOFER) 300 mg in sodium chloride 0.9 % 290 mL intermittent infusion       Admin Date  03/18/2024 Action  $New Bag Dose  300 mg Rate  193.3 mL/hr Route  Intravenous Documented By  Pippa Maddox RN                /60   Pulse 56   Temp 98  F (36.7  C)   Resp 16   SpO2 98%     Pippa Maddox RN

## 2024-03-20 ENCOUNTER — INFUSION THERAPY VISIT (OUTPATIENT)
Dept: INFUSION THERAPY | Facility: CLINIC | Age: 49
End: 2024-03-20
Attending: INTERNAL MEDICINE
Payer: COMMERCIAL

## 2024-03-20 VITALS
OXYGEN SATURATION: 100 % | DIASTOLIC BLOOD PRESSURE: 68 MMHG | SYSTOLIC BLOOD PRESSURE: 110 MMHG | HEART RATE: 54 BPM | TEMPERATURE: 98 F

## 2024-03-20 DIAGNOSIS — D63.1 ANEMIA IN STAGE 3A CHRONIC KIDNEY DISEASE (H): Primary | ICD-10-CM

## 2024-03-20 DIAGNOSIS — Z94.0 KIDNEY REPLACED BY TRANSPLANT: ICD-10-CM

## 2024-03-20 DIAGNOSIS — N18.31 ANEMIA IN STAGE 3A CHRONIC KIDNEY DISEASE (H): Primary | ICD-10-CM

## 2024-03-20 LAB
HCT VFR BLD AUTO: 29.4 % (ref 35–47)
HGB BLD-MCNC: 9.2 G/DL (ref 11.7–15.7)

## 2024-03-20 PROCEDURE — 96366 THER/PROPH/DIAG IV INF ADDON: CPT

## 2024-03-20 PROCEDURE — 36415 COLL VENOUS BLD VENIPUNCTURE: CPT

## 2024-03-20 PROCEDURE — 85014 HEMATOCRIT: CPT

## 2024-03-20 PROCEDURE — 96365 THER/PROPH/DIAG IV INF INIT: CPT

## 2024-03-20 PROCEDURE — 258N000003 HC RX IP 258 OP 636: Performed by: INTERNAL MEDICINE

## 2024-03-20 PROCEDURE — 250N000011 HC RX IP 250 OP 636: Performed by: INTERNAL MEDICINE

## 2024-03-20 RX ORDER — EPINEPHRINE 1 MG/ML
0.3 INJECTION, SOLUTION INTRAMUSCULAR; SUBCUTANEOUS EVERY 5 MIN PRN
Status: CANCELLED | OUTPATIENT
Start: 2024-03-22

## 2024-03-20 RX ORDER — ALBUTEROL SULFATE 90 UG/1
1-2 AEROSOL, METERED RESPIRATORY (INHALATION)
Status: CANCELLED
Start: 2024-03-22

## 2024-03-20 RX ORDER — HEPARIN SODIUM (PORCINE) LOCK FLUSH IV SOLN 100 UNIT/ML 100 UNIT/ML
5 SOLUTION INTRAVENOUS
Status: CANCELLED | OUTPATIENT
Start: 2024-03-22

## 2024-03-20 RX ORDER — ALBUTEROL SULFATE 0.83 MG/ML
2.5 SOLUTION RESPIRATORY (INHALATION)
Status: CANCELLED | OUTPATIENT
Start: 2024-03-22

## 2024-03-20 RX ORDER — HEPARIN SODIUM,PORCINE 10 UNIT/ML
5-20 VIAL (ML) INTRAVENOUS DAILY PRN
Status: CANCELLED | OUTPATIENT
Start: 2024-03-22

## 2024-03-20 RX ORDER — MEPERIDINE HYDROCHLORIDE 25 MG/ML
25 INJECTION INTRAMUSCULAR; INTRAVENOUS; SUBCUTANEOUS EVERY 30 MIN PRN
Status: CANCELLED | OUTPATIENT
Start: 2024-03-22

## 2024-03-20 RX ORDER — DIPHENHYDRAMINE HYDROCHLORIDE 50 MG/ML
50 INJECTION INTRAMUSCULAR; INTRAVENOUS
Status: CANCELLED
Start: 2024-03-22

## 2024-03-20 RX ORDER — METHYLPREDNISOLONE SODIUM SUCCINATE 125 MG/2ML
125 INJECTION, POWDER, LYOPHILIZED, FOR SOLUTION INTRAMUSCULAR; INTRAVENOUS
Status: CANCELLED
Start: 2024-03-22

## 2024-03-20 RX ADMIN — IRON SUCROSE 300 MG: 20 INJECTION, SOLUTION INTRAVENOUS at 08:53

## 2024-03-20 NOTE — PATIENT INSTRUCTIONS
Dear Rose Castaneda    Thank you for choosing [facility] ([facility]) for your infusion.  The following information is a summary of our appointment as well as important reminders.          If you are a transplant patient and require transplant education, please click on this link: [email or web address].    We look forward in seeing you on your next appointment here at [facility] ([facility]).  Please don t hesitate to call us at [phone] to reschedule any of your appointments or to speak with one of the [facility] registered nurses.  It was a pleasure taking care of you today.    Sincerely,    [facility]  Special[facility]  [address] [place] [address]  [address]  Phone:  [phone]

## 2024-03-20 NOTE — PROGRESS NOTES
Infusion Nursing Note:  Rose Castaneda presents today for Venofer- first dose.    Patient seen by provider today: No   present during visit today: Not Applicable.    Note:   Infusion over 90 minutes.    Intravenous Access:  Peripheral IV placed.  Labs drawn. Per orders    Treatment Conditions:  Not Applicable.    Post Infusion Assessment:  Patient tolerated infusion without incident.  Blood return noted pre and post infusion.  Site patent and intact, free from redness, edema or discomfort.  No evidence of extravasations.  Access discontinued per protocol.     Discharge Plan:   Patient and/or family verbalized understanding of discharge instructions and all questions answered.  AVS to patient via [employer].  Patient will return 3/20 for next appointment.   Patient discharged in stable condition accompanied by: self.  Departure Mode: Ambulatory.    Administrations This Visit       iron sucrose (VENOFER) 300 mg in sodium chloride 0.9 % 290 mL intermittent infusion       Admin Date  03/20/2024 Action  $New Bag Dose  300 mg Rate  193.3 mL/hr Route  Intravenous Documented By  Trisha Ruelas RN                BP 96/55 (BP Location: Left arm, Patient Position: Sitting, Cuff Size: Adult Small)   Pulse 58   Temp 98  F (36.7  C)   SpO2 100%     Svetlana Hampton RN on 3/20/2024 at 9:17 AM

## 2024-03-21 ENCOUNTER — TELEPHONE (OUTPATIENT)
Dept: OPHTHALMOLOGY | Facility: CLINIC | Age: 49
End: 2024-03-21
Payer: COMMERCIAL

## 2024-03-21 NOTE — TELEPHONE ENCOUNTER
[facility] Call Center    Phone Message    May a detailed message be left on voicemail: yes     Reason for Call: per pt needed iron infusions and pt wants to know if it will be detrimental with her vision issues, pt advises her next infusion is tomorrow 3/22/24. Please contact pt to discuss pt advises that it causes her to see floaters and circles so pt wants to know if she should stop the infusions. Thank you     Action Taken: Message routed to:  [facility] eye    Travel Screening: Not Applicable

## 2024-03-22 ENCOUNTER — INFUSION THERAPY VISIT (OUTPATIENT)
Dept: INFUSION THERAPY | Facility: CLINIC | Age: 49
End: 2024-03-22
Attending: INTERNAL MEDICINE
Payer: COMMERCIAL

## 2024-03-22 VITALS
OXYGEN SATURATION: 99 % | HEART RATE: 62 BPM | RESPIRATION RATE: 18 BRPM | TEMPERATURE: 97.9 F | DIASTOLIC BLOOD PRESSURE: 78 MMHG | SYSTOLIC BLOOD PRESSURE: 137 MMHG

## 2024-03-22 DIAGNOSIS — Z94.0 KIDNEY REPLACED BY TRANSPLANT: ICD-10-CM

## 2024-03-22 DIAGNOSIS — D63.1 ANEMIA IN STAGE 3A CHRONIC KIDNEY DISEASE (H): Primary | ICD-10-CM

## 2024-03-22 DIAGNOSIS — N18.31 ANEMIA IN STAGE 3A CHRONIC KIDNEY DISEASE (H): Primary | ICD-10-CM

## 2024-03-22 PROCEDURE — 258N000003 HC RX IP 258 OP 636: Performed by: INTERNAL MEDICINE

## 2024-03-22 PROCEDURE — 250N000011 HC RX IP 250 OP 636: Performed by: INTERNAL MEDICINE

## 2024-03-22 PROCEDURE — 96365 THER/PROPH/DIAG IV INF INIT: CPT

## 2024-03-22 RX ORDER — HEPARIN SODIUM,PORCINE 10 UNIT/ML
5-20 VIAL (ML) INTRAVENOUS DAILY PRN
Status: CANCELLED | OUTPATIENT
Start: 2024-03-22

## 2024-03-22 RX ORDER — DIPHENHYDRAMINE HYDROCHLORIDE 50 MG/ML
50 INJECTION INTRAMUSCULAR; INTRAVENOUS
Status: CANCELLED
Start: 2024-03-22

## 2024-03-22 RX ORDER — MEPERIDINE HYDROCHLORIDE 25 MG/ML
25 INJECTION INTRAMUSCULAR; INTRAVENOUS; SUBCUTANEOUS EVERY 30 MIN PRN
Status: CANCELLED | OUTPATIENT
Start: 2024-03-22

## 2024-03-22 RX ORDER — ALBUTEROL SULFATE 0.83 MG/ML
2.5 SOLUTION RESPIRATORY (INHALATION)
Status: CANCELLED | OUTPATIENT
Start: 2024-03-22

## 2024-03-22 RX ORDER — HEPARIN SODIUM (PORCINE) LOCK FLUSH IV SOLN 100 UNIT/ML 100 UNIT/ML
5 SOLUTION INTRAVENOUS
Status: CANCELLED | OUTPATIENT
Start: 2024-03-22

## 2024-03-22 RX ORDER — ALBUTEROL SULFATE 90 UG/1
1-2 AEROSOL, METERED RESPIRATORY (INHALATION)
Status: CANCELLED
Start: 2024-03-22

## 2024-03-22 RX ORDER — METHYLPREDNISOLONE SODIUM SUCCINATE 125 MG/2ML
125 INJECTION, POWDER, LYOPHILIZED, FOR SOLUTION INTRAMUSCULAR; INTRAVENOUS
Status: CANCELLED
Start: 2024-03-22

## 2024-03-22 RX ORDER — EPINEPHRINE 1 MG/ML
0.3 INJECTION, SOLUTION INTRAMUSCULAR; SUBCUTANEOUS EVERY 5 MIN PRN
Status: CANCELLED | OUTPATIENT
Start: 2024-03-22

## 2024-03-22 RX ADMIN — IRON SUCROSE 300 MG: 20 INJECTION, SOLUTION INTRAVENOUS at 11:15

## 2024-03-22 NOTE — PROGRESS NOTES
"Chief Complaint   Patient presents with    Infusion     IV Venofer     Infusion Nursing Note:  Rose Castaneda presents today for IV Venofer, dose 3/3.    Patient seen by provider today: No   present during visit today: Not Applicable.    Note: Patient reports improvement in energy since starting Venofer. Not experiencing any vision changes currently. Instructed to contact ordering provider if vision concerns return.    Venofer infused over 90 minutes.    Intravenous Access:  Peripheral IV placed.  No labs due.     Treatment Conditions:  Not Applicable.    Post Infusion Assessment:  Patient tolerated infusion without incident.  Blood return noted pre and post infusion.  Site patent and intact, free from redness, edema or discomfort.  No evidence of extravasations.  Access discontinued per protocol.     Discharge Plan:   AVS to patient via MYCHART.  Patient will follow up with ordering provider.  Patient discharged in stable condition accompanied by: self.  Departure Mode: Ambulatory.      Administrations This Visit       iron sucrose (VENOFER) 300 mg in sodium chloride 0.9 % 290 mL intermittent infusion       Admin Date  03/22/2024 Action  $New Bag Dose  300 mg Rate  193.3 mL/hr Route  Intravenous Documented By  Efrain Cervantes RN                    Vital signs:  Temp: 97.9  F (36.6  C) Temp src: Oral BP: 119/79 Pulse: 57   Resp: 18 SpO2: 99 % O2 Device: None (Room air)        Estimated body mass index is 21.9 kg/m  as calculated from the following:    Height as of 10/20/20: 1.537 m (5' 0.5\").    Weight as of 1/30/24: 51.7 kg (114 lb).          "

## 2024-03-22 NOTE — PATIENT INSTRUCTIONS
Dear Rose Castaneda    Thank you for choosing [facility] ([facility]) for your infusion.  The following information is a summary of our appointment as well as important reminders.      Please contact ordering provider if vision changes or concerns (floaters) return.       We look forward in seeing you on your next appointment here at [facility] ([facility]).  Please don t hesitate to call us at [phone] to reschedule any of your appointments or to speak with one of the [facility] registered nurses.  It was a pleasure taking care of you today.    Sincerely,    [facility]  Special[facility]fusion [facility] Center  [address] [place] [address]  [address]  Phone:  [phone]

## 2024-03-25 ENCOUNTER — PATIENT OUTREACH (OUTPATIENT)
Dept: CARE COORDINATION | Facility: CLINIC | Age: 49
End: 2024-03-25
Payer: COMMERCIAL

## 2024-03-25 NOTE — PROGRESS NOTES
Anemia Management Note - Follow Up      SUBJECTIVE/OBJECTIVE:    Referred by Dr. Mauricio Marquez on 2024  Primary Diagnosis: Anemia in Chronic Kidney Disease (N18.3, D63.1)   3a  Secondary Diagnosis: Chronic Kidney Disease, Stage 3 (N18.3)  3a  Date of Kidney Transplant: 2016  Date of Pancreas Transplant: 2016  Hgb goal range: 9-10  Epo/Darbo: TBD: Treat with IV Iron first.   Iron regimen: Treat with IV Iron  Labs : 3/7/2025  RX/TX plans : TBD     *[facility]: [phone]  option 4     Recent DONNY use, transfusion, IV iron: Iron Infusion and PRBCs.   No history of stroke, MI, and blood clots or cancers. Hx of CAD.     Contact: No Consent to Communicate on File.         Latest Ref Rng & Units 3/7/2023 3/14/2023 2024 3/5/2024 3/18/2024 3/20/2024 3/22/2024   Anemia   Hemoglobin 11.7 - 15.7 g/dL 9.4  9.7  10.0  8.8   9.2     IV Iron Dose      Other Other Other   Ferritin 6 - 175 ng/mL    52         BP Readings from Last 3 Encounters:   24 137/78   24 110/68   24 128/63     Wt Readings from Last 2 Encounters:   24 51.7 kg (114 lb)   10/20/20 49.9 kg (110 lb)           ASSESSMENT:    Hgb:at goal - continue to monitor  TSat: Due for iron studies 4 weeks after last IV iron infusion Ferritin: Due for iron studies 4 weeks after last IV iron infusion        PLAN:  Check iron studies in 4 week(s).    Orders needed to be renewed (for next follow-up date) in EPIC: None    Iron labs due:  Approx 24    Plan discussed with:  No call, chart review      NEXT FOLLOW-UP DATE:  24    Mirtha Mohr RN   Anemia Services  [facility]  [email or web address]   Office : [phone]  Fax: [phone]

## 2024-04-01 NOTE — TELEPHONE ENCOUNTER
Called and told her it is okay for her to receive iron infusions. I mentioned that if she notices a sudden increase in floaters with flashing lights or curtains, that she should be evaluated as this could indicate a new bleed, retinal tear or retinal detachment. She expressed understanding.

## 2024-04-22 ENCOUNTER — PATIENT OUTREACH (OUTPATIENT)
Dept: CARE COORDINATION | Facility: CLINIC | Age: 49
End: 2024-04-22
Payer: COMMERCIAL

## 2024-04-22 DIAGNOSIS — Z94.0 KIDNEY REPLACED BY TRANSPLANT: ICD-10-CM

## 2024-04-22 DIAGNOSIS — N18.31 ANEMIA IN STAGE 3A CHRONIC KIDNEY DISEASE (H): ICD-10-CM

## 2024-04-22 DIAGNOSIS — D63.1 ANEMIA IN STAGE 3A CHRONIC KIDNEY DISEASE (H): ICD-10-CM

## 2024-04-22 NOTE — PROGRESS NOTES
Anemia Management Note - Reminder     Follow-up with anemia management service:    Renetta is due for iron labs after iron infusions were completed last month. [employer] message sent to her with reminder to get labs done        Latest Ref Rng & Units 3/7/2023 3/14/2023 1/30/2024 3/5/2024 3/18/2024 3/20/2024 3/22/2024   Anemia   Hemoglobin 11.7 - 15.7 g/dL 9.4  9.7  10.0  8.8   9.2     IV Iron Dose      Other Other Other   Ferritin 6 - 175 ng/mL    52           Follow-up call date: 042924    Carrie Leopold, RN BSN  Anemia Services  [facility]  [email or web address]  Office: [phone]  Fax [phone]

## 2024-04-29 NOTE — PROGRESS NOTES
Renetta is due for anemia labs after iron infusions.   [employer] message x2 sent as reminder    Carrie Leopold, RN BSN  Anemia Services  [facility]  [email or web address]  Office: [phone]  Fax [phone]

## 2024-05-06 ENCOUNTER — PATIENT OUTREACH (OUTPATIENT)
Dept: CARE COORDINATION | Facility: CLINIC | Age: 49
End: 2024-05-06
Payer: COMMERCIAL

## 2024-05-06 NOTE — PROGRESS NOTES
Anemia Management Note - Reminder     Follow-up with anemia management service:    Spoke with Renetta.  She is due to have her Anemia Labs drawn.   She will try and get in for labs in the week or so.         Latest Ref Rng & Units 3/7/2023 3/14/2023 1/30/2024 3/5/2024 3/18/2024 3/20/2024 3/22/2024   Anemia   Hemoglobin 11.7 - 15.7 g/dL 9.4  9.7  10.0  8.8   9.2     IV Iron Dose      Other Other Other   Ferritin 6 - 175 ng/mL    52             Follow-up call date: 5/13/25    Mirtha Mohr RN   Anemia Services  [facility]  [email or web address]   Office : [phone]  Fax: [phone]

## 2024-05-16 NOTE — PROGRESS NOTES
Anemia labs have not yet been done.   Will follow up with Renetta next week as needed.  Carrie Leopold, RN BSN  Anemia Services  [facility]  [email or web address]  Office: [phone]  Fax [phone]

## 2024-05-20 NOTE — PROGRESS NOTES
[facility] for Renetta to remind her that anemia labs are due. Asked her to call with any questions and her plan for labs.    Carrie Leopold, RN BSN  Anemia Services  [facility]  [email or web address]  Office: [phone]  Fax [phone]

## 2024-05-30 ENCOUNTER — PATIENT OUTREACH (OUTPATIENT)
Dept: CARE COORDINATION | Facility: CLINIC | Age: 49
End: 2024-05-30

## 2024-05-30 NOTE — PROGRESS NOTES
Anemia Management Note - Reminder     Follow-up with anemia management service:    Renetta left a voicemail this morning. Writer called her back  She is in [place] unexpectedly d/t her sister having a heart attack. She believes she'll be there through the weekend. She wondered if lab orders could be sent to [facility] in [place].  Writer discussed situation with [facility] re: transplant labs, and it was determined that all labs could wait until she returns to [facility] next week. Writer advised her to call back if she ends up staying in [place] longer.  She noted that 1-2 weeks after her last iron infusion, she experienced a very heavy menstrual period for 3 days, like none that she's had before. She notes feeling dizzy currently, and does admit that dehydration could be a factor. Writer advised her to ensure adequate hydration but if sx persist or worsen, she should go to local ED. She voiced understanding.        Latest Ref Rng & Units 3/7/2023 3/14/2023 1/30/2024 3/5/2024 3/18/2024 3/20/2024 3/22/2024   Anemia   Hemoglobin 11.7 - 15.7 g/dL 9.4  9.7  10.0  8.8   9.2     IV Iron Dose      Other Other Other   Ferritin 6 - 175 ng/mL    52           Follow-up call date: 060424- lab appt scheduled locally at 1315    Carrie Leopold, RN BSN  Anemia Services  [facility]  [email or web address]  Office: [phone]  Fax [phone]

## 2024-06-04 ENCOUNTER — LAB (OUTPATIENT)
Dept: LAB | Facility: CLINIC | Age: 49
End: 2024-06-04
Payer: COMMERCIAL

## 2024-06-04 DIAGNOSIS — Z98.890 OTHER SPECIFIED POSTPROCEDURAL STATES: ICD-10-CM

## 2024-06-04 DIAGNOSIS — Z48.298 AFTERCARE FOLLOWING ORGAN TRANSPLANT: ICD-10-CM

## 2024-06-04 DIAGNOSIS — D64.9 ANEMIA, UNSPECIFIED TYPE: ICD-10-CM

## 2024-06-04 DIAGNOSIS — Z20.828 CONTACT WITH AND (SUSPECTED) EXPOSURE TO OTHER VIRAL COMMUNICABLE DISEASES: ICD-10-CM

## 2024-06-04 DIAGNOSIS — Z94.0 KIDNEY REPLACED BY TRANSPLANT: ICD-10-CM

## 2024-06-04 DIAGNOSIS — Z79.899 ENCOUNTER FOR LONG-TERM CURRENT USE OF MEDICATION: ICD-10-CM

## 2024-06-04 LAB
ERYTHROCYTE [DISTWIDTH] IN BLOOD BY AUTOMATED COUNT: 12.6 % (ref 10–15)
HCT VFR BLD AUTO: 32.6 % (ref 35–47)
HGB BLD-MCNC: 10.6 G/DL (ref 11.7–15.7)
MCH RBC QN AUTO: 29.9 PG (ref 26.5–33)
MCHC RBC AUTO-ENTMCNC: 32.5 G/DL (ref 31.5–36.5)
MCV RBC AUTO: 92 FL (ref 78–100)
PLATELET # BLD AUTO: 178 10E3/UL (ref 150–450)
RBC # BLD AUTO: 3.55 10E6/UL (ref 3.8–5.2)
WBC # BLD AUTO: 4 10E3/UL (ref 4–11)

## 2024-06-04 PROCEDURE — 83690 ASSAY OF LIPASE: CPT | Performed by: INTERNAL MEDICINE

## 2024-06-04 PROCEDURE — 99000 SPECIMEN HANDLING OFFICE-LAB: CPT | Performed by: PATHOLOGY

## 2024-06-04 PROCEDURE — 82728 ASSAY OF FERRITIN: CPT | Performed by: INTERNAL MEDICINE

## 2024-06-04 PROCEDURE — 82150 ASSAY OF AMYLASE: CPT | Performed by: INTERNAL MEDICINE

## 2024-06-04 PROCEDURE — 36415 COLL VENOUS BLD VENIPUNCTURE: CPT

## 2024-06-04 PROCEDURE — 87799 DETECT AGENT NOS DNA QUANT: CPT

## 2024-06-04 PROCEDURE — 85027 COMPLETE CBC AUTOMATED: CPT

## 2024-06-04 PROCEDURE — 80048 BASIC METABOLIC PNL TOTAL CA: CPT | Performed by: INTERNAL MEDICINE

## 2024-06-04 PROCEDURE — 83550 IRON BINDING TEST: CPT | Performed by: INTERNAL MEDICINE

## 2024-06-05 ENCOUNTER — PATIENT OUTREACH (OUTPATIENT)
Dept: CARE COORDINATION | Facility: CLINIC | Age: 49
End: 2024-06-05
Payer: COMMERCIAL

## 2024-06-05 LAB
AMYLASE SERPL-CCNC: 128 U/L (ref 28–100)
ANION GAP SERPL CALCULATED.3IONS-SCNC: 10 MMOL/L (ref 7–15)
BUN SERPL-MCNC: 26.7 MG/DL (ref 6–20)
CALCIUM SERPL-MCNC: 9.2 MG/DL (ref 8.6–10)
CHLORIDE SERPL-SCNC: 101 MMOL/L (ref 98–107)
CREAT SERPL-MCNC: 1.72 MG/DL (ref 0.51–0.95)
DEPRECATED HCO3 PLAS-SCNC: 25 MMOL/L (ref 22–29)
EBV DNA SERPL NAA+PROBE-ACNC: NOT DETECTED IU/ML
EGFRCR SERPLBLD CKD-EPI 2021: 36 ML/MIN/1.73M2
FERRITIN SERPL-MCNC: 265 NG/ML (ref 6–175)
GLUCOSE SERPL-MCNC: 77 MG/DL (ref 70–99)
IRON BINDING CAPACITY (ROCHE): 184 UG/DL (ref 240–430)
IRON SATN MFR SERPL: 36 % (ref 15–46)
IRON SERPL-MCNC: 66 UG/DL (ref 37–145)
LIPASE SERPL-CCNC: 30 U/L (ref 13–60)
POTASSIUM SERPL-SCNC: 4.6 MMOL/L (ref 3.4–5.3)
SODIUM SERPL-SCNC: 136 MMOL/L (ref 135–145)

## 2024-06-05 NOTE — PROGRESS NOTES
Anemia Management Note - Reminder     Follow-up with anemia management service:    Renetta called in, asking for her anemia lab results. Only Hgb is resulted at this time. She's still dealing with dizziness and she will plan to reach out to her PCP for follow up, since it's not as likely to be caused by anemia since her Hgb has come up since the iron infusions.   Writer will reach out to Renetta tomorrow morning after iron labs have resulted to review next steps        Latest Ref Rng & Units 3/14/2023 1/30/2024 3/5/2024 3/18/2024 3/20/2024 3/22/2024 6/4/2024   Anemia   Hemoglobin 11.7 - 15.7 g/dL 9.7  10.0  8.8   9.2   10.6    IV Iron Dose     Other Other Other    Ferritin 6 - 175 ng/mL   52            Carrie Leopold, RN BSN  Anemia Services  [facility]  [email or web address]  Office: [phone]  Fax [phone]

## 2024-06-06 ENCOUNTER — TELEPHONE (OUTPATIENT)
Dept: TRANSPLANT | Facility: CLINIC | Age: 49
End: 2024-06-06
Payer: COMMERCIAL

## 2024-06-06 ENCOUNTER — PATIENT OUTREACH (OUTPATIENT)
Dept: CARE COORDINATION | Facility: CLINIC | Age: 49
End: 2024-06-06
Payer: COMMERCIAL

## 2024-06-06 NOTE — PROGRESS NOTES
Anemia Management Note - Follow Up      SUBJECTIVE/OBJECTIVE:    Referred by Dr. Mauricio Marquez on 2024  Primary Diagnosis: Anemia in Chronic Kidney Disease (N18.3, D63.1)   3a  Secondary Diagnosis: Chronic Kidney Disease, Stage 3 (N18.3)  3a  Date of Kidney Transplant: 2016  Date of Pancreas Transplant: 2016  Hgb goal range: 9-10  Epo/Darbo: TBD: Treat with IV Iron first.   Iron regimen: Treat with IV Iron  Labs : 3/7/2025  RX/TX plans : TBD     *[facility]: [phone]  option 4     Recent DONNY use, transfusion, IV iron: Iron Infusion and PRBCs.   No history of stroke, MI, and blood clots or cancers. Hx of CAD.     Contact: No Consent to Communicate on File.         Latest Ref Rng & Units 3/14/2023 2024 3/5/2024 3/18/2024 3/20/2024 3/22/2024 2024   Anemia   Hemoglobin 11.7 - 15.7 g/dL 9.7  10.0  8.8   9.2   10.6    IV Iron Dose     Other Other Other    TSAT 15 - 46 %       36    Ferritin 6 - 175 ng/mL   52     265      BP Readings from Last 3 Encounters:   24 137/78   24 110/68   24 128/63     Wt Readings from Last 2 Encounters:   24 51.7 kg (114 lb)   10/20/20 49.9 kg (110 lb)         ASSESSMENT:    Hgb:at goal - continue to monitor  TSat: at goal >30% Ferritin: At goal (>100ng/mL)   Goals Addressed    None         PLAN:  RTC for anemia labs in 4 week(s).    Orders needed to be renewed (for next follow-up date) in EPIC: None    Iron labs due:  early 2024    Plan discussed with:  Renetta      NEXT FOLLOW-UP DATE:  708    Carrie Leopold, RN BSN  Anemia Services  [facility]  [email or web address]  Office: [phone]  Fax [phone]

## 2024-06-06 NOTE — TELEPHONE ENCOUNTER
ISSUE: elevated creatinine: 1.72 on 6/4, baseline 1.2-1.5    PLAN:  Lowell Paez MD Sveiven, Fely, RN  Pt with dizziness (per notes) and increase in Scr. Recommend IV fluids    OUTCOME: LM asking for return call or for response to my chart message.    Fely Blake RN   Transplant Coordinator  [phone]

## 2024-06-21 ENCOUNTER — TELEPHONE (OUTPATIENT)
Dept: TRANSPLANT | Facility: CLINIC | Age: 49
End: 2024-06-21
Payer: COMMERCIAL

## 2024-06-21 DIAGNOSIS — Z94.0 KIDNEY REPLACED BY TRANSPLANT: Primary | ICD-10-CM

## 2024-06-21 DIAGNOSIS — Z94.83 PANCREAS REPLACED BY TRANSPLANT (H): ICD-10-CM

## 2024-06-21 NOTE — TELEPHONE ENCOUNTER
ISSUE: creatinine elevated at 1.72 on 6/4,  have not heard back from patient after phone call and my chart message. Patient is also overdue for tacrolimus XR 24 hour trough level    OUTCOME:   Spoke with Renetta who states she is feeling well.  No longer C/O dizziness.  Confirms she will have tx labs obtained next week including at tac level.    Fely Blake RN   Transplant Coordinator  [phone]

## 2024-07-06 ENCOUNTER — HEALTH MAINTENANCE LETTER (OUTPATIENT)
Age: 49
End: 2024-07-06

## 2024-07-10 ENCOUNTER — LAB (OUTPATIENT)
Dept: LAB | Facility: CLINIC | Age: 49
End: 2024-07-10
Payer: COMMERCIAL

## 2024-07-10 DIAGNOSIS — Z48.298 AFTERCARE FOLLOWING ORGAN TRANSPLANT: ICD-10-CM

## 2024-07-10 DIAGNOSIS — N18.31 ANEMIA IN STAGE 3A CHRONIC KIDNEY DISEASE (H): ICD-10-CM

## 2024-07-10 DIAGNOSIS — Z94.83 PANCREAS REPLACED BY TRANSPLANT (H): ICD-10-CM

## 2024-07-10 DIAGNOSIS — Z20.828 CONTACT WITH AND (SUSPECTED) EXPOSURE TO OTHER VIRAL COMMUNICABLE DISEASES: ICD-10-CM

## 2024-07-10 DIAGNOSIS — Z94.0 KIDNEY REPLACED BY TRANSPLANT: ICD-10-CM

## 2024-07-10 DIAGNOSIS — D63.1 ANEMIA IN STAGE 3A CHRONIC KIDNEY DISEASE (H): ICD-10-CM

## 2024-07-10 DIAGNOSIS — Z98.890 OTHER SPECIFIED POSTPROCEDURAL STATES: ICD-10-CM

## 2024-07-10 DIAGNOSIS — Z79.899 ENCOUNTER FOR LONG-TERM CURRENT USE OF MEDICATION: ICD-10-CM

## 2024-07-10 LAB
ERYTHROCYTE [DISTWIDTH] IN BLOOD BY AUTOMATED COUNT: 12.7 % (ref 10–15)
HCT VFR BLD AUTO: 30.8 % (ref 35–47)
HGB BLD-MCNC: 10.1 G/DL (ref 11.7–15.7)
MCH RBC QN AUTO: 30.1 PG (ref 26.5–33)
MCHC RBC AUTO-ENTMCNC: 32.8 G/DL (ref 31.5–36.5)
MCV RBC AUTO: 92 FL (ref 78–100)
PLATELET # BLD AUTO: 195 10E3/UL (ref 150–450)
RBC # BLD AUTO: 3.36 10E6/UL (ref 3.8–5.2)
TACROLIMUS BLD-MCNC: 6.1 UG/L (ref 5–15)
TME LAST DOSE: NORMAL H
TME LAST DOSE: NORMAL H
WBC # BLD AUTO: 3.8 10E3/UL (ref 4–11)

## 2024-07-10 PROCEDURE — 80197 ASSAY OF TACROLIMUS: CPT

## 2024-07-10 PROCEDURE — 82728 ASSAY OF FERRITIN: CPT

## 2024-07-10 PROCEDURE — 83690 ASSAY OF LIPASE: CPT

## 2024-07-10 PROCEDURE — 82150 ASSAY OF AMYLASE: CPT

## 2024-07-10 PROCEDURE — 80048 BASIC METABOLIC PNL TOTAL CA: CPT

## 2024-07-10 PROCEDURE — 85027 COMPLETE CBC AUTOMATED: CPT

## 2024-07-10 PROCEDURE — 36415 COLL VENOUS BLD VENIPUNCTURE: CPT

## 2024-07-10 PROCEDURE — 83540 ASSAY OF IRON: CPT

## 2024-07-10 PROCEDURE — 83550 IRON BINDING TEST: CPT

## 2024-07-10 PROCEDURE — 87799 DETECT AGENT NOS DNA QUANT: CPT

## 2024-07-11 ENCOUNTER — PATIENT OUTREACH (OUTPATIENT)
Dept: CARE COORDINATION | Facility: CLINIC | Age: 49
End: 2024-07-11
Payer: COMMERCIAL

## 2024-07-11 LAB
AMYLASE SERPL-CCNC: 131 U/L (ref 28–100)
ANION GAP SERPL CALCULATED.3IONS-SCNC: 8 MMOL/L (ref 7–15)
BUN SERPL-MCNC: 23.3 MG/DL (ref 6–20)
CALCIUM SERPL-MCNC: 9.1 MG/DL (ref 8.6–10)
CHLORIDE SERPL-SCNC: 106 MMOL/L (ref 98–107)
CREAT SERPL-MCNC: 1.74 MG/DL (ref 0.51–0.95)
DEPRECATED HCO3 PLAS-SCNC: 23 MMOL/L (ref 22–29)
EBV DNA SERPL NAA+PROBE-ACNC: NOT DETECTED IU/ML
EGFRCR SERPLBLD CKD-EPI 2021: 35 ML/MIN/1.73M2
FERRITIN SERPL-MCNC: 197 NG/ML (ref 6–175)
GLUCOSE SERPL-MCNC: 90 MG/DL (ref 70–99)
IRON BINDING CAPACITY (ROCHE): 196 UG/DL (ref 240–430)
IRON SATN MFR SERPL: 52 % (ref 15–46)
IRON SERPL-MCNC: 101 UG/DL (ref 37–145)
LIPASE SERPL-CCNC: 38 U/L (ref 13–60)
POTASSIUM SERPL-SCNC: 4.7 MMOL/L (ref 3.4–5.3)
SODIUM SERPL-SCNC: 137 MMOL/L (ref 135–145)

## 2024-07-11 NOTE — PROGRESS NOTES
Anemia Management Note - Follow Up      SUBJECTIVE/OBJECTIVE:    Referred by Dr. Mauricio Marquez on 2024  Primary Diagnosis: Anemia in Chronic Kidney Disease (N18.3, D63.1)   3a  Secondary Diagnosis: Chronic Kidney Disease, Stage 3 (N18.3)  3a  Date of Kidney Transplant: 2016  Date of Pancreas Transplant: 2016  Hgb goal range: 9-10  Epo/Darbo: TBD: Treat with IV Iron first.   Iron regimen: Treat with IV Iron  Labs : 3/7/2025  RX/TX plans : TBD     *[facility]: [phone]  option 4     Recent DONNY use, transfusion, IV iron: Iron Infusion and PRBCs.   No history of stroke, MI, and blood clots or cancers. Hx of CAD.     Contact: No Consent to Communicate on File.         Latest Ref Rng & Units 2024 3/5/2024 3/18/2024 3/20/2024 3/22/2024 2024 7/10/2024   Anemia   Hemoglobin 11.7 - 15.7 g/dL 10.0  8.8   9.2   10.6  10.1    IV Iron Dose    Other Other Other     TSAT 15 - 46 %      36  52    Ferritin 6 - 175 ng/mL  52     265  197         BP Readings from Last 3 Encounters:   24 137/78   24 110/68   24 128/63     Wt Readings from Last 2 Encounters:   24 51.7 kg (114 lb)   10/20/20 49.9 kg (110 lb)         ASSESSMENT:    Hgb:at goal - continue to monitor  TSat: elevated at >50% Ferritin: At goal (>100ng/mL)   Goals Addressed    None         PLAN:  RTC for anemia labs in 4 week(s).    Orders needed to be renewed (for next follow-up date) in EPIC: None    Iron labs due:  4 weeks, mid 2024    Plan discussed with:  Renetta [address] Hub[address]ry      NEXT FOLLOW-UP DATE:  808    Carrie Leopold, RN BSN  Anemia Services  [facility]  [email or web address]  Office: [phone]  Fax [phone]

## 2024-07-18 DIAGNOSIS — Z94.83 PANCREAS TRANSPLANTED (H): ICD-10-CM

## 2024-07-18 DIAGNOSIS — Z94.0 KIDNEY REPLACED BY TRANSPLANT: ICD-10-CM

## 2024-07-18 RX ORDER — SULFAMETHOXAZOLE AND TRIMETHOPRIM 400; 80 MG/1; MG/1
1 TABLET ORAL DAILY
Qty: 90 TABLET | Refills: 3 | Status: SHIPPED | OUTPATIENT
Start: 2024-07-18

## 2024-07-26 ENCOUNTER — LAB (OUTPATIENT)
Dept: LAB | Facility: CLINIC | Age: 49
End: 2024-07-26
Payer: COMMERCIAL

## 2024-07-26 ENCOUNTER — TELEPHONE (OUTPATIENT)
Dept: TRANSPLANT | Facility: CLINIC | Age: 49
End: 2024-07-26
Payer: COMMERCIAL

## 2024-07-26 DIAGNOSIS — Z94.0 KIDNEY TRANSPLANT RECIPIENT: ICD-10-CM

## 2024-07-26 DIAGNOSIS — D84.9 IMMUNOSUPPRESSED STATUS (H): ICD-10-CM

## 2024-07-26 DIAGNOSIS — R60.9 EDEMA: ICD-10-CM

## 2024-07-26 DIAGNOSIS — Z48.22 ENCOUNTER FOR AFTERCARE FOLLOWING KIDNEY TRANSPLANT: ICD-10-CM

## 2024-07-26 DIAGNOSIS — Z48.22 ENCOUNTER FOR AFTERCARE FOLLOWING KIDNEY TRANSPLANT: Primary | ICD-10-CM

## 2024-07-26 LAB
ALBUMIN MFR UR ELPH: 41.1 MG/DL
AMYLASE SERPL-CCNC: 114 U/L (ref 28–100)
ANION GAP SERPL CALCULATED.3IONS-SCNC: 9 MMOL/L (ref 7–15)
BUN SERPL-MCNC: 23 MG/DL (ref 6–20)
CALCIUM SERPL-MCNC: 9.3 MG/DL (ref 8.8–10.4)
CHLORIDE SERPL-SCNC: 104 MMOL/L (ref 98–107)
CREAT SERPL-MCNC: 1.23 MG/DL (ref 0.51–0.95)
CREAT UR-MCNC: 357 MG/DL
EGFRCR SERPLBLD CKD-EPI 2021: 53 ML/MIN/1.73M2
ERYTHROCYTE [DISTWIDTH] IN BLOOD BY AUTOMATED COUNT: 13.1 % (ref 10–15)
GLUCOSE SERPL-MCNC: 95 MG/DL (ref 70–99)
HCO3 SERPL-SCNC: 25 MMOL/L (ref 22–29)
HCT VFR BLD AUTO: 29 % (ref 35–47)
HGB BLD-MCNC: 9.3 G/DL (ref 11.7–15.7)
LIPASE SERPL-CCNC: 35 U/L (ref 13–60)
MCH RBC QN AUTO: 30.1 PG (ref 26.5–33)
MCHC RBC AUTO-ENTMCNC: 32.1 G/DL (ref 31.5–36.5)
MCV RBC AUTO: 94 FL (ref 78–100)
PLATELET # BLD AUTO: 199 10E3/UL (ref 150–450)
POTASSIUM SERPL-SCNC: 4.5 MMOL/L (ref 3.4–5.3)
PROT/CREAT 24H UR: 0.12 MG/MG CR (ref 0–0.2)
RBC # BLD AUTO: 3.09 10E6/UL (ref 3.8–5.2)
SODIUM SERPL-SCNC: 138 MMOL/L (ref 135–145)
TSH SERPL DL<=0.005 MIU/L-ACNC: 1.32 UIU/ML (ref 0.3–4.2)
WBC # BLD AUTO: 6 10E3/UL (ref 4–11)

## 2024-07-26 PROCEDURE — 84443 ASSAY THYROID STIM HORMONE: CPT

## 2024-07-26 PROCEDURE — 85027 COMPLETE CBC AUTOMATED: CPT

## 2024-07-26 PROCEDURE — 84156 ASSAY OF PROTEIN URINE: CPT

## 2024-07-26 PROCEDURE — 80048 BASIC METABOLIC PNL TOTAL CA: CPT

## 2024-07-26 PROCEDURE — 36415 COLL VENOUS BLD VENIPUNCTURE: CPT

## 2024-07-26 PROCEDURE — 82150 ASSAY OF AMYLASE: CPT

## 2024-07-26 PROCEDURE — 83690 ASSAY OF LIPASE: CPT

## 2024-07-26 NOTE — TELEPHONE ENCOUNTER
ISSUE  Patient with increased weight and generalized edema after travel. She reports that she was away for 7 days and since returning home she has felt her skin tightening. Weight is up 12 lbs over her baseline. She hasn't noticed any change in her urine characteristics. BP's at baseline - today 92/40. She is not usually on diuretics.    OUTCOME  Lab orders entered with [facility]. Patient will go to lab today. Message to provider for review.

## 2024-07-26 NOTE — TELEPHONE ENCOUNTER
Emerson Wolf MD Moody, Sarah M, RN  [facility]  TSH  Echocardiogram        Lab orders updated. Patient aware.

## 2024-08-08 NOTE — PROGRESS NOTES
Renetta has not been in for full anemia labs yet this month  Will continue to monitor for results.    Carrie Leopold, RN BSN  Anemia Services  [facility]  [email or web address]  Office: [phone]  Fax [phone]

## 2024-08-15 NOTE — PROGRESS NOTES
Anemia Management Note - Reminder     Follow-up with anemia management service:    Spoke to Renetta, reminding her to have anemia labs drawn. Most recent labs did not include iron studies. If Hgb remains low, we would need recent iron studies to determine treatment plan. She voiced understanding. Writer asked her to have labs done within 2 weeks if possible.         Latest Ref Rng & Units 3/5/2024 3/18/2024 3/20/2024 3/22/2024 6/4/2024 7/10/2024 7/26/2024   Anemia   Hemoglobin 11.7 - 15.7 g/dL 8.8   9.2   10.6  10.1  9.3    IV Iron Dose   Other Other Other      TSAT 15 - 46 %     36  52     Ferritin 6 - 175 ng/mL 52     265  197            Follow-up call date: [id number]-[id number] labs done?    Carrie Leopold, RN BSN  Anemia Services  [facility]  [email or web address]  Office: [phone]  Fax [phone]

## 2024-08-29 NOTE — PROGRESS NOTES
Renetta has not yet had labs done. If no labs by 090424, writer will follow up with patient    Carrie Leopold, RN BSN  Anemia Services  [facility]  [email or web address]  Office: [phone]  Fax [phone]

## 2024-09-04 ENCOUNTER — PATIENT OUTREACH (OUTPATIENT)
Dept: CARE COORDINATION | Facility: CLINIC | Age: 49
End: 2024-09-04
Payer: COMMERCIAL

## 2024-09-04 NOTE — PROGRESS NOTES
Anemia Management Note - Reminder     Follow-up with anemia management service:    Renetta is due to have her Anemia Labs drawn. Sent reminder via [employer].         Latest Ref Rng & Units 3/5/2024 3/18/2024 3/20/2024 3/22/2024 6/4/2024 7/10/2024 7/26/2024   Anemia   Hemoglobin 11.7 - 15.7 g/dL 8.8   9.2   10.6  10.1  9.3    IV Iron Dose   Other Other Other      TSAT 15 - 46 %     36  52     Ferritin 6 - 175 ng/mL 52     265  197              Follow-up call date: 9/12/24    Mirtha Mohr RN   Anemia Services  [facility]  [email or web address]   Office : [phone]  Fax: [phone]

## 2024-09-12 NOTE — PROGRESS NOTES
Anemia Management Note - Reminder     Follow-up with anemia management service:    LVM for Renetta, requesting she have anemia labs done before the end of October. OK to have done with next transplant labs.         Latest Ref Rng & Units 3/5/2024 3/18/2024 3/20/2024 3/22/2024 6/4/2024 7/10/2024 7/26/2024   Anemia   Hemoglobin 11.7 - 15.7 g/dL 8.8   9.2   10.6  10.1  9.3    IV Iron Dose   Other Other Other      TSAT 15 - 46 %     36  52     Ferritin 6 - 175 ng/mL 52     265  197          Follow-up call date: 101724    Carrie Leopold, RN BSN  Anemia Services  [facility]  [email or web address]  Office: [phone]  Fax [phone]

## 2024-09-14 ENCOUNTER — HEALTH MAINTENANCE LETTER (OUTPATIENT)
Age: 49
End: 2024-09-14

## 2024-09-26 NOTE — NURSING NOTE
Chief Complaints and History of Present Illnesses   Patient presents with     Follow Up     Chief Complaint(s) and History of Present Illness(es)     Follow Up     Laterality: both eyes    Onset: unknown    Location: peripheral vision    Quality: blurred vision    Severity: severe    Frequency: constantly    Timing: throughout the day    Context: distance vision, mid-range vision, near vision, reading, watching TV, computer work and dim lighting    Course: stable    Associated symptoms: photophobia, flashes (longstanding / not new per pt), floaters (longstanding / not new per pt), dryness and tearing.  Negative for eye pain    Pain scale: 0/10              Comments     Chief Complaints and History of Present Illnesses   Patient presents with     Follow Up     Chief Complaint(s) and History of Present Illness(es)     Follow Up     Laterality: both eyes    Onset: unknown    Location: peripheral vision    Quality: blurred vision    Severity: severe    Frequency: constantly    Timing: throughout the day    Context: distance vision, mid-range vision, near vision, reading, watching TV, computer work and dim lighting    Course: stable    Associated symptoms: photophobia, flashes (longstanding / not new per pt), floaters (longstanding / not new per pt), dryness and tearing.  Negative for eye pain    Pain scale: 0/10              Comments     Pt has paperwork which needs completion for [facility]. States they want to know 'where she stands' with current vision situation including prognosis.    Pt ordered jarek tinted lenses after tint assessment on 12/16/2020. She hasn't really worked with it because has been so 'snow blinded' on viry days while wearing, as if not tinted enough, and when is inside a [employer] store they seem to be too dark. Pt states that she notes the only time she is happy with better clarity from the tint is when she's outdoors on a mildly cloudy day. She's super frustrated because she feels like there is such a  fine line where she is comfortable. She states she is coming to the realization that she needs to accept that her vision may never improve, so now wants to concentrate with how to use vision she has to manage ADL's.  IOL of LE 9/29/2020  IOL of RE 10/20/2020  Currently no eye meds used, per pt.    Alexandrea Lazaro, COT COT 2:17 PM 02/16/2021                                           Calm

## 2024-10-16 NOTE — TELEPHONE ENCOUNTER
Chief Complaint   Patient presents with    Follow-up    Hypertension    Chronic Kidney Disease    Cholesterol Problem     1 week followup    1. Have you been to the ER, urgent care clinic since your last visit?  Hospitalized since your last visit?no    2. Have you seen or consulted any other health care providers outside of the [facility] since your last visit?  Include any pap smears or colon screening. no    Patient Call: Transplant  [facility] Dr. [facility] would like to do a procedure ASAP  Reason for call: Needs to be seen by Nephrology ASAP  Please call Rose # [phone]

## 2024-11-01 ENCOUNTER — PATIENT OUTREACH (OUTPATIENT)
Dept: CARE COORDINATION | Facility: CLINIC | Age: 49
End: 2024-11-01
Payer: COMMERCIAL

## 2024-11-01 NOTE — PROGRESS NOTES
Anemia Management Note - Reminder     Follow-up with anemia management service:    Renetta is due to have Anemia Labs drawn.  LVM and sent a [employer] message.     Left a 2nd message for Renetta.         Latest Ref Rng & Units 3/5/2024 3/18/2024 3/20/2024 3/22/2024 6/4/2024 7/10/2024 7/26/2024   Anemia   Hemoglobin 11.7 - 15.7 g/dL 8.8   9.2   10.6  10.1  9.3    IV Iron Dose   Other Other Other      TSAT 15 - 46 %     36  52     Ferritin 6 - 175 ng/mL 52     265  197              Follow-up call date: 11/18/24    Mirtha Mohr RN   Anemia Services  [facility]  [email or web address]   Office : [phone]  Fax: [phone]

## 2024-11-15 ENCOUNTER — LAB (OUTPATIENT)
Dept: LAB | Facility: CLINIC | Age: 49
End: 2024-11-15
Payer: COMMERCIAL

## 2024-11-15 DIAGNOSIS — Z94.0 KIDNEY REPLACED BY TRANSPLANT: ICD-10-CM

## 2024-11-15 DIAGNOSIS — D63.1 ANEMIA IN STAGE 3A CHRONIC KIDNEY DISEASE (H): ICD-10-CM

## 2024-11-15 DIAGNOSIS — Z20.828 CONTACT WITH AND (SUSPECTED) EXPOSURE TO OTHER VIRAL COMMUNICABLE DISEASES: ICD-10-CM

## 2024-11-15 DIAGNOSIS — N18.31 ANEMIA IN STAGE 3A CHRONIC KIDNEY DISEASE (H): ICD-10-CM

## 2024-11-15 DIAGNOSIS — Z79.899 ENCOUNTER FOR LONG-TERM CURRENT USE OF MEDICATION: ICD-10-CM

## 2024-11-15 DIAGNOSIS — Z48.298 AFTERCARE FOLLOWING ORGAN TRANSPLANT: ICD-10-CM

## 2024-11-15 DIAGNOSIS — Z98.890 OTHER SPECIFIED POSTPROCEDURAL STATES: ICD-10-CM

## 2024-11-15 LAB
ERYTHROCYTE [DISTWIDTH] IN BLOOD BY AUTOMATED COUNT: 12.9 % (ref 10–15)
HCT VFR BLD AUTO: 32.8 % (ref 35–47)
HGB BLD-MCNC: 10.6 G/DL (ref 11.7–15.7)
MCH RBC QN AUTO: 29.9 PG (ref 26.5–33)
MCHC RBC AUTO-ENTMCNC: 32.3 G/DL (ref 31.5–36.5)
MCV RBC AUTO: 93 FL (ref 78–100)
PLATELET # BLD AUTO: 207 10E3/UL (ref 150–450)
RBC # BLD AUTO: 3.54 10E6/UL (ref 3.8–5.2)
TACROLIMUS BLD-MCNC: 6.9 UG/L (ref 5–15)
TME LAST DOSE: NORMAL H
TME LAST DOSE: NORMAL H
WBC # BLD AUTO: 4.3 10E3/UL (ref 4–11)

## 2024-11-15 PROCEDURE — 83540 ASSAY OF IRON: CPT

## 2024-11-15 PROCEDURE — 80048 BASIC METABOLIC PNL TOTAL CA: CPT

## 2024-11-15 PROCEDURE — 85027 COMPLETE CBC AUTOMATED: CPT

## 2024-11-15 PROCEDURE — 82728 ASSAY OF FERRITIN: CPT

## 2024-11-15 PROCEDURE — 83690 ASSAY OF LIPASE: CPT

## 2024-11-15 PROCEDURE — 80197 ASSAY OF TACROLIMUS: CPT

## 2024-11-15 PROCEDURE — 82150 ASSAY OF AMYLASE: CPT

## 2024-11-15 PROCEDURE — 83550 IRON BINDING TEST: CPT

## 2024-11-15 PROCEDURE — 36415 COLL VENOUS BLD VENIPUNCTURE: CPT

## 2024-11-16 LAB
AMYLASE SERPL-CCNC: 219 U/L (ref 28–100)
ANION GAP SERPL CALCULATED.3IONS-SCNC: 13 MMOL/L (ref 7–15)
BUN SERPL-MCNC: 21.7 MG/DL (ref 6–20)
CALCIUM SERPL-MCNC: 8.8 MG/DL (ref 8.8–10.4)
CHLORIDE SERPL-SCNC: 107 MMOL/L (ref 98–107)
CREAT SERPL-MCNC: 1.52 MG/DL (ref 0.51–0.95)
EGFRCR SERPLBLD CKD-EPI 2021: 41 ML/MIN/1.73M2
FERRITIN SERPL-MCNC: 227 NG/ML (ref 6–175)
GLUCOSE SERPL-MCNC: 42 MG/DL (ref 70–99)
HCO3 SERPL-SCNC: 19 MMOL/L (ref 22–29)
IRON BINDING CAPACITY (ROCHE): 201 UG/DL (ref 240–430)
IRON SATN MFR SERPL: 43 % (ref 15–46)
IRON SERPL-MCNC: 86 UG/DL (ref 37–145)
LIPASE SERPL-CCNC: 43 U/L (ref 13–60)
POTASSIUM SERPL-SCNC: 3.9 MMOL/L (ref 3.4–5.3)
SODIUM SERPL-SCNC: 139 MMOL/L (ref 135–145)

## 2024-11-17 LAB — EBV DNA SERPL NAA+PROBE-ACNC: NOT DETECTED IU/ML

## 2024-11-18 ENCOUNTER — PATIENT OUTREACH (OUTPATIENT)
Dept: CARE COORDINATION | Facility: CLINIC | Age: 49
End: 2024-11-18
Payer: COMMERCIAL

## 2024-11-18 ENCOUNTER — TELEPHONE (OUTPATIENT)
Dept: TRANSPLANT | Facility: CLINIC | Age: 49
End: 2024-11-18
Payer: COMMERCIAL

## 2024-11-18 DIAGNOSIS — U07.1 CLINICAL DIAGNOSIS OF COVID-19: ICD-10-CM

## 2024-11-18 NOTE — PROGRESS NOTES
Anemia Management Note - Discharge    Referred by Dr. Mauricio Marquez on 030724       Hgb has been stable (within or above goal range) since 03/20/24 with no intervention.    Patient meets criteria for discharge from Anemia Clinic with at least 3 months without DONNY Therapy or IV Iron, and/or no Oral Iron therapy for 6 months.        Latest Ref Rng & Units 3/18/2024 3/20/2024 3/22/2024 6/4/2024 7/10/2024 7/26/2024 11/15/2024   Anemia   Hemoglobin 11.7 - 15.7 g/dL  9.2   10.6  10.1  9.3  10.6    IV Iron Dose  Other Other Other       TSAT 15 - 46 %    36  52   43    Ferritin 6 - 175 ng/mL    265  197   227          Goals Addressed    None         Anemia labs discontinued/outside lab/clinic notified (if applicable), Rx discontinued/Therapy Plans cancelled, removed from active patient list, patient and/or caregiver and referring provider notified as appropriate.     Carrie Leopold, RN BSN  Anemia Services  [facility]  [email or web address]  Office: [phone]  Fax [phone]

## 2024-11-18 NOTE — TELEPHONE ENCOUNTER
Please call Rose,  She is wondering if she needs to be scheduled with infusion based on her labs from 11/15/2024.

## 2024-11-19 NOTE — TELEPHONE ENCOUNTER
ISSUE: co2 low at 19 on 11/15.    OUTCOME: Renetta states she likely was not well hydrated.  Will increase hydration.  Discussed labs to be drawn monthly.    Fely Blake RN   Transplant Coordinator  [phone]

## 2025-01-06 ENCOUNTER — LAB (OUTPATIENT)
Dept: LAB | Facility: CLINIC | Age: 50
End: 2025-01-06
Payer: COMMERCIAL

## 2025-01-06 DIAGNOSIS — D63.1 ANEMIA IN STAGE 3A CHRONIC KIDNEY DISEASE (H): ICD-10-CM

## 2025-01-06 DIAGNOSIS — Z20.828 CONTACT WITH AND (SUSPECTED) EXPOSURE TO OTHER VIRAL COMMUNICABLE DISEASES: ICD-10-CM

## 2025-01-06 DIAGNOSIS — Z48.298 AFTERCARE FOLLOWING ORGAN TRANSPLANT: ICD-10-CM

## 2025-01-06 DIAGNOSIS — Z98.890 OTHER SPECIFIED POSTPROCEDURAL STATES: ICD-10-CM

## 2025-01-06 DIAGNOSIS — Z79.899 ENCOUNTER FOR LONG-TERM CURRENT USE OF MEDICATION: ICD-10-CM

## 2025-01-06 DIAGNOSIS — N18.31 ANEMIA IN STAGE 3A CHRONIC KIDNEY DISEASE (H): ICD-10-CM

## 2025-01-06 DIAGNOSIS — Z94.0 KIDNEY REPLACED BY TRANSPLANT: ICD-10-CM

## 2025-01-06 LAB
ALBUMIN MFR UR ELPH: 15.4 MG/DL
AMYLASE SERPL-CCNC: 111 U/L (ref 28–100)
ANION GAP SERPL CALCULATED.3IONS-SCNC: 10 MMOL/L (ref 7–15)
BUN SERPL-MCNC: 32.2 MG/DL (ref 6–20)
CALCIUM SERPL-MCNC: 9.2 MG/DL (ref 8.8–10.4)
CHLORIDE SERPL-SCNC: 106 MMOL/L (ref 98–107)
CREAT SERPL-MCNC: 1.83 MG/DL (ref 0.51–0.95)
CREAT UR-MCNC: 146 MG/DL
EGFRCR SERPLBLD CKD-EPI 2021: 33 ML/MIN/1.73M2
ERYTHROCYTE [DISTWIDTH] IN BLOOD BY AUTOMATED COUNT: 12.6 % (ref 10–15)
GLUCOSE SERPL-MCNC: 88 MG/DL (ref 70–99)
HCO3 SERPL-SCNC: 22 MMOL/L (ref 22–29)
HCT VFR BLD AUTO: 30.7 % (ref 35–47)
HGB BLD-MCNC: 10.2 G/DL (ref 11.7–15.7)
LIPASE SERPL-CCNC: 37 U/L (ref 13–60)
MCH RBC QN AUTO: 30.3 PG (ref 26.5–33)
MCHC RBC AUTO-ENTMCNC: 33.2 G/DL (ref 31.5–36.5)
MCV RBC AUTO: 91 FL (ref 78–100)
PLATELET # BLD AUTO: 193 10E3/UL (ref 150–450)
POTASSIUM SERPL-SCNC: 4.3 MMOL/L (ref 3.4–5.3)
PROT/CREAT 24H UR: 0.11 MG/MG CR (ref 0–0.2)
RBC # BLD AUTO: 3.37 10E6/UL (ref 3.8–5.2)
SODIUM SERPL-SCNC: 138 MMOL/L (ref 135–145)
TACROLIMUS BLD-MCNC: 8.9 UG/L (ref 5–15)
TME LAST DOSE: NORMAL H
TME LAST DOSE: NORMAL H
WBC # BLD AUTO: 4.5 10E3/UL (ref 4–11)

## 2025-01-06 PROCEDURE — 80048 BASIC METABOLIC PNL TOTAL CA: CPT

## 2025-01-06 PROCEDURE — 36415 COLL VENOUS BLD VENIPUNCTURE: CPT

## 2025-01-06 PROCEDURE — 85027 COMPLETE CBC AUTOMATED: CPT

## 2025-01-06 PROCEDURE — 82150 ASSAY OF AMYLASE: CPT

## 2025-01-06 PROCEDURE — 84156 ASSAY OF PROTEIN URINE: CPT

## 2025-01-06 PROCEDURE — 87799 DETECT AGENT NOS DNA QUANT: CPT

## 2025-01-06 PROCEDURE — 83690 ASSAY OF LIPASE: CPT

## 2025-01-06 PROCEDURE — 80197 ASSAY OF TACROLIMUS: CPT

## 2025-01-07 ENCOUNTER — TELEPHONE (OUTPATIENT)
Dept: TRANSPLANT | Facility: CLINIC | Age: 50
End: 2025-01-07
Payer: COMMERCIAL

## 2025-01-07 DIAGNOSIS — R79.89 ELEVATED SERUM CREATININE: ICD-10-CM

## 2025-01-07 DIAGNOSIS — Z94.83 PANCREAS REPLACED BY TRANSPLANT (H): ICD-10-CM

## 2025-01-07 DIAGNOSIS — Z94.0 KIDNEY TRANSPLANT RECIPIENT: Primary | ICD-10-CM

## 2025-01-07 DIAGNOSIS — U07.1 CLINICAL DIAGNOSIS OF COVID-19: ICD-10-CM

## 2025-01-07 DIAGNOSIS — N29 OTHER DISORDERS OF KIDNEY AND URETER IN DISEASES CLASSIFIED ELSEWHERE: ICD-10-CM

## 2025-01-07 DIAGNOSIS — Z48.22 ENCOUNTER FOR AFTERCARE FOLLOWING KIDNEY TRANSPLANT: ICD-10-CM

## 2025-01-07 LAB — EBV DNA SERPL NAA+PROBE-ACNC: NOT DETECTED IU/ML

## 2025-01-07 NOTE — TELEPHONE ENCOUNTER
ISSUE:   Creatinine above baseline of 1.2-1.5   Latest Reference Range & Units 11/15/24 14:48 01/06/25 10:46   Creatinine 0.51 - 0.95 mg/dL 1.52 (H) 1.83 (H)   (H): Data is abnormally high    PLAN:    Emerson Wolf MD Sveiven, Fely, RN  Uptrend in Cr; check if she needs IVF, any missed meds, graft pain, or urinary symptoms. Repeat labs with UA Ucx DSA kidney US  If remains elevated on repeat, may need a kidney biopsy    OUTCOME:  Patient report extreme fatigue.  Weight loss of 10 pounds over the last month.      Denies night sweats, swollen glands, lingering cough or low grade fever.  Denies missed IS doses.    Denies UTI S/S    EBV drawn on 1/7/2025 pending,  EBV undetected November 2024    Renetta feels she is hydrating however is not able to tell RNCC how much she is drinking per day.  She will increase hydration before repeat labs.    Tacrolimus level 8.9 at 12 hours, on 1/6/2025.  Goal 5-8.   Current dose Tac XR  3.75 mg in AM,    Dose changed to 3 mg in AM    Renetta V/U of repeating labs next week along with DSA and UA.  Orders placed for renal tx US, patient to call to schedule    Recheck level in 1 week    Fely Blake RN   Transplant Coordinator  [phone]

## 2025-01-09 ENCOUNTER — TELEPHONE (OUTPATIENT)
Dept: TRANSPLANT | Facility: CLINIC | Age: 50
End: 2025-01-09
Payer: COMMERCIAL

## 2025-01-09 DIAGNOSIS — U07.1 CLINICAL DIAGNOSIS OF COVID-19: ICD-10-CM

## 2025-01-09 NOTE — TELEPHONE ENCOUNTER
Patient Call: General  Route to LPN    Reason for call: Mendy from US department called to speak to RNCC. US was ordered w/o doppler, which is unusual. Department called for confirmation. Please call back ASAP as US is scheduled today. Per Mendy, can speak with anyone in US department    Call back needed? Yes    Return Call Needed  Same as documented in contacts section  When to return call?: Same day: Route High Priority

## 2025-01-10 ENCOUNTER — MYC MEDICAL ADVICE (OUTPATIENT)
Dept: TRANSPLANT | Facility: CLINIC | Age: 50
End: 2025-01-10
Payer: COMMERCIAL

## 2025-01-10 DIAGNOSIS — U07.1 CLINICAL DIAGNOSIS OF COVID-19: ICD-10-CM

## 2025-01-10 PROBLEM — R79.89 ELEVATED SERUM CREATININE: Status: ACTIVE | Noted: 2025-01-10

## 2025-01-11 ENCOUNTER — INFUSION THERAPY VISIT (OUTPATIENT)
Dept: INFUSION THERAPY | Facility: CLINIC | Age: 50
End: 2025-01-11
Attending: INTERNAL MEDICINE
Payer: COMMERCIAL

## 2025-01-11 VITALS — DIASTOLIC BLOOD PRESSURE: 76 MMHG | OXYGEN SATURATION: 98 % | HEART RATE: 66 BPM | SYSTOLIC BLOOD PRESSURE: 122 MMHG

## 2025-01-11 DIAGNOSIS — E46 PROTEIN-CALORIE MALNUTRITION, UNSPECIFIED SEVERITY: ICD-10-CM

## 2025-01-11 DIAGNOSIS — Z94.0 KIDNEY REPLACED BY TRANSPLANT: Primary | ICD-10-CM

## 2025-01-11 DIAGNOSIS — D63.1 ANEMIA IN STAGE 3A CHRONIC KIDNEY DISEASE (H): ICD-10-CM

## 2025-01-11 DIAGNOSIS — D64.9 ANEMIA, UNSPECIFIED TYPE: ICD-10-CM

## 2025-01-11 DIAGNOSIS — N18.31 ANEMIA IN STAGE 3A CHRONIC KIDNEY DISEASE (H): ICD-10-CM

## 2025-01-11 DIAGNOSIS — Z94.0 KIDNEY TRANSPLANT RECIPIENT: ICD-10-CM

## 2025-01-11 DIAGNOSIS — R79.89 ELEVATED SERUM CREATININE: ICD-10-CM

## 2025-01-11 DIAGNOSIS — N18.9 CHRONIC KIDNEY DISEASE, UNSPECIFIED CKD STAGE: ICD-10-CM

## 2025-01-11 LAB
FERRITIN SERPL-MCNC: 231 NG/ML (ref 11–328)
IRON BINDING CAPACITY (ROCHE): 152 UG/DL (ref 240–430)
IRON SATN MFR SERPL: 34 % (ref 15–46)
IRON SERPL-MCNC: 52 UG/DL (ref 37–145)
TSH SERPL DL<=0.005 MIU/L-ACNC: 0.79 UIU/ML (ref 0.3–4.2)

## 2025-01-11 PROCEDURE — 83550 IRON BINDING TEST: CPT

## 2025-01-11 PROCEDURE — 258N000003 HC RX IP 258 OP 636: Performed by: INTERNAL MEDICINE

## 2025-01-11 PROCEDURE — 96360 HYDRATION IV INFUSION INIT: CPT

## 2025-01-11 PROCEDURE — 83540 ASSAY OF IRON: CPT

## 2025-01-11 PROCEDURE — 82728 ASSAY OF FERRITIN: CPT

## 2025-01-11 PROCEDURE — 84443 ASSAY THYROID STIM HORMONE: CPT

## 2025-01-11 PROCEDURE — 87799 DETECT AGENT NOS DNA QUANT: CPT

## 2025-01-11 PROCEDURE — 36415 COLL VENOUS BLD VENIPUNCTURE: CPT

## 2025-01-11 RX ORDER — HEPARIN SODIUM,PORCINE 10 UNIT/ML
5-20 VIAL (ML) INTRAVENOUS DAILY PRN
Status: CANCELLED | OUTPATIENT
Start: 2025-01-11

## 2025-01-11 RX ORDER — ALBUTEROL SULFATE 0.83 MG/ML
2.5 SOLUTION RESPIRATORY (INHALATION)
Status: CANCELLED | OUTPATIENT
Start: 2025-01-11

## 2025-01-11 RX ORDER — DIPHENHYDRAMINE HYDROCHLORIDE 50 MG/ML
50 INJECTION INTRAMUSCULAR; INTRAVENOUS
Status: CANCELLED
Start: 2025-01-11

## 2025-01-11 RX ORDER — EPINEPHRINE 1 MG/ML
0.3 INJECTION, SOLUTION INTRAMUSCULAR; SUBCUTANEOUS EVERY 5 MIN PRN
Status: CANCELLED | OUTPATIENT
Start: 2025-01-11

## 2025-01-11 RX ORDER — ALBUTEROL SULFATE 90 UG/1
1-2 INHALANT RESPIRATORY (INHALATION)
Status: CANCELLED
Start: 2025-01-11

## 2025-01-11 RX ORDER — METHYLPREDNISOLONE SODIUM SUCCINATE 40 MG/ML
40 INJECTION INTRAMUSCULAR; INTRAVENOUS
Status: CANCELLED
Start: 2025-01-11

## 2025-01-11 RX ORDER — HEPARIN SODIUM (PORCINE) LOCK FLUSH IV SOLN 100 UNIT/ML 100 UNIT/ML
5 SOLUTION INTRAVENOUS
Status: CANCELLED | OUTPATIENT
Start: 2025-01-11

## 2025-01-11 RX ORDER — MEPERIDINE HYDROCHLORIDE 25 MG/ML
25 INJECTION INTRAMUSCULAR; INTRAVENOUS; SUBCUTANEOUS
Status: CANCELLED | OUTPATIENT
Start: 2025-01-11

## 2025-01-11 RX ORDER — DIPHENHYDRAMINE HYDROCHLORIDE 50 MG/ML
25 INJECTION INTRAMUSCULAR; INTRAVENOUS
Status: CANCELLED
Start: 2025-01-11

## 2025-01-11 RX ADMIN — SODIUM CHLORIDE 1000 ML: 9 INJECTION, SOLUTION INTRAVENOUS at 07:22

## 2025-01-11 NOTE — PROGRESS NOTES
Nursing Note  Rose Castaneda presents [facility] for:   Chief Complaint   Patient presents with    Infusion     During today's [facility] appointment, orders from Dr.El Pappas were completed.  Frequency: once    Progress note:  Patient identification verified by name and date of birth.  Assessment completed.  Vitals recorded in Doc Flowsheets.  Patient was provided with education regarding medication/procedure and possible side effects.  Patient verbalized understanding.     present during visit today: Not Applicable.    Treatment Conditions: Non-applicable.    Premedications: were not ordered.    Drug Waste Record: No    Infusion length and rate:  infusion given over approximately  90 minutes    Labs: were drawn per orders.     Vascular access: peripheral IV placed today.    Is the next appt scheduled? no    Post Infusion Assessment:  Patient tolerated infusion without incident.  Access discontinued per protocol.     Discharge Plan:   Follow up plan of care with: transplant coordinator. and after visit summary declined by patient  Discharge instructions were reviewed with patient.  Patient/representative verbalized understanding of discharge instructions and all questions answered.  Patient discharged [facility] in stable condition.    Aundrea Yee RN    Administrations This Visit       sodium chloride 0.9% BOLUS 1,000 mL       Admin Date  01/11/2025 Action  $New Bag Dose  1,000 mL Route  Intravenous Documented By  Aundrea Yee RN                    BP (!) 71/47 (BP Location: Left arm, Patient Position: Semi-Crow's, Cuff Size: Adult Regular)   Pulse 72   SpO2 98%

## 2025-01-11 NOTE — PATIENT INSTRUCTIONS
Dear Rose Castaneda    Thank you for choosing [facility] ([facility] for your infusion.  The following information is a summary of our appointment as well as important reminders.      If you are a transplant patient and require transplant education, please click on this link: [email or web address].    If you have any questions on your upcoming [facility] appointments, please call scheduling at [phone].  It was a pleasure taking care of you today.    Sincerely,    [facility]  [facility]  [address] [place] [address]  [id number]  Phone:  [phone]

## 2025-01-12 LAB — EBV DNA SERPL NAA+PROBE-ACNC: NOT DETECTED IU/ML

## 2025-01-13 DIAGNOSIS — U07.1 CLINICAL DIAGNOSIS OF COVID-19: ICD-10-CM

## 2025-01-30 ENCOUNTER — TELEPHONE (OUTPATIENT)
Dept: TRANSPLANT | Facility: CLINIC | Age: 50
End: 2025-01-30
Payer: COMMERCIAL

## 2025-01-30 DIAGNOSIS — U07.1 CLINICAL DIAGNOSIS OF COVID-19: ICD-10-CM

## 2025-01-30 NOTE — TELEPHONE ENCOUNTER
ISSUE: Renetta is overdue for repeat transplant labs after receiving IVF on 1/11 for elevated creatinine.    OUTCOME: Renetta states that she did have to reschedule lab apt due to transportation issues and is having labs drawn Monday 2/3. She states she is still feeling well.    Fely Blake RN   Transplant Coordinator  [phone]

## 2025-02-03 ENCOUNTER — LAB (OUTPATIENT)
Dept: LAB | Facility: CLINIC | Age: 50
End: 2025-02-03
Payer: COMMERCIAL

## 2025-02-03 DIAGNOSIS — Z98.890 OTHER SPECIFIED POSTPROCEDURAL STATES: ICD-10-CM

## 2025-02-03 DIAGNOSIS — Z79.899 ENCOUNTER FOR LONG-TERM CURRENT USE OF MEDICATION: ICD-10-CM

## 2025-02-03 DIAGNOSIS — Z94.0 KIDNEY TRANSPLANT RECIPIENT: ICD-10-CM

## 2025-02-03 DIAGNOSIS — Z94.83 PANCREAS REPLACED BY TRANSPLANT (H): ICD-10-CM

## 2025-02-03 DIAGNOSIS — N29 OTHER DISORDERS OF KIDNEY AND URETER IN DISEASES CLASSIFIED ELSEWHERE: ICD-10-CM

## 2025-02-03 DIAGNOSIS — Z94.0 KIDNEY REPLACED BY TRANSPLANT: ICD-10-CM

## 2025-02-03 DIAGNOSIS — Z48.298 AFTERCARE FOLLOWING ORGAN TRANSPLANT: ICD-10-CM

## 2025-02-03 DIAGNOSIS — R79.89 ELEVATED SERUM CREATININE: ICD-10-CM

## 2025-02-03 DIAGNOSIS — Z20.828 CONTACT WITH AND (SUSPECTED) EXPOSURE TO OTHER VIRAL COMMUNICABLE DISEASES: ICD-10-CM

## 2025-02-03 LAB
ALBUMIN UR-MCNC: 30 MG/DL
AMYLASE SERPL-CCNC: 123 U/L (ref 28–100)
ANION GAP SERPL CALCULATED.3IONS-SCNC: 11 MMOL/L (ref 7–15)
APPEARANCE UR: CLEAR
BACTERIA #/AREA URNS HPF: ABNORMAL /HPF
BILIRUB UR QL STRIP: ABNORMAL
BUN SERPL-MCNC: 20.9 MG/DL (ref 6–20)
CALCIUM SERPL-MCNC: 9 MG/DL (ref 8.8–10.4)
CHLORIDE SERPL-SCNC: 107 MMOL/L (ref 98–107)
COLOR UR AUTO: ABNORMAL
CREAT SERPL-MCNC: 1.63 MG/DL (ref 0.51–0.95)
EGFRCR SERPLBLD CKD-EPI 2021: 38 ML/MIN/1.73M2
ERYTHROCYTE [DISTWIDTH] IN BLOOD BY AUTOMATED COUNT: 12.8 % (ref 10–15)
GLUCOSE SERPL-MCNC: 71 MG/DL (ref 70–99)
GLUCOSE UR STRIP-MCNC: NEGATIVE MG/DL
HCO3 SERPL-SCNC: 20 MMOL/L (ref 22–29)
HCT VFR BLD AUTO: 28.1 % (ref 35–47)
HGB BLD-MCNC: 9.2 G/DL (ref 11.7–15.7)
HGB UR QL STRIP: ABNORMAL
KETONES UR STRIP-MCNC: NEGATIVE MG/DL
LEUKOCYTE ESTERASE UR QL STRIP: ABNORMAL
LIPASE SERPL-CCNC: 49 U/L (ref 13–60)
MCH RBC QN AUTO: 30.3 PG (ref 26.5–33)
MCHC RBC AUTO-ENTMCNC: 32.7 G/DL (ref 31.5–36.5)
MCV RBC AUTO: 92 FL (ref 78–100)
NITRATE UR QL: NEGATIVE
PH UR STRIP: 6 [PH] (ref 5–7)
PLATELET # BLD AUTO: 191 10E3/UL (ref 150–450)
POTASSIUM SERPL-SCNC: 3.7 MMOL/L (ref 3.4–5.3)
RBC # BLD AUTO: 3.04 10E6/UL (ref 3.8–5.2)
RBC #/AREA URNS AUTO: ABNORMAL /HPF
SODIUM SERPL-SCNC: 138 MMOL/L (ref 135–145)
SP GR UR STRIP: >=1.03 (ref 1–1.03)
SQUAMOUS #/AREA URNS AUTO: ABNORMAL /LPF
TACROLIMUS BLD-MCNC: 3.8 UG/L (ref 5–15)
TME LAST DOSE: ABNORMAL H
TME LAST DOSE: ABNORMAL H
UROBILINOGEN UR STRIP-ACNC: 0.2 E.U./DL
WBC # BLD AUTO: 3.4 10E3/UL (ref 4–11)
WBC #/AREA URNS AUTO: ABNORMAL /HPF

## 2025-02-03 PROCEDURE — 83690 ASSAY OF LIPASE: CPT

## 2025-02-03 PROCEDURE — 87086 URINE CULTURE/COLONY COUNT: CPT | Mod: GZ

## 2025-02-03 PROCEDURE — 87799 DETECT AGENT NOS DNA QUANT: CPT

## 2025-02-03 PROCEDURE — 80197 ASSAY OF TACROLIMUS: CPT

## 2025-02-03 PROCEDURE — 36415 COLL VENOUS BLD VENIPUNCTURE: CPT

## 2025-02-03 PROCEDURE — 81001 URINALYSIS AUTO W/SCOPE: CPT

## 2025-02-03 PROCEDURE — 82150 ASSAY OF AMYLASE: CPT

## 2025-02-03 PROCEDURE — 85027 COMPLETE CBC AUTOMATED: CPT

## 2025-02-03 PROCEDURE — 80048 BASIC METABOLIC PNL TOTAL CA: CPT

## 2025-02-04 ENCOUNTER — TELEPHONE (OUTPATIENT)
Dept: TRANSPLANT | Facility: CLINIC | Age: 50
End: 2025-02-04
Payer: COMMERCIAL

## 2025-02-04 DIAGNOSIS — U07.1 CLINICAL DIAGNOSIS OF COVID-19: ICD-10-CM

## 2025-02-04 DIAGNOSIS — Z94.83 PANCREAS REPLACED BY TRANSPLANT (H): ICD-10-CM

## 2025-02-04 DIAGNOSIS — Z94.0 KIDNEY TRANSPLANTED: Primary | ICD-10-CM

## 2025-02-04 DIAGNOSIS — Z94.0 KIDNEY REPLACED BY TRANSPLANT: ICD-10-CM

## 2025-02-04 LAB
BACTERIA UR CULT: NORMAL
CMV DNA SPEC NAA+PROBE-ACNC: NOT DETECTED IU/ML
EBV DNA SERPL NAA+PROBE-ACNC: NOT DETECTED IU/ML
SPECIMEN TYPE: NORMAL

## 2025-02-04 RX ORDER — TACROLIMUS 1 MG/1
2 CAPSULE ORAL 2 TIMES DAILY
Qty: 120 CAPSULE | Refills: 11 | Status: CANCELLED | OUTPATIENT
Start: 2025-02-04

## 2025-02-04 RX ORDER — TACROLIMUS 0.75 MG/1
1.5 TABLET, EXTENDED RELEASE ORAL
Qty: 60 TABLET | Refills: 11 | Status: CANCELLED | OUTPATIENT
Start: 2025-02-04

## 2025-02-04 NOTE — TELEPHONE ENCOUNTER
Tacrolimus = 3.8  Goal 5-8  Current Envarsus dose 3 mg daily  1/7/25 - was decreased from 3.75 mg daily when level was 8.9    PLAN:   Confirm this was a good 24 - hour trough.   Verify current dose.   Confirm no new medications or illness (juanito. Diarrhea).  Confirm any MISSED DOSES.   If good trough and correct dose above, recommend:  increase dose to 3.5 mg daily.   1 mg + 1 mg + 0.75 mg + 0.75 mg     Is this more than a 50% increase or decrease in current IS dose: No  If YES, justification: N/A  *If > 50% change in immunosuppression dose, repeat labs in 1 week.     Recheck level in 2 weeks and make sure it is a good trough to avoid additional lab draws.        Creatinine = 1.63   Baseline 1.2-1.5      PLAN:  Check for recent illness.  Any fever?  Any missed medication?  Changes in medication, (juanito diuretics)?  Any pain over the transplanted kidney?  Any nausea / vomiting / diarrhea?  Any cough / cold / congestion?  Any frequency / urgency / burning with urination?  Dehydrated?   Recommend improving hydration and recheck BMP in 2 weeks.  Add UA/UC.

## 2025-02-04 NOTE — TELEPHONE ENCOUNTER
Call placed to patient. Patient confirms current dose and accurate trough level. Denies any recent illness, diarrhea or medication changes. Denies any dehydration. Patient v\u to improve hydration and repeat labs in 2 weeks of dose change. Order/rx sent    Patient would like a return call from RNCC to discuss hematocrit hgb. State that her levels are at the point of needing an iron infusion.     Also state that she gets her medications from [facility].

## 2025-02-05 NOTE — TELEPHONE ENCOUNTER
Patient calling to report that last tac level of 3.8 on 2/3 was not an accurate 24 hour trough level. She is requesting to not change dose at this time.  Patient will remain on current dose of envarsus 3 mg daily and will repeat labs within one week.    Fely Blake RN   Transplant Coordinator  [phone]

## 2025-02-10 ENCOUNTER — LAB (OUTPATIENT)
Dept: LAB | Facility: CLINIC | Age: 50
End: 2025-02-10
Payer: COMMERCIAL

## 2025-02-10 DIAGNOSIS — Z94.0 KIDNEY REPLACED BY TRANSPLANT: ICD-10-CM

## 2025-02-10 LAB
ANION GAP SERPL CALCULATED.3IONS-SCNC: 11 MMOL/L (ref 7–15)
BUN SERPL-MCNC: 23.4 MG/DL (ref 6–20)
CALCIUM SERPL-MCNC: 8.9 MG/DL (ref 8.8–10.4)
CHLORIDE SERPL-SCNC: 111 MMOL/L (ref 98–107)
CREAT SERPL-MCNC: 1.41 MG/DL (ref 0.51–0.95)
EGFRCR SERPLBLD CKD-EPI 2021: 45 ML/MIN/1.73M2
ERYTHROCYTE [DISTWIDTH] IN BLOOD BY AUTOMATED COUNT: 13 % (ref 10–15)
GLUCOSE SERPL-MCNC: 80 MG/DL (ref 70–99)
HCO3 SERPL-SCNC: 19 MMOL/L (ref 22–29)
HCT VFR BLD AUTO: 27.8 % (ref 35–47)
HGB BLD-MCNC: 9.2 G/DL (ref 11.7–15.7)
MCH RBC QN AUTO: 30.9 PG (ref 26.5–33)
MCHC RBC AUTO-ENTMCNC: 33.1 G/DL (ref 31.5–36.5)
MCV RBC AUTO: 93 FL (ref 78–100)
PLATELET # BLD AUTO: 225 10E3/UL (ref 150–450)
POTASSIUM SERPL-SCNC: 4.5 MMOL/L (ref 3.4–5.3)
RBC # BLD AUTO: 2.98 10E6/UL (ref 3.8–5.2)
SODIUM SERPL-SCNC: 141 MMOL/L (ref 135–145)
TACROLIMUS BLD-MCNC: 4.8 UG/L (ref 5–15)
TME LAST DOSE: ABNORMAL H
TME LAST DOSE: ABNORMAL H
WBC # BLD AUTO: 4.5 10E3/UL (ref 4–11)

## 2025-02-10 PROCEDURE — 85027 COMPLETE CBC AUTOMATED: CPT

## 2025-02-10 PROCEDURE — 80048 BASIC METABOLIC PNL TOTAL CA: CPT

## 2025-02-10 PROCEDURE — 80197 ASSAY OF TACROLIMUS: CPT

## 2025-02-10 PROCEDURE — 36415 COLL VENOUS BLD VENIPUNCTURE: CPT

## 2025-02-17 ENCOUNTER — TELEPHONE (OUTPATIENT)
Dept: TRANSPLANT | Facility: CLINIC | Age: 50
End: 2025-02-17
Payer: COMMERCIAL

## 2025-02-17 DIAGNOSIS — Z94.0 KIDNEY REPLACED BY TRANSPLANT: ICD-10-CM

## 2025-02-17 DIAGNOSIS — U07.1 CLINICAL DIAGNOSIS OF COVID-19: ICD-10-CM

## 2025-02-17 NOTE — TELEPHONE ENCOUNTER
Patient Call: General  Route to LPN    Reason for call: Eve from [facility] called to confirm pt's current Envarsus dose    Call back needed? Yes    Return Call Needed  Same as documented in contacts section  When to return call?: Same day: Route High Priority

## 2025-02-24 DIAGNOSIS — Z94.0 KIDNEY REPLACED BY TRANSPLANT: ICD-10-CM

## 2025-02-24 DIAGNOSIS — Z94.83 PANCREAS REPLACED BY TRANSPLANT (H): ICD-10-CM

## 2025-02-24 RX ORDER — MYCOPHENOLIC ACID 180 MG/1
360 TABLET, DELAYED RELEASE ORAL 2 TIMES DAILY
Qty: 120 TABLET | Refills: 11 | Status: SHIPPED | OUTPATIENT
Start: 2025-02-24

## 2025-03-30 ENCOUNTER — HEALTH MAINTENANCE LETTER (OUTPATIENT)
Age: 50
End: 2025-03-30

## 2025-04-17 ENCOUNTER — LAB (OUTPATIENT)
Dept: LAB | Facility: CLINIC | Age: 50
End: 2025-04-17
Payer: COMMERCIAL

## 2025-04-17 DIAGNOSIS — U07.1 CLINICAL DIAGNOSIS OF COVID-19: ICD-10-CM

## 2025-04-17 DIAGNOSIS — Z94.0 KIDNEY TRANSPLANTED: ICD-10-CM

## 2025-04-17 DIAGNOSIS — Z94.0 KIDNEY REPLACED BY TRANSPLANT: ICD-10-CM

## 2025-04-17 DIAGNOSIS — Z94.83 PANCREAS REPLACED BY TRANSPLANT (H): ICD-10-CM

## 2025-04-17 LAB
ANION GAP SERPL CALCULATED.3IONS-SCNC: 9 MMOL/L (ref 7–15)
BUN SERPL-MCNC: 29.3 MG/DL (ref 6–20)
CALCIUM SERPL-MCNC: 9.2 MG/DL (ref 8.8–10.4)
CHLORIDE SERPL-SCNC: 109 MMOL/L (ref 98–107)
CREAT SERPL-MCNC: 1.49 MG/DL (ref 0.51–0.95)
EGFRCR SERPLBLD CKD-EPI 2021: 42 ML/MIN/1.73M2
ERYTHROCYTE [DISTWIDTH] IN BLOOD BY AUTOMATED COUNT: 12.5 % (ref 10–15)
GLUCOSE SERPL-MCNC: 89 MG/DL (ref 70–99)
HCO3 SERPL-SCNC: 22 MMOL/L (ref 22–29)
HCT VFR BLD AUTO: 30.6 % (ref 35–47)
HGB BLD-MCNC: 10 G/DL (ref 11.7–15.7)
MCH RBC QN AUTO: 29.9 PG (ref 26.5–33)
MCHC RBC AUTO-ENTMCNC: 32.7 G/DL (ref 31.5–36.5)
MCV RBC AUTO: 92 FL (ref 78–100)
PLATELET # BLD AUTO: 187 10E3/UL (ref 150–450)
POTASSIUM SERPL-SCNC: 5.5 MMOL/L (ref 3.4–5.3)
RBC # BLD AUTO: 3.34 10E6/UL (ref 3.8–5.2)
SODIUM SERPL-SCNC: 140 MMOL/L (ref 135–145)
WBC # BLD AUTO: 4.9 10E3/UL (ref 4–11)

## 2025-04-18 LAB
TACROLIMUS BLD-MCNC: 7.8 UG/L (ref 5–15)
TME LAST DOSE: NORMAL H
TME LAST DOSE: NORMAL H

## 2025-06-01 ENCOUNTER — HEALTH MAINTENANCE LETTER (OUTPATIENT)
Age: 50
End: 2025-06-01

## 2025-07-07 ENCOUNTER — TELEPHONE (OUTPATIENT)
Dept: TRANSPLANT | Facility: CLINIC | Age: 50
End: 2025-07-07
Payer: COMMERCIAL

## 2025-07-07 DIAGNOSIS — Z94.0 KIDNEY REPLACED BY TRANSPLANT: ICD-10-CM

## 2025-07-07 DIAGNOSIS — U07.1 CLINICAL DIAGNOSIS OF COVID-19: ICD-10-CM

## 2025-07-07 NOTE — TELEPHONE ENCOUNTER
General  Route to LPN    Reason for call: Pt has a dose questing and also has a msg in my chart she can't access     Call back needed? Yes    Return Call Needed  Same as documented in contacts section  When to return call?: Greater than one day: Route standard priority

## 2025-07-08 NOTE — TELEPHONE ENCOUNTER
Confirmed Envarsus dose of 4 mg daily with patient.  Faxed refill request to preferred pharmacy.  Place lab orders for full set of transplant labs including drug levels to be drawn next week as patient is overdue.  She is aware.    Fely Blake RN   Transplant Coordinator  [phone]

## (undated) DEVICE — PACK CATARACT CUSTOM ASC SEY15CPUMC

## (undated) DEVICE — EYE TIP IRRIGATION & ASPIRATION POLYMER 35D BENT 8065751511

## (undated) DEVICE — SOL WATER IRRIG 1000ML BOTTLE 2F7114

## (undated) DEVICE — SU ETHILON 3-0 PS-1 18" 1663G

## (undated) DEVICE — GLOVE PROTEXIS POWDER FREE 8.0 ORTHOPEDIC 2D73ET80

## (undated) DEVICE — SU VICRYL 2-0 CT-2 27" UND J269H

## (undated) DEVICE — PACK CATARACT CUSTOM SO DALE SEY32CTFCX

## (undated) DEVICE — DRAPE STERI APERATURE 51X33" 1030

## (undated) DEVICE — BLADE KNIFE BEAVER 6900

## (undated) DEVICE — EYE KNIFE SLIT XSTAR VISITEC 2.4MM 45DEG BEVEL UP 373724

## (undated) DEVICE — EYE CANN IRR 25GA CYSTOTOME 581610

## (undated) DEVICE — IMM LEG ELEVATOR 79-90191

## (undated) DEVICE — EYE KNIFE STILETTO VISITEC 1.1MM ANG 45DEG SIDEPORT 376620

## (undated) DEVICE — GOWN XLG DISP 9545

## (undated) DEVICE — EYE CANN IRR 30GA  ANTERIOR CHAMBER 581273

## (undated) DEVICE — BLADE KNIFE SURG 10 371110

## (undated) DEVICE — SUCTION MANIFOLD NEPTUNE 2 SYS 1 PORT 702-025-000

## (undated) DEVICE — LINEN TOWEL PACK X5 5464

## (undated) DEVICE — EYE PACK CUSTOM ANTERIOR 30DEG TIP CENTURION PPK6682-04

## (undated) DEVICE — ESU GROUND PAD ADULT W/CORD E7507

## (undated) DEVICE — NDL 30GA 0.5" 305106

## (undated) DEVICE — SOL NACL 0.9% IRRIG 1000ML BOTTLE 2F7124

## (undated) DEVICE — EYE SPONGE SPEAR WECK CEL 0008685

## (undated) DEVICE — GLOVE PROTEXIS MICRO 6.5  2D73PM65

## (undated) DEVICE — PACK LOWER EXTREMITY CUSTOM ASC

## (undated) DEVICE — TAPE MICROPORE 2"X1.5YD 1530S-2

## (undated) DEVICE — EYE CANN IRR 27GA ANTERIOR CHAMBER 581280

## (undated) DEVICE — APPLICATOR COTTON TIP 3" X50

## (undated) DEVICE — EYE SHIELD PLASTIC

## (undated) DEVICE — TAPE MICROPORE 1"X1.5YD 1530S-1

## (undated) DEVICE — LINEN ORTHO PACK 5446

## (undated) DEVICE — GLOVE PROTEXIS POWDER FREE SMT 7.5  2D72PT75X

## (undated) DEVICE — LINEN GOWN XLG 5407

## (undated) RX ORDER — LIDOCAINE HYDROCHLORIDE 20 MG/ML
INJECTION, SOLUTION EPIDURAL; INFILTRATION; INTRACAUDAL; PERINEURAL
Status: DISPENSED
Start: 2020-09-29

## (undated) RX ORDER — FENTANYL CITRATE 50 UG/ML
INJECTION, SOLUTION INTRAMUSCULAR; INTRAVENOUS
Status: DISPENSED
Start: 2020-10-20

## (undated) RX ORDER — LIDOCAINE HYDROCHLORIDE 10 MG/ML
INJECTION, SOLUTION EPIDURAL; INFILTRATION; INTRACAUDAL; PERINEURAL
Status: DISPENSED
Start: 2019-12-04

## (undated) RX ORDER — ACETAMINOPHEN 325 MG/1
TABLET ORAL
Status: DISPENSED
Start: 2018-02-14

## (undated) RX ORDER — PROPOFOL 10 MG/ML
INJECTION, EMULSION INTRAVENOUS
Status: DISPENSED
Start: 2020-10-20

## (undated) RX ORDER — PROPOFOL 10 MG/ML
INJECTION, EMULSION INTRAVENOUS
Status: DISPENSED
Start: 2020-09-29

## (undated) RX ORDER — SODIUM CHLORIDE 9 MG/ML
INJECTION, SOLUTION INTRAVENOUS
Status: DISPENSED
Start: 2019-12-04

## (undated) RX ORDER — DEXAMETHASONE SODIUM PHOSPHATE 4 MG/ML
INJECTION, SOLUTION INTRA-ARTICULAR; INTRALESIONAL; INTRAMUSCULAR; INTRAVENOUS; SOFT TISSUE
Status: DISPENSED
Start: 2020-09-29

## (undated) RX ORDER — LIDOCAINE HYDROCHLORIDE 10 MG/ML
INJECTION, SOLUTION EPIDURAL; INFILTRATION; INTRACAUDAL; PERINEURAL
Status: DISPENSED
Start: 2018-02-14

## (undated) RX ORDER — ONDANSETRON 2 MG/ML
INJECTION INTRAMUSCULAR; INTRAVENOUS
Status: DISPENSED
Start: 2020-09-29

## (undated) RX ORDER — LIDOCAINE HYDROCHLORIDE 20 MG/ML
INJECTION, SOLUTION EPIDURAL; INFILTRATION; INTRACAUDAL; PERINEURAL
Status: DISPENSED
Start: 2020-10-20

## (undated) RX ORDER — FENTANYL CITRATE 50 UG/ML
INJECTION, SOLUTION INTRAMUSCULAR; INTRAVENOUS
Status: DISPENSED
Start: 2019-12-04

## (undated) RX ORDER — TOBRAMYCIN AND DEXAMETHASONE 3; 1 MG/ML; MG/ML
SUSPENSION/ DROPS OPHTHALMIC
Status: DISPENSED
Start: 2020-09-29

## (undated) RX ORDER — ACETAMINOPHEN 325 MG/1
TABLET ORAL
Status: DISPENSED
Start: 2020-09-29

## (undated) RX ORDER — FENTANYL CITRATE 50 UG/ML
INJECTION, SOLUTION INTRAMUSCULAR; INTRAVENOUS
Status: DISPENSED
Start: 2020-09-29

## (undated) RX ORDER — GABAPENTIN 300 MG/1
CAPSULE ORAL
Status: DISPENSED
Start: 2018-02-14

## (undated) RX ORDER — BUPIVACAINE HYDROCHLORIDE 5 MG/ML
INJECTION, SOLUTION EPIDURAL; INTRACAUDAL
Status: DISPENSED
Start: 2018-02-14